# Patient Record
Sex: MALE | Race: WHITE | NOT HISPANIC OR LATINO | Employment: OTHER | URBAN - METROPOLITAN AREA
[De-identification: names, ages, dates, MRNs, and addresses within clinical notes are randomized per-mention and may not be internally consistent; named-entity substitution may affect disease eponyms.]

---

## 2022-01-01 ENCOUNTER — APPOINTMENT (INPATIENT)
Dept: RADIOLOGY | Facility: HOSPITAL | Age: 82
End: 2022-01-01

## 2022-01-01 ENCOUNTER — APPOINTMENT (INPATIENT)
Dept: INTERVENTIONAL RADIOLOGY/VASCULAR | Facility: HOSPITAL | Age: 82
End: 2022-01-01
Attending: INTERNAL MEDICINE

## 2022-01-01 ENCOUNTER — HOSPITAL ENCOUNTER (OUTPATIENT)
Dept: INFUSION CENTER | Facility: HOSPITAL | Age: 82
Discharge: HOME | End: 2022-12-13

## 2022-01-01 ENCOUNTER — APPOINTMENT (EMERGENCY)
Dept: RADIOLOGY | Facility: HOSPITAL | Age: 82
End: 2022-01-01

## 2022-01-01 ENCOUNTER — TELEPHONE (OUTPATIENT)
Dept: SURGICAL ONCOLOGY | Facility: CLINIC | Age: 82
End: 2022-01-01

## 2022-01-01 ENCOUNTER — APPOINTMENT (INPATIENT)
Dept: INTERVENTIONAL RADIOLOGY/VASCULAR | Facility: HOSPITAL | Age: 82
End: 2022-01-01

## 2022-01-01 ENCOUNTER — HOSPITAL ENCOUNTER (INPATIENT)
Facility: HOSPITAL | Age: 82
LOS: 11 days | End: 2022-12-16
Attending: EMERGENCY MEDICINE | Admitting: INTERNAL MEDICINE

## 2022-01-01 ENCOUNTER — PATIENT OUTREACH (OUTPATIENT)
Dept: HEMATOLOGY ONCOLOGY | Facility: CLINIC | Age: 82
End: 2022-01-01

## 2022-01-01 ENCOUNTER — APPOINTMENT (INPATIENT)
Dept: CT IMAGING | Facility: HOSPITAL | Age: 82
End: 2022-01-01

## 2022-01-01 VITALS
SYSTOLIC BLOOD PRESSURE: 92 MMHG | DIASTOLIC BLOOD PRESSURE: 59 MMHG | TEMPERATURE: 97.6 F | RESPIRATION RATE: 21 BRPM | OXYGEN SATURATION: 97 % | HEART RATE: 72 BPM | BODY MASS INDEX: 17.99 KG/M2 | HEIGHT: 67 IN | WEIGHT: 114.64 LBS

## 2022-01-01 DIAGNOSIS — C25.9 PANCREATIC ADENOCARCINOMA (HCC): ICD-10-CM

## 2022-01-01 DIAGNOSIS — E44.1 MILD PROTEIN-CALORIE MALNUTRITION (HCC): ICD-10-CM

## 2022-01-01 DIAGNOSIS — K22.4 ESOPHAGEAL DYSMOTILITY: ICD-10-CM

## 2022-01-01 DIAGNOSIS — C25.9 PANCREATIC CANCER (HCC): ICD-10-CM

## 2022-01-01 DIAGNOSIS — E87.6 HYPOKALEMIA: Primary | ICD-10-CM

## 2022-01-01 DIAGNOSIS — F41.8 ANXIETY ABOUT HEALTH: ICD-10-CM

## 2022-01-01 DIAGNOSIS — N28.9 ACUTE RENAL INSUFFICIENCY: ICD-10-CM

## 2022-01-01 DIAGNOSIS — E86.0 DEHYDRATION: Primary | ICD-10-CM

## 2022-01-01 DIAGNOSIS — R53.83 LETHARGY: ICD-10-CM

## 2022-01-01 LAB
2HR DELTA HS TROPONIN: 0 NG/L
4HR DELTA HS TROPONIN: -1 NG/L
ALBUMIN FLD-MCNC: 1.9 G/DL
ALBUMIN SERPL BCP-MCNC: 2.3 G/DL (ref 3.5–5)
ALBUMIN SERPL BCP-MCNC: 2.6 G/DL (ref 3.5–5)
ALBUMIN SERPL BCP-MCNC: 3 G/DL (ref 3.5–5)
ALBUMIN SERPL BCP-MCNC: 3 G/DL (ref 3.5–5)
ALP SERPL-CCNC: 158 U/L (ref 46–116)
ALP SERPL-CCNC: 171 U/L (ref 46–116)
ALP SERPL-CCNC: 206 U/L (ref 46–116)
ALP SERPL-CCNC: 206 U/L (ref 46–116)
ALT SERPL W P-5'-P-CCNC: 20 U/L (ref 12–78)
ALT SERPL W P-5'-P-CCNC: 22 U/L (ref 12–78)
ALT SERPL W P-5'-P-CCNC: 23 U/L (ref 12–78)
ALT SERPL W P-5'-P-CCNC: 30 U/L (ref 12–78)
AMYLASE FLD QL: 4 U/L
ANION GAP SERPL CALCULATED.3IONS-SCNC: 11 MMOL/L (ref 4–13)
ANION GAP SERPL CALCULATED.3IONS-SCNC: 12 MMOL/L (ref 4–13)
ANION GAP SERPL CALCULATED.3IONS-SCNC: 14 MMOL/L (ref 4–13)
ANION GAP SERPL CALCULATED.3IONS-SCNC: 7 MMOL/L (ref 4–13)
ANION GAP SERPL CALCULATED.3IONS-SCNC: 7 MMOL/L (ref 4–13)
ANION GAP SERPL CALCULATED.3IONS-SCNC: 9 MMOL/L (ref 4–13)
ANION GAP SERPL CALCULATED.3IONS-SCNC: 9 MMOL/L (ref 4–13)
ANISOCYTOSIS BLD QL SMEAR: PRESENT
APPEARANCE FLD: NORMAL
APTT PPP: 29 SECONDS (ref 23–37)
AST SERPL W P-5'-P-CCNC: 18 U/L (ref 5–45)
AST SERPL W P-5'-P-CCNC: 20 U/L (ref 5–45)
AST SERPL W P-5'-P-CCNC: 22 U/L (ref 5–45)
AST SERPL W P-5'-P-CCNC: 24 U/L (ref 5–45)
ATRIAL RATE: 86 BPM
ATRIAL RATE: 86 BPM
ATRIAL RATE: 87 BPM
BACTERIA BLD CULT: NORMAL
BACTERIA BLD CULT: NORMAL
BACTERIA SPEC ANAEROBE CULT: NO GROWTH
BACTERIA SPEC BFLD CULT: NO GROWTH
BACTERIA UR QL AUTO: ABNORMAL /HPF
BASE EXCESS BLDA CALC-SCNC: -3 MMOL/L (ref -2–3)
BASOPHILS # BLD AUTO: 0 THOUSANDS/ÂΜL (ref 0–0.1)
BASOPHILS # BLD AUTO: 0.01 THOUSANDS/ÂΜL (ref 0–0.1)
BASOPHILS # BLD MANUAL: 0 THOUSAND/UL (ref 0–0.1)
BASOPHILS NFR BLD AUTO: 0 % (ref 0–1)
BASOPHILS NFR FLD MANUAL: 1 %
BASOPHILS NFR MAR MANUAL: 0 % (ref 0–1)
BILIRUB SERPL-MCNC: 0.5 MG/DL (ref 0.2–1)
BILIRUB SERPL-MCNC: 0.5 MG/DL (ref 0.2–1)
BILIRUB SERPL-MCNC: 0.6 MG/DL (ref 0.2–1)
BILIRUB SERPL-MCNC: 0.8 MG/DL (ref 0.2–1)
BILIRUB UR QL STRIP: NEGATIVE
BUN SERPL-MCNC: 53 MG/DL (ref 5–25)
BUN SERPL-MCNC: 57 MG/DL (ref 5–25)
BUN SERPL-MCNC: 62 MG/DL (ref 5–25)
BUN SERPL-MCNC: 67 MG/DL (ref 5–25)
BUN SERPL-MCNC: 71 MG/DL (ref 5–25)
BUN SERPL-MCNC: 74 MG/DL (ref 5–25)
BUN SERPL-MCNC: 74 MG/DL (ref 5–25)
BUN SERPL-MCNC: 76 MG/DL (ref 5–25)
BUN SERPL-MCNC: 80 MG/DL (ref 5–25)
BUN SERPL-MCNC: 83 MG/DL (ref 5–25)
CA-I BLD-SCNC: 1.19 MMOL/L (ref 1.12–1.32)
CA-I BLD-SCNC: 1.2 MMOL/L (ref 1.12–1.32)
CALCIUM ALBUM COR SERPL-MCNC: 10.3 MG/DL (ref 8.3–10.1)
CALCIUM ALBUM COR SERPL-MCNC: 9.3 MG/DL (ref 8.3–10.1)
CALCIUM ALBUM COR SERPL-MCNC: 9.8 MG/DL (ref 8.3–10.1)
CALCIUM ALBUM COR SERPL-MCNC: 9.9 MG/DL (ref 8.3–10.1)
CALCIUM SERPL-MCNC: 5.7 MG/DL (ref 8.3–10.1)
CALCIUM SERPL-MCNC: 8.3 MG/DL (ref 8.3–10.1)
CALCIUM SERPL-MCNC: 8.4 MG/DL (ref 8.3–10.1)
CALCIUM SERPL-MCNC: 8.4 MG/DL (ref 8.3–10.1)
CALCIUM SERPL-MCNC: 8.5 MG/DL (ref 8.3–10.1)
CALCIUM SERPL-MCNC: 8.5 MG/DL (ref 8.3–10.1)
CALCIUM SERPL-MCNC: 8.7 MG/DL (ref 8.3–10.1)
CALCIUM SERPL-MCNC: 9.5 MG/DL (ref 8.3–10.1)
CARDIAC TROPONIN I PNL SERPL HS: 10 NG/L
CARDIAC TROPONIN I PNL SERPL HS: 10 NG/L
CARDIAC TROPONIN I PNL SERPL HS: 9 NG/L
CHLORIDE SERPL-SCNC: 103 MMOL/L (ref 96–108)
CHLORIDE SERPL-SCNC: 105 MMOL/L (ref 96–108)
CHLORIDE SERPL-SCNC: 107 MMOL/L (ref 96–108)
CHLORIDE SERPL-SCNC: 107 MMOL/L (ref 96–108)
CHLORIDE SERPL-SCNC: 108 MMOL/L (ref 96–108)
CHLORIDE SERPL-SCNC: 109 MMOL/L (ref 96–108)
CHLORIDE SERPL-SCNC: 117 MMOL/L (ref 96–108)
CHLORIDE SERPL-SCNC: 99 MMOL/L (ref 96–108)
CLARITY UR: CLEAR
CO2 SERPL-SCNC: 14 MMOL/L (ref 21–32)
CO2 SERPL-SCNC: 18 MMOL/L (ref 21–32)
CO2 SERPL-SCNC: 19 MMOL/L (ref 21–32)
CO2 SERPL-SCNC: 20 MMOL/L (ref 21–32)
CO2 SERPL-SCNC: 22 MMOL/L (ref 21–32)
CO2 SERPL-SCNC: 23 MMOL/L (ref 21–32)
COLOR FLD: NORMAL
COLOR UR: YELLOW
CREAT FLD-MCNC: 1.53 MG/DL
CREAT SERPL-MCNC: 1.27 MG/DL (ref 0.6–1.3)
CREAT SERPL-MCNC: 1.32 MG/DL (ref 0.6–1.3)
CREAT SERPL-MCNC: 1.35 MG/DL (ref 0.6–1.3)
CREAT SERPL-MCNC: 1.4 MG/DL (ref 0.6–1.3)
CREAT SERPL-MCNC: 1.53 MG/DL (ref 0.6–1.3)
CREAT SERPL-MCNC: 1.62 MG/DL (ref 0.6–1.3)
CREAT SERPL-MCNC: 1.64 MG/DL (ref 0.6–1.3)
CREAT SERPL-MCNC: 1.94 MG/DL (ref 0.6–1.3)
CREAT SERPL-MCNC: 1.94 MG/DL (ref 0.6–1.3)
CREAT SERPL-MCNC: 2 MG/DL (ref 0.6–1.3)
EOSINOPHIL # BLD AUTO: 0 THOUSAND/ÂΜL (ref 0–0.61)
EOSINOPHIL # BLD AUTO: 0 THOUSAND/ÂΜL (ref 0–0.61)
EOSINOPHIL # BLD AUTO: 0.01 THOUSAND/ÂΜL (ref 0–0.61)
EOSINOPHIL # BLD AUTO: 0.02 THOUSAND/ÂΜL (ref 0–0.61)
EOSINOPHIL # BLD MANUAL: 0 THOUSAND/UL (ref 0–0.4)
EOSINOPHIL NFR BLD AUTO: 0 % (ref 0–6)
EOSINOPHIL NFR BLD MANUAL: 0 % (ref 0–6)
EOSINOPHIL NFR FLD MANUAL: 5 %
ERYTHROCYTE [DISTWIDTH] IN BLOOD BY AUTOMATED COUNT: 15.1 % (ref 11.6–15.1)
ERYTHROCYTE [DISTWIDTH] IN BLOOD BY AUTOMATED COUNT: 15.2 % (ref 11.6–15.1)
ERYTHROCYTE [DISTWIDTH] IN BLOOD BY AUTOMATED COUNT: 15.6 % (ref 11.6–15.1)
ERYTHROCYTE [DISTWIDTH] IN BLOOD BY AUTOMATED COUNT: 16.2 % (ref 11.6–15.1)
ERYTHROCYTE [DISTWIDTH] IN BLOOD BY AUTOMATED COUNT: 16.5 % (ref 11.6–15.1)
FLUAV RNA RESP QL NAA+PROBE: NEGATIVE
FLUAV RNA RESP QL NAA+PROBE: NEGATIVE
FLUBV RNA RESP QL NAA+PROBE: NEGATIVE
FLUBV RNA RESP QL NAA+PROBE: NEGATIVE
FUNGUS SPEC CULT: NORMAL
GFR SERPL CREATININE-BSD FRML MDRD: 30 ML/MIN/1.73SQ M
GFR SERPL CREATININE-BSD FRML MDRD: 31 ML/MIN/1.73SQ M
GFR SERPL CREATININE-BSD FRML MDRD: 31 ML/MIN/1.73SQ M
GFR SERPL CREATININE-BSD FRML MDRD: 38 ML/MIN/1.73SQ M
GFR SERPL CREATININE-BSD FRML MDRD: 39 ML/MIN/1.73SQ M
GFR SERPL CREATININE-BSD FRML MDRD: 42 ML/MIN/1.73SQ M
GFR SERPL CREATININE-BSD FRML MDRD: 47 ML/MIN/1.73SQ M
GFR SERPL CREATININE-BSD FRML MDRD: 49 ML/MIN/1.73SQ M
GFR SERPL CREATININE-BSD FRML MDRD: 50 ML/MIN/1.73SQ M
GFR SERPL CREATININE-BSD FRML MDRD: 53 ML/MIN/1.73SQ M
GLUCOSE FLD-MCNC: 148 MG/DL
GLUCOSE SERPL-MCNC: 106 MG/DL (ref 65–140)
GLUCOSE SERPL-MCNC: 108 MG/DL (ref 65–140)
GLUCOSE SERPL-MCNC: 109 MG/DL (ref 65–140)
GLUCOSE SERPL-MCNC: 112 MG/DL (ref 65–140)
GLUCOSE SERPL-MCNC: 118 MG/DL (ref 65–140)
GLUCOSE SERPL-MCNC: 120 MG/DL (ref 65–140)
GLUCOSE SERPL-MCNC: 123 MG/DL (ref 65–140)
GLUCOSE SERPL-MCNC: 123 MG/DL (ref 65–140)
GLUCOSE SERPL-MCNC: 124 MG/DL (ref 65–140)
GLUCOSE SERPL-MCNC: 126 MG/DL (ref 65–140)
GLUCOSE SERPL-MCNC: 127 MG/DL (ref 65–140)
GLUCOSE SERPL-MCNC: 130 MG/DL (ref 65–140)
GLUCOSE SERPL-MCNC: 131 MG/DL (ref 65–140)
GLUCOSE SERPL-MCNC: 133 MG/DL (ref 65–140)
GLUCOSE SERPL-MCNC: 136 MG/DL (ref 65–140)
GLUCOSE SERPL-MCNC: 137 MG/DL (ref 65–140)
GLUCOSE SERPL-MCNC: 137 MG/DL (ref 65–140)
GLUCOSE SERPL-MCNC: 139 MG/DL (ref 65–140)
GLUCOSE SERPL-MCNC: 140 MG/DL (ref 65–140)
GLUCOSE SERPL-MCNC: 141 MG/DL (ref 65–140)
GLUCOSE SERPL-MCNC: 144 MG/DL (ref 65–140)
GLUCOSE SERPL-MCNC: 145 MG/DL (ref 65–140)
GLUCOSE SERPL-MCNC: 146 MG/DL (ref 65–140)
GLUCOSE SERPL-MCNC: 149 MG/DL (ref 65–140)
GLUCOSE SERPL-MCNC: 159 MG/DL (ref 65–140)
GLUCOSE SERPL-MCNC: 163 MG/DL (ref 65–140)
GLUCOSE SERPL-MCNC: 165 MG/DL (ref 65–140)
GLUCOSE SERPL-MCNC: 166 MG/DL (ref 65–140)
GLUCOSE SERPL-MCNC: 177 MG/DL (ref 65–140)
GLUCOSE SERPL-MCNC: 185 MG/DL (ref 65–140)
GLUCOSE SERPL-MCNC: 185 MG/DL (ref 65–140)
GLUCOSE SERPL-MCNC: 200 MG/DL (ref 65–140)
GLUCOSE SERPL-MCNC: 206 MG/DL (ref 65–140)
GLUCOSE SERPL-MCNC: 206 MG/DL (ref 65–140)
GLUCOSE SERPL-MCNC: 209 MG/DL (ref 65–140)
GLUCOSE SERPL-MCNC: 209 MG/DL (ref 65–140)
GLUCOSE SERPL-MCNC: 52 MG/DL (ref 65–140)
GLUCOSE SERPL-MCNC: 56 MG/DL (ref 65–140)
GLUCOSE SERPL-MCNC: 62 MG/DL (ref 65–140)
GLUCOSE SERPL-MCNC: 69 MG/DL (ref 65–140)
GLUCOSE SERPL-MCNC: 72 MG/DL (ref 65–140)
GLUCOSE SERPL-MCNC: 84 MG/DL (ref 65–140)
GLUCOSE SERPL-MCNC: 90 MG/DL (ref 65–140)
GLUCOSE SERPL-MCNC: 92 MG/DL (ref 65–140)
GLUCOSE SERPL-MCNC: 94 MG/DL (ref 65–140)
GLUCOSE UR STRIP-MCNC: NEGATIVE MG/DL
GRAM STN SPEC: NORMAL
GRAM STN SPEC: NORMAL
HCO3 BLDA-SCNC: 21.2 MMOL/L (ref 24–30)
HCT VFR BLD AUTO: 29.7 % (ref 36.5–49.3)
HCT VFR BLD AUTO: 31.4 % (ref 36.5–49.3)
HCT VFR BLD AUTO: 31.8 % (ref 36.5–49.3)
HCT VFR BLD AUTO: 32 % (ref 36.5–49.3)
HCT VFR BLD AUTO: 35 % (ref 36.5–49.3)
HCT VFR BLD CALC: 34 % (ref 36.5–49.3)
HELMET CELLS BLD QL SMEAR: PRESENT
HGB BLD-MCNC: 10 G/DL (ref 12–17)
HGB BLD-MCNC: 10.3 G/DL (ref 12–17)
HGB BLD-MCNC: 10.3 G/DL (ref 12–17)
HGB BLD-MCNC: 11.1 G/DL (ref 12–17)
HGB BLD-MCNC: 9.8 G/DL (ref 12–17)
HGB BLDA-MCNC: 11.6 G/DL (ref 12–17)
HGB UR QL STRIP.AUTO: NEGATIVE
HISTIOCYTES NFR FLD: 36 %
HYALINE CASTS #/AREA URNS LPF: ABNORMAL /LPF
IMM GRANULOCYTES # BLD AUTO: 0.01 THOUSAND/UL (ref 0–0.2)
IMM GRANULOCYTES # BLD AUTO: 0.03 THOUSAND/UL (ref 0–0.2)
IMM GRANULOCYTES # BLD AUTO: 0.04 THOUSAND/UL (ref 0–0.2)
IMM GRANULOCYTES # BLD AUTO: 0.05 THOUSAND/UL (ref 0–0.2)
IMM GRANULOCYTES NFR BLD AUTO: 0 % (ref 0–2)
IMM GRANULOCYTES NFR BLD AUTO: 1 % (ref 0–2)
INR PPP: 1.01 (ref 0.84–1.19)
KETONES UR STRIP-MCNC: ABNORMAL MG/DL
LACTATE SERPL-SCNC: 1.8 MMOL/L (ref 0.5–2)
LDH FLD L TO P-CCNC: 172 U/L
LEUKOCYTE ESTERASE UR QL STRIP: ABNORMAL
LYMPHOCYTES # BLD AUTO: 0.07 THOUSAND/UL (ref 0.6–4.47)
LYMPHOCYTES # BLD AUTO: 0.15 THOUSANDS/ÂΜL (ref 0.6–4.47)
LYMPHOCYTES # BLD AUTO: 0.19 THOUSANDS/ÂΜL (ref 0.6–4.47)
LYMPHOCYTES # BLD AUTO: 0.19 THOUSANDS/ÂΜL (ref 0.6–4.47)
LYMPHOCYTES # BLD AUTO: 0.2 THOUSANDS/ÂΜL (ref 0.6–4.47)
LYMPHOCYTES # BLD AUTO: 2 % (ref 14–44)
LYMPHOCYTES NFR BLD AUTO: 3 % (ref 14–44)
LYMPHOCYTES NFR BLD AUTO: 3 % (ref 14–44)
LYMPHOCYTES NFR BLD AUTO: 4 % (ref 14–44)
LYMPHOCYTES NFR BLD AUTO: 4 % (ref 14–44)
LYMPHOCYTES NFR BLD AUTO: 9 %
MAGNESIUM SERPL-MCNC: 2.1 MG/DL (ref 1.6–2.6)
MCH RBC QN AUTO: 29.1 PG (ref 26.8–34.3)
MCH RBC QN AUTO: 29.3 PG (ref 26.8–34.3)
MCH RBC QN AUTO: 29.4 PG (ref 26.8–34.3)
MCH RBC QN AUTO: 29.5 PG (ref 26.8–34.3)
MCH RBC QN AUTO: 29.7 PG (ref 26.8–34.3)
MCHC RBC AUTO-ENTMCNC: 31.7 G/DL (ref 31.4–37.4)
MCHC RBC AUTO-ENTMCNC: 31.8 G/DL (ref 31.4–37.4)
MCHC RBC AUTO-ENTMCNC: 32.2 G/DL (ref 31.4–37.4)
MCHC RBC AUTO-ENTMCNC: 32.4 G/DL (ref 31.4–37.4)
MCHC RBC AUTO-ENTMCNC: 33 G/DL (ref 31.4–37.4)
MCV RBC AUTO: 89 FL (ref 82–98)
MCV RBC AUTO: 92 FL (ref 82–98)
MONOCYTES # BLD AUTO: 0.07 THOUSAND/UL (ref 0–1.22)
MONOCYTES # BLD AUTO: 0.26 THOUSAND/ÂΜL (ref 0.17–1.22)
MONOCYTES # BLD AUTO: 0.47 THOUSAND/ÂΜL (ref 0.17–1.22)
MONOCYTES # BLD AUTO: 0.51 THOUSAND/ÂΜL (ref 0.17–1.22)
MONOCYTES # BLD AUTO: 0.53 THOUSAND/ÂΜL (ref 0.17–1.22)
MONOCYTES NFR BLD AUTO: 11 % (ref 4–12)
MONOCYTES NFR BLD AUTO: 18 %
MONOCYTES NFR BLD AUTO: 8 % (ref 4–12)
MONOCYTES NFR BLD: 2 % (ref 4–12)
NEUTROPHILS # BLD AUTO: 2.96 THOUSANDS/ÂΜL (ref 1.85–7.62)
NEUTROPHILS # BLD AUTO: 3.73 THOUSANDS/ÂΜL (ref 1.85–7.62)
NEUTROPHILS # BLD AUTO: 5.46 THOUSANDS/ÂΜL (ref 1.85–7.62)
NEUTROPHILS # BLD AUTO: 5.48 THOUSANDS/ÂΜL (ref 1.85–7.62)
NEUTROPHILS # BLD MANUAL: 3.32 THOUSAND/UL (ref 1.85–7.62)
NEUTS BAND NFR BLD MANUAL: 5 % (ref 0–8)
NEUTS SEG NFR BLD AUTO: 31 %
NEUTS SEG NFR BLD AUTO: 84 % (ref 43–75)
NEUTS SEG NFR BLD AUTO: 88 % (ref 43–75)
NEUTS SEG NFR BLD AUTO: 91 % (ref 43–75)
NITRITE UR QL STRIP: NEGATIVE
NON-SQ EPI CELLS URNS QL MICRO: ABNORMAL /HPF
NRBC BLD AUTO-RTO: 0 /100 WBCS
NT-PROBNP SERPL-MCNC: 492 PG/ML
OVALOCYTES BLD QL SMEAR: PRESENT
P AXIS: 37 DEGREES
P AXIS: 83 DEGREES
PCO2 BLD: 22 MMOL/L (ref 21–32)
PCO2 BLD: 34.4 MM HG (ref 42–50)
PH BLD: 7.4 [PH] (ref 7.3–7.4)
PH UR STRIP.AUTO: 5 [PH]
PHOSPHATE SERPL-MCNC: 4.8 MG/DL (ref 2.3–4.1)
PLATELET # BLD AUTO: 162 THOUSANDS/UL (ref 149–390)
PLATELET # BLD AUTO: 175 THOUSANDS/UL (ref 149–390)
PLATELET # BLD AUTO: 187 THOUSANDS/UL (ref 149–390)
PLATELET # BLD AUTO: 227 THOUSANDS/UL (ref 149–390)
PLATELET # BLD AUTO: 273 THOUSANDS/UL (ref 149–390)
PLATELET BLD QL SMEAR: ADEQUATE
PMV BLD AUTO: 10.1 FL (ref 8.9–12.7)
PMV BLD AUTO: 10.3 FL (ref 8.9–12.7)
PMV BLD AUTO: 10.7 FL (ref 8.9–12.7)
PMV BLD AUTO: 9.3 FL (ref 8.9–12.7)
PMV BLD AUTO: 9.7 FL (ref 8.9–12.7)
PO2 BLD: 27 MM HG (ref 35–45)
POLYCHROMASIA BLD QL SMEAR: PRESENT
POTASSIUM BLD-SCNC: 4.7 MMOL/L (ref 3.5–5.3)
POTASSIUM SERPL-SCNC: 3.6 MMOL/L (ref 3.5–5.3)
POTASSIUM SERPL-SCNC: 4.8 MMOL/L (ref 3.5–5.3)
POTASSIUM SERPL-SCNC: 4.8 MMOL/L (ref 3.5–5.3)
POTASSIUM SERPL-SCNC: 4.9 MMOL/L (ref 3.5–5.3)
POTASSIUM SERPL-SCNC: 4.9 MMOL/L (ref 3.5–5.3)
POTASSIUM SERPL-SCNC: 5.6 MMOL/L (ref 3.5–5.3)
POTASSIUM SERPL-SCNC: 6.1 MMOL/L (ref 3.5–5.3)
POTASSIUM SERPL-SCNC: 6.2 MMOL/L (ref 3.5–5.3)
POTASSIUM SERPL-SCNC: 7 MMOL/L (ref 3.5–5.3)
POTASSIUM SERPL-SCNC: 7 MMOL/L (ref 3.5–5.3)
PR INTERVAL: 200 MS
PR INTERVAL: 208 MS
PR INTERVAL: 232 MS
PROCALCITONIN SERPL-MCNC: 0.43 NG/ML
PROCALCITONIN SERPL-MCNC: 0.48 NG/ML
PROT FLD-MCNC: 3.4 G/DL
PROT SERPL-MCNC: 5.9 G/DL (ref 6.4–8.4)
PROT SERPL-MCNC: 6.2 G/DL (ref 6.4–8.4)
PROT SERPL-MCNC: 6.6 G/DL (ref 6.4–8.4)
PROT SERPL-MCNC: 7.4 G/DL (ref 6.4–8.4)
PROT UR STRIP-MCNC: ABNORMAL MG/DL
PROTHROMBIN TIME: 14 SECONDS (ref 11.6–14.5)
QRS AXIS: -71 DEGREES
QRS AXIS: -80 DEGREES
QRS AXIS: -82 DEGREES
QRS AXIS: -82 DEGREES
QRSD INTERVAL: 134 MS
QRSD INTERVAL: 136 MS
QRSD INTERVAL: 142 MS
QRSD INTERVAL: 146 MS
QT INTERVAL: 382 MS
QT INTERVAL: 384 MS
QT INTERVAL: 390 MS
QT INTERVAL: 404 MS
QTC INTERVAL: 462 MS
QTC INTERVAL: 462 MS
QTC INTERVAL: 466 MS
QTC INTERVAL: 483 MS
RBC # BLD AUTO: 3.35 MILLION/UL (ref 3.88–5.62)
RBC # BLD AUTO: 3.4 MILLION/UL (ref 3.88–5.62)
RBC # BLD AUTO: 3.47 MILLION/UL (ref 3.88–5.62)
RBC # BLD AUTO: 3.49 MILLION/UL (ref 3.88–5.62)
RBC # BLD AUTO: 3.82 MILLION/UL (ref 3.88–5.62)
RBC # FLD MANUAL: 1000 /UL
RBC #/AREA URNS AUTO: ABNORMAL /HPF
RBC MORPH BLD: PRESENT
RSV RNA RESP QL NAA+PROBE: NEGATIVE
RSV RNA RESP QL NAA+PROBE: NEGATIVE
SAO2 % BLD FROM PO2: 51 % (ref 60–85)
SARS-COV-2 RNA RESP QL NAA+PROBE: NEGATIVE
SARS-COV-2 RNA RESP QL NAA+PROBE: POSITIVE
SCHISTOCYTES BLD QL SMEAR: PRESENT
SITE: NORMAL
SODIUM BLD-SCNC: 132 MMOL/L (ref 136–145)
SODIUM SERPL-SCNC: 133 MMOL/L (ref 135–147)
SODIUM SERPL-SCNC: 134 MMOL/L (ref 135–147)
SODIUM SERPL-SCNC: 135 MMOL/L (ref 135–147)
SODIUM SERPL-SCNC: 135 MMOL/L (ref 135–147)
SODIUM SERPL-SCNC: 136 MMOL/L (ref 135–147)
SODIUM SERPL-SCNC: 137 MMOL/L (ref 135–147)
SODIUM SERPL-SCNC: 137 MMOL/L (ref 135–147)
SODIUM SERPL-SCNC: 138 MMOL/L (ref 135–147)
SODIUM SERPL-SCNC: 138 MMOL/L (ref 135–147)
SODIUM SERPL-SCNC: 142 MMOL/L (ref 135–147)
SP GR UR STRIP.AUTO: 1.02 (ref 1–1.03)
SPECIMEN SOURCE: ABNORMAL
T WAVE AXIS: 89 DEGREES
T WAVE AXIS: 89 DEGREES
T WAVE AXIS: 90 DEGREES
T WAVE AXIS: 93 DEGREES
TOTAL CELLS COUNTED SPEC: 100
TRIGL FLD-MCNC: 26 MG/DL
UROBILINOGEN UR STRIP-ACNC: <2 MG/DL
VENTRICULAR RATE: 86 BPM
VENTRICULAR RATE: 86 BPM
VENTRICULAR RATE: 87 BPM
VENTRICULAR RATE: 88 BPM
WBC # BLD AUTO: 3.39 THOUSAND/UL (ref 4.31–10.16)
WBC # BLD AUTO: 3.46 THOUSAND/UL (ref 4.31–10.16)
WBC # BLD AUTO: 4.44 THOUSAND/UL (ref 4.31–10.16)
WBC # BLD AUTO: 6.21 THOUSAND/UL (ref 4.31–10.16)
WBC # BLD AUTO: 6.28 THOUSAND/UL (ref 4.31–10.16)
WBC # FLD MANUAL: 440 /UL
WBC #/AREA URNS AUTO: ABNORMAL /HPF

## 2022-01-01 PROCEDURE — 0W9G3ZZ DRAINAGE OF PERITONEAL CAVITY, PERCUTANEOUS APPROACH: ICD-10-PCS | Performed by: RADIOLOGY

## 2022-01-01 RX ORDER — ONDANSETRON 2 MG/ML
4 INJECTION INTRAMUSCULAR; INTRAVENOUS EVERY 4 HOURS PRN
Status: DISCONTINUED | OUTPATIENT
Start: 2022-01-01 | End: 2022-01-01

## 2022-01-01 RX ORDER — HYDROMORPHONE HCL/PF 1 MG/ML
0.5 SYRINGE (ML) INJECTION ONCE
Status: COMPLETED | OUTPATIENT
Start: 2022-01-01 | End: 2022-01-01

## 2022-01-01 RX ORDER — POTASSIUM CHLORIDE 20 MEQ/1
40 TABLET, EXTENDED RELEASE ORAL DAILY
Qty: 14 TABLET | Refills: 0 | Status: SHIPPED | OUTPATIENT
Start: 2022-01-01 | End: 2022-01-01 | Stop reason: SDUPTHER

## 2022-01-01 RX ORDER — ACETAMINOPHEN 325 MG/1
650 TABLET ORAL EVERY 6 HOURS PRN
Status: DISCONTINUED | OUTPATIENT
Start: 2022-01-01 | End: 2022-01-01 | Stop reason: HOSPADM

## 2022-01-01 RX ORDER — SODIUM CHLORIDE FOR INHALATION 0.9 %
12 VIAL, NEBULIZER (ML) INHALATION ONCE
Status: COMPLETED | OUTPATIENT
Start: 2022-01-01 | End: 2022-01-01

## 2022-01-01 RX ORDER — KETOROLAC TROMETHAMINE 30 MG/ML
15 INJECTION, SOLUTION INTRAMUSCULAR; INTRAVENOUS ONCE
Status: COMPLETED | OUTPATIENT
Start: 2022-01-01 | End: 2022-01-01

## 2022-01-01 RX ORDER — LIDOCAINE HYDROCHLORIDE 10 MG/ML
INJECTION, SOLUTION EPIDURAL; INFILTRATION; INTRACAUDAL; PERINEURAL AS NEEDED
Status: COMPLETED | OUTPATIENT
Start: 2022-01-01 | End: 2022-01-01

## 2022-01-01 RX ORDER — ALBUMIN (HUMAN) 12.5 G/50ML
25 SOLUTION INTRAVENOUS ONCE
Status: COMPLETED | OUTPATIENT
Start: 2022-01-01 | End: 2022-01-01

## 2022-01-01 RX ORDER — POTASSIUM CHLORIDE 20 MEQ/1
40 TABLET, EXTENDED RELEASE ORAL DAILY
Status: DISCONTINUED | OUTPATIENT
Start: 2022-01-01 | End: 2022-01-01

## 2022-01-01 RX ORDER — HYDROMORPHONE HCL/PF 1 MG/ML
0.5 SYRINGE (ML) INJECTION EVERY 6 HOURS PRN
Status: DISCONTINUED | OUTPATIENT
Start: 2022-01-01 | End: 2022-01-01

## 2022-01-01 RX ORDER — BISACODYL 10 MG
10 SUPPOSITORY, RECTAL RECTAL DAILY PRN
Status: DISCONTINUED | OUTPATIENT
Start: 2022-01-01 | End: 2022-01-01 | Stop reason: HOSPADM

## 2022-01-01 RX ORDER — OXYCODONE HYDROCHLORIDE 5 MG/1
5 TABLET ORAL EVERY 6 HOURS PRN
Status: DISCONTINUED | OUTPATIENT
Start: 2022-01-01 | End: 2022-01-01

## 2022-01-01 RX ORDER — OXYCODONE HYDROCHLORIDE 5 MG/1
5 TABLET ORAL EVERY 4 HOURS PRN
Status: DISCONTINUED | OUTPATIENT
Start: 2022-01-01 | End: 2022-01-01 | Stop reason: HOSPADM

## 2022-01-01 RX ORDER — SODIUM POLYSTYRENE SULFONATE 4.1 MEQ/G
15 POWDER, FOR SUSPENSION ORAL; RECTAL ONCE
Status: COMPLETED | OUTPATIENT
Start: 2022-01-01 | End: 2022-01-01

## 2022-01-01 RX ORDER — ONDANSETRON 2 MG/ML
4 INJECTION INTRAMUSCULAR; INTRAVENOUS ONCE
Status: COMPLETED | OUTPATIENT
Start: 2022-01-01 | End: 2022-01-01

## 2022-01-01 RX ORDER — PANTOPRAZOLE SODIUM 40 MG/1
40 TABLET, DELAYED RELEASE ORAL
Status: DISCONTINUED | OUTPATIENT
Start: 2022-01-01 | End: 2022-01-01 | Stop reason: HOSPADM

## 2022-01-01 RX ORDER — DEXTROSE MONOHYDRATE 25 G/50ML
25 INJECTION, SOLUTION INTRAVENOUS ONCE
Status: COMPLETED | OUTPATIENT
Start: 2022-01-01 | End: 2022-01-01

## 2022-01-01 RX ORDER — POLYETHYLENE GLYCOL 3350 17 G/17G
17 POWDER, FOR SOLUTION ORAL DAILY PRN
Status: DISCONTINUED | OUTPATIENT
Start: 2022-01-01 | End: 2022-01-01

## 2022-01-01 RX ORDER — LORAZEPAM 2 MG/ML
0.5 INJECTION INTRAMUSCULAR EVERY 6 HOURS PRN
Status: DISCONTINUED | OUTPATIENT
Start: 2022-01-01 | End: 2022-01-01

## 2022-01-01 RX ORDER — SENNOSIDES 8.6 MG
1 TABLET ORAL 2 TIMES DAILY
Status: DISCONTINUED | OUTPATIENT
Start: 2022-01-01 | End: 2022-01-01 | Stop reason: HOSPADM

## 2022-01-01 RX ORDER — SODIUM CHLORIDE 9 MG/ML
75 INJECTION, SOLUTION INTRAVENOUS CONTINUOUS
Status: DISCONTINUED | OUTPATIENT
Start: 2022-01-01 | End: 2022-01-01

## 2022-01-01 RX ORDER — SODIUM CHLORIDE 9 MG/ML
125 INJECTION, SOLUTION INTRAVENOUS CONTINUOUS
Status: DISCONTINUED | OUTPATIENT
Start: 2022-01-01 | End: 2022-01-01

## 2022-01-01 RX ORDER — CALCIUM CARBONATE 200(500)MG
500 TABLET,CHEWABLE ORAL DAILY PRN
Status: DISCONTINUED | OUTPATIENT
Start: 2022-01-01 | End: 2022-01-01

## 2022-01-01 RX ORDER — HALOPERIDOL 2 MG/ML
2 SOLUTION ORAL EVERY 4 HOURS PRN
Status: DISCONTINUED | OUTPATIENT
Start: 2022-01-01 | End: 2022-01-01 | Stop reason: HOSPADM

## 2022-01-01 RX ORDER — INSULIN LISPRO 100 [IU]/ML
1-5 INJECTION, SOLUTION INTRAVENOUS; SUBCUTANEOUS
Status: DISCONTINUED | OUTPATIENT
Start: 2022-01-01 | End: 2022-01-01

## 2022-01-01 RX ORDER — HYDROMORPHONE HCL/PF 1 MG/ML
0.5 SYRINGE (ML) INJECTION
Status: DISCONTINUED | OUTPATIENT
Start: 2022-01-01 | End: 2022-01-01

## 2022-01-01 RX ORDER — ONDANSETRON 2 MG/ML
4 INJECTION INTRAMUSCULAR; INTRAVENOUS EVERY 4 HOURS PRN
Status: DISCONTINUED | OUTPATIENT
Start: 2022-01-01 | End: 2022-01-01 | Stop reason: HOSPADM

## 2022-01-01 RX ORDER — ONDANSETRON 4 MG/1
4 TABLET, ORALLY DISINTEGRATING ORAL EVERY 8 HOURS SCHEDULED
Status: DISCONTINUED | OUTPATIENT
Start: 2022-01-01 | End: 2022-01-01

## 2022-01-01 RX ORDER — ALPRAZOLAM 0.25 MG/1
0.25 TABLET ORAL EVERY 6 HOURS PRN
Status: DISCONTINUED | OUTPATIENT
Start: 2022-01-01 | End: 2022-01-01

## 2022-01-01 RX ORDER — HALOPERIDOL 5 MG/ML
0.5 INJECTION INTRAMUSCULAR
Status: DISCONTINUED | OUTPATIENT
Start: 2022-01-01 | End: 2022-01-01 | Stop reason: HOSPADM

## 2022-01-01 RX ORDER — PANTOPRAZOLE SODIUM 40 MG/1
40 TABLET, DELAYED RELEASE ORAL
Status: DISCONTINUED | OUTPATIENT
Start: 2022-01-01 | End: 2022-01-01

## 2022-01-01 RX ORDER — LORAZEPAM 2 MG/ML
2 INJECTION INTRAMUSCULAR
Status: DISCONTINUED | OUTPATIENT
Start: 2022-01-01 | End: 2022-01-01 | Stop reason: HOSPADM

## 2022-01-01 RX ORDER — ALBUMIN (HUMAN) 12.5 G/50ML
12.5 SOLUTION INTRAVENOUS ONCE
Status: COMPLETED | OUTPATIENT
Start: 2022-01-01 | End: 2022-01-01

## 2022-01-01 RX ORDER — SODIUM CHLORIDE, SODIUM GLUCONATE, SODIUM ACETATE, POTASSIUM CHLORIDE, MAGNESIUM CHLORIDE, SODIUM PHOSPHATE, DIBASIC, AND POTASSIUM PHOSPHATE .53; .5; .37; .037; .03; .012; .00082 G/100ML; G/100ML; G/100ML; G/100ML; G/100ML; G/100ML; G/100ML
500 INJECTION, SOLUTION INTRAVENOUS ONCE
Status: COMPLETED | OUTPATIENT
Start: 2022-01-01 | End: 2022-01-01

## 2022-01-01 RX ORDER — METOCLOPRAMIDE HYDROCHLORIDE 5 MG/ML
10 INJECTION INTRAMUSCULAR; INTRAVENOUS EVERY 6 HOURS PRN
Status: DISCONTINUED | OUTPATIENT
Start: 2022-01-01 | End: 2022-01-01

## 2022-01-01 RX ORDER — CALCIUM GLUCONATE 20 MG/ML
1 INJECTION, SOLUTION INTRAVENOUS ONCE
Status: COMPLETED | OUTPATIENT
Start: 2022-01-01 | End: 2022-01-01

## 2022-01-01 RX ORDER — LORAZEPAM 2 MG/ML
0.5 INJECTION INTRAMUSCULAR EVERY 6 HOURS PRN
Status: DISCONTINUED | OUTPATIENT
Start: 2022-01-01 | End: 2022-01-01 | Stop reason: HOSPADM

## 2022-01-01 RX ORDER — CALCIUM GLUCONATE 20 MG/ML
2 INJECTION, SOLUTION INTRAVENOUS ONCE
Status: COMPLETED | OUTPATIENT
Start: 2022-01-01 | End: 2022-01-01

## 2022-01-01 RX ORDER — ALPRAZOLAM 0.25 MG/1
0.25 TABLET ORAL EVERY 6 HOURS PRN
Status: DISCONTINUED | OUTPATIENT
Start: 2022-01-01 | End: 2022-01-01 | Stop reason: HOSPADM

## 2022-01-01 RX ORDER — ALPRAZOLAM 0.25 MG/1
0.25 TABLET ORAL 3 TIMES DAILY PRN
Status: DISCONTINUED | OUTPATIENT
Start: 2022-01-01 | End: 2022-01-01

## 2022-01-01 RX ORDER — PRAVASTATIN SODIUM 20 MG
20 TABLET ORAL DAILY
Status: DISCONTINUED | OUTPATIENT
Start: 2022-01-01 | End: 2022-01-01

## 2022-01-01 RX ADMIN — HYDROMORPHONE HYDROCHLORIDE 0.5 MG: 1 INJECTION, SOLUTION INTRAMUSCULAR; INTRAVENOUS; SUBCUTANEOUS at 17:39

## 2022-01-01 RX ADMIN — ONDANSETRON 4 MG: 2 INJECTION INTRAMUSCULAR; INTRAVENOUS at 19:45

## 2022-01-01 RX ADMIN — PANTOPRAZOLE SODIUM 40 MG: 40 TABLET, DELAYED RELEASE ORAL at 05:57

## 2022-01-01 RX ADMIN — SENNOSIDES 8.6 MG: 8.6 TABLET, FILM COATED ORAL at 08:31

## 2022-01-01 RX ADMIN — SENNOSIDES 8.6 MG: 8.6 TABLET, FILM COATED ORAL at 09:02

## 2022-01-01 RX ADMIN — PRAVASTATIN SODIUM 20 MG: 20 TABLET ORAL at 08:11

## 2022-01-01 RX ADMIN — HYDROMORPHONE HYDROCHLORIDE 0.5 MG: 1 INJECTION, SOLUTION INTRAMUSCULAR; INTRAVENOUS; SUBCUTANEOUS at 17:27

## 2022-01-01 RX ADMIN — ONDANSETRON 4 MG: 4 TABLET, ORALLY DISINTEGRATING ORAL at 14:52

## 2022-01-01 RX ADMIN — APIXABAN 5 MG: 5 TABLET, FILM COATED ORAL at 08:06

## 2022-01-01 RX ADMIN — BISACODYL 10 MG: 5 TABLET, COATED ORAL at 18:08

## 2022-01-01 RX ADMIN — PANTOPRAZOLE SODIUM 40 MG: 40 TABLET, DELAYED RELEASE ORAL at 17:13

## 2022-01-01 RX ADMIN — CALCIUM GLUCONATE 1 G: 20 INJECTION, SOLUTION INTRAVENOUS at 17:12

## 2022-01-01 RX ADMIN — APIXABAN 5 MG: 5 TABLET, FILM COATED ORAL at 09:31

## 2022-01-01 RX ADMIN — PANTOPRAZOLE SODIUM 40 MG: 40 TABLET, DELAYED RELEASE ORAL at 05:26

## 2022-01-01 RX ADMIN — ONDANSETRON 4 MG: 2 INJECTION INTRAMUSCULAR; INTRAVENOUS at 17:34

## 2022-01-01 RX ADMIN — SENNOSIDES 8.6 MG: 8.6 TABLET, FILM COATED ORAL at 18:33

## 2022-01-01 RX ADMIN — OXYCODONE HYDROCHLORIDE 5 MG: 5 TABLET ORAL at 11:41

## 2022-01-01 RX ADMIN — OXYCODONE HYDROCHLORIDE 5 MG: 5 TABLET ORAL at 02:33

## 2022-01-01 RX ADMIN — ONDANSETRON 4 MG: 4 TABLET, ORALLY DISINTEGRATING ORAL at 21:17

## 2022-01-01 RX ADMIN — SODIUM POLYSTYRENE SULFONATE 15 G: 4.1 POWDER, FOR SUSPENSION ORAL; RECTAL at 17:12

## 2022-01-01 RX ADMIN — METOCLOPRAMIDE HYDROCHLORIDE 10 MG: 5 INJECTION INTRAMUSCULAR; INTRAVENOUS at 21:38

## 2022-01-01 RX ADMIN — ALBUMIN (HUMAN) 12.5 G: 0.25 INJECTION, SOLUTION INTRAVENOUS at 08:35

## 2022-01-01 RX ADMIN — APIXABAN 5 MG: 5 TABLET, FILM COATED ORAL at 07:46

## 2022-01-01 RX ADMIN — ONDANSETRON 4 MG: 4 TABLET, ORALLY DISINTEGRATING ORAL at 14:54

## 2022-01-01 RX ADMIN — ONDANSETRON 4 MG: 4 TABLET, ORALLY DISINTEGRATING ORAL at 13:37

## 2022-01-01 RX ADMIN — ONDANSETRON 4 MG: 4 TABLET, ORALLY DISINTEGRATING ORAL at 13:40

## 2022-01-01 RX ADMIN — ONDANSETRON 4 MG: 4 TABLET, ORALLY DISINTEGRATING ORAL at 05:55

## 2022-01-01 RX ADMIN — SODIUM CHLORIDE 75 ML/HR: 0.9 INJECTION, SOLUTION INTRAVENOUS at 19:40

## 2022-01-01 RX ADMIN — SODIUM CHLORIDE 125 ML/HR: 0.9 INJECTION, SOLUTION INTRAVENOUS at 07:05

## 2022-01-01 RX ADMIN — PANTOPRAZOLE SODIUM 40 MG: 40 TABLET, DELAYED RELEASE ORAL at 06:08

## 2022-01-01 RX ADMIN — APIXABAN 5 MG: 5 TABLET, FILM COATED ORAL at 09:02

## 2022-01-01 RX ADMIN — PRAVASTATIN SODIUM 20 MG: 20 TABLET ORAL at 09:31

## 2022-01-01 RX ADMIN — OXYCODONE HYDROCHLORIDE 5 MG: 5 TABLET ORAL at 13:38

## 2022-01-01 RX ADMIN — PANTOPRAZOLE SODIUM 40 MG: 40 TABLET, DELAYED RELEASE ORAL at 05:03

## 2022-01-01 RX ADMIN — ONDANSETRON 4 MG: 4 TABLET, ORALLY DISINTEGRATING ORAL at 22:01

## 2022-01-01 RX ADMIN — INSULIN HUMAN 10 UNITS: 100 INJECTION, SOLUTION PARENTERAL at 17:12

## 2022-01-01 RX ADMIN — SODIUM CHLORIDE 500 ML: 0.9 INJECTION, SOLUTION INTRAVENOUS at 10:32

## 2022-01-01 RX ADMIN — SENNOSIDES 8.6 MG: 8.6 TABLET, FILM COATED ORAL at 08:11

## 2022-01-01 RX ADMIN — ONDANSETRON 4 MG: 4 TABLET, ORALLY DISINTEGRATING ORAL at 13:24

## 2022-01-01 RX ADMIN — APIXABAN 5 MG: 5 TABLET, FILM COATED ORAL at 08:11

## 2022-01-01 RX ADMIN — ACETAMINOPHEN 650 MG: 325 TABLET ORAL at 06:49

## 2022-01-01 RX ADMIN — ACETAMINOPHEN 650 MG: 325 TABLET ORAL at 16:27

## 2022-01-01 RX ADMIN — APIXABAN 5 MG: 5 TABLET, FILM COATED ORAL at 18:33

## 2022-01-01 RX ADMIN — POTASSIUM CHLORIDE 40 MEQ: 1500 TABLET, EXTENDED RELEASE ORAL at 09:31

## 2022-01-01 RX ADMIN — PANTOPRAZOLE SODIUM 40 MG: 40 TABLET, DELAYED RELEASE ORAL at 16:54

## 2022-01-01 RX ADMIN — ACETAMINOPHEN 650 MG: 325 TABLET ORAL at 07:55

## 2022-01-01 RX ADMIN — SODIUM CHLORIDE 75 ML/HR: 0.9 INJECTION, SOLUTION INTRAVENOUS at 05:56

## 2022-01-01 RX ADMIN — HYDROMORPHONE HYDROCHLORIDE 0.5 MG: 1 INJECTION, SOLUTION INTRAMUSCULAR; INTRAVENOUS; SUBCUTANEOUS at 17:14

## 2022-01-01 RX ADMIN — ONDANSETRON 4 MG: 4 TABLET, ORALLY DISINTEGRATING ORAL at 05:26

## 2022-01-01 RX ADMIN — ONDANSETRON 4 MG: 2 INJECTION INTRAMUSCULAR; INTRAVENOUS at 13:21

## 2022-01-01 RX ADMIN — PANTOPRAZOLE SODIUM 40 MG: 40 TABLET, DELAYED RELEASE ORAL at 05:22

## 2022-01-01 RX ADMIN — ONDANSETRON 4 MG: 4 TABLET, ORALLY DISINTEGRATING ORAL at 21:55

## 2022-01-01 RX ADMIN — OXYCODONE HYDROCHLORIDE 5 MG: 5 TABLET ORAL at 17:13

## 2022-01-01 RX ADMIN — PANTOPRAZOLE SODIUM 40 MG: 40 TABLET, DELAYED RELEASE ORAL at 14:54

## 2022-01-01 RX ADMIN — PANTOPRAZOLE SODIUM 40 MG: 40 TABLET, DELAYED RELEASE ORAL at 16:11

## 2022-01-01 RX ADMIN — HYDROMORPHONE HYDROCHLORIDE 0.5 MG: 1 INJECTION, SOLUTION INTRAMUSCULAR; INTRAVENOUS; SUBCUTANEOUS at 23:11

## 2022-01-01 RX ADMIN — DEXTROSE MONOHYDRATE 25 ML: 25 INJECTION, SOLUTION INTRAVENOUS at 17:12

## 2022-01-01 RX ADMIN — ONDANSETRON 4 MG: 2 INJECTION INTRAMUSCULAR; INTRAVENOUS at 01:50

## 2022-01-01 RX ADMIN — ALBUMIN (HUMAN) 25 G: 0.25 INJECTION, SOLUTION INTRAVENOUS at 18:08

## 2022-01-01 RX ADMIN — ONDANSETRON 4 MG: 4 TABLET, ORALLY DISINTEGRATING ORAL at 05:17

## 2022-01-01 RX ADMIN — SODIUM CHLORIDE 50 ML/HR: 0.9 INJECTION, SOLUTION INTRAVENOUS at 17:16

## 2022-01-01 RX ADMIN — INSULIN LISPRO 1 UNITS: 100 INJECTION, SOLUTION INTRAVENOUS; SUBCUTANEOUS at 21:17

## 2022-01-01 RX ADMIN — ONDANSETRON 4 MG: 2 INJECTION INTRAMUSCULAR; INTRAVENOUS at 16:29

## 2022-01-01 RX ADMIN — OXYCODONE HYDROCHLORIDE 5 MG: 5 TABLET ORAL at 17:34

## 2022-01-01 RX ADMIN — SENNOSIDES 8.6 MG: 8.6 TABLET, FILM COATED ORAL at 17:31

## 2022-01-01 RX ADMIN — PRAVASTATIN SODIUM 20 MG: 20 TABLET ORAL at 08:06

## 2022-01-01 RX ADMIN — INSULIN LISPRO 1 UNITS: 100 INJECTION, SOLUTION INTRAVENOUS; SUBCUTANEOUS at 21:55

## 2022-01-01 RX ADMIN — APIXABAN 5 MG: 5 TABLET, FILM COATED ORAL at 17:12

## 2022-01-01 RX ADMIN — ALPRAZOLAM 0.25 MG: 0.25 TABLET ORAL at 08:11

## 2022-01-01 RX ADMIN — PANTOPRAZOLE SODIUM 40 MG: 40 TABLET, DELAYED RELEASE ORAL at 06:04

## 2022-01-01 RX ADMIN — KETOROLAC TROMETHAMINE 15 MG: 30 INJECTION, SOLUTION INTRAMUSCULAR; INTRAVENOUS at 18:04

## 2022-01-01 RX ADMIN — LORAZEPAM 0.5 MG: 2 INJECTION INTRAMUSCULAR; INTRAVENOUS at 21:06

## 2022-01-01 RX ADMIN — APIXABAN 5 MG: 5 TABLET, FILM COATED ORAL at 16:43

## 2022-01-01 RX ADMIN — INSULIN LISPRO 1 UNITS: 100 INJECTION, SOLUTION INTRAVENOUS; SUBCUTANEOUS at 11:48

## 2022-01-01 RX ADMIN — LIDOCAINE HYDROCHLORIDE 9 ML: 10 INJECTION, SOLUTION EPIDURAL; INFILTRATION; INTRACAUDAL; PERINEURAL at 14:55

## 2022-01-01 RX ADMIN — POTASSIUM CHLORIDE 40 MEQ: 1500 TABLET, EXTENDED RELEASE ORAL at 08:06

## 2022-01-01 RX ADMIN — INSULIN LISPRO 1 UNITS: 100 INJECTION, SOLUTION INTRAVENOUS; SUBCUTANEOUS at 11:52

## 2022-01-01 RX ADMIN — ONDANSETRON 4 MG: 2 INJECTION INTRAMUSCULAR; INTRAVENOUS at 17:15

## 2022-01-01 RX ADMIN — CALCIUM GLUCONATE 1 G: 20 INJECTION, SOLUTION INTRAVENOUS at 10:38

## 2022-01-01 RX ADMIN — APIXABAN 5 MG: 5 TABLET, FILM COATED ORAL at 17:20

## 2022-01-01 RX ADMIN — PRAVASTATIN SODIUM 20 MG: 20 TABLET ORAL at 08:31

## 2022-01-01 RX ADMIN — HYDROMORPHONE HYDROCHLORIDE 0.5 MG: 1 INJECTION, SOLUTION INTRAMUSCULAR; INTRAVENOUS; SUBCUTANEOUS at 20:03

## 2022-01-01 RX ADMIN — PANTOPRAZOLE SODIUM 40 MG: 40 TABLET, DELAYED RELEASE ORAL at 08:14

## 2022-01-01 RX ADMIN — ONDANSETRON 4 MG: 4 TABLET, ORALLY DISINTEGRATING ORAL at 05:03

## 2022-01-01 RX ADMIN — ACETAMINOPHEN 650 MG: 325 TABLET ORAL at 23:41

## 2022-01-01 RX ADMIN — DEXTROSE MONOHYDRATE 25 ML: 25 INJECTION, SOLUTION INTRAVENOUS at 10:39

## 2022-01-01 RX ADMIN — POTASSIUM CHLORIDE 40 MEQ: 1500 TABLET, EXTENDED RELEASE ORAL at 08:11

## 2022-01-01 RX ADMIN — OXYCODONE HYDROCHLORIDE 5 MG: 5 TABLET ORAL at 22:38

## 2022-01-01 RX ADMIN — INSULIN HUMAN 10 UNITS: 100 INJECTION, SOLUTION PARENTERAL at 10:39

## 2022-01-01 RX ADMIN — PANTOPRAZOLE SODIUM 40 MG: 40 TABLET, DELAYED RELEASE ORAL at 16:30

## 2022-01-01 RX ADMIN — HYDROMORPHONE HYDROCHLORIDE 0.5 MG: 1 INJECTION, SOLUTION INTRAMUSCULAR; INTRAVENOUS; SUBCUTANEOUS at 16:34

## 2022-01-01 RX ADMIN — ACETAMINOPHEN 650 MG: 325 TABLET ORAL at 05:22

## 2022-01-01 RX ADMIN — APIXABAN 5 MG: 5 TABLET, FILM COATED ORAL at 09:13

## 2022-01-01 RX ADMIN — APIXABAN 5 MG: 5 TABLET, FILM COATED ORAL at 17:32

## 2022-01-01 RX ADMIN — ALPRAZOLAM 0.25 MG: 0.25 TABLET ORAL at 11:41

## 2022-01-01 RX ADMIN — HYDROMORPHONE HYDROCHLORIDE 0.5 MG: 1 INJECTION, SOLUTION INTRAMUSCULAR; INTRAVENOUS; SUBCUTANEOUS at 22:17

## 2022-01-01 RX ADMIN — PANTOPRAZOLE SODIUM 40 MG: 40 TABLET, DELAYED RELEASE ORAL at 16:46

## 2022-01-01 RX ADMIN — HYDROMORPHONE HYDROCHLORIDE 0.5 MG: 1 INJECTION, SOLUTION INTRAMUSCULAR; INTRAVENOUS; SUBCUTANEOUS at 07:41

## 2022-01-01 RX ADMIN — OXYCODONE HYDROCHLORIDE 5 MG: 5 TABLET ORAL at 19:14

## 2022-01-01 RX ADMIN — PANTOPRAZOLE SODIUM 40 MG: 40 TABLET, DELAYED RELEASE ORAL at 16:23

## 2022-01-01 RX ADMIN — ONDANSETRON 4 MG: 4 TABLET, ORALLY DISINTEGRATING ORAL at 13:38

## 2022-01-01 RX ADMIN — ALBUMIN (HUMAN) 25 G: 0.25 INJECTION, SOLUTION INTRAVENOUS at 21:16

## 2022-01-01 RX ADMIN — ONDANSETRON 4 MG: 4 TABLET, ORALLY DISINTEGRATING ORAL at 21:18

## 2022-01-01 RX ADMIN — POTASSIUM CHLORIDE 40 MEQ: 1500 TABLET, EXTENDED RELEASE ORAL at 08:35

## 2022-01-01 RX ADMIN — POTASSIUM CHLORIDE 40 MEQ: 1500 TABLET, EXTENDED RELEASE ORAL at 08:45

## 2022-01-01 RX ADMIN — POTASSIUM CHLORIDE 40 MEQ: 1500 TABLET, EXTENDED RELEASE ORAL at 09:13

## 2022-01-01 RX ADMIN — HYDROMORPHONE HYDROCHLORIDE 0.5 MG: 1 INJECTION, SOLUTION INTRAMUSCULAR; INTRAVENOUS; SUBCUTANEOUS at 07:26

## 2022-01-01 RX ADMIN — APIXABAN 5 MG: 5 TABLET, FILM COATED ORAL at 19:34

## 2022-01-01 RX ADMIN — CALCIUM CARBONATE (ANTACID) CHEW TAB 500 MG 500 MG: 500 CHEW TAB at 10:24

## 2022-01-01 RX ADMIN — ONDANSETRON 4 MG: 4 TABLET, ORALLY DISINTEGRATING ORAL at 21:54

## 2022-01-01 RX ADMIN — OXYCODONE HYDROCHLORIDE 5 MG: 5 TABLET ORAL at 01:50

## 2022-01-01 RX ADMIN — PANTOPRAZOLE SODIUM 40 MG: 40 TABLET, DELAYED RELEASE ORAL at 05:48

## 2022-01-01 RX ADMIN — APIXABAN 5 MG: 5 TABLET, FILM COATED ORAL at 08:35

## 2022-01-01 RX ADMIN — SENNOSIDES 8.6 MG: 8.6 TABLET, FILM COATED ORAL at 09:12

## 2022-01-01 RX ADMIN — ONDANSETRON 4 MG: 4 TABLET, ORALLY DISINTEGRATING ORAL at 21:16

## 2022-01-01 RX ADMIN — INSULIN LISPRO 1 UNITS: 100 INJECTION, SOLUTION INTRAVENOUS; SUBCUTANEOUS at 11:51

## 2022-01-01 RX ADMIN — ALPRAZOLAM 0.25 MG: 0.25 TABLET ORAL at 13:39

## 2022-01-01 RX ADMIN — SODIUM CHLORIDE 125 ML/HR: 0.9 INJECTION, SOLUTION INTRAVENOUS at 23:11

## 2022-01-01 RX ADMIN — PRAVASTATIN SODIUM 20 MG: 20 TABLET ORAL at 07:47

## 2022-01-01 RX ADMIN — SODIUM POLYSTYRENE SULFONATE 15 G: 4.1 POWDER, FOR SUSPENSION ORAL; RECTAL at 10:33

## 2022-01-01 RX ADMIN — POTASSIUM CHLORIDE 40 MEQ: 1500 TABLET, EXTENDED RELEASE ORAL at 07:46

## 2022-01-01 RX ADMIN — PANTOPRAZOLE SODIUM 40 MG: 40 TABLET, DELAYED RELEASE ORAL at 15:10

## 2022-01-01 RX ADMIN — POLYETHYLENE GLYCOL 3350 17 G: 17 POWDER, FOR SOLUTION ORAL at 09:13

## 2022-01-01 RX ADMIN — PRAVASTATIN SODIUM 20 MG: 20 TABLET ORAL at 08:35

## 2022-01-01 RX ADMIN — OXYCODONE HYDROCHLORIDE 5 MG: 5 TABLET ORAL at 00:52

## 2022-01-01 RX ADMIN — APIXABAN 5 MG: 5 TABLET, FILM COATED ORAL at 08:45

## 2022-01-01 RX ADMIN — ONDANSETRON 4 MG: 2 INJECTION INTRAMUSCULAR; INTRAVENOUS at 17:45

## 2022-01-01 RX ADMIN — APIXABAN 5 MG: 5 TABLET, FILM COATED ORAL at 17:00

## 2022-01-01 RX ADMIN — ONDANSETRON 4 MG: 4 TABLET, ORALLY DISINTEGRATING ORAL at 05:47

## 2022-01-01 RX ADMIN — INSULIN LISPRO 1 UNITS: 100 INJECTION, SOLUTION INTRAVENOUS; SUBCUTANEOUS at 12:17

## 2022-01-01 RX ADMIN — INSULIN LISPRO 1 UNITS: 100 INJECTION, SOLUTION INTRAVENOUS; SUBCUTANEOUS at 11:42

## 2022-01-01 RX ADMIN — SODIUM CHLORIDE 125 ML/HR: 0.9 INJECTION, SOLUTION INTRAVENOUS at 15:24

## 2022-01-01 RX ADMIN — PANTOPRAZOLE SODIUM 40 MG: 40 TABLET, DELAYED RELEASE ORAL at 06:32

## 2022-01-01 RX ADMIN — CALCIUM GLUCONATE 2 G: 20 INJECTION, SOLUTION INTRAVENOUS at 18:33

## 2022-01-01 RX ADMIN — ONDANSETRON 4 MG: 4 TABLET, ORALLY DISINTEGRATING ORAL at 05:22

## 2022-01-01 RX ADMIN — APIXABAN 5 MG: 5 TABLET, FILM COATED ORAL at 17:13

## 2022-01-01 RX ADMIN — OXYCODONE HYDROCHLORIDE 5 MG: 5 TABLET ORAL at 22:31

## 2022-01-01 RX ADMIN — SENNOSIDES 8.6 MG: 8.6 TABLET, FILM COATED ORAL at 17:12

## 2022-01-01 RX ADMIN — PRAVASTATIN SODIUM 20 MG: 20 TABLET ORAL at 09:13

## 2022-01-01 RX ADMIN — SODIUM CHLORIDE 75 ML/HR: 0.9 INJECTION, SOLUTION INTRAVENOUS at 10:33

## 2022-01-01 RX ADMIN — PRAVASTATIN SODIUM 20 MG: 20 TABLET ORAL at 08:45

## 2022-01-01 RX ADMIN — INSULIN LISPRO 1 UNITS: 100 INJECTION, SOLUTION INTRAVENOUS; SUBCUTANEOUS at 16:44

## 2022-01-01 RX ADMIN — ALBUTEROL SULFATE 10 MG: 2.5 SOLUTION RESPIRATORY (INHALATION) at 17:23

## 2022-01-01 RX ADMIN — ALPRAZOLAM 0.25 MG: 0.25 TABLET ORAL at 01:47

## 2022-01-01 RX ADMIN — OXYCODONE HYDROCHLORIDE 5 MG: 5 TABLET ORAL at 21:54

## 2022-01-01 RX ADMIN — PRAVASTATIN SODIUM 20 MG: 20 TABLET ORAL at 09:02

## 2022-01-01 RX ADMIN — SODIUM CHLORIDE 1000 ML: 0.9 INJECTION, SOLUTION INTRAVENOUS at 15:32

## 2022-01-01 RX ADMIN — ONDANSETRON 4 MG: 2 INJECTION INTRAMUSCULAR; INTRAVENOUS at 14:34

## 2022-01-01 RX ADMIN — APIXABAN 5 MG: 5 TABLET, FILM COATED ORAL at 08:31

## 2022-01-01 RX ADMIN — SODIUM CHLORIDE 125 ML/HR: 0.9 INJECTION, SOLUTION INTRAVENOUS at 06:58

## 2022-01-01 RX ADMIN — HYDROMORPHONE HYDROCHLORIDE 0.5 MG: 1 INJECTION, SOLUTION INTRAMUSCULAR; INTRAVENOUS; SUBCUTANEOUS at 13:21

## 2022-01-01 RX ADMIN — INSULIN LISPRO 1 UNITS: 100 INJECTION, SOLUTION INTRAVENOUS; SUBCUTANEOUS at 11:24

## 2022-01-01 RX ADMIN — SODIUM CHLORIDE, SODIUM GLUCONATE, SODIUM ACETATE, POTASSIUM CHLORIDE AND MAGNESIUM CHLORIDE 500 ML: 526; 502; 368; 37; 30 INJECTION, SOLUTION INTRAVENOUS at 17:31

## 2022-01-01 RX ADMIN — OXYCODONE HYDROCHLORIDE 5 MG: 5 TABLET ORAL at 06:32

## 2022-01-01 RX ADMIN — HYDROMORPHONE HYDROCHLORIDE 0.5 MG: 1 INJECTION, SOLUTION INTRAMUSCULAR; INTRAVENOUS; SUBCUTANEOUS at 20:51

## 2022-01-01 RX ADMIN — PANTOPRAZOLE SODIUM 40 MG: 40 TABLET, DELAYED RELEASE ORAL at 17:31

## 2022-01-01 RX ADMIN — ISODIUM CHLORIDE 12 ML: 0.03 SOLUTION RESPIRATORY (INHALATION) at 17:23

## 2022-01-01 RX ADMIN — HYDROMORPHONE HYDROCHLORIDE 0.5 MG: 1 INJECTION, SOLUTION INTRAMUSCULAR; INTRAVENOUS; SUBCUTANEOUS at 08:13

## 2022-02-02 ENCOUNTER — TELEPHONE (OUTPATIENT)
Dept: GASTROENTEROLOGY | Facility: CLINIC | Age: 82
End: 2022-02-02

## 2022-02-02 DIAGNOSIS — R79.89 ELEVATED LFTS: ICD-10-CM

## 2022-02-02 DIAGNOSIS — K86.1 OTHER CHRONIC PANCREATITIS (HCC): Primary | ICD-10-CM

## 2022-02-02 NOTE — TELEPHONE ENCOUNTER
----- Message from Kim Restrepo RN sent at 2/2/2022 10:55 AM EST -----  He was discharged from [facility] November 2021  If he wants earlier, put him on with NP openings on 2/16 if he wants physician he will need to wait until something opens up    He saw [facility] for consult and LS, WO hosp visits    ----- Message -----  From: Bonnie Patel  Sent: 2/1/2022   2:32 PM EST  To: Kim Restrepo RN    Patient called to schedule a [facility] hospital follow up with Dr Perrin Civil   He does not want to wait until March with [facility]   He feels it should be sooner rather than later     Thanks   [facility]

## 2022-02-14 NOTE — TELEPHONE ENCOUNTER
Per GS patient will be discharged from [facility] 2/15/22  He needs EUS as soon as possible  His Eliquis will need to be held two days prior  She requests LFT, lipase in one week  Order placed to [address] [place] [address]

## 2022-02-16 ENCOUNTER — PREP FOR PROCEDURE (OUTPATIENT)
Dept: GASTROENTEROLOGY | Facility: CLINIC | Age: 82
End: 2022-02-16

## 2022-02-16 DIAGNOSIS — K86.89 PANCREATIC MASS: Primary | ICD-10-CM

## 2022-02-16 DIAGNOSIS — K86.1 OTHER CHRONIC PANCREATITIS (HCC): ICD-10-CM

## 2022-02-16 NOTE — PROGRESS NOTES
Patient discharged [facility] 2/15/22  Records scanned into Epic  GS requested order for EUS to be placed to complete as soon as possible  Patient has transportation issues which are being addressed

## 2022-02-21 ENCOUNTER — TELEPHONE (OUTPATIENT)
Dept: GASTROENTEROLOGY | Facility: CLINIC | Age: 82
End: 2022-02-21

## 2022-02-21 LAB
ALBUMIN SERPL-MCNC: 4.3 G/DL (ref 3.6–5.1)
ALBUMIN/GLOB SERPL: 1.7 (CALC) (ref 1–2.5)
ALP SERPL-CCNC: 103 U/L (ref 35–144)
ALT SERPL-CCNC: 98 U/L (ref 9–46)
AST SERPL-CCNC: 57 U/L (ref 10–35)
BILIRUB DIRECT SERPL-MCNC: 0.8 MG/DL
BILIRUB INDIRECT SERPL-MCNC: 1.1 MG/DL (CALC) (ref 0.2–1.2)
BILIRUB SERPL-MCNC: 1.9 MG/DL (ref 0.2–1.2)
GLOBULIN SER CALC-MCNC: 2.6 G/DL (CALC) (ref 1.9–3.7)
LIPASE SERPL-CCNC: 220 U/L (ref 7–60)
PROT SERPL-MCNC: 6.9 G/DL (ref 6.1–8.1)

## 2022-02-21 NOTE — TELEPHONE ENCOUNTER
Pt was seen this morn by NP at [facility]; was told he has to call here to get pain med Oxycodone; was seen by [facility] in [facility]; d/c'd early Feb  # [phone]  Pt seems confused

## 2022-02-21 NOTE — TELEPHONE ENCOUNTER
Pt stopped in today to  lab orders  Printed and given orders from 2/15/2022 to go to [address] [place] [address]   Also asked what status was for going to [place] for biopsy?

## 2022-02-21 NOTE — TELEPHONE ENCOUNTER
Pt left  mssg asking nurse if it would be OK for his daughter to arrange a handicap [employer] for him to get to ? CB# [phone]

## 2022-02-21 NOTE — TELEPHONE ENCOUNTER
Patient finished the Percocet ordered at discharge  His pcp would not renew and told him to contact our office  Patient continues with mild pain at this time  If no Percocet refill what would we recommend  Please advise  I also discussed EUS with patient  advised that [facility] is working on transportation  I asked him if he spoke with his sister (she lives in [address] [place] [address] Road) to ask if she could possibly take time to help w/transportation and he has not  I did ask him to speak with her and if she says yes we can hopefully schedule soon  Patient also states his insurance company has a transport program for [employer]  I reviewed that this is not a routine office visit and patient will be under anesthesia so I do not think that option would be appropriate

## 2022-02-22 NOTE — RESULT ENCOUNTER NOTE
Awaiting EUS  Please advise pt to stay on clears, stay hydrated, if pain is getting worse, OK to take tylenol extra strength
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
## 2022-02-23 NOTE — TELEPHONE ENCOUNTER
I returned call, no answer, left message that patient should take Tylenol extra strength for pain  If moderate to severe he can take the Percocet if needed

## 2022-02-24 DIAGNOSIS — R79.89 ELEVATED LFTS: Primary | ICD-10-CM

## 2022-02-24 DIAGNOSIS — K86.1 OTHER CHRONIC PANCREATITIS (HCC): ICD-10-CM

## 2022-02-24 LAB
ALBUMIN SERPL-MCNC: 4.3 G/DL (ref 3.6–5.1)
ALBUMIN/GLOB SERPL: 1.9 (CALC) (ref 1–2.5)
ALP SERPL-CCNC: 129 U/L (ref 35–144)
ALT SERPL-CCNC: 267 U/L (ref 9–46)
AST SERPL-CCNC: 132 U/L (ref 10–35)
BILIRUB DIRECT SERPL-MCNC: 2.6 MG/DL
BILIRUB INDIRECT SERPL-MCNC: 2.2 MG/DL (CALC) (ref 0.2–1.2)
BILIRUB SERPL-MCNC: 4.8 MG/DL (ref 0.2–1.2)
GLOBULIN SER CALC-MCNC: 2.3 G/DL (CALC) (ref 1.9–3.7)
LIPASE SERPL-CCNC: 140 U/L (ref 7–60)
PROT SERPL-MCNC: 6.6 G/DL (ref 6.1–8.1)

## 2022-02-25 NOTE — RESULT ENCOUNTER NOTE
Spoke with patient  Increasing LFTs  Pt denies any fevers/abd pain  I advised if he has pain or fevers, he needs to go to the ER  Pt is agreeable  Pt cannot get to [place] for the EUS and would like a call back on Monday  Please fax LFTs and CBC to [facility] on Monday for pt to get

## 2022-03-01 DIAGNOSIS — K86.1 OTHER CHRONIC PANCREATITIS (HCC): Primary | ICD-10-CM

## 2022-03-01 RX ORDER — OXYCODONE HYDROCHLORIDE AND ACETAMINOPHEN 5; 325 MG/1; MG/1
1 TABLET ORAL EVERY 4 HOURS PRN
Qty: 10 TABLET | Refills: 0 | Status: SHIPPED | OUTPATIENT
Start: 2022-03-01 | End: 2022-03-05 | Stop reason: HOSPADM

## 2022-03-01 NOTE — TELEPHONE ENCOUNTER
Procedure scheduled with Dory Keith  Called patient LVM waiting for pt to confirm  Scheduled date of EUS(as of today): 3/18/22  Physician performing EUS: Dr Walsh  Location of EUS: [facility]  Instructions reviewed with patient by: Jesus Smith to [email or web address]  Clearances: Med Clearance faxed to Isaias Her

## 2022-03-01 NOTE — TELEPHONE ENCOUNTER
Patient still awaiting EUS to be scheduled  He was seen [facility] ER for abdominal pain 2/26/22, hx pancreatic mass  Records sent to scan  He is awaiting call from B to schedule EUS  He is requesting a refill on his Percocet 1 tab q 8 hr prn  He was discharged from [facility] with 15 pills and last refill was 2/21/22 for 10 pills  He only uses it when pain level reaches 8-10 otherwise he is maintained on extra strength Tylenol  He only has one pill left  Please review and send Rx to [facility]  He asked that I call him back to update him  GS rounding and KK not available (they both were providers while he was inpatient)

## 2022-03-01 NOTE — TELEPHONE ENCOUNTER
I spoke with patient's sister David Hathaway and patient (conference call) Her phone number [phone]  She is willing to transport patient to and from EUS  She requests he be scheduled after March 9, 2022  She requires notice to get down from [facility] and get a hotel room in area  Please call her along with Adria Velázquez with an appointment as soon as possible  Original referral placed for EUS 2/16/22  It is okay to leave a message on [employer] phone if she does not   Please update me/Dr Kathryn Patrick when scheduled  Thank You

## 2022-03-02 ENCOUNTER — HOSPITAL ENCOUNTER (EMERGENCY)
Facility: HOSPITAL | Age: 82
End: 2022-03-02
Attending: EMERGENCY MEDICINE | Admitting: EMERGENCY MEDICINE
Payer: COMMERCIAL

## 2022-03-02 ENCOUNTER — HOSPITAL ENCOUNTER (INPATIENT)
Facility: HOSPITAL | Age: 82
LOS: 3 days | Discharge: HOME | DRG: 438 | End: 2022-03-05
Attending: HOSPITALIST | Admitting: INTERNAL MEDICINE
Payer: COMMERCIAL

## 2022-03-02 VITALS
SYSTOLIC BLOOD PRESSURE: 127 MMHG | TEMPERATURE: 98.4 F | BODY MASS INDEX: 32.91 KG/M2 | DIASTOLIC BLOOD PRESSURE: 73 MMHG | RESPIRATION RATE: 20 BRPM | OXYGEN SATURATION: 99 % | WEIGHT: 210.1 LBS | HEART RATE: 80 BPM

## 2022-03-02 DIAGNOSIS — E80.6 HYPERBILIRUBINEMIA: Primary | ICD-10-CM

## 2022-03-02 DIAGNOSIS — K86.89 PANCREATIC MASS: ICD-10-CM

## 2022-03-02 DIAGNOSIS — R10.13 ACUTE EPIGASTRIC PAIN: ICD-10-CM

## 2022-03-02 DIAGNOSIS — R74.01 TRANSAMINITIS: Primary | ICD-10-CM

## 2022-03-02 DIAGNOSIS — Z79.4 TYPE 2 DIABETES MELLITUS WITHOUT COMPLICATION, WITH LONG-TERM CURRENT USE OF INSULIN (HCC): ICD-10-CM

## 2022-03-02 DIAGNOSIS — R17 ELEVATED BILIRUBIN: ICD-10-CM

## 2022-03-02 DIAGNOSIS — E11.9 TYPE 2 DIABETES MELLITUS WITHOUT COMPLICATION, WITH LONG-TERM CURRENT USE OF INSULIN (HCC): ICD-10-CM

## 2022-03-02 LAB
ALBUMIN SERPL BCP-MCNC: 3.4 G/DL (ref 3.5–5)
ALBUMIN SERPL-MCNC: 4.3 G/DL (ref 3.6–5.1)
ALBUMIN/GLOB SERPL: 1.8 (CALC) (ref 1–2.5)
ALP SERPL-CCNC: 269 U/L (ref 35–144)
ALP SERPL-CCNC: 303 U/L (ref 46–116)
ALT SERPL W P-5'-P-CCNC: 702 U/L (ref 12–78)
ALT SERPL-CCNC: 504 U/L (ref 9–46)
ANION GAP SERPL CALCULATED.3IONS-SCNC: 4 MMOL/L (ref 4–13)
APAP SERPL-MCNC: <2 UG/ML (ref 10–20)
APTT PPP: 30 SECONDS (ref 23–37)
AST SERPL W P-5'-P-CCNC: 322 U/L (ref 5–45)
AST SERPL-CCNC: 271 U/L (ref 10–35)
BACTERIA UR QL AUTO: ABNORMAL /HPF
BASOPHILS # BLD AUTO: 0.07 THOUSANDS/ΜL (ref 0–0.1)
BASOPHILS # BLD AUTO: 61 CELLS/UL (ref 0–200)
BASOPHILS NFR BLD AUTO: 1 % (ref 0–1)
BASOPHILS NFR BLD AUTO: 1.2 %
BILIRUB DIRECT SERPL-MCNC: 6.1 MG/DL
BILIRUB INDIRECT SERPL-MCNC: 4.6 MG/DL (CALC) (ref 0.2–1.2)
BILIRUB SERPL-MCNC: 10.7 MG/DL (ref 0.2–1)
BILIRUB SERPL-MCNC: 10.7 MG/DL (ref 0.2–1.2)
BILIRUB UR QL STRIP: ABNORMAL
BUN SERPL-MCNC: 23 MG/DL (ref 5–25)
CALCIUM ALBUM COR SERPL-MCNC: 9.1 MG/DL (ref 8.3–10.1)
CALCIUM SERPL-MCNC: 8.6 MG/DL (ref 8.3–10.1)
CHLORIDE SERPL-SCNC: 98 MMOL/L (ref 100–108)
CLARITY UR: CLEAR
CO2 SERPL-SCNC: 30 MMOL/L (ref 21–32)
COLOR UR: ABNORMAL
CREAT SERPL-MCNC: 0.78 MG/DL (ref 0.6–1.3)
EOSINOPHIL # BLD AUTO: 0.19 THOUSAND/ΜL (ref 0–0.61)
EOSINOPHIL # BLD AUTO: 158 CELLS/UL (ref 15–500)
EOSINOPHIL NFR BLD AUTO: 3.1 %
EOSINOPHIL NFR BLD AUTO: 4 % (ref 0–6)
ERYTHROCYTE [DISTWIDTH] IN BLOOD BY AUTOMATED COUNT: 12.7 % (ref 11–15)
ERYTHROCYTE [DISTWIDTH] IN BLOOD BY AUTOMATED COUNT: 14.4 % (ref 11.6–15.1)
GFR SERPL CREATININE-BSD FRML MDRD: 85 ML/MIN/1.73SQ M
GLOBULIN SER CALC-MCNC: 2.4 G/DL (CALC) (ref 1.9–3.7)
GLUCOSE SERPL-MCNC: 273 MG/DL (ref 65–140)
GLUCOSE UR STRIP-MCNC: ABNORMAL MG/DL
HCT VFR BLD AUTO: 37.5 % (ref 36.5–49.3)
HCT VFR BLD AUTO: 41.1 % (ref 38.5–50)
HGB BLD-MCNC: 12.4 G/DL (ref 12–17)
HGB BLD-MCNC: 13.7 G/DL (ref 13.2–17.1)
HGB UR QL STRIP.AUTO: NEGATIVE
IMM GRANULOCYTES # BLD AUTO: 0.02 THOUSAND/UL (ref 0–0.2)
IMM GRANULOCYTES NFR BLD AUTO: 0 % (ref 0–2)
INR PPP: 1.21 (ref 0.84–1.19)
KETONES UR STRIP-MCNC: ABNORMAL MG/DL
LEUKOCYTE ESTERASE UR QL STRIP: ABNORMAL
LIPASE SERPL-CCNC: 650 U/L (ref 73–393)
LYMPHOCYTES # BLD AUTO: 1.03 THOUSANDS/ΜL (ref 0.6–4.47)
LYMPHOCYTES # BLD AUTO: 1010 CELLS/UL (ref 850–3900)
LYMPHOCYTES NFR BLD AUTO: 19.8 %
LYMPHOCYTES NFR BLD AUTO: 21 % (ref 14–44)
MCH RBC QN AUTO: 28.8 PG (ref 26.8–34.3)
MCH RBC QN AUTO: 29.5 PG (ref 27–33)
MCHC RBC AUTO-ENTMCNC: 33.1 G/DL (ref 31.4–37.4)
MCHC RBC AUTO-ENTMCNC: 33.3 G/DL (ref 32–36)
MCV RBC AUTO: 87 FL (ref 82–98)
MCV RBC AUTO: 88.4 FL (ref 80–100)
MONOCYTES # BLD AUTO: 0.54 THOUSAND/ΜL (ref 0.17–1.22)
MONOCYTES # BLD AUTO: 587 CELLS/UL (ref 200–950)
MONOCYTES NFR BLD AUTO: 11 % (ref 4–12)
MONOCYTES NFR BLD AUTO: 11.5 %
MUCOUS THREADS UR QL AUTO: ABNORMAL
NEUTROPHILS # BLD AUTO: 3.03 THOUSANDS/ΜL (ref 1.85–7.62)
NEUTROPHILS # BLD AUTO: 3284 CELLS/UL (ref 1500–7800)
NEUTROPHILS NFR BLD AUTO: 64.4 %
NEUTS SEG NFR BLD AUTO: 63 % (ref 43–75)
NITRITE UR QL STRIP: NEGATIVE
NON-SQ EPI CELLS URNS QL MICRO: ABNORMAL /HPF
NRBC BLD AUTO-RTO: 0 /100 WBCS
PH UR STRIP.AUTO: 6 [PH]
PLATELET # BLD AUTO: 186 THOUSANDS/UL (ref 149–390)
PLATELET # BLD AUTO: 209 THOUSAND/UL (ref 140–400)
PMV BLD AUTO: 11.6 FL (ref 8.9–12.7)
PMV BLD REES-ECKER: 11.8 FL (ref 7.5–12.5)
POTASSIUM SERPL-SCNC: 3.5 MMOL/L (ref 3.5–5.3)
PROT SERPL-MCNC: 6.7 G/DL (ref 6.1–8.1)
PROT SERPL-MCNC: 6.8 G/DL (ref 6.4–8.2)
PROT UR STRIP-MCNC: ABNORMAL MG/DL
PROTHROMBIN TIME: 15.1 SECONDS (ref 11.6–14.5)
RBC # BLD AUTO: 4.31 MILLION/UL (ref 3.88–5.62)
RBC # BLD AUTO: 4.65 MILLION/UL (ref 4.2–5.8)
RBC #/AREA URNS AUTO: ABNORMAL /HPF
SODIUM SERPL-SCNC: 132 MMOL/L (ref 136–145)
SP GR UR STRIP.AUTO: >=1.03 (ref 1–1.03)
UROBILINOGEN UR QL STRIP.AUTO: 0.2 E.U./DL
WBC # BLD AUTO: 4.88 THOUSAND/UL (ref 4.31–10.16)
WBC # BLD AUTO: 5.1 THOUSAND/UL (ref 3.8–10.8)
WBC #/AREA URNS AUTO: ABNORMAL /HPF

## 2022-03-02 PROCEDURE — 99285 EMERGENCY DEPT VISIT HI MDM: CPT | Performed by: PHYSICIAN ASSISTANT

## 2022-03-02 PROCEDURE — 85025 COMPLETE CBC W/AUTO DIFF WBC: CPT | Performed by: PHYSICIAN ASSISTANT

## 2022-03-02 PROCEDURE — 85730 THROMBOPLASTIN TIME PARTIAL: CPT | Performed by: PHYSICIAN ASSISTANT

## 2022-03-02 PROCEDURE — 80143 DRUG ASSAY ACETAMINOPHEN: CPT | Performed by: PHYSICIAN ASSISTANT

## 2022-03-02 PROCEDURE — 96361 HYDRATE IV INFUSION ADD-ON: CPT

## 2022-03-02 PROCEDURE — 99285 EMERGENCY DEPT VISIT HI MDM: CPT

## 2022-03-02 PROCEDURE — 93005 ELECTROCARDIOGRAM TRACING: CPT

## 2022-03-02 PROCEDURE — 85610 PROTHROMBIN TIME: CPT | Performed by: PHYSICIAN ASSISTANT

## 2022-03-02 PROCEDURE — 96360 HYDRATION IV INFUSION INIT: CPT

## 2022-03-02 PROCEDURE — 80053 COMPREHEN METABOLIC PANEL: CPT | Performed by: PHYSICIAN ASSISTANT

## 2022-03-02 PROCEDURE — 81001 URINALYSIS AUTO W/SCOPE: CPT | Performed by: PHYSICIAN ASSISTANT

## 2022-03-02 PROCEDURE — 83690 ASSAY OF LIPASE: CPT | Performed by: PHYSICIAN ASSISTANT

## 2022-03-02 PROCEDURE — 36415 COLL VENOUS BLD VENIPUNCTURE: CPT | Performed by: PHYSICIAN ASSISTANT

## 2022-03-02 RX ORDER — HYDROMORPHONE HCL IN WATER/PF 6 MG/30 ML
0.2 PATIENT CONTROLLED ANALGESIA SYRINGE INTRAVENOUS EVERY 4 HOURS PRN
Status: DISCONTINUED | OUTPATIENT
Start: 2022-03-02 | End: 2022-03-05 | Stop reason: HOSPADM

## 2022-03-02 RX ORDER — MAGNESIUM HYDROXIDE/ALUMINUM HYDROXICE/SIMETHICONE 120; 1200; 1200 MG/30ML; MG/30ML; MG/30ML
30 SUSPENSION ORAL EVERY 6 HOURS PRN
Status: DISCONTINUED | OUTPATIENT
Start: 2022-03-02 | End: 2022-03-05 | Stop reason: HOSPADM

## 2022-03-02 RX ORDER — NICOTINE 21 MG/24HR
1 PATCH, TRANSDERMAL 24 HOURS TRANSDERMAL DAILY
Status: DISCONTINUED | OUTPATIENT
Start: 2022-03-03 | End: 2022-03-05 | Stop reason: HOSPADM

## 2022-03-02 RX ORDER — OXYCODONE HYDROCHLORIDE 5 MG/1
2.5 TABLET ORAL EVERY 4 HOURS PRN
Status: DISCONTINUED | OUTPATIENT
Start: 2022-03-02 | End: 2022-03-05 | Stop reason: HOSPADM

## 2022-03-02 RX ORDER — INSULIN GLARGINE 100 [IU]/ML
40 INJECTION, SOLUTION SUBCUTANEOUS DAILY
Status: DISCONTINUED | OUTPATIENT
Start: 2022-03-03 | End: 2022-03-03

## 2022-03-02 RX ORDER — TEMAZEPAM 7.5 MG/1
7.5 CAPSULE ORAL
Status: DISCONTINUED | OUTPATIENT
Start: 2022-03-02 | End: 2022-03-03

## 2022-03-02 RX ORDER — PANTOPRAZOLE SODIUM 40 MG/1
40 TABLET, DELAYED RELEASE ORAL
Status: DISCONTINUED | OUTPATIENT
Start: 2022-03-03 | End: 2022-03-05 | Stop reason: HOSPADM

## 2022-03-02 RX ORDER — SODIUM CHLORIDE 9 MG/ML
75 INJECTION, SOLUTION INTRAVENOUS CONTINUOUS
Status: DISCONTINUED | OUTPATIENT
Start: 2022-03-02 | End: 2022-03-02 | Stop reason: HOSPADM

## 2022-03-02 RX ORDER — DOCUSATE SODIUM 100 MG/1
100 CAPSULE, LIQUID FILLED ORAL 2 TIMES DAILY PRN
Status: DISCONTINUED | OUTPATIENT
Start: 2022-03-02 | End: 2022-03-05 | Stop reason: HOSPADM

## 2022-03-02 RX ORDER — ONDANSETRON 2 MG/ML
4 INJECTION INTRAMUSCULAR; INTRAVENOUS EVERY 6 HOURS PRN
Status: DISCONTINUED | OUTPATIENT
Start: 2022-03-02 | End: 2022-03-05 | Stop reason: HOSPADM

## 2022-03-02 RX ORDER — PRAVASTATIN SODIUM 40 MG
40 TABLET ORAL DAILY
Status: DISCONTINUED | OUTPATIENT
Start: 2022-03-03 | End: 2022-03-03

## 2022-03-02 RX ORDER — SODIUM CHLORIDE, SODIUM GLUCONATE, SODIUM ACETATE, POTASSIUM CHLORIDE, MAGNESIUM CHLORIDE, SODIUM PHOSPHATE, DIBASIC, AND POTASSIUM PHOSPHATE .53; .5; .37; .037; .03; .012; .00082 G/100ML; G/100ML; G/100ML; G/100ML; G/100ML; G/100ML; G/100ML
100 INJECTION, SOLUTION INTRAVENOUS CONTINUOUS
Status: DISCONTINUED | OUTPATIENT
Start: 2022-03-02 | End: 2022-03-04

## 2022-03-02 RX ORDER — OXYCODONE HYDROCHLORIDE 5 MG/1
5 TABLET ORAL EVERY 4 HOURS PRN
Status: DISCONTINUED | OUTPATIENT
Start: 2022-03-02 | End: 2022-03-05 | Stop reason: HOSPADM

## 2022-03-02 RX ORDER — LORAZEPAM 2 MG/ML
1 INJECTION INTRAMUSCULAR EVERY 4 HOURS PRN
Status: DISCONTINUED | OUTPATIENT
Start: 2022-03-02 | End: 2022-03-03

## 2022-03-02 RX ORDER — POLYETHYLENE GLYCOL 3350 17 G/17G
17 POWDER, FOR SOLUTION ORAL DAILY
COMMUNITY

## 2022-03-02 RX ADMIN — LORAZEPAM 1 MG: 2 INJECTION INTRAMUSCULAR; INTRAVENOUS at 23:37

## 2022-03-02 RX ADMIN — SODIUM CHLORIDE, SODIUM GLUCONATE, SODIUM ACETATE, POTASSIUM CHLORIDE, MAGNESIUM CHLORIDE, SODIUM PHOSPHATE, DIBASIC, AND POTASSIUM PHOSPHATE 100 ML/HR: .53; .5; .37; .037; .03; .012; .00082 INJECTION, SOLUTION INTRAVENOUS at 23:37

## 2022-03-02 RX ADMIN — SODIUM CHLORIDE 75 ML/HR: 0.9 INJECTION, SOLUTION INTRAVENOUS at 20:22

## 2022-03-02 NOTE — RESULT ENCOUNTER NOTE
Increasing labs  Getting more obstructed  Call pt, no answer  Will route to nursing to try to reach him  He needs to go to [facility] ER  I think we will have to transfer him from there to [place] for EUS/ERCP, which isn't scheduled til 3/18
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
## 2022-03-03 PROBLEM — Z72.0 TOBACCO ABUSE: Status: ACTIVE | Noted: 2022-03-03

## 2022-03-03 LAB
ALBUMIN SERPL BCP-MCNC: 3.3 G/DL (ref 3.5–5)
ALP SERPL-CCNC: 302 U/L (ref 46–116)
ALT SERPL W P-5'-P-CCNC: 695 U/L (ref 12–78)
ANION GAP SERPL CALCULATED.3IONS-SCNC: 7 MMOL/L (ref 4–13)
APTT PPP: 29 SECONDS (ref 23–37)
APTT PPP: 82 SECONDS (ref 23–37)
AST SERPL W P-5'-P-CCNC: 333 U/L (ref 5–45)
ATRIAL RATE: 82 BPM
BACTERIA UR QL AUTO: ABNORMAL /HPF
BASOPHILS # BLD AUTO: 0.07 THOUSANDS/ΜL (ref 0–0.1)
BASOPHILS NFR BLD AUTO: 1 % (ref 0–1)
BILIRUB SERPL-MCNC: 11.2 MG/DL (ref 0.2–1)
BILIRUB UR QL STRIP: ABNORMAL
BUN SERPL-MCNC: 24 MG/DL (ref 5–25)
CALCIUM ALBUM COR SERPL-MCNC: 10.2 MG/DL (ref 8.3–10.1)
CALCIUM SERPL-MCNC: 9.6 MG/DL (ref 8.3–10.1)
CHLORIDE SERPL-SCNC: 101 MMOL/L (ref 100–108)
CLARITY UR: CLEAR
CO2 SERPL-SCNC: 26 MMOL/L (ref 21–32)
COLOR UR: ABNORMAL
CREAT SERPL-MCNC: 0.67 MG/DL (ref 0.6–1.3)
EOSINOPHIL # BLD AUTO: 0.2 THOUSAND/ΜL (ref 0–0.61)
EOSINOPHIL NFR BLD AUTO: 3 % (ref 0–6)
ERYTHROCYTE [DISTWIDTH] IN BLOOD BY AUTOMATED COUNT: 14.7 % (ref 11.6–15.1)
GFR SERPL CREATININE-BSD FRML MDRD: 91 ML/MIN/1.73SQ M
GLUCOSE SERPL-MCNC: 140 MG/DL (ref 65–140)
GLUCOSE SERPL-MCNC: 187 MG/DL (ref 65–140)
GLUCOSE SERPL-MCNC: 220 MG/DL (ref 65–140)
GLUCOSE SERPL-MCNC: 86 MG/DL (ref 65–140)
GLUCOSE UR STRIP-MCNC: ABNORMAL MG/DL
GRAN CASTS #/AREA URNS LPF: ABNORMAL /[LPF]
HCT VFR BLD AUTO: 36.9 % (ref 36.5–49.3)
HGB BLD-MCNC: 12.5 G/DL (ref 12–17)
HGB UR QL STRIP.AUTO: ABNORMAL
IMM GRANULOCYTES # BLD AUTO: 0.02 THOUSAND/UL (ref 0–0.2)
IMM GRANULOCYTES NFR BLD AUTO: 0 % (ref 0–2)
INR PPP: 1.2 (ref 0.84–1.19)
KETONES UR STRIP-MCNC: ABNORMAL MG/DL
LEUKOCYTE ESTERASE UR QL STRIP: ABNORMAL
LYMPHOCYTES # BLD AUTO: 1.11 THOUSANDS/ΜL (ref 0.6–4.47)
LYMPHOCYTES NFR BLD AUTO: 19 % (ref 14–44)
MAGNESIUM SERPL-MCNC: 2 MG/DL (ref 1.6–2.6)
MCH RBC QN AUTO: 29.9 PG (ref 26.8–34.3)
MCHC RBC AUTO-ENTMCNC: 33.9 G/DL (ref 31.4–37.4)
MCV RBC AUTO: 88 FL (ref 82–98)
MONOCYTES # BLD AUTO: 0.66 THOUSAND/ΜL (ref 0.17–1.22)
MONOCYTES NFR BLD AUTO: 11 % (ref 4–12)
MUCOUS THREADS UR QL AUTO: ABNORMAL
NEUTROPHILS # BLD AUTO: 3.74 THOUSANDS/ΜL (ref 1.85–7.62)
NEUTS SEG NFR BLD AUTO: 66 % (ref 43–75)
NITRITE UR QL STRIP: NEGATIVE
NON-SQ EPI CELLS URNS QL MICRO: ABNORMAL /HPF
NRBC BLD AUTO-RTO: 0 /100 WBCS
P AXIS: 70 DEGREES
PH UR STRIP.AUTO: 6 [PH]
PHOSPHATE SERPL-MCNC: 2.8 MG/DL (ref 2.3–4.1)
PLATELET # BLD AUTO: 195 THOUSANDS/UL (ref 149–390)
PMV BLD AUTO: 11.6 FL (ref 8.9–12.7)
POTASSIUM SERPL-SCNC: 3.4 MMOL/L (ref 3.5–5.3)
PR INTERVAL: 212 MS
PROT SERPL-MCNC: 6.7 G/DL (ref 6.4–8.2)
PROT UR STRIP-MCNC: ABNORMAL MG/DL
PROTHROMBIN TIME: 14.8 SECONDS (ref 11.6–14.5)
QRS AXIS: -72 DEGREES
QRSD INTERVAL: 140 MS
QT INTERVAL: 400 MS
QTC INTERVAL: 467 MS
RBC # BLD AUTO: 4.18 MILLION/UL (ref 3.88–5.62)
RBC #/AREA URNS AUTO: ABNORMAL /HPF
SODIUM SERPL-SCNC: 134 MMOL/L (ref 136–145)
SP GR UR STRIP.AUTO: 1.03 (ref 1–1.03)
T WAVE AXIS: 84 DEGREES
UROBILINOGEN UR STRIP-ACNC: <2 MG/DL
VENTRICULAR RATE: 82 BPM
WBC # BLD AUTO: 5.8 THOUSAND/UL (ref 4.31–10.16)
WBC #/AREA URNS AUTO: ABNORMAL /HPF

## 2022-03-03 PROCEDURE — 85730 THROMBOPLASTIN TIME PARTIAL: CPT | Performed by: HOSPITALIST

## 2022-03-03 PROCEDURE — 80053 COMPREHEN METABOLIC PANEL: CPT | Performed by: HOSPITALIST

## 2022-03-03 PROCEDURE — 85025 COMPLETE CBC W/AUTO DIFF WBC: CPT | Performed by: HOSPITALIST

## 2022-03-03 PROCEDURE — 85610 PROTHROMBIN TIME: CPT | Performed by: HOSPITALIST

## 2022-03-03 PROCEDURE — 99223 1ST HOSP IP/OBS HIGH 75: CPT | Performed by: INTERNAL MEDICINE

## 2022-03-03 PROCEDURE — RECHECK: Performed by: NURSE PRACTITIONER

## 2022-03-03 PROCEDURE — 82948 REAGENT STRIP/BLOOD GLUCOSE: CPT

## 2022-03-03 PROCEDURE — 84100 ASSAY OF PHOSPHORUS: CPT | Performed by: HOSPITALIST

## 2022-03-03 PROCEDURE — 83735 ASSAY OF MAGNESIUM: CPT | Performed by: HOSPITALIST

## 2022-03-03 PROCEDURE — 81001 URINALYSIS AUTO W/SCOPE: CPT | Performed by: INTERNAL MEDICINE

## 2022-03-03 PROCEDURE — 93010 ELECTROCARDIOGRAM REPORT: CPT | Performed by: INTERNAL MEDICINE

## 2022-03-03 RX ORDER — ALPRAZOLAM 0.25 MG/1
0.25 TABLET ORAL 3 TIMES DAILY PRN
Status: DISCONTINUED | OUTPATIENT
Start: 2022-03-03 | End: 2022-03-05 | Stop reason: HOSPADM

## 2022-03-03 RX ORDER — INSULIN GLARGINE 100 [IU]/ML
20 INJECTION, SOLUTION SUBCUTANEOUS DAILY
Status: DISCONTINUED | OUTPATIENT
Start: 2022-03-04 | End: 2022-03-05 | Stop reason: HOSPADM

## 2022-03-03 RX ORDER — HEPARIN SODIUM 1000 [USP'U]/ML
4800 INJECTION, SOLUTION INTRAVENOUS; SUBCUTANEOUS ONCE
Status: COMPLETED | OUTPATIENT
Start: 2022-03-03 | End: 2022-03-03

## 2022-03-03 RX ORDER — POLYETHYLENE GLYCOL 3350 17 G/17G
17 POWDER, FOR SOLUTION ORAL DAILY
Status: DISCONTINUED | OUTPATIENT
Start: 2022-03-03 | End: 2022-03-05 | Stop reason: HOSPADM

## 2022-03-03 RX ORDER — HEPARIN SODIUM 1000 [USP'U]/ML
2400 INJECTION, SOLUTION INTRAVENOUS; SUBCUTANEOUS
Status: DISCONTINUED | OUTPATIENT
Start: 2022-03-03 | End: 2022-03-04

## 2022-03-03 RX ORDER — HEPARIN SODIUM 1000 [USP'U]/ML
4800 INJECTION, SOLUTION INTRAVENOUS; SUBCUTANEOUS
Status: DISCONTINUED | OUTPATIENT
Start: 2022-03-03 | End: 2022-03-04

## 2022-03-03 RX ADMIN — ENOXAPARIN SODIUM 40 MG: 40 INJECTION SUBCUTANEOUS at 08:51

## 2022-03-03 RX ADMIN — NICOTINE 1 PATCH: 21 PATCH, EXTENDED RELEASE TRANSDERMAL at 08:51

## 2022-03-03 RX ADMIN — PANTOPRAZOLE SODIUM 40 MG: 40 TABLET, DELAYED RELEASE ORAL at 05:59

## 2022-03-03 RX ADMIN — INSULIN GLARGINE 40 UNITS: 100 INJECTION, SOLUTION SUBCUTANEOUS at 08:51

## 2022-03-03 RX ADMIN — OXYCODONE HYDROCHLORIDE 5 MG: 5 TABLET ORAL at 02:35

## 2022-03-03 RX ADMIN — PRAVASTATIN SODIUM 40 MG: 40 TABLET ORAL at 08:50

## 2022-03-03 RX ADMIN — HEPARIN SODIUM 4800 UNITS: 1000 INJECTION INTRAVENOUS; SUBCUTANEOUS at 16:46

## 2022-03-03 RX ADMIN — HEPARIN SODIUM 18 UNITS/KG/HR: 10000 INJECTION, SOLUTION INTRAVENOUS; SUBCUTANEOUS at 16:41

## 2022-03-03 RX ADMIN — SODIUM CHLORIDE, SODIUM GLUCONATE, SODIUM ACETATE, POTASSIUM CHLORIDE, MAGNESIUM CHLORIDE, SODIUM PHOSPHATE, DIBASIC, AND POTASSIUM PHOSPHATE 100 ML/HR: .53; .5; .37; .037; .03; .012; .00082 INJECTION, SOLUTION INTRAVENOUS at 20:05

## 2022-03-03 RX ADMIN — SODIUM CHLORIDE, SODIUM GLUCONATE, SODIUM ACETATE, POTASSIUM CHLORIDE, MAGNESIUM CHLORIDE, SODIUM PHOSPHATE, DIBASIC, AND POTASSIUM PHOSPHATE 100 ML/HR: .53; .5; .37; .037; .03; .012; .00082 INJECTION, SOLUTION INTRAVENOUS at 10:30

## 2022-03-03 RX ADMIN — OXYCODONE HYDROCHLORIDE 5 MG: 5 TABLET ORAL at 14:46

## 2022-03-03 NOTE — CASE MANAGEMENT
Case Management Assessment & Discharge Planning Note    Patient name Robb Pa  Location [facility] [address]/[facility] 676[address]54 MRN [id number]  : 1940 Date 3/3/2022       Current Admission Date: 3/2/2022  Current Admission Diagnosis:Pancreatic mass   Patient Active Problem List    Diagnosis Date Noted    Type 2 diabetes mellitus without complication, with long-term current use of insulin ([address] )     GERD (gastroesophageal reflux disease)     Pancreatic mass     Chronic pulmonary embolism without acute cor pulmonale (HCC)     Hyperbilirubinemia     Anxiety about health       LOS (days): 1  Geometric Mean LOS (GMLOS) (days): 3 10  Days to GMLOS:2 6     OBJECTIVE:    Risk of Unplanned Readmission Score: 12         Current admission status: Inpatient       Preferred Pharmacy:   [id number] [address]  Phone: [phone] Fax: [phone]    Primary Care Provider: Diego Wray MD    Primary Insurance: [facility]  Secondary Insurance:     ASSESSMENT:  [address] Agents    There are no active Health Care Agents on file  Advance Directives  Does patient have a [address]?: No  Does patient have Advance Directives?: No              Patient Information  Admitted from[de-identified] Facility  Mental Status: Alert  During Assessment patient was accompanied by: Not accompanied during assessment  Assessment information provided by[de-identified] Patient  Primary Caregiver: Self  Support Systems: [facility] entry access options   Select all that apply : Elevator  Type of Current Residence: Apartment  Floor Level: 1  Upon entering residence, is there a bedroom on the main floor (no further steps)?: Yes  Upon entering residence, is there a bathroom on the main floor (no further steps)?: Yes  In the last 12 months, was there a time when you were not able to pay the mortgage or rent on time?: No  In the last 12 months, was there a time when you did not have a steady place to sleep or slept in a shelter (including now)?: No  Homeless/housing insecurity resource given?: N/A  Living Arrangements: Lives Alone    Activities of Daily Living Prior to Admission  Functional Status: Independent  Completes ADLs independently?: Yes  Ambulates independently?: Yes  Does patient use assisted devices?: Yes  Assisted Devices (DME) used: Straight Cane  Does patient currently own DME?: Yes  What DME does the patient currently own?: Straight Cane  Does patient have a history of Outpatient Therapy (PT/OT)?: No  Does the patient have a history of Short-Term Rehab?: No  Does patient have a history of HHC?: Yes ([place])         Patient Information Continued  Income Source: Pension/USP  Within the past 12 months, you worried that your food would run out before you got the money to buy more : Never true  Within the past 12 months, the food you bought just didnt last and you didnt have money to get more : Never true  Food insecurity resource given?: N/A  Does patient receive dialysis treatments?: No  Does patient have a history of substance abuse?: No  Does patient have a history of Mental Health Diagnosis?: Yes ([address] [place] [address], no treatment presently)  Has patient received inpatient treatment related to mental health in the last 2 years?: No         Means of Transportation  Means of Transport to Appts[de-identified] Drives Self  In the past 12 months, has lack of transportation kept you from medical appointments or from getting medications?: No  In the past 12 months, has lack of transportation kept you from meetings, work, or from getting things needed for daily living?: No  Was application for public transport provided?: N/A        DISCHARGE DETAILS:    Discharge planning discussed with[de-identified] patient  Freedom of Choice: Yes                   Contacts  Patient Contacts: sister Willie Perry  Relationship to Patient[de-identified] Family  Contact Method: Phone  Phone Number: [phone]  Reason/Outcome: Continuity of Care,Emergency Contact,Discharge Planning                   Would you like to participate in our [address] service program?  : No - Declined     CM reviewed d/c planning process including the following: identifying help at home, patient preference for d/c planning needs, Discharge Lounge, [facility] Meds to Bed program, availability of treatment team to discuss questions or concerns patient and/or family may have regarding understanding medications and recognizing signs and symptoms once discharged  CM also encouraged patient to follow up with all recommended appointments after discharge  Patient advised of importance for patient and family to participate in managing patients medical well being  Patient/caregiver received discharge checklist  Content reviewed  Patient/caregiver encouraged to participate in discharge plan of care prior to discharge home

## 2022-03-03 NOTE — TELEPHONE ENCOUNTER
Dr Eamon Hudson requested patient be contacted to have him report to hospital due to elevated labs  I spoke with patient and reviewed need to report to [facility] for possible transport to [place]  Patient agreeable and he asked that I contact his sister Bianca Herrera and update her  He was concerned about leaving this car in hospital parking lot and I assured him that would be okay

## 2022-03-03 NOTE — UTILIZATION REVIEW
Initial Clinical Review    Admission: Date/Time/Statement:   Admission Orders (From admission, onward)     Ordered        03/02/22 2245  Inpatient Admission  Once                      Orders Placed This Encounter   Procedures    Inpatient Admission     Standing Status:   Standing     Number of Occurrences:   1     Order Specific Question:   Level of Care     Answer:   Med Surg [16]     Order Specific Question:   Estimated length of stay     Answer:   More than 2 Midnights     Order Specific Question:   Certification     Answer:   I certify that inpatient services are medically necessary for this patient for a duration of greater than two midnights  See H&P and MD Progress Notes for additional information about the patient's course of treatment  Initial Presentation: 79 y/o male with PMhx of PE on Eliquis, GERD, T2 DM on insulin, HLD and constipation presents to \[facility]\"" as a transfer from [facility] where he initially presented on 3/2 with c/o abdominal discomfort and weight loss  Imaging in ED revealed a pancreatic mass, elevated LFTs and was tx'd to \[facility]\"" for GI eval and further tx  In \[facility]\"" on exam pt denies abdominal pain currently  ADMIT INPATIENT to M/S UNIT with PANCREATIC MASS -- npo for now  Hold Eliquis  BMP/CBC in AM  GI consulted  Continue pantoprazole, fingerstick glucose checks w/ ssi  GI consult 3/3 -- Pt with likely non-calcified obstruction of the biliary tract, will undergo ERCP during this stay  Last dose of Eliquis AM of 3/2, continue to hold  Will need two days off Eliquis for ERCP  Can advance diet today, NPO midnight for ERCP tomorrow  Pt very anxious of pancreas diagnosis and this morning does not want any bad news to be communicated to him regarding this  States he would prefer to follow up with his pancreas workup as an outpatient  Date: 3/3   Day 2: Pt reports right abdominal pain 2/10 on attending exam  Denies n/v/d/c  Pt reports he is feeling very anxious   Hold metformin  Currently on Lantus- reduce dose to 20 units daily since he will be NPO after MN  Insulin sliding scale with Accu-Checks  Cons carb diet today, npo after MN  Add prn Xanax  start VTE high heparin gtt now  Stop at 6 am prior to ERCP  ERCP planned for 3/4      Vital Signs:   Date/Time Temp Pulse Resp BP MAP (mmHg) SpO2   03/03/22 15:37:59 98 7 °F (37 1 °C) -- 18 133/81 98 --   03/03/22 07:38:38 97 9 °F (36 6 °C) -- 20 132/83 99 --   03/02/22 22:49:24 97 8 °F (36 6 °C) 80 18 130/82 98 99 %       Pertinent Labs/Diagnostic Test Results:   CT A/P 2/26 -- CT of the abdomen reveals stable right infiltrating pancreatic head mass which encases the superior mesenteric artery and vein and narrows the distal portal vein  The gallbladder appears to be mildly hydropic and increased in size from prior  No definite wall thickening noted  A small amount of pericholecystic fluid is noted  Consider right upper quadrant ultrasound if acute cholecystitis is in the differential consideration clinically  Results from last 7 days   Lab Units 03/03/22 0256 03/02/22 1815 03/01/22  1101   WBC Thousand/uL 5 80 4 88  --    WHITE BLOOD CELL COUNT  Thousand/uL  --   --  5 1   HEMOGLOBIN g/dL 12 5 12 4  --    HEMOGLOBIN  g/dL  --   --  13 7   HEMATOCRIT % 36 9 37 5  --    HEMATOCRIT  %  --   --  41 1   PLATELETS Thousands/uL 195 186  --    PLATELETS  Thousand/uL  --   --  209   NEUTROS ABS Thousands/µL 3 74 3 03  --    NEUTROS ABS   cells/uL  --   --  3,284     Results from last 7 days   Lab Units 03/03/22 0256 03/02/22 1815   SODIUM mmol/L 134* 132*   POTASSIUM mmol/L 3 4* 3 5   CHLORIDE mmol/L 101 98*   CO2 mmol/L 26 30   ANION GAP mmol/L 7 4   BUN mg/dL 24 23   CREATININE mg/dL 0 67 0 78   EGFR ml/min/1 73sq m 91 85   CALCIUM mg/dL 9 6 8 6   MAGNESIUM mg/dL 2 0  --    PHOSPHORUS mg/dL 2 8  --      Results from last 7 days   Lab Units 03/03/22  0256 03/02/22 1815 03/01/22  1101 02/24/22  0907   AST U/L 333* 322* 271* 132* ALT U/L 695* 702* 504* 267*   ALK PHOS U/L 302* 303* 269* 129   TOTAL PROTEIN g/dL 6 7 6 8 6 7 6 6   ALBUMIN g/dL 3 3* 3 4* 4 3 4 3   TOTAL BILIRUBIN mg/dL 11 20* 10 70* 10 7* 4 8*     Results from last 7 days   Lab Units 03/03/22  0755   POC GLUCOSE mg/dl 187*     Results from last 7 days   Lab Units 03/03/22  0256 03/02/22  1815   GLUCOSE RANDOM mg/dL 220* 273*     Results from last 7 days   Lab Units 03/03/22  0256 03/02/22  1815   PROTIME seconds 14 8* 15 1*   INR  1 20* 1 21*   PTT seconds 29 30     Results from last 7 days   Lab Units 03/02/22  1815 02/24/22  0907   LIPASE u/L 650* 140*     Results from last 7 days   Lab Units 03/03/22  0352 03/02/22  1818   CLARITY UA  Clear Clear   COLOR UA  Dark Yellow Orange   SPEC GRAV UA  1 035* >=1 030   PH UA  6 0 6 0   GLUCOSE UA mg/dl >=1000 (1%)* >=1000 (1%)*   KETONES UA mg/dl 60 (2+)* Trace*   BLOOD UA  Moderate* Negative   PROTEIN UA mg/dl 30 (1+)* Trace*   NITRITE UA  Negative Negative   BILIRUBIN UA  Moderate* Interference- unable to analyze*   UROBILINOGEN UA E U /dl  --  0 2   UROBILINOGEN UA (BE) mg/dl <2 0  --    LEUKOCYTES UA  Elevated glucose may cause decreased leukocyte values  See urine microscopic for [facility] result/* Elevated glucose may cause decreased leukocyte values   See urine microscopic for [facility] result/*   WBC UA /hpf 4-10* 0-1   RBC UA /hpf 1-2 0-1   BACTERIA UA /hpf Occasional Occasional   EPITHELIAL CELLS WET PREP /hpf None Seen Occasional   MUCUS THREADS  Occasional* Occasional*     Results from last 7 days   Lab Units 03/02/22  1815   ACETAMINOPHEN LVL ug/mL <2 0*       Past Medical History:   Diagnosis Date    Diabetes mellitus ([facility] [address] )     GERD (gastroesophageal reflux disease)      Present on Admission:  **None**      Admitting Diagnosis: Elevated liver enzymes [R74 8]  Age/Sex: 80 y o  male  Admission Orders:  Scheduled Medications:  enoxaparin, 40 mg, Subcutaneous, Daily  insulin glargine, 40 Units, Subcutaneous, Daily  nicotine, 1 patch, Transdermal, Daily  pantoprazole, 40 mg, Oral, Early Morning  polyethylene glycol, 17 g, Oral, Daily  pravastatin, 40 mg, Oral, Daily    Continuous IV Infusions:  multi-electrolyte, 100 mL/hr, Intravenous, Continuous    PRN Meds:  aluminum-magnesium hydroxide-simethicone, 30 mL, Oral, Q6H PRN  docusate sodium, 100 mg, Oral, BID PRN  HYDROmorphone, 0 2 mg, Intravenous, Q4H PRN  LORazepam, 1 mg, Intravenous, Q4H PRN 3/2 x1  ondansetron, 4 mg, Intravenous, Q6H PRN  oxyCODONE, 2 5 mg, Oral, Q4H PRN  oxyCODONE, 5 mg, Oral, Q4H PRN 3/3 x2  temazepam, 7 5 mg, Oral, HS PRN        IP CONSULT TO GASTROENTEROLOGY  IP CONSULT TO CASE MANAGEMENT    Network Utilization Review Department  ATTENTION: Please call with any questions or concerns to [phone] and carefully listen to the prompts so that you are directed to the right person  All voicemails are confidential   Adwoa Rodriguez all requests for admission clinical reviews, approved or denied determinations and any other requests to dedicated fax number below belonging to the campus where the patient is receiving treatment   List of dedicated fax numbers for the Facilities:  [address] DENIALS (Administrative/Medical Necessity) [phone]   [address] (Maternity/NICU/Pediatrics) [phone]   [address]  [address] [facility] [place] [address] [address] [place] [address] [address] [facility] [phone]

## 2022-03-03 NOTE — ASSESSMENT & PLAN NOTE
· Patient's workup has all been done by [facility] gastroenterology    · Currently with elevated liver enzymes from previous; sent to [facility] for further gastroenterology intervention with ERCP possibly stenting- plan for ERCP 3/4  · Gastroenterology consult appreciated  · hold Eliquis- start IV heparin gtt give recent PE

## 2022-03-03 NOTE — ASSESSMENT & PLAN NOTE
Lab Results   Component Value Date    HGBA1C 6 1 08/28/2021       Recent Labs     03/03/22  0755 03/03/22  1145   POCGLU 187* 140       Blood Sugar Average: Last 72 hrs:  (P) 163 5   · currently on Lantus- reduce dose to 20 units daily since he will be NPO after midnight     · Insulin sliding scale with Accu-Cheks every   · Hold Metformin   · ADA diet   · Hypoglycemia protocol

## 2022-03-03 NOTE — PLAN OF CARE
Problem: PAIN - ADULT  Goal: Verbalizes/displays adequate comfort level or baseline comfort level  Description: Interventions:  - Encourage patient to monitor pain and request assistance  - Assess pain using appropriate pain scale  - Administer analgesics based on type and severity of pain and evaluate response  - Implement non-pharmacological measures as appropriate and evaluate response  - Consider cultural and social influences on pain and pain management  - Notify physician/advanced practitioner if interventions unsuccessful or patient reports new pain  Outcome: Progressing     Problem: INFECTION - ADULT  Goal: Absence or prevention of progression during hospitalization  Description: INTERVENTIONS:  - Assess and monitor for signs and symptoms of infection  - Monitor lab/diagnostic results  - Monitor all insertion sites, i e  indwelling lines, tubes, and drains  - Monitor endotracheal if appropriate and nasal secretions for changes in amount and color  - [place] appropriate cooling/warming therapies per order  - Administer medications as ordered  - Instruct and encourage patient and family to use good hand hygiene technique  - Identify and instruct in appropriate isolation precautions for identified infection/condition  Outcome: Progressing  Goal: Absence of fever/infection during neutropenic period  Description: INTERVENTIONS:  - Monitor WBC    Outcome: Progressing     Problem: DISCHARGE PLANNING  Goal: Discharge to home or other facility with appropriate resources  Description: INTERVENTIONS:  - Identify barriers to discharge w/patient and caregiver  - Arrange for needed discharge resources and transportation as appropriate  - Identify discharge learning needs (meds, wound care, etc )  - Arrange for interpretive services to assist at discharge as needed  - Refer to Case Management Department for coordinating discharge planning if the patient needs post-hospital services based on physician/advanced practitioner order or complex needs related to functional status, cognitive ability, or social support system  Outcome: Progressing     Problem: Knowledge Deficit  Goal: Patient/family/caregiver demonstrates understanding of disease process, treatment plan, medications, and discharge instructions  Description: Complete learning assessment and assess knowledge base    Interventions:  - Provide teaching at level of understanding  - Provide teaching via preferred learning methods  Outcome: Progressing

## 2022-03-03 NOTE — ASSESSMENT & PLAN NOTE
· On chronic Eliquis  · hold Eliquis for anticipated procedure tomorrow  · Pt reports PE was diagnosed in December of this year- will start VTE high heparin gtt now   Stop at 6 am prior to ERCP

## 2022-03-03 NOTE — CONSULTS
Consultation - [facility]  Anabel Gonsales 80 y o  male MRN: [id number]  Unit/Bed#: [facility] 834-01 Encounter: [id number]        Inpatient consult to gastroenterology  Consult performed by: Av Wilson MD  Consult ordered by: Denny Greenfield MD          Reason for Consult / Principal Problem: For ERCP, hyperbilirubinemia      ASSESSMENT AND PLAN:      71yo male pmh of pancreatic mass of the uncinate process with no biliary obstruction, here with uptrending cholestatic labs and CT showing mildly hydropic gallbladder with increased size and the presence of pericholecystic fluid  · Patient with likely non-calcified obstruction of the biliary tract, will undergo ERCP during this stay  · Last dose of Eliquis AM of 3/2, continue to hold  Will need two days off Eliquis for ERCP  · Can advance diet today, NPO midnight for ERCP tomorrow  · Patient with pancreatic mass, scheduled for EUS mid March  · Patient very anxious of pancreas diagnosis and this morning does not want any bad news to be communicated to him regarding this  States he would prefer to follow up with his pancreas workup as an outpatient      ______________________________________________________________________    HPI:      71yo male with pmh of COPD, pulmonary embolism 11/2021 on chronic Eliquis, GERD, Insulin dependent DM2 last Hba1c 6 1, appendectomy    For the past 1 month the patient reports experiencing worsening lower abdominal discomfort with weight loss of roughly 10 lbs  He presented to [facility] on [facility]/2022 for this discomfort that at that time was 10/10 with normal LFTs and lipase of 264      CT abdomen pelvis with con at that time showed:  · Heterogeneous enlargement with decreased attenuation involving the pancreatic head with low attenuation focus measuring up to 3 2 by 2 4cm, concerning for possible pancreatic mass lesion vs inflammation or fluid collection of pancreatitis or pseudocyst   · Normal gallbladder and bile ducts with no calcified stones or ductal dilation  MRI/MRCP w/ and w/o con on 2/13/2022 showed:  · 4 x 3 2 cm pancreatic mass at the uncinate process with no biliary obstruction  There is increased T2 signal within the lesion  The peripheral portion of the lesion is of diminished signal intensity  There is peripheral enhancement with nonhenhancing central portion  · There is anterior displacement and partial encasement of the superior mesenteric artery and vein  · Pancreatic duct is of average caliber and is not obstructed by this mass  · Unremarkable gallbladder and biliary system  · 7mm subcapsular lesion within the right lobe of the liver, possibly a small hemangioma but not as T2 hyperintense as is typical for hemangiomas  Patient was to follow up with EUS to biopsy the pancreatic mass but in March but prior to that appointment presented to [facility] 2/26 with abdominal pain  Labs at that time notible for: , , Alk Phos 170, Tbili 5 0    CT at that time found:  · Stable pancreatic head mass which encases the superior mesenteric artery and vein and narrows the distal portal vein  · Mildly hydropic gallbladder with increased size and the presence of pericholecystic fluid  Patient was discharged with recommendations to f/u with GI  Patient presented to the ED at [facility] 03/02 stating that he was told by his GI clinic that he needs to be transferred to [facility] because he has abnormal LFTs and will need an ERCP  Patient seen bedside this morning  He is anxious about his possible diagnosis and continually states that he does not want to know too much "depressing" information at one time  He is aware of his pancreatic mass but would like to follow up with this as an outpatient and states he really only wants to do the ERCP here  Denies slight 1/10 suprapubic abdominal pain intermittently but not currently  No nausea or vomiting  No diarrhea  Slight constipation for which he takes miralax, last bowel movement yesterday with no blood  Distant hx of tobacco use in his 25s  Drinks socially but last drink multiple months ago  No drug use  Brother with a hx of thyroid cancer, no other family hx of cancer or GI issues that he knows of  REVIEW OF SYSTEMS:    CONSTITUTIONAL: Denies any fever, chills, rigors, +weight loss  HEENT: No earache or tinnitus  Denies hearing loss or visual disturbances  CARDIOVASCULAR: No chest pain or palpitations  RESPIRATORY: Denies any cough, hemoptysis, shortness of breath or dyspnea on exertion  GASTROINTESTINAL: As noted in the History of Present Illness  GENITOURINARY: No problems with urination  Denies any hematuria or dysuria  NEUROLOGIC: No dizziness or vertigo, denies headaches  MUSCULOSKELETAL: Denies any muscle or joint pain  SKIN: Denies skin rashes or itching  ENDOCRINE: Denies excessive thirst  Denies intolerance to heat or cold  PSYCHOSOCIAL: Denies depression or anxiety  Denies any recent memory loss  Historical Information   Past Medical History:   Diagnosis Date    [facility] mellitus ([facility] [address] )     GERD (gastroesophageal reflux disease)      Past Surgical History:   Procedure Laterality Date    APPENDECTOMY      CATARACT EXTRACTION Right      Social History   Social History     Substance and Sexual Activity   Alcohol Use Yes    Comment: seldom     Social History     Substance and Sexual Activity   Drug Use No     Social History     Tobacco Use   Smoking Status Current Every Day Smoker   Smokeless Tobacco Never Used   Tobacco Comment    quit in 1960s     No family history on file      Meds/Allergies     Medications Prior to Admission   Medication    apixaban (Eliquis) 5 mg    omeprazole (PriLOSEC) 40 MG capsule    oxyCODONE-acetaminophen (Percocet) 5-325 mg per tablet    polyethylene glycol (MIRALAX) 17 g packet    insulin glargine (LANTUS) 100 units/mL subcutaneous injection    metFORMIN (GLUCOPHAGE) 500 mg tablet    ondansetron (ZOFRAN) 4 mg tablet    pioglitazone (ACTOS) 45 mg tablet    pravastatin (PRAVACHOL) 40 mg tablet     Current Facility-Administered Medications   Medication Dose Route Frequency    aluminum-magnesium hydroxide-simethicone (MYLANTA) oral suspension 30 mL  30 mL Oral Q6H PRN    docusate sodium (COLACE) capsule 100 mg  100 mg Oral BID PRN    enoxaparin (LOVENOX) subcutaneous injection 40 mg  40 mg Subcutaneous Daily    HYDROmorphone HCl (DILAUDID) injection 0 2 mg  0 2 mg Intravenous Q4H PRN    insulin glargine (LANTUS) subcutaneous injection 40 Units 0 4 mL  40 Units Subcutaneous Daily    LORazepam (ATIVAN) injection 1 mg  1 mg Intravenous Q4H PRN    multi-electrolyte (PLASMALYTE-A/ISOLYTE-S PH 7 4) IV solution  100 mL/hr Intravenous Continuous    nicotine (NICODERM CQ) 21 mg/24 hr TD 24 hr patch 1 patch  1 patch Transdermal Daily    ondansetron (ZOFRAN) injection 4 mg  4 mg Intravenous Q6H PRN    oxyCODONE (ROXICODONE) IR tablet 2 5 mg  2 5 mg Oral Q4H PRN    oxyCODONE (ROXICODONE) IR tablet 5 mg  5 mg Oral Q4H PRN    pantoprazole (PROTONIX) EC tablet 40 mg  40 mg Oral Early Morning    polyethylene glycol (MIRALAX) packet 17 g  17 g Oral Daily    pravastatin (PRAVACHOL) tablet 40 mg  40 mg Oral Daily    temazepam (RESTORIL) capsule 7 5 mg  7 5 mg Oral HS PRN       Allergies   Allergen Reactions    Aleve [Naproxen] Swelling           Objective     Blood pressure 132/83, pulse 80, temperature 97 9 °F (36 6 °C), resp  rate 20, SpO2 99 %  There is no height or weight on file to calculate BMI      No intake or output data in the 24 hours ending 03/03/22 0744      PHYSICAL EXAM:      General Appearance:   Alert, cooperative, no distress   HEENT:   Normocephalic, atraumatic, +scleral icterus persent      Neck:  Supple, symmetrical, trachea midline   Lungs:   Clear to auscultation bilaterally; no rales, rhonchi or wheezing; respirations unlabored  Heart[de-identified]   Regular rate and rhythm; no murmur, rub, or gallop  Abdomen:   Soft, non-tender, non-distended; normal bowel sounds; no masses, no organomegaly, no alford's sign    Genitalia:   Deferred    Rectal:   Deferred    Extremities:  No cyanosis, clubbing or edema    Pulses:  2+ and symmetric all extremities    Skin:  + jaundice, rashes, or lesions    Lymph nodes:  No palpable cervical lymphadenopathy        Lab Results:   Admission on 03/02/2022   Component Date Value    Color, UA 03/03/2022 Dark Yellow     Clarity, UA 03/03/2022 Clear     Specific Gravity, UA 03/03/2022 1 035*    pH, UA 03/03/2022 6 0     Leukocytes, UA 03/03/2022 Elevated glucose may cause decreased leukocyte values   See urine microscopic for [facility] result/*    Nitrite, UA 03/03/2022 Negative     Protein, UA 03/03/2022 30 (1+)*    Glucose, UA 03/03/2022 >=1000 (1%)*    Ketones, UA 03/03/2022 60 (2+)*    Urobilinogen, UA 03/03/2022 <2 0     Bilirubin, UA 03/03/2022 Moderate*    Blood, UA 03/03/2022 Moderate*    PTT 03/03/2022 29     WBC 03/03/2022 5 80     RBC 03/03/2022 4 18     Hemoglobin 03/03/2022 12 5     Hematocrit 03/03/2022 36 9     MCV 03/03/2022 88     MCH 03/03/2022 29 9     MCHC 03/03/2022 33 9     RDW 03/03/2022 14 7     MPV 03/03/2022 11 6     Platelets 24/00/0691 195     nRBC 03/03/2022 0     Neutrophils Relative 03/03/2022 66     Immat GRANS % 03/03/2022 0     Lymphocytes Relative 03/03/2022 19     Monocytes Relative 03/03/2022 11     Eosinophils Relative 03/03/2022 3     Basophils Relative 03/03/2022 1     Neutrophils Absolute 03/03/2022 3 74     Immature Grans Absolute 03/03/2022 0 02     Lymphocytes Absolute 03/03/2022 1 11     Monocytes Absolute 03/03/2022 0 66     Eosinophils Absolute 03/03/2022 0 20     Basophils Absolute 03/03/2022 0 07     Sodium 03/03/2022 134*    Potassium 03/03/2022 3 4*    Chloride 03/03/2022 101     CO2 03/03/2022 26     ANION GAP 03/03/2022 7     BUN 03/03/2022 24     Creatinine 03/03/2022 0 67     Glucose 03/03/2022 220*    Calcium 03/03/2022 9 6     Corrected Calcium 03/03/2022 10 2*    AST 03/03/2022 333*    ALT 03/03/2022 695*    Alkaline Phosphatase 03/03/2022 302*    Total Protein 03/03/2022 6 7     Albumin 03/03/2022 3 3*    Total Bilirubin 03/03/2022 11 20*    eGFR 03/03/2022 91     Magnesium 03/03/2022 2 0     Phosphorus 03/03/2022 2 8     Protime 03/03/2022 14 8*    INR 03/03/2022 1 20*    RBC, UA 03/03/2022 1-2     WBC, UA 03/03/2022 4-10*    Epithelial Cells 03/03/2022 None Seen     Bacteria, UA 03/03/2022 Occasional     MUCUS THREADS 03/03/2022 Occasional*    Granular Casts, UA 03/03/2022 0-3*       Imaging Studies: I have personally reviewed pertinent imaging studies

## 2022-03-03 NOTE — H&P
[address] [facility] 1940, 80 y o  male MRN: [id number]  [facility] Encounter: [id number]  Primary Care Provider: Anisha Villafuerte MD   Date and time admitted to hospital: 3/2/2022 10:34 PM    * Pancreatic mass  Assessment & Plan  Patient's workup has all been done by [facility] gastroenterology  Currently with elevated liver enzymes from previous; sent to [facility] for further gastroenterology intervention with ERCP possibly stenting  At this point will consult Gastroenterology  Will leave to gastroenterology if patient needs further inpatient consult with Oncology and Surgical Oncology  NPO now  Will put on hold Eliquis  Metabolic profile with CBC and coagulation profile tomorrow  Anxiety about health  Assessment & Plan  Patient does not want to know further about what he knows right now regarding his current condition  Patient would rather wait for further information and possibly discuss with his primary care provider before discussing further with him  Hyperbilirubinemia  Assessment & Plan  Will continue to monitor liver enzymes  For ERCP tomorrow  Chronic pulmonary embolism without acute cor pulmonale (HCC)  Assessment & Plan  On chronic Eliquis  Will put on hold Eliquis for anticipated procedure tomorrow  GERD (gastroesophageal reflux disease)  Assessment & Plan  Continue pantoprazole 40 mg p o  Type 2 diabetes mellitus without complication, with long-term current use of insulin [facility])  Assessment & Plan  Lab Results   Component Value Date    HGBA1C 6 1 08/28/2021       No results for input(s): POCGLU in the last 72 hours  Blood Sugar Average: Last 72 hrs:   currently on Lantus  Insulin sliding scale with Accu-Cheks every 6 hours  Put on hold metformin and insulin glargine            VTE Prophylaxis: Apixaban (Eliquis)  / sequential compression device   Code Status: Level 1 - Full Code   POLST: There is no POLST form on file for this patient (pre-hospital)    Anticipated Length of Stay:  Patient will be admitted on an Inpatient basis with an anticipated length of stay of  greater than 2 midnights  Justification for Hospital Stay: Please see detailed plans noted above  Chief Complaint:     Worsening jaundice and liver enzymes  History of Present Illness:  Cole Silveira is a 80 y o  male who has past medical history significant for pulmonary embolism on chronic Eliquis, gastroesophageal reflux disease, diabetes mellitus type 2 on chronic insulin, hyperlipidemia and constipation  According to the patient he was in his usual state of health until approximately a month ago when he began to have lower abdominal discomfort and he was noted to have lost a bit of weight  He attributed this to poor appetite  However he was seen in [facility] and initial imaging studies revealed presence of pancreatic mass  According to the report of February 26, 2022 the CT of the abdomen reveals stable right infiltrating pancreatic head mass which encases the superior mesenteric artery and vein and narrows the distal portal vein  The gallbladder appears to be mildly hydropic and increased in size from prior  No definite wall thickening noted  A small amount of pericholecystic fluid is noted  Consider right upper quadrant ultrasound if acute cholecystitis is in the differential consideration clinically "    Patient reports that he had blood work done and someone from [facility] called telling him to go to [facility] in order to have a procedure done specifically an ERCP  Currently, patient is not having any abdominal pain  He denies having pruritus  According to patient his weight loss is much better after he is having his food delivered by a certain group that specializes in nutrition      Review of Systems:    Constitutional:  Denies fever or chills   Eyes:  Denies change in visual acuity HENT:  Denies nasal congestion or sore throat   Respiratory:  Denies cough or shortness of breath   Cardiovascular:  Denies chest pain or edema   GI:  Denies abdominal pain, nausea, vomiting, bloody stools or diarrhea   :  Denies dysuria   Musculoskeletal:  Denies back pain or joint pain   Integument:  Denies rash   Neurologic:  Denies headache, focal weakness or sensory changes   Endocrine:  Denies polyuria or polydipsia   Lymphatic:  Denies swollen glands   Psychiatric:  Denies depression or anxiety     Past Medical and Surgical History:   Past Medical History:   Diagnosis Date    Diabetes mellitus ([facility] [address] )     GERD (gastroesophageal reflux disease)      Past Surgical History:   Procedure Laterality Date    APPENDECTOMY      CATARACT EXTRACTION Right        Meds/Allergies:  Medications Prior to Admission   Medication    apixaban (Eliquis) 5 mg    omeprazole (PriLOSEC) 40 MG capsule    oxyCODONE-acetaminophen (Percocet) 5-325 mg per tablet    polyethylene glycol (MIRALAX) 17 g packet    insulin glargine (LANTUS) 100 units/mL subcutaneous injection    metFORMIN (GLUCOPHAGE) 500 mg tablet    ondansetron (ZOFRAN) 4 mg tablet    pioglitazone (ACTOS) 45 mg tablet    pravastatin (PRAVACHOL) 40 mg tablet       Allergies: Allergies   Allergen Reactions    Aleve [Naproxen] Swelling     History:  Marital Status:    Occupation:  He is retired  Patient Pre-hospital Living Situation:  Lives at home in an assisted living  Patient Pre-hospital Level of Mobility:  Ambulatory  Patient Pre-hospital Diet Restrictions:  Regular  Substance Use History:   Social History     Substance and Sexual Activity   Alcohol Use Yes    Comment: seldom     Social History     Tobacco Use   Smoking Status Current Every Day Smoker   Smokeless Tobacco Never Used   Tobacco Comment    quit in 1960s     Social History     Substance and Sexual Activity   Drug Use No       Family History:  No family history on file      Physical Exam: Vitals:   Blood Pressure: 130/82 (03/02/22 2249)  Pulse: 80 (03/02/22 2249)  Temperature: 97 8 °F (36 6 °C) (03/02/22 2249)  Temp Source: Oral (03/02/22 2249)  Respirations: 18 (03/02/22 2249)  SpO2: 99 % (03/02/22 2249)    Constitutional:  Well developed, well nourished, no acute distress, non-toxic appearance   Eyes:  PERRL, conjunctiva normal however icteric and mildly jaundiced  HENT:  Atraumatic, external ears normal, nose normal, oropharynx moist, no pharyngeal exudates  Neck- normal range of motion, no tenderness, supple   Respiratory:  No respiratory distress, normal breath sounds, no rales, no wheezing   Cardiovascular:  Normal rate, normal rhythm, no murmurs, no gallops, no rubs   GI:  Soft, nondistended, normal bowel sounds, nontender, no organomegaly, no mass, no rebound, no guarding   :  No costovertebral angle tenderness   Musculoskeletal:  No edema, no tenderness, no deformities  Back- no tenderness  Integument:  Well hydrated, no rash   Lymphatic:  No lymphadenopathy noted   Neurologic:  Alert &awake, communicative, CN 2-12 normal, normal motor function, normal sensory function, no focal deficits noted   Psychiatric:  Speech and behavior appropriate       Lab Results: I have personally reviewed pertinent reports  Results from last 7 days   Lab Units 03/02/22  1815   WBC Thousand/uL 4 88   HEMOGLOBIN g/dL 12 4   HEMATOCRIT % 37 5   PLATELETS Thousands/uL 186   NEUTROS PCT % 63   LYMPHS PCT % 21   MONOS PCT % 11   EOS PCT % 4     Results from last 7 days   Lab Units 03/02/22  1815   POTASSIUM mmol/L 3 5   CHLORIDE mmol/L 98*   CO2 mmol/L 30   BUN mg/dL 23   CREATININE mg/dL 0 78   CALCIUM mg/dL 8 6   ALK PHOS U/L 303*   ALT U/L 702*   AST U/L 322*     Results from last 7 days   Lab Units 03/02/22  1815   INR  1 21*           Imaging: I have personally reviewed pertinent reports  No results found        ** Please Note: Dragon 360 Dictation voice to text software was used in the creation of this document   **

## 2022-03-03 NOTE — ASSESSMENT & PLAN NOTE
· Patient does not want to know further about what he knows right now regarding his current condition  · Patient would rather wait for further information and possibly discuss with his primary care provider before discussing further with him    · Add prn xanax

## 2022-03-03 NOTE — PROGRESS NOTES
[address] [facility]  Progress Note - Patrecia Lips 1940, 80 y o  male MRN: [id number]  Unit/Bed#: [facility] 834-01 Encounter: [id number]  Primary Care Provider: Taar Horta MD   Date and time admitted to hospital: 3/2/2022 10:34 PM    * Pancreatic mass  Assessment & Plan  · Patient's workup has all been done by [facility] gastroenterology  · Currently with elevated liver enzymes from previous; sent to [facility] for further gastroenterology intervention with ERCP possibly stenting- plan for ERCP 3/4  · Gastroenterology consult appreciated  · hold Eliquis- start IV heparin gtt give recent PE    Hyperbilirubinemia  Assessment & Plan  · Will continue to monitor liver enzymes  · For ERCP tomorrow  History of pulmonary embolism  Assessment & Plan  · On chronic Eliquis  · hold Eliquis for anticipated procedure tomorrow  · Pt reports PE was diagnosed in December of this year- will start VTE high heparin gtt now  Stop at 6 am prior to ERCP     Tobacco abuse  Assessment & Plan  Encourage cessation   C/w patch     Anxiety about health  Assessment & Plan  · Patient does not want to know further about what he knows right now regarding his current condition  · Patient would rather wait for further information and possibly discuss with his primary care provider before discussing further with him  · Add prn xanax     GERD (gastroesophageal reflux disease)  Assessment & Plan  Continue pantoprazole 40 mg p o  Type 2 diabetes mellitus without complication, with long-term current use of insulin [facility])  Assessment & Plan  Lab Results   Component Value Date    HGBA1C 6 1 08/28/2021       Recent Labs     03/03/22  0755 03/03/22  1145   POCGLU 187* 140       Blood Sugar Average: Last 72 hrs:  (P) 163 5   · currently on Lantus- reduce dose to 20 units daily since he will be NPO after midnight     · Insulin sliding scale with Accu-Cheks every   · Hold Metformin   · ADA diet · Hypoglycemia protocol        VTE Pharmacologic Prophylaxis: VTE Score: 6 High Risk (Score >/= 5) - Pharmacological DVT Prophylaxis Ordered: heparin drip  Sequential Compression Devices Ordered  Patient Centered Rounds: I performed bedside rounds with nursing staff today  Discussions with Specialists or Other Care Team Provider: d/w RN d/w GI     Education and Discussions with Family / Patient: Patient declined call to   Time Spent for Care: 30 minutes  More than 50% of total time spent on counseling and coordination of care as described above  Current Length of Stay: 1 day(s)  Current Patient Status: Inpatient   Certification Statement: The patient will continue to require additional inpatient hospital stay due to pending ERCP in am   Discharge Plan: Anticipate discharge in 24-48 hrs to home  Code Status: Level 1 - Full Code    Subjective:   Pt reports right abdominal pain 2/10 currently  Denies n/v/d/c  Pt reports he is feeling very anxious  Denies any other complaints  Objective:     Vitals:   Temp (24hrs), Av °F (36 7 °C), Min:97 8 °F (36 6 °C), Max:98 4 °F (36 9 °C)    Temp:  [97 8 °F (36 6 °C)-98 4 °F (36 9 °C)] 97 9 °F (36 6 °C)  HR:  [74-88] 80  Resp:  [13-31] 20  BP: (123-158)/(64-96) 132/83  SpO2:  [96 %-99 %] 99 %  Body mass index is 21 75 kg/m²  Input and Output Summary (last 24 hours): Intake/Output Summary (Last 24 hours) at 3/3/2022 1241  Last data filed at 3/3/2022 1030  Gross per 24 hour   Intake 1088 33 ml   Output --   Net 1088 33 ml       Physical Exam:   Physical Exam  Constitutional:       General: He is not in acute distress  Eyes:      General: Scleral icterus present  Cardiovascular:      Rate and Rhythm: Normal rate and regular rhythm  Pulses: Normal pulses  Heart sounds: Normal heart sounds  No murmur heard  Pulmonary:      Effort: No respiratory distress  Breath sounds: Normal breath sounds  No wheezing or rales  Abdominal:      General: Bowel sounds are normal  There is no distension  Palpations: Abdomen is soft  Tenderness: There is no abdominal tenderness  Musculoskeletal:         General: No swelling or tenderness  Skin:     General: Skin is warm and dry  Coloration: Skin is jaundiced  Findings: No erythema or rash  Neurological:      General: No focal deficit present  Mental Status: He is alert  Mental status is at baseline  Psychiatric:         Attention and Perception: He is inattentive  Mood and Affect: Mood is anxious  Additional Data:     Labs:  Results from last 7 days   Lab Units 03/03/22  0256   WBC Thousand/uL 5 80   HEMOGLOBIN g/dL 12 5   HEMATOCRIT % 36 9   PLATELETS Thousands/uL 195   NEUTROS PCT % 66   LYMPHS PCT % 19   MONOS PCT % 11   EOS PCT % 3     Results from last 7 days   Lab Units 03/03/22  0256   SODIUM mmol/L 134*   POTASSIUM mmol/L 3 4*   CHLORIDE mmol/L 101   CO2 mmol/L 26   BUN mg/dL 24   CREATININE mg/dL 0 67   ANION GAP mmol/L 7   CALCIUM mg/dL 9 6   ALBUMIN g/dL 3 3*   TOTAL BILIRUBIN mg/dL 11 20*   ALK PHOS U/L 302*   ALT U/L 695*   AST U/L 333*   GLUCOSE RANDOM mg/dL 220*     Results from last 7 days   Lab Units 03/03/22  0256   INR  1 20*     Results from last 7 days   Lab Units 03/03/22  1145 03/03/22  0755   POC GLUCOSE mg/dl 140 187*               Lines/Drains:  Invasive Devices  Report    Peripheral Intravenous Line            Peripheral IV 03/03/22 Left;Ventral (anterior) Forearm <1 day                      Imaging: No pertinent imaging reviewed      Recent Cultures (last 7 days):         Last 24 Hours Medication List:   Current Facility-Administered Medications   Medication Dose Route Frequency Provider Last Rate    ALPRAZolam  0 25 mg Oral TID PRN TAMIKA Malin      aluminum-magnesium hydroxide-simethicone  30 mL Oral Q6H PRN Minus MD Donnie      docusate sodium  100 mg Oral BID PRN Minus MD Donnie      heparin (porcine)  3-30 Units/kg/hr (Order-Specific) Intravenous Titrated TAMIKA Figueroa      heparin (porcine)  2,400 Units Intravenous Q1H PRN TAMIKA Figueroa      heparin (porcine)  4,800 Units Intravenous Once TAMIKA Figueroa      heparin (porcine)  4,800 Units Intravenous Q1H PRN TAMIKA Figueroa      HYDROmorphone  0 2 mg Intravenous Q4H PRN Kin Raymundo MD      insulin glargine  40 Units Subcutaneous Daily Kin Raymundo MD      insulin lispro  1-5 Units Subcutaneous TID AC TAMIKA Figueroa      multi-electrolyte  100 mL/hr Intravenous Continuous Kin Raymundo  mL/hr (03/03/22 1030)    nicotine  1 patch Transdermal Daily Kin Raymundo MD      ondansetron  4 mg Intravenous Q6H PRN Kin Raymundo MD      oxyCODONE  2 5 mg Oral Q4H PRN Kin Raymundo MD      oxyCODONE  5 mg Oral Q4H PRN Kin Raymundo MD      pantoprazole  40 mg Oral Early Morning iKn Raymundo MD      polyethylene glycol  17 g Oral Daily Kin Raymundo MD          Today, Patient Was Seen By: TAMIKA Figueroa    **Please Note: This note may have been constructed using a voice recognition system  **

## 2022-03-03 NOTE — ASSESSMENT & PLAN NOTE
Patient does not want to know further about what he knows right now regarding his current condition  Patient would rather wait for further information and possibly discuss with his primary care provider before discussing further with him

## 2022-03-04 ENCOUNTER — APPOINTMENT (OUTPATIENT)
Dept: GASTROENTEROLOGY | Facility: HOSPITAL | Age: 82
DRG: 438 | End: 2022-03-04
Payer: COMMERCIAL

## 2022-03-04 ENCOUNTER — ANESTHESIA (INPATIENT)
Dept: GASTROENTEROLOGY | Facility: HOSPITAL | Age: 82
DRG: 438 | End: 2022-03-04
Payer: COMMERCIAL

## 2022-03-04 ENCOUNTER — ANESTHESIA EVENT (INPATIENT)
Dept: GASTROENTEROLOGY | Facility: HOSPITAL | Age: 82
DRG: 438 | End: 2022-03-04
Payer: COMMERCIAL

## 2022-03-04 ENCOUNTER — APPOINTMENT (INPATIENT)
Dept: RADIOLOGY | Facility: HOSPITAL | Age: 82
DRG: 438 | End: 2022-03-04
Payer: COMMERCIAL

## 2022-03-04 PROBLEM — Z72.0 TOBACCO ABUSE: Status: RESOLVED | Noted: 2022-03-03 | Resolved: 2022-03-04

## 2022-03-04 PROBLEM — E87.6 HYPOKALEMIA: Status: ACTIVE | Noted: 2022-03-04

## 2022-03-04 LAB
ALBUMIN SERPL BCP-MCNC: 3 G/DL (ref 3.5–5)
ALP SERPL-CCNC: 293 U/L (ref 46–116)
ALT SERPL W P-5'-P-CCNC: 701 U/L (ref 12–78)
ANION GAP SERPL CALCULATED.3IONS-SCNC: 9 MMOL/L (ref 4–13)
APTT PPP: 27 SECONDS (ref 23–37)
APTT PPP: 81 SECONDS (ref 23–37)
AST SERPL W P-5'-P-CCNC: 380 U/L (ref 5–45)
BILIRUB SERPL-MCNC: 11.91 MG/DL (ref 0.2–1)
BUN SERPL-MCNC: 13 MG/DL (ref 5–25)
CALCIUM ALBUM COR SERPL-MCNC: 9.7 MG/DL (ref 8.3–10.1)
CALCIUM SERPL-MCNC: 8.9 MG/DL (ref 8.3–10.1)
CHLORIDE SERPL-SCNC: 104 MMOL/L (ref 100–108)
CO2 SERPL-SCNC: 24 MMOL/L (ref 21–32)
CREAT SERPL-MCNC: 0.55 MG/DL (ref 0.6–1.3)
ERYTHROCYTE [DISTWIDTH] IN BLOOD BY AUTOMATED COUNT: 14.8 % (ref 11.6–15.1)
ERYTHROCYTE [DISTWIDTH] IN BLOOD BY AUTOMATED COUNT: 15.2 % (ref 11.6–15.1)
GFR SERPL CREATININE-BSD FRML MDRD: 99 ML/MIN/1.73SQ M
GLUCOSE SERPL-MCNC: 121 MG/DL (ref 65–140)
GLUCOSE SERPL-MCNC: 130 MG/DL (ref 65–140)
GLUCOSE SERPL-MCNC: 205 MG/DL (ref 65–140)
GLUCOSE SERPL-MCNC: 63 MG/DL (ref 65–140)
GLUCOSE SERPL-MCNC: 68 MG/DL (ref 65–140)
GLUCOSE SERPL-MCNC: 71 MG/DL (ref 65–140)
HCT VFR BLD AUTO: 34.1 % (ref 36.5–49.3)
HCT VFR BLD AUTO: 34.5 % (ref 36.5–49.3)
HGB BLD-MCNC: 11.9 G/DL (ref 12–17)
HGB BLD-MCNC: 12.2 G/DL (ref 12–17)
INR PPP: 1.18 (ref 0.84–1.19)
MCH RBC QN AUTO: 29.5 PG (ref 26.8–34.3)
MCH RBC QN AUTO: 29.9 PG (ref 26.8–34.3)
MCHC RBC AUTO-ENTMCNC: 34.9 G/DL (ref 31.4–37.4)
MCHC RBC AUTO-ENTMCNC: 35.4 G/DL (ref 31.4–37.4)
MCV RBC AUTO: 84 FL (ref 82–98)
MCV RBC AUTO: 85 FL (ref 82–98)
PLATELET # BLD AUTO: 164 THOUSANDS/UL (ref 149–390)
PLATELET # BLD AUTO: 178 THOUSANDS/UL (ref 149–390)
PMV BLD AUTO: 11.2 FL (ref 8.9–12.7)
PMV BLD AUTO: 11.4 FL (ref 8.9–12.7)
POTASSIUM SERPL-SCNC: 3.1 MMOL/L (ref 3.5–5.3)
PROT SERPL-MCNC: 6.4 G/DL (ref 6.4–8.2)
PROTHROMBIN TIME: 14.5 SECONDS (ref 11.6–14.5)
RBC # BLD AUTO: 4.04 MILLION/UL (ref 3.88–5.62)
RBC # BLD AUTO: 4.08 MILLION/UL (ref 3.88–5.62)
SODIUM SERPL-SCNC: 137 MMOL/L (ref 136–145)
WBC # BLD AUTO: 5.64 THOUSAND/UL (ref 4.31–10.16)
WBC # BLD AUTO: 7.77 THOUSAND/UL (ref 4.31–10.16)

## 2022-03-04 PROCEDURE — 88172 CYTP DX EVAL FNA 1ST EA SITE: CPT | Performed by: PATHOLOGY

## 2022-03-04 PROCEDURE — 88305 TISSUE EXAM BY PATHOLOGIST: CPT | Performed by: PATHOLOGY

## 2022-03-04 PROCEDURE — 85610 PROTHROMBIN TIME: CPT | Performed by: INTERNAL MEDICINE

## 2022-03-04 PROCEDURE — 88363 XM ARCHIVE TISSUE MOLEC ANAL: CPT | Performed by: PATHOLOGY

## 2022-03-04 PROCEDURE — 0FBG8ZX EXCISION OF PANCREAS, VIA NATURAL OR ARTIFICIAL OPENING ENDOSCOPIC, DIAGNOSTIC: ICD-10-PCS | Performed by: INTERNAL MEDICINE

## 2022-03-04 PROCEDURE — 0F798DZ DILATION OF COMMON BILE DUCT WITH INTRALUMINAL DEVICE, VIA NATURAL OR ARTIFICIAL OPENING ENDOSCOPIC: ICD-10-PCS | Performed by: INTERNAL MEDICINE

## 2022-03-04 PROCEDURE — BF101ZZ FLUOROSCOPY OF BILE DUCTS USING LOW OSMOLAR CONTRAST: ICD-10-PCS | Performed by: INTERNAL MEDICINE

## 2022-03-04 PROCEDURE — 85730 THROMBOPLASTIN TIME PARTIAL: CPT | Performed by: INTERNAL MEDICINE

## 2022-03-04 PROCEDURE — C1874 STENT, COATED/COV W/DEL SYS: HCPCS

## 2022-03-04 PROCEDURE — 99232 SBSQ HOSP IP/OBS MODERATE 35: CPT | Performed by: NURSE PRACTITIONER

## 2022-03-04 PROCEDURE — 43242 EGD US FINE NEEDLE BX/ASPIR: CPT | Performed by: INTERNAL MEDICINE

## 2022-03-04 PROCEDURE — 85027 COMPLETE CBC AUTOMATED: CPT | Performed by: NURSE PRACTITIONER

## 2022-03-04 PROCEDURE — 74330 X-RAY BILE/PANC ENDOSCOPY: CPT

## 2022-03-04 PROCEDURE — 85027 COMPLETE CBC AUTOMATED: CPT | Performed by: INTERNAL MEDICINE

## 2022-03-04 PROCEDURE — 82948 REAGENT STRIP/BLOOD GLUCOSE: CPT

## 2022-03-04 PROCEDURE — 80053 COMPREHEN METABOLIC PANEL: CPT | Performed by: NURSE PRACTITIONER

## 2022-03-04 PROCEDURE — 88173 CYTOPATH EVAL FNA REPORT: CPT | Performed by: PATHOLOGY

## 2022-03-04 PROCEDURE — 43274 ERCP DUCT STENT PLACEMENT: CPT | Performed by: INTERNAL MEDICINE

## 2022-03-04 PROCEDURE — C1769 GUIDE WIRE: HCPCS

## 2022-03-04 PROCEDURE — 85730 THROMBOPLASTIN TIME PARTIAL: CPT | Performed by: HOSPITALIST

## 2022-03-04 RX ORDER — SODIUM CHLORIDE 9 MG/ML
75 INJECTION, SOLUTION INTRAVENOUS CONTINUOUS
Status: DISCONTINUED | OUTPATIENT
Start: 2022-03-04 | End: 2022-03-05

## 2022-03-04 RX ORDER — POTASSIUM CHLORIDE 14.9 MG/ML
20 INJECTION INTRAVENOUS ONCE
Status: COMPLETED | OUTPATIENT
Start: 2022-03-04 | End: 2022-03-04

## 2022-03-04 RX ORDER — LIDOCAINE HYDROCHLORIDE 10 MG/ML
INJECTION, SOLUTION EPIDURAL; INFILTRATION; INTRACAUDAL; PERINEURAL AS NEEDED
Status: DISCONTINUED | OUTPATIENT
Start: 2022-03-04 | End: 2022-03-04

## 2022-03-04 RX ORDER — ALBUMIN, HUMAN INJ 5% 5 %
SOLUTION INTRAVENOUS CONTINUOUS PRN
Status: DISCONTINUED | OUTPATIENT
Start: 2022-03-04 | End: 2022-03-04

## 2022-03-04 RX ORDER — PROPOFOL 10 MG/ML
INJECTION, EMULSION INTRAVENOUS AS NEEDED
Status: DISCONTINUED | OUTPATIENT
Start: 2022-03-04 | End: 2022-03-04

## 2022-03-04 RX ORDER — FENTANYL CITRATE 50 UG/ML
INJECTION, SOLUTION INTRAMUSCULAR; INTRAVENOUS AS NEEDED
Status: DISCONTINUED | OUTPATIENT
Start: 2022-03-04 | End: 2022-03-04

## 2022-03-04 RX ORDER — SODIUM CHLORIDE, SODIUM LACTATE, POTASSIUM CHLORIDE, CALCIUM CHLORIDE 600; 310; 30; 20 MG/100ML; MG/100ML; MG/100ML; MG/100ML
INJECTION, SOLUTION INTRAVENOUS CONTINUOUS PRN
Status: DISCONTINUED | OUTPATIENT
Start: 2022-03-04 | End: 2022-03-04

## 2022-03-04 RX ORDER — ROCURONIUM BROMIDE 10 MG/ML
INJECTION, SOLUTION INTRAVENOUS AS NEEDED
Status: DISCONTINUED | OUTPATIENT
Start: 2022-03-04 | End: 2022-03-04

## 2022-03-04 RX ORDER — DEXTROSE AND SODIUM CHLORIDE 5; .9 G/100ML; G/100ML
100 INJECTION, SOLUTION INTRAVENOUS CONTINUOUS
Status: DISCONTINUED | OUTPATIENT
Start: 2022-03-04 | End: 2022-03-04

## 2022-03-04 RX ADMIN — PHENYLEPHRINE HYDROCHLORIDE 50 MCG/MIN: 10 INJECTION INTRAVENOUS at 13:40

## 2022-03-04 RX ADMIN — FENTANYL CITRATE 25 MCG: 50 INJECTION INTRAMUSCULAR; INTRAVENOUS at 13:34

## 2022-03-04 RX ADMIN — SUGAMMADEX 120 MG: 100 INJECTION, SOLUTION INTRAVENOUS at 14:47

## 2022-03-04 RX ADMIN — OXYCODONE HYDROCHLORIDE 2.5 MG: 5 TABLET ORAL at 10:29

## 2022-03-04 RX ADMIN — DOCUSATE SODIUM 100 MG: 100 CAPSULE ORAL at 16:10

## 2022-03-04 RX ADMIN — ALBUMIN (HUMAN): 12.5 INJECTION, SOLUTION INTRAVENOUS at 13:50

## 2022-03-04 RX ADMIN — HEPARIN SODIUM 18 UNITS/KG/HR: 10000 INJECTION, SOLUTION INTRAVENOUS; SUBCUTANEOUS at 23:35

## 2022-03-04 RX ADMIN — DEXTROSE AND SODIUM CHLORIDE 50 ML/HR: 5; .9 INJECTION, SOLUTION INTRAVENOUS at 10:14

## 2022-03-04 RX ADMIN — IOHEXOL 12 ML: 240 INJECTION, SOLUTION INTRATHECAL; INTRAVASCULAR; INTRAVENOUS; ORAL at 14:15

## 2022-03-04 RX ADMIN — FENTANYL CITRATE 25 MCG: 50 INJECTION INTRAMUSCULAR; INTRAVENOUS at 13:30

## 2022-03-04 RX ADMIN — SODIUM CHLORIDE 75 ML/HR: 9 INJECTION, SOLUTION INTRAVENOUS at 17:29

## 2022-03-04 RX ADMIN — PROPOFOL 160 MG: 10 INJECTION, EMULSION INTRAVENOUS at 13:34

## 2022-03-04 RX ADMIN — SODIUM CHLORIDE, SODIUM LACTATE, POTASSIUM CHLORIDE, AND CALCIUM CHLORIDE: .6; .31; .03; .02 INJECTION, SOLUTION INTRAVENOUS at 13:27

## 2022-03-04 RX ADMIN — POTASSIUM CHLORIDE 20 MEQ: 14.9 INJECTION, SOLUTION INTRAVENOUS at 10:14

## 2022-03-04 RX ADMIN — LIDOCAINE HYDROCHLORIDE 100 MG: 10 INJECTION, SOLUTION EPIDURAL; INFILTRATION; INTRACAUDAL; PERINEURAL at 13:34

## 2022-03-04 RX ADMIN — OXYCODONE HYDROCHLORIDE 5 MG: 5 TABLET ORAL at 16:01

## 2022-03-04 RX ADMIN — ROCURONIUM BROMIDE 50 MG: 50 INJECTION, SOLUTION INTRAVENOUS at 13:35

## 2022-03-04 RX ADMIN — SODIUM CHLORIDE, SODIUM GLUCONATE, SODIUM ACETATE, POTASSIUM CHLORIDE, MAGNESIUM CHLORIDE, SODIUM PHOSPHATE, DIBASIC, AND POTASSIUM PHOSPHATE 100 ML/HR: .53; .5; .37; .037; .03; .012; .00082 INJECTION, SOLUTION INTRAVENOUS at 06:11

## 2022-03-04 NOTE — ASSESSMENT & PLAN NOTE
Lab Results   Component Value Date    HGBA1C 6 1 08/28/2021       Recent Labs     03/03/22  0755 03/03/22  1145 03/03/22  1629 03/04/22  0827   POCGLU 187* 140 86 63*       Blood Sugar Average: Last 72 hrs:  (P) 119   · currently on Lantus- reduced dose to 20 units daily 3/3- will hold dose of lantus today given hypoglycemia and start D5NS at 50ml/hr since he is currently NPO    · Insulin sliding scale with Accu-Cheks every 6hrs   · Hold Metformin   · ADA diet   · Hypoglycemia protocol

## 2022-03-04 NOTE — ANESTHESIA POSTPROCEDURE EVALUATION
Post-Op Assessment Note    CV Status:  Stable  Pain Score: 0    Pain management: adequate     Mental Status:  Agitated   Hydration Status:  Stable   PONV Controlled:  Controlled   Airway Patency:  Patent      Post Op Vitals Reviewed: Yes      Staff: CRNA         No complications documented      BP (!) 190/95 (03/04/22 1506)    Temp (!) 97 3 °F (36 3 °C) (03/04/22 1506)    Pulse 94 (03/04/22 1506)   Resp 18 (03/04/22 1506)    SpO2 100 % (03/04/22 1506)

## 2022-03-04 NOTE — ASSESSMENT & PLAN NOTE
· On chronic Eliquis  · hold Eliquis for anticipated procedure tomorrow    · Pt reports PE was diagnosed in December of this year  · Started on VTE high heparin gtt yesterday- currently on hold for ERCP

## 2022-03-04 NOTE — PROGRESS NOTES
[address] [facility]  Progress Note - Maite Schmitz 1940, 80 y o  male MRN: [id number]  Unit/Bed#: [facility] 834-01 Encounter: [id number]  Primary Care Provider: Leann Rehman MD   Date and time admitted to hospital: 3/2/2022 10:34 PM    * Pancreatic mass  Assessment & Plan  · Patient's workup has all been done by [facility] gastroenterology  · Currently with elevated liver enzymes from previous; sent to [facility] for further gastroenterology intervention with ERCP possibly stenting- plan for ERCP today   · Gastroenterology consult appreciated  · holding Eliquis- started IV heparin gtt give recent PE on 3/4- currently on hold for procedure     Hyperbilirubinemia  Assessment & Plan  · Will continue to monitor liver enzymes  · For ERCP today    History of pulmonary embolism  Assessment & Plan  · On chronic Eliquis  · hold Eliquis for anticipated procedure tomorrow  · Pt reports PE was diagnosed in December of this year  · Started on VTE high heparin gtt yesterday- currently on hold for ERCP     Hypokalemia  Assessment & Plan  · Replete with potassium chloride 20 meq IV x1     Tobacco abuse  Assessment & Plan  · Encourage cessation   · C/w patch     Anxiety about health  Assessment & Plan  · Patient does not want to know further about what he knows right now regarding his current condition  · Patient would rather wait for further information and possibly discuss with his primary care provider before discussing further with him  · Provide supportive care   · prn xanax     GERD (gastroesophageal reflux disease)  Assessment & Plan  Continue pantoprazole 40 mg p o      Type 2 diabetes mellitus without complication, with long-term current use of insulin [facility])  Assessment & Plan  Lab Results   Component Value Date    HGBA1C 6 1 08/28/2021       Recent Labs     03/03/22  0755 03/03/22  1145 03/03/22  1629 03/04/22  0827   POCGLU 187* 140 86 63*       Blood Sugar Average: Last 72 hrs:  (P) 119   · currently on Lantus- reduced dose to 20 units daily 3/3- will hold dose of lantus today given hypoglycemia and start D5NS at 50ml/hr since he is currently NPO    · Insulin sliding scale with Accu-Cheks every 6hrs   · Hold Metformin   · ADA diet   · Hypoglycemia protocol        VTE Pharmacologic Prophylaxis: VTE Score: 6 High Risk (Score >/= 5) - Pharmacological DVT Prophylaxis Ordered: heparin drip  Sequential Compression Devices Ordered  currently on hold for ERCP     Patient Centered Rounds: I performed bedside rounds with nursing staff today  Discussions with Specialists or Other Care Team Provider: d/w RN     Education and Discussions with Family / Patient: Updated  (sister) via phone  Time Spent for Care: 30 minutes  More than 50% of total time spent on counseling and coordination of care as described above  Current Length of Stay: 2 day(s)  Current Patient Status: Inpatient   Certification Statement: The patient will continue to require additional inpatient hospital stay due to ERCP   Discharge Plan: Anticipate discharge in 24-48 hrs to home  Code Status: Level 1 - Full Code    Subjective:   Pt reports he is anxious because his blood sugar is 63 and he is afraid he will go into a diabetic coma  He reports some minor 2/10 abdominal pain today denies n/v/d/c  Pt denies cp or sob  Admits to feeling week and tired with the low glucose     Objective:     Vitals:   Temp (24hrs), Av 7 °F (37 1 °C), Min:98 6 °F (37 °C), Max:98 8 °F (37 1 °C)    Temp:  [98 6 °F (37 °C)-98 8 °F (37 1 °C)] 98 6 °F (37 °C)  Resp:  [9-18] 18  BP: (132-136)/(81-84) 136/82  Body mass index is 21 75 kg/m²  Input and Output Summary (last 24 hours):      Intake/Output Summary (Last 24 hours) at 3/4/2022 0912  Last data filed at 3/4/2022 0854  Gross per 24 hour   Intake 3397 28 ml   Output --   Net 3397 28 ml       Physical Exam:   Physical Exam  Constitutional:       General: He is not in acute distress  Eyes:      General: Scleral icterus present  Cardiovascular:      Rate and Rhythm: Normal rate and regular rhythm  Pulses: Normal pulses  Heart sounds: Normal heart sounds  No murmur heard  Pulmonary:      Effort: No respiratory distress  Breath sounds: Normal breath sounds  No wheezing or rales  Abdominal:      General: Bowel sounds are normal  There is no distension  Palpations: Abdomen is soft  Tenderness: There is no abdominal tenderness  Musculoskeletal:         General: No swelling or tenderness  Skin:     General: Skin is warm and dry  Coloration: Skin is jaundiced  Findings: No erythema or rash  Neurological:      General: No focal deficit present  Mental Status: He is alert  Mental status is at baseline  Psychiatric:         Attention and Perception: Attention normal          Mood and Affect: Mood is anxious  Additional Data:     Labs:  Results from last 7 days   Lab Units 03/04/22  0531 03/03/22  0256 03/03/22  0256   WBC Thousand/uL 5 64   < > 5 80   HEMOGLOBIN g/dL 11 9*   < > 12 5   HEMATOCRIT % 34 1*   < > 36 9   PLATELETS Thousands/uL 178   < > 195   NEUTROS PCT %  --   --  66   LYMPHS PCT %  --   --  19   MONOS PCT %  --   --  11   EOS PCT %  --   --  3    < > = values in this interval not displayed       Results from last 7 days   Lab Units 03/04/22  0531   SODIUM mmol/L 137   POTASSIUM mmol/L 3 1*   CHLORIDE mmol/L 104   CO2 mmol/L 24   BUN mg/dL 13   CREATININE mg/dL 0 55*   ANION GAP mmol/L 9   CALCIUM mg/dL 8 9   ALBUMIN g/dL 3 0*   TOTAL BILIRUBIN mg/dL 11 91*   ALK PHOS U/L 293*   ALT U/L 701*   AST U/L 380*   GLUCOSE RANDOM mg/dL 71     Results from last 7 days   Lab Units 03/03/22  0256   INR  1 20*     Results from last 7 days   Lab Units 03/04/22  0827 03/03/22  1629 03/03/22  1145 03/03/22  0755   POC GLUCOSE mg/dl 63* 86 140 187*               Lines/Drains:  Invasive Devices  Report    Peripheral Intravenous Line Peripheral IV 03/03/22 Left;Ventral (anterior) Forearm 1 day    Peripheral IV 03/03/22 Distal;Dorsal (posterior); Left Forearm <1 day                      Imaging: No pertinent imaging reviewed  Recent Cultures (last 7 days):         Last 24 Hours Medication List:   Current Facility-Administered Medications   Medication Dose Route Frequency Provider Last Rate    ALPRAZolam  0 25 mg Oral TID PRN TAMIKA Nguyen      aluminum-magnesium hydroxide-simethicone  30 mL Oral Q6H PRN Kelsey Roland MD      dextrose 5 % and sodium chloride 0 9 %  50 mL/hr Intravenous Continuous TAMIKA Cruz      docusate sodium  100 mg Oral BID PRN Kelsey Roland MD      HYDROmorphone  0 2 mg Intravenous Q4H PRN Kelsey Roland MD      insulin glargine  20 Units Subcutaneous Daily TAMIKA Nguyen      insulin lispro  1-5 Units Subcutaneous TID AC TAMIKA Nguyen      nicotine  1 patch Transdermal Daily Kelsey Roland MD      ondansetron  4 mg Intravenous Q6H PRN Kelsey Roland MD      oxyCODONE  2 5 mg Oral Q4H PRN Kelsey Roland MD      oxyCODONE  5 mg Oral Q4H PRN Kelsey Roland MD      pantoprazole  40 mg Oral Early Morning Kelsey Roland MD      polyethylene glycol  17 g Oral Daily Kelsey Roland MD      potassium chloride  20 mEq Intravenous Once TAMIKA Nguyen          Today, Patient Was Seen By: TAMIKA Nguyen    **Please Note: This note may have been constructed using a voice recognition system  **

## 2022-03-04 NOTE — ANESTHESIA PREPROCEDURE EVALUATION
Procedure:  ERCP  ENDOSCOPIC ULTRASOUND (UPPER)    Relevant Problems   ANESTHESIA (within normal limits)      CARDIO (within normal limits)      ENDO   (+) Type 2 diabetes mellitus without complication, with long-term current use of insulin (HCC)      GI/HEPATIC   (+) GERD (gastroesophageal reflux disease)      NEURO/PSYCH   (+) Anxiety about health   (+) History of pulmonary embolism      PULMONARY   (-) Smoking      Other   (+) Pancreatic mass        Physical Exam    Airway    Mallampati score: II  TM Distance: >3 FB  Neck ROM: full     Dental   No notable dental hx     Cardiovascular      Pulmonary      Other Findings        Anesthesia Plan  ASA Score- 2     Anesthesia Type- general with ASA Monitors  Additional Monitors:   Airway Plan:           Plan Factors-Exercise tolerance (METS): >4 METS  Chart reviewed  EKG reviewed  Existing labs reviewed  Patient summary reviewed  Patient is not a current smoker  Induction- intravenous  Postoperative Plan-     Informed Consent- Anesthetic plan and risks discussed with patient  I personally reviewed this patient with the CRNA  Discussed and agreed on the Anesthesia Plan with the DOMO Rondon

## 2022-03-04 NOTE — ASSESSMENT & PLAN NOTE
· Patient's workup has all been done by [facility] gastroenterology    · Currently with elevated liver enzymes from previous; sent to [facility] for further gastroenterology intervention with ERCP possibly stenting- plan for ERCP today   · Gastroenterology consult appreciated  · holding Eliquis- started IV heparin gtt give recent PE on 3/4- currently on hold for procedure

## 2022-03-04 NOTE — ASSESSMENT & PLAN NOTE
· Patient does not want to know further about what he knows right now regarding his current condition  · Patient would rather wait for further information and possibly discuss with his primary care provider before discussing further with him    · Provide supportive care   · prn xanax

## 2022-03-04 NOTE — CASE MANAGEMENT
Case Management Discharge Planning Note    Patient name Penny Venegas  [facility] [id number]  : 1940 Date 3/4/2022       Current Admission Date: 3/2/2022  Current Admission Diagnosis:Pancreatic mass   Patient Active Problem List    Diagnosis Date Noted    Hypokalemia 2022    Tobacco abuse 2022    Type 2 diabetes mellitus without complication, with long-term current use of insulin (HCC)     GERD (gastroesophageal reflux disease)     Pancreatic mass     History of pulmonary embolism     Hyperbilirubinemia     Anxiety about health       LOS (days): 2  Geometric Mean LOS (GMLOS) (days): 3 10  Days to GMLOS:1 6     OBJECTIVE:  Risk of Unplanned Readmission Score: 12         Current admission status: Inpatient   Preferred Pharmacy:   [facility]/pharmacy #[id number]- [address]  Phone: [phone] Fax: [phone]    Primary Care Provider: Stuart Quiroz MD    Primary Insurance: [facility]  Secondary Insurance:     DISCHARGE DETAILS:    DC AM    Will need ride  States car is at [facility] but he is not sure he should drive    If he cannot, would need ride home                                                                                        IMM Given (Date):: 22  IMM Given to[de-identified] Patient

## 2022-03-05 VITALS
TEMPERATURE: 98 F | OXYGEN SATURATION: 99 % | DIASTOLIC BLOOD PRESSURE: 72 MMHG | BODY MASS INDEX: 21.97 KG/M2 | HEIGHT: 67 IN | HEART RATE: 80 BPM | SYSTOLIC BLOOD PRESSURE: 121 MMHG | RESPIRATION RATE: 16 BRPM | WEIGHT: 140 LBS

## 2022-03-05 PROBLEM — E87.6 HYPOKALEMIA: Status: RESOLVED | Noted: 2022-03-04 | Resolved: 2022-03-05

## 2022-03-05 LAB
ALBUMIN SERPL BCP-MCNC: 3.3 G/DL (ref 3.5–5)
ALP SERPL-CCNC: 351 U/L (ref 46–116)
ALT SERPL W P-5'-P-CCNC: 752 U/L (ref 12–78)
ANION GAP SERPL CALCULATED.3IONS-SCNC: 9 MMOL/L (ref 4–13)
APTT PPP: 49 SECONDS (ref 23–37)
AST SERPL W P-5'-P-CCNC: 403 U/L (ref 5–45)
BILIRUB SERPL-MCNC: 8.08 MG/DL (ref 0.2–1)
BUN SERPL-MCNC: 13 MG/DL (ref 5–25)
CALCIUM ALBUM COR SERPL-MCNC: 10.3 MG/DL (ref 8.3–10.1)
CALCIUM SERPL-MCNC: 9.7 MG/DL (ref 8.3–10.1)
CHLORIDE SERPL-SCNC: 102 MMOL/L (ref 100–108)
CO2 SERPL-SCNC: 23 MMOL/L (ref 21–32)
CREAT SERPL-MCNC: 0.57 MG/DL (ref 0.6–1.3)
ERYTHROCYTE [DISTWIDTH] IN BLOOD BY AUTOMATED COUNT: 15.2 % (ref 11.6–15.1)
GFR SERPL CREATININE-BSD FRML MDRD: 97 ML/MIN/1.73SQ M
GLUCOSE SERPL-MCNC: 139 MG/DL (ref 65–140)
GLUCOSE SERPL-MCNC: 147 MG/DL (ref 65–140)
HCT VFR BLD AUTO: 34.9 % (ref 36.5–49.3)
HGB BLD-MCNC: 12 G/DL (ref 12–17)
MCH RBC QN AUTO: 29.4 PG (ref 26.8–34.3)
MCHC RBC AUTO-ENTMCNC: 34.4 G/DL (ref 31.4–37.4)
MCV RBC AUTO: 86 FL (ref 82–98)
PLATELET # BLD AUTO: 183 THOUSANDS/UL (ref 149–390)
PMV BLD AUTO: 11.7 FL (ref 8.9–12.7)
POTASSIUM SERPL-SCNC: 3.7 MMOL/L (ref 3.5–5.3)
PROT SERPL-MCNC: 6.6 G/DL (ref 6.4–8.2)
RBC # BLD AUTO: 4.08 MILLION/UL (ref 3.88–5.62)
SODIUM SERPL-SCNC: 134 MMOL/L (ref 136–145)
WBC # BLD AUTO: 5.53 THOUSAND/UL (ref 4.31–10.16)

## 2022-03-05 PROCEDURE — 85730 THROMBOPLASTIN TIME PARTIAL: CPT | Performed by: HOSPITALIST

## 2022-03-05 PROCEDURE — 99232 SBSQ HOSP IP/OBS MODERATE 35: CPT | Performed by: INTERNAL MEDICINE

## 2022-03-05 PROCEDURE — 99239 HOSP IP/OBS DSCHRG MGMT >30: CPT | Performed by: NURSE PRACTITIONER

## 2022-03-05 PROCEDURE — 82948 REAGENT STRIP/BLOOD GLUCOSE: CPT

## 2022-03-05 PROCEDURE — 80053 COMPREHEN METABOLIC PANEL: CPT | Performed by: NURSE PRACTITIONER

## 2022-03-05 PROCEDURE — 85027 COMPLETE CBC AUTOMATED: CPT | Performed by: NURSE PRACTITIONER

## 2022-03-05 RX ORDER — BLOOD SUGAR DIAGNOSTIC
STRIP MISCELLANEOUS
Qty: 100 EACH | Refills: 0 | Status: SHIPPED | OUTPATIENT
Start: 2022-03-05

## 2022-03-05 RX ORDER — BLOOD-GLUCOSE METER
KIT MISCELLANEOUS
Qty: 1 KIT | Refills: 0 | Status: SHIPPED | OUTPATIENT
Start: 2022-03-05

## 2022-03-05 RX ORDER — GLUCOSAMINE HCL/CHONDROITIN SU 500-400 MG
CAPSULE ORAL
Qty: 100 EACH | Refills: 0 | Status: SHIPPED | OUTPATIENT
Start: 2022-03-05

## 2022-03-05 RX ORDER — LANCETS 33 GAUGE
EACH MISCELLANEOUS
Qty: 100 EACH | Refills: 0 | Status: SHIPPED | OUTPATIENT
Start: 2022-03-05

## 2022-03-05 RX ADMIN — INSULIN GLARGINE 20 UNITS: 100 INJECTION, SOLUTION SUBCUTANEOUS at 08:02

## 2022-03-05 RX ADMIN — OXYCODONE HYDROCHLORIDE 5 MG: 5 TABLET ORAL at 11:49

## 2022-03-05 RX ADMIN — OXYCODONE HYDROCHLORIDE 2.5 MG: 5 TABLET ORAL at 00:24

## 2022-03-05 RX ADMIN — PANTOPRAZOLE SODIUM 40 MG: 40 TABLET, DELAYED RELEASE ORAL at 06:46

## 2022-03-05 RX ADMIN — POLYETHYLENE GLYCOL 3350 17 G: 17 POWDER, FOR SOLUTION ORAL at 08:01

## 2022-03-05 RX ADMIN — OXYCODONE HYDROCHLORIDE 2.5 MG: 5 TABLET ORAL at 06:46

## 2022-03-05 RX ADMIN — SODIUM CHLORIDE 75 ML/HR: 9 INJECTION, SOLUTION INTRAVENOUS at 07:58

## 2022-03-05 RX ADMIN — APIXABAN 5 MG: 5 TABLET, FILM COATED ORAL at 10:07

## 2022-03-05 NOTE — ASSESSMENT & PLAN NOTE
· Patient's workup has all been done by [facility] gastroenterology  · presented with elevated liver enzymes from previous; sent to [facility] for further gastroenterology intervention  · S/p ERCP/EUS on 3/4- s/p stent placement (metal) traversing the distal biliary stricture  Large pancreatic head/ uncinate mass with upstream bile duct dilation  Portal confluence was not seen  No liver lesions seen and no lymphadenopathy present  Biopsy done      · F/u with biopsy after discharge   · GI following and cleared for discharge today  · AST and ALT slightly elevated today but Tbili decreasing from 11--> 8- will order repeat CMP in one week from discharge to continue to monitor   ·  --> 403  ·  --> 752

## 2022-03-05 NOTE — DISCHARGE INSTRUCTIONS
Endoscopic Biliary Stent Placement   AMBULATORY CARE:   What you need to know about an endoscopic biliary stent placement: An endoscopic biliary stent placement is a procedure to open a blocked bile duct  Your bile duct carries bile from your gallbladder to your small intestine  Bile helps your body digest foods  A stent is a small tube made of plastic or metal  The stent helps widen your bile duct and keeps it open          How to prepare for an endoscopic biliary stent placement:   · Your healthcare provider will talk to you about how to prepare for your procedure  He or she may tell you not to eat or drink anything after midnight on the day of your procedure  He or she will tell you what medicines to take or not take on the day of your procedure  You may need to stop taking blood thinners, aspirin, or NSAIDs several days before your procedure       · You may be given an antibiotic to help prevent a bacterial infection  You will be given contrast liquid during your procedure  Tell your healthcare provider if you have ever had an allergic reaction to contrast liquid or antibiotics  Arrange for someone to drive you home and stay with you      What will happen during an endoscopic biliary stent placement:   · You will be given general anesthesia to keep you asleep and free from pain during surgery  Your healthcare provider will insert an endoscope through your mouth and into your stomach  An endoscope is a long tube with a camera and a light  The provider will move the endoscope until it reaches your small intestines  He or she will use the camera to find the opening to your bile duct       · Your healthcare provider will inject contrast liquid into your bile duct  This will help your bile duct show up better in pictures  Your provider may use a balloon to widen your bile duct  He or she will insert 1 or more stents through the endoscope and into your bile duct   The provider will take more pictures to make sure the stent or stents are in the correct position      What will happen after an endoscopic biliary stent placement: Healthcare providers will monitor you until you are awake  You may need blood tests to check your liver function  You may have abdominal pain or feel bloated  You may also feel nauseous  These symptoms should go away in a few hours  You may be able to go home or you may need to spend a night in the hospital    Risks of an endoscopic biliary stent placement: You may bleed more than expected or get an infection  The endoscope or tools may damage your esophagus, stomach, intestines, or bile duct  The biliary stent may move out of place or block your bile duct  The procedure may not work and you may need to have another stent placed  Seek care immediately if:   · You have severe abdominal pain or your abdomen is larger than usual       · You faint       · You vomit blood or something that looks like coffee grounds       · You have blood in your bowel movements, or your bowel movements look like tar       · Your skin or whites of your eyes are yellow      Contact your healthcare provider if:   · You have a fever or chills      · You have nausea or are vomiting      · Your skin is itchy, swollen, or you have a rash      · You have questions or concerns about your condition or care      Medicines:   · Medicines may be given to decrease pain       · Take your medicine as directed  Contact your healthcare provider if you think your medicine is not helping or if you have side effects  Tell him or her if you are allergic to any medicine  Keep a list of the medicines, vitamins, and herbs you take  Include the amounts, and when and why you take them  Bring the list or the pill bottles to follow-up visits  Carry your medicine list with you in case of an emergency      Self-care:   · Rest as directed  Return to your regular activities in 24 hours or as directed       · Drink liquids as directed   Liquids will help flush the contrast liquid from your body  Ask how much liquid to drink each day and which liquids are best for you       · Eat small meals more often  This may help prevent nausea  Do not eat spicy or greasy foods      Follow up with your healthcare provider as directed: You may need to return for more tests  Write down your questions so you remember to ask them during your visits  © Copyright [employer] 2022 Information is for End User's use only and may not be sold, redistributed or otherwise used for commercial purposes  All illustrations and images included in CareNotes® are the copyrighted property of A D A M , Inc  or [address] [place] [address] [facility]  The above information is an  only  It is not intended as medical advice for individual conditions or treatments  Talk to your doctor, nurse or pharmacist before following any medical regimen to see if it is safe and effective for you      Pancreatic Stent Placement   WHAT YOU NEED TO KNOW:   A stent is a small tube used to widen a pancreatic duct and keep it open  Pancreatic fluid normally flows through the ducts  Smaller ducts empty fluid into the main duct  A stent can help treat a narrow, blocked, or leaking duct, or drain extra fluid  One or more stents may be placed before certain procedures to prevent pancreatitis (inflammation of the pancreas)  Pancreatic stent placement may be done during endoscopic retrograde cholangiopancreatography (ERCP)          DISCHARGE INSTRUCTIONS:   Call your local emergency number (911 in the [address] [place] [address],[address]) if:   · You have chest pain      · You have sudden or severe trouble breathing      Call your doctor if:   · You have severe pain in your throat 2 days after the procedure      · You have trouble swallowing      · You have severe abdominal pain or your abdomen is larger than usual      · You faint      · You vomit blood or something that looks like coffee grounds      · You have blood in your bowel movements, or your bowel movements look like tar      · You have a fever or chills      · You have nausea or are vomiting      · You have questions or concerns about your condition or care      Medicines:   · Prescription pain medicine may be given  Ask your healthcare provider how to take this medicine safely  Some prescription pain medicines contain acetaminophen  Do not take other medicines that contain acetaminophen without talking to your healthcare provider  Too much acetaminophen may cause liver damage  Prescription pain medicine may cause constipation  Ask your healthcare provider how to prevent or treat constipation       · Take your medicine as directed  Contact your healthcare provider if you think your medicine is not helping or if you have side effects  Tell him or her if you are allergic to any medicine  Keep a list of the medicines, vitamins, and herbs you take  Include the amounts, and when and why you take them  Bring the list or the pill bottles to follow-up visits  Carry your medicine list with you in case of an emergency      Self-care:   · Rest when you feel it is needed  You may be drowsy for up to 24 hours after your procedure  Return to your daily activities as directed       · Drink liquids as directed  Liquids will help flush the contrast liquid from your body  Ask how much liquid to drink each day and which liquids are best for you      · Ask when you can eat regular foods  Healthy foods include fruits, vegetables, whole-grain breads, low-fat dairy products, beans, lean meats, and fish  Eat small meals more often  This may help prevent nausea      · Relieve a sore throat with ice chips, liquids, or lozenges as directed       · Do not take aspirin after your procedure unless your healthcare provider says it is okay  Aspirin may increase your risk for bleeding      Follow up with your healthcare provider as directed: You will need to return in 3 to 4 weeks to make sure the stent is still in the right place   Write down your questions so you remember to ask them during your visits  © Copyright [employer] 2022 Information is for End User's use only and may not be sold, redistributed or otherwise used for commercial purposes  All illustrations and images included in CareNotes® are the copyrighted property of A D A M , Inc  or Rachael Morgan  The above information is an  only  It is not intended as medical advice for individual conditions or treatments  Talk to your doctor, nurse or pharmacist before following any medical regimen to see if it is safe and effective for you      Type 2 Diabetes in the Older Adult   WHAT YOU NEED TO KNOW:   The risk for type 2 diabetes increases as a person gets older  Type 2 diabetes means your pancreas does not make enough insulin, or your body does not use insulin well  Insulin helps move sugar out of the blood so it can be used for energy  Diabetes cannot be cured, but it can be managed         DISCHARGE INSTRUCTIONS:   Have someone call your local emergency number (911 in the [address] [facility],[address]) for any of the following:   · You have any of the following signs of a stroke:      ? Numbness or drooping on one side of your face      ? Weakness in an arm or leg     ?  Confusion or difficulty speaking     ? Dizziness, a severe headache, or vision loss     · You have any of the following signs of a heart attack:      ? Squeezing, pressure, or pain in your chest     ? You may also have any of the following:      § Discomfort or pain in your back, neck, jaw, stomach, or arm     § Shortness of breath     § Nausea or vomiting     § Lightheadedness or a sudden cold sweat     Return to the emergency department if:   · Your blood sugar level is higher than your goal and does not come down with treatment      · You have blurred or double vision      · You have signs of a high ketone level, such as fruity, sweet smelling breath, or shallow breathing      · You have symptoms of a low blood sugar level, such as trouble thinking, sweating, or a pounding heartbeat      · Your blood sugar level is lower than goal and does not improve with treatment      Call your doctor or diabetes care team if:   · You are vomiting or have diarrhea      · You have an upset stomach and cannot eat the foods on your meal plan      · You feel weak or more tired than usual      · You feel dizzy, have headaches, or are easily irritated      · Your skin is red, warm, dry, or swollen      · You have a wound that does not heal      · You have numbness in your arms or legs      · You have trouble coping with diabetes, or you feel anxious or depressed      · You have memory problems      · You have changes in your vision      · You have questions or concerns about your condition or care      Diabetes education: You may have providers come to your house and teach you more about diabetes  You may instead attend classes with others who have diabetes  You will be taught the following:  · When and how to check your blood sugar level: You will learn when to check your blood sugar level and what the level should be  You will learn what to do if your level is too high or too low  You may need to check a drop of your blood in a glucose test machine  Write down the times of your checks and your levels  Take them to all follow-up appointments  Your diabetes care team may recommend a continuous glucose monitor (CGM)  A CGM is a device that is worn at all times  The CGM checks your blood sugar level every 5 minutes  It sends results to an electronic device such as a smart phone              · About diabetes medicine: Oral diabetes medicine may be given to help control your blood sugar levels  Your healthcare provider will teach you how and when to take your diabetes medicine  You will also be taught when not to take the medicine  You will also be taught about side effects oral diabetes medicine can cause   Insulin may be added if oral diabetes medicine becomes less effective over time  Insulin may be injected, or given through a pump or pen  You and your care team will discuss which method is best for you      ? An insulin pump is an implanted device that gives your insulin 24 hours a day  An insulin pump prevents the need for multiple insulin injections in a day           ? An insulin pen is a device prefilled with the right amount of insulin           ? You and your family members will be taught how to draw up and give insulin if this is the best method for you  Your education team will also teach you how to dispose of needles and syringes      ? You will learn how much insulin you need and when to give it  You will be taught when not to give insulin  You will also be taught what to do if your blood sugar level drops too low  This may happen if you take insulin and do not eat the right amount of carbohydrates      · About nutrition: A dietitian will help you make a meal plan to keep your blood sugar level steady  You will learn why protein is important  You will learn how food affects your blood sugar levels  You will also learn to keep track of sugar and starchy foods (carbohydrates)  Do not skip meals  Your blood sugar level may drop too low if you have taken insulin and do not eat      · How to prevent complications: Diabetes that is not well controlled can lead to health problems  Examples include foot sores, retinopathy (vision loss), and peripheral neuropathy (loss of feeling in your hands and feet)  Also, risk for dementia can increase with blood sugar levels that are too high or too low for long periods of time  Your team will help you know when to get regular checkups, such as vision checks  They will teach you how to watch for problems and when to get a problem checked      What you can do to manage your blood sugar levels: Sometimes type 2 diabetes can be managed with changes in nutrition and physical activity    · Work with your diabetes care team to create plans to meet your needs  Your diabetes care team may include a physician, nurse practitioner, and physician assistant  It may also include a diabetes nurse educator, dietitian, and an exercise specialist  Family members, or others who are close to you, may also be part of the team  You and your team will make goals and plans to manage diabetes and other health problems  For example, the plan will include how to manage medicines you may take for diabetes and for other health conditions  The plans and goals will be specific to your needs and abilities  Your plan will change as your needs and abilities change      · Try to be physically active for 30 to 60 minutes most days of the week  Physical activity, such as exercise, helps keep your blood sugar level steady and lowers your risk for heart disease  Physical activity can help improve your balance and strength and lower your risk for falls  Start slowly  Activity can be done in 10-minute intervals      ? Set a goal for 30 minutes of aerobic activity at least 5 times a week  Aerobic activity helps your heart stay strong  Aerobic activity includes walking, bicycling, dancing, swimming, and raking leaves           ? Set a goal for strength training 2 times a week  Strength training helps you keep the muscles you have and build new muscles  Strength training includes lifting weights, climbing stairs, and doing yoga or laverne chi           ? Stay steady on your feet with balancing activities  These include walking backwards, standing on one foot, and walking heel to toe in a straight line         · Maintain a healthy weight  Ask your provider what a healthy weight is for you  A healthy weight can help you control diabetes and prevent heart disease  Ask your provider to help you create a weight loss plan if you are overweight  Weight loss of 10 to 15 pounds can help make a difference in managing diabetes   Together you and your care team can set manageable weight loss goals      · Know the risks if you choose to drink alcohol  Alcohol can cause your blood sugar level to be low if you use insulin  Alcohol can cause high blood sugar levels and weight gain if you drink too much  Women 21 years or older and men 72 years or older should limit alcohol to 1 drink a day  Men 21 to 64 years should limit alcohol to 2 drinks a day  A drink of alcohol is 12 ounces of beer, 5 ounces of wine, or 1½ ounces of liquor  Your care team can tell you how many drinks are okay within 24 hours and within 1 week      What else you can do to manage diabetes:   · Check your feet every day for sores  Look at your whole foot, including the bottom, and between and under your toes  Check for wounds, corns, and calluses  Use a mirror to see the bottom of your feet  The skin on your feet may be shiny, tight, dry, or darker than normal  Your feet may also be cold and pale  Feel your feet by running your hands along the tops, bottoms, sides, and between your toes  Redness, swelling, and warmth are signs of blood flow problems that can lead to a foot ulcer  Do not try to remove corns or calluses yourself           · Wear medical alert identification  Wear medical alert jewelry or carry a card that says you have type 2 diabetes  Ask your care team where to get these items           · Do not smoke  Nicotine and other chemicals in cigarettes can cause lung disease and other health problems  It can also cause blood vessel damage that makes diabetes more difficult to manage  Ask your healthcare provider for information if you currently smoke and need help to quit  Do not use e-cigarettes or smokeless tobacco in place of cigarettes or to help you quit  They still contain nicotine      · Manage other health issues as directed  Health issues may include high blood pressure, high cholesterol levels, and heart problems  Health issues may also include depression   Together you and your care team can create a plan to manage any other health issues      · Ask about vaccines you may need  You have a higher risk for serious illness if you get the flu, pneumonia, COVID-19, or hepatitis  Ask if you should get vaccines to prevent these or other diseases, and when to get the vaccines      · Get help from family and friends  You may need help checking your blood sugar level, giving insulin injections, or preparing your meals  You may also need help to check your feet for sores  Talk to your care team if you need someone at home to help you      Follow up with your doctor or diabetes care team as directed: You will need to return to meet with different care team members  You may need tests to monitor for problems  Write down your questions so you remember to ask them during your visits  Talk to your care team or doctor if you cannot afford your medicines  © Copyright [employer] 2022 Information is for End User's use only and may not be sold, redistributed or otherwise used for commercial purposes  All illustrations and images included in CareNotes® are the copyrighted property of A D A M , Inc  or [facility] [employer]   The above information is an  only  It is not intended as medical advice for individual conditions or treatments  Talk to your doctor, nurse or pharmacist before following any medical regimen to see if it is safe and effective for you        How to Check your Blood Sugar   WHAT YOU NEED TO KNOW:   Your healthcare provider will tell you when and how often to check your blood sugar levels during the day  Your provider may use the results to make changes to your medicine, food, or exercise schedules  DISCHARGE INSTRUCTIONS:   Have someone call 911 for any of the following:   · You cannot be woken  · You have chest pain or shortness of breath  Return to the emergency department if:   · You have a low blood sugar level and it does not improve with treatment       · Your blood sugar level is above 240 mg/dL and does not come down after you take a dose of insulin  · You have ketones in your blood or urine  · You have blurred or double vision  · Your breath has a fruity, sweet smell, or your breathing is shallow  · You have symptoms of a low blood sugar level, such as trouble thinking, sweating, or a pounding heartbeat  Contact your healthcare provider if:   · Your blood sugar levels are higher than your target goals  · You often have low blood sugar levels  · You have trouble coping with your illness or you feel anxious or depressed  · You have questions or concerns about your condition or care  How to check your blood sugar: You will be taught how to check a small drop of blood with a glucose meter  Get all of your supplies together, such as your meter, test strips, and lancet device  Be sure to read the information that came with your meter  Use the following steps as a guide:  · Wash your hands  Use warm water to help blood flow  Dry your hands completely  · Put the test strip in the meter  This will turn on your meter  · Use the lancing device to stick your finger or area  Do not use the same finger or area every time  Stick the side of your fingertip, not the middle  The side may cause less pain  · Squeeze your fingertip or area gently  · Touch the drop of blood to the test strip  A number will appear on the meter after a few seconds  This is your blood sugar level  ·        When to check your blood sugar level:   · If you check your blood sugar level before a meal , it will show your lowest blood sugar  If you check your blood sugar level 2 hours after a meal , it will show your highest blood sugar  Ask your healthcare provider what good goals are for your blood sugar levels at different times  · You may need to check for ketones in your urine or blood if your blood sugar level is higher than directed      Follow up with your healthcare provider as directed:  Write down your results and show them to your healthcare provider at every visit  Also, write down your questions so you remember to ask them during your visits  © Copyright [employer] 2022 Information is for End User's use only and may not be sold, redistributed or otherwise used for commercial purposes  All illustrations and images included in CareNotes® are the copyrighted property of [employer]  or [facility] [employer]   The above information is an  only  It is not intended as medical advice for individual conditions or treatments  Talk to your doctor, nurse or pharmacist before following any medical regimen to see if it is safe and effective for you

## 2022-03-05 NOTE — ASSESSMENT & PLAN NOTE
Lab Results   Component Value Date    HGBA1C 6 1 08/28/2021       Recent Labs     03/04/22  1218 03/04/22  1624 03/04/22  2101 03/05/22  0744   POCGLU 121 130 205* 139       Blood Sugar Average: Last 72 hrs:  (P) 661 [id number]   · Discussed medication regimen with patient- pt was noted to be hypoglycemic yesterday-- pt reports he no longer takes actos or lantus at home because he was able to improve his diet to control his blood sugars at home- he reports his PCP recently stopped his metformin as well- will d/c with no diabetic medication at discharge, c/w CCD   Call to [facility] pharmacy and glucometer ordered for patient to check blood sugars after discharge (no longer has a machine at home)

## 2022-03-05 NOTE — PLAN OF CARE
Problem: PAIN - ADULT  Goal: Verbalizes/displays adequate comfort level or baseline comfort level  Description: Interventions:  - Encourage patient to monitor pain and request assistance  - Assess pain using appropriate pain scale  - Administer analgesics based on type and severity of pain and evaluate response  - Implement non-pharmacological measures as appropriate and evaluate response  - Consider cultural and social influences on pain and pain management  - Notify physician/advanced practitioner if interventions unsuccessful or patient reports new pain  Outcome: Progressing     Problem: INFECTION - ADULT  Goal: Absence or prevention of progression during hospitalization  Description: INTERVENTIONS:  - Assess and monitor for signs and symptoms of infection  - Monitor lab/diagnostic results  - Monitor all insertion sites, i e  indwelling lines, tubes, and drains  - Monitor endotracheal if appropriate and nasal secretions for changes in amount and color  - [place] appropriate cooling/warming therapies per order  - Administer medications as ordered  - Instruct and encourage patient and family to use good hand hygiene technique  - Identify and instruct in appropriate isolation precautions for identified infection/condition  Outcome: Progressing  Goal: Absence of fever/infection during neutropenic period  Description: INTERVENTIONS:  - Monitor WBC    Outcome: Progressing     Problem: SAFETY ADULT  Goal: Patient will remain free of falls  Description: INTERVENTIONS:  - Educate patient/family on patient safety including physical limitations  - Instruct patient to call for assistance with activity   - Consult OT/PT to assist with strengthening/mobility   - Keep Call bell within reach  - Keep bed low and locked with side rails adjusted as appropriate  - Keep care items and personal belongings within reach  - Initiate and maintain comfort rounds  - Make Fall Risk Sign visible to staff  - Apply yellow socks and bracelet for high fall risk patients  - Consider moving patient to room near nurses station  Outcome: Progressing  Goal: Maintain or return to baseline ADL function  Description: INTERVENTIONS:  -  Assess patient's ability to carry out ADLs; assess patient's baseline for ADL function and identify physical deficits which impact ability to perform ADLs (bathing, care of mouth/teeth, toileting, grooming, dressing, etc )  - Assess/evaluate cause of self-care deficits   - Assess range of motion  - Assess patient's mobility; develop plan if impaired  - Assess patient's need for assistive devices and provide as appropriate  - Encourage maximum independence but intervene and supervise when necessary  - Involve family in performance of ADLs  - Assess for home care needs following discharge   - Consider OT consult to assist with ADL evaluation and planning for discharge  - Provide patient education as appropriate  Outcome: Progressing  Goal: Maintains/Returns to pre admission functional level  Description: INTERVENTIONS:  - Perform BMAT or MOVE assessment daily    - Set and communicate daily mobility goal to care team and patient/family/caregiver     - Collaborate with rehabilitation services on mobility goals if consulted  - Out of bed for toileting  - Record patient progress and toleration of activity level   Outcome: Progressing     Problem: DISCHARGE PLANNING  Goal: Discharge to home or other facility with appropriate resources  Description: INTERVENTIONS:  - Identify barriers to discharge w/patient and caregiver  - Arrange for needed discharge resources and transportation as appropriate  - Identify discharge learning needs (meds, wound care, etc )  - Arrange for interpretive services to assist at discharge as needed  - Refer to Case Management Department for coordinating discharge planning if the patient needs post-hospital services based on physician/advanced practitioner order or complex needs related to functional status, cognitive ability, or social support system  Outcome: Progressing     Problem: Knowledge Deficit  Goal: Patient/family/caregiver demonstrates understanding of disease process, treatment plan, medications, and discharge instructions  Description: Complete learning assessment and assess knowledge base  Interventions:  - Provide teaching at level of understanding  - Provide teaching via preferred learning methods  Outcome: Progressing     Problem: Potential for Falls  Goal: Patient will remain free of falls  Description: INTERVENTIONS:  - Educate patient/family on patient safety including physical limitations  - Instruct patient to call for assistance with activity   - Consult OT/PT to assist with strengthening/mobility   - Keep Call bell within reach  - Keep bed low and locked with side rails adjusted as appropriate  - Keep care items and personal belongings within reach  - Initiate and maintain comfort rounds  - Make Fall Risk Sign visible to staff  - Apply yellow socks and bracelet for high fall risk patients  - Consider moving patient to room near nurses station  Outcome: Progressing     Problem: Nutrition/Hydration-ADULT  Goal: Nutrient/Hydration intake appropriate for improving, restoring or maintaining nutritional needs  Description: Monitor and assess patient's nutrition/hydration status for malnutrition  Collaborate with interdisciplinary team and initiate plan and interventions as ordered  Monitor patient's weight and dietary intake as ordered or per policy  Utilize nutrition screening tool and intervene as necessary  Determine patient's food preferences and provide high-protein, high-caloric foods as appropriate       INTERVENTIONS:  - Monitor oral intake, urinary output, labs, and treatment plans  - Assess nutrition and hydration status and recommend course of action  - Evaluate amount of meals eaten  - Assist patient with eating if necessary   - Allow adequate time for meals  - Recommend/ encourage appropriate diets, oral nutritional supplements, and vitamin/mineral supplements  - Order, calculate, and assess calorie counts as needed  - Recommend, monitor, and adjust tube feedings and TPN/PPN based on assessed needs  - Assess need for intravenous fluids  - Provide specific nutrition/hydration education as appropriate  - Include patient/family/caregiver in decisions related to nutrition  Outcome: Progressing

## 2022-03-05 NOTE — DISCHARGE SUMMARY
[facility]  Discharge- Alirioa Lips 1940, 80 y o  male MRN: [id number]  Unit/Bed#: [facility] 834-01 Encounter: [id number]  Primary Care Provider: Tara Horta MD   Date and time admitted to hospital: 3/2/2022 10:34 PM    * Pancreatic mass  Assessment & Plan  · Patient's workup has all been done by [facility] gastroenterology  · presented with elevated liver enzymes from previous; sent to [facility] for further gastroenterology intervention  · S/p ERCP/EUS on 3/4- s/p stent placement (metal) traversing the distal biliary stricture  Large pancreatic head/ uncinate mass with upstream bile duct dilation  Portal confluence was not seen  No liver lesions seen and no lymphadenopathy present  Biopsy done  · F/u with biopsy after discharge   · GI following and cleared for discharge today  · AST and ALT slightly elevated today but Tbili decreasing from 11--> 8- will order repeat CMP in one week from discharge to continue to monitor   ·  --> 403  ·  --> 752    Hyperbilirubinemia  Assessment & Plan  · Improved today from 11 --> 8 today   · S/p ERCP on 3/4    History of pulmonary embolism  Assessment & Plan  · On chronic Eliquis- ok to resume per GI   · Pt reports PE was diagnosed in December of this year     Anxiety about health  Assessment & Plan  · Patient does not want to know further about what he knows right now regarding his current condition  · Patient would rather wait for further information and possibly discuss with his primary care provider before discussing further with him  · Provide supportive care   · Recommend outpatient counseling after discharge     GERD (gastroesophageal reflux disease)  Assessment & Plan  Continue pantoprazole 40 mg p o      Type 2 diabetes mellitus without complication, with long-term current use of insulin [facility])  Assessment & Plan  Lab Results   Component Value Date    HGBA1C 6 1 08/28/2021       Recent Labs 03/04/22  1218 03/04/22  1624 03/04/22  2101 03/05/22  0744   POCGLU 121 130 205* 139       Blood Sugar Average: Last 72 hrs:  (P) 430 [id number]   · Discussed medication regimen with patient- pt was noted to be hypoglycemic yesterday-- pt reports he no longer takes actos or lantus at home because he was able to improve his diet to control his blood sugars at home- he reports his PCP recently stopped his metformin as well- will d/c with no diabetic medication at discharge, c/w CCD  Call to [facility] pharmacy and glucometer ordered for patient to check blood sugars after discharge (no longer has a machine at home)    Hypokalemia-resolved as of 3/5/2022  Assessment & Plan  · Repleted       Medical Problems             Resolved Problems  Date Reviewed: 3/5/2022          Resolved    Tobacco abuse 3/4/2022     Resolved by  Mu Pierce MD    Hypokalemia 3/5/2022     Resolved by  Sheri Hi [facility]              Discharging Physician / Practitioner: TAMIKA Hi  PCP: Leann Rehman MD  Admission Date:   Admission Orders (From admission, onward)     Ordered        03/02/22 2245  Inpatient Admission  Once                      Discharge Date: 03/05/22    Consultations During Hospital Stay:  · GI     Procedures Performed:   · 3/4 ERCP/ EUS- Successful ERCP with stent placement (metal) traversing the distal biliary stricture  Large pancreatic head/ uncinate mass with upstream bile duct dilation  Portal confluence was not seen  No liver lesions seen and no lymphadenopathy present  Biopsy done  Significant Findings / Test Results:   · Pancreatic mass, transaminitis, hyperbilirubinemia     Incidental Findings:   · See above     Test Results Pending at Discharge (will require follow up):    · Biopsy      Outpatient Tests Requested:  · cmp in one week     Complications:  None     Reason for Admission: worsening jaundice and liver enzymes     Hospital Course:   Maite Schmitz is a 80 y o  male patient who has past medical history significant for pulmonary embolism on Eliquis, gastroesophageal reflux disease, diabetes mellitus type 2, anxiety, pancreatic mass who originally presented to the hospital on 3/2/2022 due to elevated liver enzymes and worsening jaundice  The patient presented to [facility] and was transferred to [facility] for ERCP/EUS with GI  The patient primarily follows with [facility] GI outpatient and they are following him for his pancreatic mass  ERCP and EUS was done on 3/4 with  stent placement (metal) traversing the distal biliary stricture  Large pancreatic head/ uncinate mass with upstream bile duct dilation  Portal confluence was not seen  No liver lesions seen and no lymphadenopathy present  Biopsy was done and pending at discharge  The patient was advised to f/u outpatient with a repeat CMP in one week  His AST and ALT were slightly more elevated on the day of discharge compared to the day prior but his bilirubin level had improved from 11 --> 8 on the day of discharge  GI cleared patient for discharge with outpatient followup  During this admission the patient reported anxiety about his pancreatic mass and expressed that he didn't want to discuss the mass in detail as he is following with his primary providers outpatient for further workup and diagnosis  The patient reported he was here for his ERCP/EUS and wanted that done and discussed during this admission  Pt was advised to follow with his primary team for further workup, diagnosis and treatment outpatient  The patient was admitted to the hospital and started on lantus which it was reported he was on at home  The patient was noted to be hypoglycemic and on further discussion the patient disclosed he is no longer taking insulin or oral diabetic medications  Will d/c off of medications and RX called in for new glucometer to [facility] pharmacy  Pt was advised to check glucose BID after discharge         Please see above list of diagnoses and related plan for additional information  Condition at Discharge: good    Discharge Day Visit / Exam:   Subjective:  Pt denies any complaints  Reports he has some intermittent abdominal pain at times but currently no pain  Denies any n/v and tolerating regular diet  Pt is requesting to be discharged home today    Vitals: Blood Pressure: 121/72 (03/05/22 0753)  Pulse: 80 (03/05/22 0753)  Temperature: 98 °F (36 7 °C) (03/05/22 0753)  Temp Source: Oral (03/05/22 0753)  Respirations: 16 (03/05/22 0753)  Height: 5' 7" (170 2 cm) (03/04/22 1314)  Weight - Scale: 63 5 kg (140 lb) (03/04/22 1314)  SpO2: 99 % (03/05/22 0753)  Exam:   Physical Exam  Vitals and nursing note reviewed  Constitutional:       Appearance: He is well-developed  Eyes:      General: Scleral icterus present  Cardiovascular:      Rate and Rhythm: Normal rate and regular rhythm  Heart sounds: Normal heart sounds  No murmur heard  Pulmonary:      Effort: Pulmonary effort is normal  No respiratory distress  Breath sounds: Normal breath sounds  No wheezing or rales  Abdominal:      General: Bowel sounds are normal  There is no distension  Palpations: Abdomen is soft  Tenderness: There is no abdominal tenderness  Musculoskeletal:         General: No swelling or tenderness  Skin:     General: Skin is warm and dry  Coloration: Skin is jaundiced (improved from yesterday)  Neurological:      Mental Status: He is alert  Mental status is at baseline  Psychiatric:         Mood and Affect: Mood is anxious  Discussion with Family: Patient declined call to   Discharge instructions/Information to patient and family:   See after visit summary for information provided to patient and family  Provisions for Follow-Up Care:  See after visit summary for information related to follow-up care and any pertinent home health orders         Disposition:   Home    Planned Readmission: no     Discharge Statement:  I spent 45 minutes discharging the patient  This time was spent on the day of discharge  I had direct contact with the patient on the day of discharge  Greater than 50% of the total time was spent examining patient, answering all patient questions, arranging and discussing plan of care with patient as well as directly providing post-discharge instructions  Additional time then spent on discharge activities  Discharge Medications:  See after visit summary for reconciled discharge medications provided to patient and/or family        **Please Note: This note may have been constructed using a voice recognition system**

## 2022-03-05 NOTE — PROGRESS NOTES
[facility] - Progress Note  Trina Martin 80 y o  male MRN: [id number]  Unit/Bed#: [facility] 834-01 Encounter: [id number]      ASSESSMENT & PLAN:    82-year-old male with pancreatic mass of the uncinate with biliary obstruction and elevated LFTs  GI consultation requested for EUS and ERCP  1  Pancreatic mass, biliary obstruction, elevated LFTs - status post EUS and ERCP  Large pancreatic head/uncinate mass with upstream bile duct dilatation  Performed FNB and path is pending  ERCP with small sphincterotomy and placement of covered metal biliary stent  Mild elevation in transaminases and ALP which could be secondary to procedure  Total bilirubin improved to 8 from 11   · Okay for discharge from GI standpoint  · Follow-up pathology results  · Will message oncology navigators to arrange outpatient follow-up with surgical and medical onology  · Repeat CMP next week; orders in  · Remainder of care per primary team    2  History of pulmonary embolism - on anticoagulation  Currently on IV heparin  No bleeding  Hb stable  · Okay to restart Eliquis      GI will sign off  Please contact the GI fellow on-call with any questions or concerns  ______________________________________________________________________    SUBJECTIVE:     Patient seen and evaluated at bedside  Denies any new abdominal pain  Tolerating diet  Offers no acute GI complaints      Medication Administration - last 24 hours from 03/04/2022 0739 to 03/05/2022 0739       Date/Time Order Dose Route Action Action by     03/05/2022 0646 pantoprazole (PROTONIX) EC tablet 40 mg 40 mg Oral Given Briana Santoyo RN     03/04/2022 1734 multi-electrolyte (PLASMALYTE-A/ISOLYTE-S PH 7 4) IV solution 0 mL/hr Intravenous Stopped Tramaine Mitchell RN     03/04/2022 1610 docusate sodium (COLACE) capsule 100 mg 100 mg Oral Given Darrell Roberts     03/04/2022 1032 nicotine (NICODERM CQ) 21 mg/24 hr TD 24 hr patch 1 patch 1 patch Transdermal Not Given Ewell Severance     03/04/2022 1031 nicotine (NICODERM CQ) 21 mg/24 hr TD 24 hr patch 1 patch 1 patch Transdermal Patch Removed Ewell Severance     03/05/2022 0646 oxyCODONE (ROXICODONE) IR tablet 2 5 mg 2 5 mg Oral Given Yanira Tirado RN     03/05/2022 0024 oxyCODONE (ROXICODONE) IR tablet 2 5 mg 2 5 mg Oral Given Yanira Tirado RN     03/04/2022 1029 oxyCODONE (ROXICODONE) IR tablet 2 5 mg 2 5 mg Oral Given Ewell Severance     03/04/2022 1601 oxyCODONE (ROXICODONE) IR tablet 5 mg 5 mg Oral Given Brian Snow RN     03/04/2022 1032 polyethylene glycol (MIRALAX) packet 17 g 17 g Oral Refused [facility]     03/04/2022 1032 insulin lispro (HumaLOG) 100 units/mL subcutaneous injection 1-5 Units 1 Units Subcutaneous Not Given Ewell Severance     03/04/2022 1030 insulin glargine (LANTUS) subcutaneous injection 20 Units 0 2 mL 20 Units Subcutaneous Not Given Ewell Severance     03/04/2022 1734 dextrose 5 % and sodium chloride 0 9 % infusion 0 mL/hr Intravenous Stopped Aurora Carreno RN     03/04/2022 1327 dextrose 5 % and sodium chloride 0 9 % infusion 100 mL/hr Intravenous Continue from Pre-op Xiomara Beltre CRNA     03/04/2022 1225 dextrose 5 % and sodium chloride 0 9 % infusion 100 mL/hr Intravenous Rate/Dose Change [facility]     03/04/2022 1014 dextrose 5 % and sodium chloride 0 9 % infusion 50 mL/hr Intravenous New Bag [facility]     03/04/2022 1014 potassium chloride 20 mEq IVPB (premix) 20 mEq Intravenous New Bag [facility]     03/04/2022 1721 insulin lispro (HumaLOG) 100 units/mL subcutaneous injection 1-5 Units 1 Units Subcutaneous Not Given [facility]     03/04/2022 1230 insulin lispro (HumaLOG) 100 units/mL subcutaneous injection 1-5 Units 1 Units Subcutaneous Not Given [facility]     03/04/2022 1415 iohexol (OMNIPAQUE) 240 MG/ML solution 50 mL 12 mL Injection Given by Maximiliano MUJICA [facility]     03/04/2022 2290 sodium chloride 0 9 % infusion 75 mL/hr Intravenous New Bag Dinesh Guidry RN 03/04/2022 1730 insulin lispro (HumaLOG) 100 units/mL subcutaneous injection 1-5 Units 1 Units Subcutaneous Not Given Breana Kim RN     03/04/2022 2335 heparin (porcine) 25,000 Units in sodium chloride 0 45 % 250 mL infusion 18 Units/kg/hr Intravenous New Bag Asuncion Morton RN          OBJECTIVE:     Objective   Blood pressure 118/72, pulse 85, temperature 98 6 °F (37 °C), temperature source Oral, resp  rate 18, height 5' 7" (1 702 m), weight 63 5 kg (140 lb), SpO2 99 %  Body mass index is 21 93 kg/m²  Intake/Output Summary (Last 24 hours) at 3/5/2022 0739  Last data filed at 3/4/2022 1734  Gross per 24 hour   Intake 450 ml   Output 975 ml   Net -525 ml       PHYSICAL EXAM:   General Appearance: Awake and alert, in no acute distress; chronically ill-appearing, jaundiced  Abdomen: Soft, non-tender, non-distended; bowel sounds normal; no masses or no organomegaly    Invasive Devices  Report    Peripheral Intravenous Line            Peripheral IV 03/03/22 Left;Ventral (anterior) Forearm 2 days    Peripheral IV 03/03/22 Distal;Dorsal (posterior); Left Forearm 1 day                LAB RESULTS:  Admission on 03/02/2022   Component Date Value    Color, UA 03/03/2022 Dark Yellow     Clarity, UA 03/03/2022 Clear     Specific Gravity, UA 03/03/2022 1 035*    pH, UA 03/03/2022 6 0     Leukocytes, UA 03/03/2022 Elevated glucose may cause decreased leukocyte values   See urine microscopic for [facility] result/*    Nitrite, UA 03/03/2022 Negative     Protein, UA 03/03/2022 30 (1+)*    Glucose, UA 03/03/2022 >=1000 (1%)*    Ketones, UA 03/03/2022 60 (2+)*    Urobilinogen, UA 03/03/2022 <2 0     Bilirubin, UA 03/03/2022 Moderate*    Blood, UA 03/03/2022 Moderate*    PTT 03/03/2022 29     WBC 03/03/2022 5 80     RBC 03/03/2022 4 18     Hemoglobin 03/03/2022 12 5     Hematocrit 03/03/2022 36 9     MCV 03/03/2022 88     MCH 03/03/2022 29 9     MCHC 03/03/2022 33 9     RDW 03/03/2022 14 7     MPV 03/03/2022 11 6     Platelets 65/23/3445 195     nRBC 03/03/2022 0     Neutrophils Relative 03/03/2022 66     Immat GRANS % 03/03/2022 0     Lymphocytes Relative 03/03/2022 19     Monocytes Relative 03/03/2022 11     Eosinophils Relative 03/03/2022 3     Basophils Relative 03/03/2022 1     Neutrophils Absolute 03/03/2022 3 74     Immature Grans Absolute 03/03/2022 0 02     Lymphocytes Absolute 03/03/2022 1 11     Monocytes Absolute 03/03/2022 0 66     Eosinophils Absolute 03/03/2022 0 20     Basophils Absolute 03/03/2022 0 07     Sodium 03/03/2022 134*    Potassium 03/03/2022 3 4*    Chloride 03/03/2022 101     CO2 03/03/2022 26     ANION GAP 03/03/2022 7     BUN 03/03/2022 24     Creatinine 03/03/2022 0 67     Glucose 03/03/2022 220*    Calcium 03/03/2022 9 6     Corrected Calcium 03/03/2022 10 2*    AST 03/03/2022 333*    ALT 03/03/2022 695*    Alkaline Phosphatase 03/03/2022 302*    Total Protein 03/03/2022 6 7     Albumin 03/03/2022 3 3*    Total Bilirubin 03/03/2022 11 20*    eGFR 03/03/2022 91     Magnesium 03/03/2022 2 0     Phosphorus 03/03/2022 2 8     Protime 03/03/2022 14 8*    INR 03/03/2022 1 20*    RBC, UA 03/03/2022 1-2     WBC, UA 03/03/2022 4-10*    Epithelial Cells 03/03/2022 None Seen     Bacteria, UA 03/03/2022 Occasional     MUCUS THREADS 03/03/2022 Occasional*    Granular Casts, UA 03/03/2022 0-3*    POC Glucose 03/03/2022 187*    POC Glucose 03/03/2022 140     POC Glucose 03/03/2022 86     PTT 03/03/2022 82*    PTT 03/04/2022 81*    Sodium 03/04/2022 137     Potassium 03/04/2022 3 1*    Chloride 03/04/2022 104     CO2 03/04/2022 24     ANION GAP 03/04/2022 9     BUN 03/04/2022 13     Creatinine 03/04/2022 0 55*    Glucose 03/04/2022 71     Calcium 03/04/2022 8 9     Corrected Calcium 03/04/2022 9 7     AST 03/04/2022 380*    ALT 03/04/2022 701*    Alkaline Phosphatase 03/04/2022 293*    Total Protein 03/04/2022 6 4     Albumin 03/04/2022 3 0*  Total Bilirubin 03/04/2022 11 91*    eGFR 03/04/2022 99     WBC 03/04/2022 5 64     RBC 03/04/2022 4 04     Hemoglobin 03/04/2022 11 9*    Hematocrit 03/04/2022 34 1*    MCV 03/04/2022 84     MCH 03/04/2022 29 5     MCHC 03/04/2022 34 9     RDW 03/04/2022 14 8     Platelets 18/99/5071 178     MPV 03/04/2022 11 4     POC Glucose 03/04/2022 63*    POC Glucose 03/04/2022 68     POC Glucose 03/04/2022 121     POC Glucose 03/04/2022 130     WBC 03/04/2022 7 77     RBC 03/04/2022 4 08     Hemoglobin 03/04/2022 12 2     Hematocrit 03/04/2022 34 5*    MCV 03/04/2022 85     MCH 03/04/2022 29 9     MCHC 03/04/2022 35 4     RDW 03/04/2022 15 2*    Platelets 24/99/6568 164     MPV 03/04/2022 11 2     PTT 03/04/2022 27     Protime 03/04/2022 14 5     INR 03/04/2022 1 18     POC Glucose 03/04/2022 205*    Sodium 03/05/2022 134*    Potassium 03/05/2022 3 7     Chloride 03/05/2022 102     CO2 03/05/2022 23     ANION GAP 03/05/2022 9     BUN 03/05/2022 13     Creatinine 03/05/2022 0 57*    Glucose 03/05/2022 147*    Calcium 03/05/2022 9 7     Corrected Calcium 03/05/2022 10 3*    AST 03/05/2022 403*    ALT 03/05/2022 752*    Alkaline Phosphatase 03/05/2022 351*    Total Protein 03/05/2022 6 6     Albumin 03/05/2022 3 3*    Total Bilirubin 03/05/2022 8 08*    eGFR 03/05/2022 97     WBC 03/05/2022 5 53     RBC 03/05/2022 4 08     Hemoglobin 03/05/2022 12 0     Hematocrit 03/05/2022 34 9*    MCV 03/05/2022 86     MCH 03/05/2022 29 4     MCHC 03/05/2022 34 4     RDW 03/05/2022 15 2*    Platelets 25/35/6367 183     MPV 03/05/2022 11 7     PTT 03/05/2022 49*       RADIOLOGY RESULTS: I have personally reviewed pertinent imaging studies  ALMA Payton  Gastroenterology Fellow  [facility]  Available on [email or web address]

## 2022-03-05 NOTE — CASE MANAGEMENT
Case Management Discharge Planning Note    Patient name Stephani Rivera  Location [facility] 834/[facility] 242-54 MRN [id number]  : 1940 Date 3/5/2022       Current Admission Date: 3/2/2022  Current Admission Diagnosis:Pancreatic mass   Patient Active Problem List    Diagnosis Date Noted    Type 2 diabetes mellitus without complication, with long-term current use of insulin (HCC)     GERD (gastroesophageal reflux disease)     Pancreatic mass     History of pulmonary embolism     Hyperbilirubinemia     Anxiety about health       LOS (days): 3  Geometric Mean LOS (GMLOS) (days): 3 10  Days to GMLOS:0 6     OBJECTIVE:  Risk of Unplanned Readmission Score: 12         Current admission status: Inpatient   Preferred Pharmacy:   [id number], [address] [place] [address]  Phone: [phone] Fax: [phone]    Primary Care Provider: Kit Thompson MD    Primary Insurance: [facility]  Secondary Insurance:     DISCHARGE DETAILS:    Discharge planning discussed with[de-identified] Pt chart review  Per medical team, pt is medically stable for discharged  Pt to discharged home with no need  Cm rec'd TT from unit RN reported pt need to ride to get to his car at [place] [address] in [facility]  Cm spoke to pt via unit phone, confirmed discharged and transport  pt reported same that need ride to [address] [facility]  Unit RN made aware can assist with ride with lyft when pt ready for discharged  Pt require to signed waiver form  Lyft requested and unit RN made aware

## 2022-03-05 NOTE — ASSESSMENT & PLAN NOTE
· Patient does not want to know further about what he knows right now regarding his current condition  · Patient would rather wait for further information and possibly discuss with his primary care provider before discussing further with him    · Provide supportive care   · Recommend outpatient counseling after discharge

## 2022-03-07 ENCOUNTER — TELEPHONE (OUTPATIENT)
Dept: GASTROENTEROLOGY | Facility: CLINIC | Age: 82
End: 2022-03-07

## 2022-03-07 NOTE — TELEPHONE ENCOUNTER
Pt states nurse Latrice Mcclellan told him to go to  for liver enzymes proc; after was told to have BW in 1 wk but he lost the order  Conf'd that lab order was sent to [facility] to be done 3/12  Also, he is to f/u w/ GI  in [place]; notes transportation difficulty/asks if he can f/u w/ [facility]? # [phone]

## 2022-03-07 NOTE — UTILIZATION REVIEW
Notification of Discharge   This is a Notification of Discharge from our facility [address]  Please be advised that this patient has been discharge from our facility  Below you will find the admission and discharge date and time including the patients disposition  UTILIZATION REVIEW CONTACT:  Jeff Bennett  Utilization   Network Utilization Review Department  Phone: [phone] x carefully listen to the prompts  All voicemails are confidential   Email: [email or web address]     PHYSICIAN ADVISORY SERVICES:  FOR [id number] REVIEW - MEDICAL NECESSITY DENIAL  Phone: [phone]  Fax: [phone]  Email: [email or web address]     PRESENTATION DATE: 3/2/2022 10:34 PM  OBERVATION ADMISSION DATE:   INPATIENT ADMISSION DATE: 3/2/22 10:34 PM   DISCHARGE DATE: 3/5/2022 12:41 PM  DISPOSITION: Home/Self Care Home/Self Care      IMPORTANT INFORMATION:  Send all requests for admission clinical reviews, approved or denied determinations and any other requests to dedicated fax number below belonging to the campus where the patient is receiving treatment   List of dedicated fax numbers:  [address] DENIALS (Administrative/Medical Necessity) [phone]   [address] (Maternity/NICU/Pediatrics) [phone]   Douglas Johnson [phone]   [address] [facility] [phone]   [address] [place] [address] [phone]   2000 [facility] [address] [place] [address],[address] [place] [address] [phone]   [facility]  [phone]   [address] [place] [address], [address]  [address] [facility] 1000 [address] [facility] [phone]

## 2022-03-08 ENCOUNTER — TELEPHONE (OUTPATIENT)
Dept: GASTROENTEROLOGY | Facility: AMBULARY SURGERY CENTER | Age: 82
End: 2022-03-08

## 2022-03-08 NOTE — TELEPHONE ENCOUNTER
Patients GI provider:  Dr Chris Calvillo     Number to return call: [phone]     Reason for call: Pt calling to reschedule his Endoscopic Ultrasound     Scheduled procedure/appointment date if applicable: Apt/procedure n/a

## 2022-03-08 NOTE — TELEPHONE ENCOUNTER
Pt called again stating that he does not need to schedule this   This was done recently in the hospital

## 2022-03-08 NOTE — TELEPHONE ENCOUNTER
Dr Jodi Newsome replied and is okay with patient to follow up with our office  Patient scheduled for 3/15/22

## 2022-03-08 NOTE — TELEPHONE ENCOUNTER
Patients GI provider:  Dr Javier Villegas    Number to return call: ( [phone]    Reason for call: Pt calling to reschedule his EUS     Scheduled procedure/appointment date if applicable:  N/A

## 2022-03-08 NOTE — TELEPHONE ENCOUNTER
I spoke with Arias Borges and reviewed that his EUS was completed while he was inpatient in [facility]  I did schedule him for follow up appt 3/15/22 with Dr Audra Ariza

## 2022-03-14 PROBLEM — C25.9 PANCREATIC ADENOCARCINOMA (HCC): Status: ACTIVE | Noted: 2022-03-14

## 2022-03-15 ENCOUNTER — OFFICE VISIT (OUTPATIENT)
Dept: GASTROENTEROLOGY | Facility: CLINIC | Age: 82
End: 2022-03-15
Payer: COMMERCIAL

## 2022-03-15 VITALS
HEART RATE: 86 BPM | BODY MASS INDEX: 21.35 KG/M2 | DIASTOLIC BLOOD PRESSURE: 76 MMHG | SYSTOLIC BLOOD PRESSURE: 118 MMHG | WEIGHT: 136 LBS | HEIGHT: 67 IN

## 2022-03-15 DIAGNOSIS — C25.9 PANCREATIC ADENOCARCINOMA (HCC): Primary | ICD-10-CM

## 2022-03-15 PROCEDURE — 99214 OFFICE O/P EST MOD 30 MIN: CPT | Performed by: INTERNAL MEDICINE

## 2022-03-15 NOTE — Clinical Note
Rene Serna pt said he had labs at [facility] on Saturday, can you scan into epic and assign to me? Thanks

## 2022-03-15 NOTE — PROGRESS NOTES
[facility] - Outpatient Follow-up Note  Clotilde Go 80 y o  male MRN: [id number]  Encounter: [id number]    ASSESSMENT AND PLAN:      1  Pancreatic adenocarcinoma [facility])  Presented to [facility] February 13th with several months of progressive upper abdominal pain  CT showed an abnormality in the pancreatic head, mass was confirmed on MRI  MRI shows partial encasement of the superior mesenteric artery and vein  Bilirubin was initially normal began to increase  EUS March 4th at [facility]  Fine-needle aspiration confirmed adenoCA and a covered metal stent was placed  Jaundice, icterus and dark urine are improving  Await labs from [address]   The patient is working with an oncology liaison on scheduling Medical and Surgical Oncology appointments, along with transportation      Followup Appointment:  Pending medical and surgical oncology consult  ______________________________________________________________________    Chief Complaint   Patient presents with   [facility] follow up     HPI:  The patient presents for follow-up on a pancreatic mass  He was initially seen during admission to [facility] in mid February with abdominal pain  CT suggested a pancreatic head mass which was confirmed on MRI along with encasement of the mesenteric vessels  He was discharged with plans for an endoscopic ultrasound  This was expedited when bilirubin began increasing  He had EUS March 4th that confirmed adenocarcinoma and a metal stent was placed  Symptoms of obstruction are improving  He had labs at [address] but they are not yet available  He is due to follow-up with Medical and Surgical Oncology but is having issues with transportation  He denies any abdominal pain, nausea or vomiting  Appetite is good and his weight is stable      Historical Information   Past Medical History:   Diagnosis Date    Diabetes mellitus ([address] )     GERD (gastroesophageal reflux disease)      Past Surgical History:   Procedure Laterality Date    APPENDECTOMY      CATARACT EXTRACTION Right      Social History     Substance and Sexual Activity   Alcohol Use Yes    Comment: seldom     Social History     Substance and Sexual Activity   Drug Use No     Social History     Tobacco Use   Smoking Status Former Smoker   Smokeless Tobacco Never Used   Tobacco Comment    quit in 1960s     History reviewed  No pertinent family history  Current Outpatient Medications:     Alcohol Swabs 70 % PADS    apixaban (Eliquis) 5 mg    Blood Glucose Monitoring Suppl (OneTouch Verio Reflect) w/Device KIT    glucose blood (OneTouch Verio) test strip    metFORMIN (GLUCOPHAGE) 1000 MG tablet    omeprazole (PriLOSEC) 40 MG capsule    OneTouch Delica Lancets 80U MISC    polyethylene glycol (MIRALAX) 17 g packet  Allergies   Allergen Reactions    Aleve [Naproxen] Swelling     Reviewed medications and allergies and updated as indicated    PHYSICAL EXAM:    Blood pressure 118/76, pulse 86, height 5' 7" (1 702 m), weight 61 7 kg (136 lb)  Body mass index is 21 3 kg/m²  General Appearance: NAD, cooperative, alert  Eyes: Anicteric, PERRLA, EOMI  ENT:  Normocephalic, atraumatic, normal mucosa  Neck:  Supple, symmetrical, trachea midline  Resp:  Clear to auscultation bilaterally; no rales, rhonchi or wheezing; respirations unlabored   CV:  S1 S2, Regular rate and rhythm; no murmur, rub, or gallop  GI:  Soft, non-tender, non-distended; normal bowel sounds; no masses, no organomegaly   Rectal: Deferred  Musculoskeletal: No cyanosis, clubbing or edema  Normal ROM    Skin:  No jaundice, rashes, or lesions   Heme/Lymph: No palpable cervical lymphadenopathy  Psych: Normal affect, good eye contact  Neuro: No gross deficits, AAOx3    Lab Results:   Lab Results   Component Value Date    WBC 5 53 03/05/2022    HGB 12 0 03/05/2022    HCT 34 9 (L) 03/05/2022    MCV 86 03/05/2022     03/05/2022     Lab Results Component Value Date    K 3 7 03/05/2022     03/05/2022    CO2 23 03/05/2022    BUN 13 03/05/2022    CREATININE 0 57 (L) 03/05/2022    CALCIUM 9 7 03/05/2022    CORRECTEDCA 10 3 (H) 03/05/2022     (H) 03/05/2022     (H) 03/05/2022    ALKPHOS 351 (H) 03/05/2022    EGFR 97 03/05/2022     No results found for: IRON, TIBC, FERRITIN  Lab Results   Component Value Date    LIPASE 650 (H) 03/02/2022       Radiology Results:   ERCP    Result Date: 3/4/2022  Narrative: [address] [place] [address] [address] DATE OF SERVICE: 3/04/22 PHYSICIAN(S): Gilbert Ruggiero MD Proceduralist INDICATION: Hyperbilirubinemia, Pancreatic mass POST-OP DIAGNOSIS: See the impression below  PREPROCEDURE: Informed consent was obtained for the procedure, including sedation  Risks of perforation, hemorrhage, adverse drug reaction and aspiration were discussed  The patient was placed in the left lateral decubitus position  Patient was explained about the risks and benefits of the procedure  Risks including but not limited to bleeding, infection, and perforation were explained in detail  Also explained about less than 100% sensitivity with the exam and other alternatives  DETAILS OF PROCEDURE: Patient was taken to the procedure room where a time out was performed to confirm correct patient and correct procedure  The patient underwent general anesthesia, which was administered by an anesthesia professional  The patient's blood pressure, heart rate, level of consciousness, respirations and oxygen were monitored throughout the procedure  Clinical intention was achieved  The patient experienced no blood loss  The procedure was not difficult  The patient tolerated the procedure well   ANESTHESIA INFORMATION: ASA: II Anesthesia Type: General MEDICATIONS: iohexol (OMNIPAQUE) 240 MG/ML solution 50 mL 12 mL (Totals for administrations occurring from 1327 to 1504 on 03/04/22) FINDINGS: The esophagus appeared normal  The stomach appeared normal  Deeply cannulated the common bile duct after 1 attempt using a traction sphincterotome  Used 260 cm x 0 035 in guidewire  Cannulation was not difficult  Injected contrast  Distal stricture with upstream dilation  Performed small biliary sphincterotomy using a sphincterotome  No bleeding at the procedure site  Placed metal, covered stent with length of 6 cm and diameter of 10 mm in the common bile duct  SPECIMENS: ID Type Source Tests Collected by Time Destination 1 : pancreatic head mass Other FNA NON-GYNECOLOGIC CYTOLOGY, FINE NEEDLE ASPIRATION Shayy Garcia MD 3/4/2022  1:50 PM       Impression: Successful ERCP with stent placement (metal) traversing the distal biliary stricture  RECOMMENDATION: Follow LFT's  Diet Discharge if stable tomorrow  Shayy Garcia MD     [place] ERCP biliary and pancreatic    Result Date: 3/4/2022  Narrative: ERCP INDICATION:  Hyperbilirubinemia COMPARISON:  Endoscopic ultrasound from 3/4/2022  IMAGES:  6 FLUOROSCOPY TIME:   1 min 25 sec CONTRAST: 12 mL of iohexol (OMNIPAQUE) FINDINGS: Fluoroscopic guidance was provided for performance of ERCP  BILIARY:  Images provided demonstrates performance of ERCP  Common bile duct was opacified with contrast, and was dilated proximally with narrowing distally  A metallic common bile duct stent was eventually placed  PANCREATIC: Not opacified with contrast      Impression: Fluoroscopic guidance was provided for performance of ERCP  Please see ERCP procedure note for full description  Workstation performed: [id number]     Endoscopic ultrasonography, GI (Upper)    Result Date: 3/4/2022  Narrative: [address] [place] [address] [address] DATE OF SERVICE: 3/04/22 PHYSICIAN(S): Shayy Garcia MD Proceduralist INDICATION: Pancreatic mass POST-OP DIAGNOSIS: See the impression below   PREPROCEDURE: Informed consent was obtained for the procedure, including sedation  Risks of perforation, hemorrhage, adverse drug reaction and aspiration were discussed  The patient was placed in the left lateral decubitus position  Patient was explained about the risks and benefits of the procedure  Risks including but not limited to bleeding, infection, and perforation were explained in detail  Also explained about less than 100% sensitivity with the exam and other alternatives  DETAILS OF PROCEDURE: Patient was taken to the procedure room where a time out was performed to confirm correct patient and correct procedure  The patient underwent monitored anesthesia care, which was administered by an anesthesia professional  The patient's blood pressure, heart rate, oxygen, respirations and level of consciousness were monitored throughout the procedure  The linear scope was advanced to the duodenum  The patient experienced no blood loss  The procedure was not difficult  The patient tolerated the procedure well  There were no apparent complications  ANESTHESIA INFORMATION: ASA: II Anesthesia Type: General MEDICATIONS: No administrations occurring from 1327 to 1413 on 03/04/22 FINDINGS: Single round, hypoechoic and heterogeneous mass measuring 30 mm x 30 mm with well-defined margins in the head of the pancreas and uncinate process; 4 fine needle biopsy passes were taken with a 25 gauge needle using a transduodenal approach guided by Doppler and sent sample for cytology  Onsite cytologist was present  The mass measured at least 3 cm but this was only one view  It extended towards the uncinate process and the neck of the pancreas  The portal confluence could not be well seen  The SMA and the celiac artery appeared to be free of tumor  The bile duct appeared to be occluded and was dilated up to 12 mm and the common hepatic duct  The pancreatic duct was 3 mm in the body of the pancreas  Pancreatic atrophy was seen  Dilation of the bile duct up to 12 mm   Visualized areas of the liver appeared to be normal   No mass lesions were seen  No celiac nodes were seen  SPECIMENS: ID Type Source Tests Collected by Time Destination 1 : pancreatic head mass Other FNA NON-GYNECOLOGIC CYTOLOGY, FINE NEEDLE ASPIRATION Quinton Dean MD 3/4/2022  1:50 PM       Impression: Large pancreatic head/ uncinate mass with upstream bile duct dilation  Portal confluence was not seen  No liver lesions seen and no lymphadenopathy present  Biopsy done  RECOMMENDATION: Await pathology results ERCP today     Quinton Dean MD

## 2022-03-15 NOTE — PROGRESS NOTES
Labs printed and sent to [facility] for the chart  I put the paper copies on your desk for review

## 2022-03-16 ENCOUNTER — TELEPHONE (OUTPATIENT)
Dept: HEMATOLOGY ONCOLOGY | Facility: CLINIC | Age: 82
End: 2022-03-16

## 2022-03-16 ENCOUNTER — PATIENT OUTREACH (OUTPATIENT)
Dept: HEMATOLOGY ONCOLOGY | Facility: CLINIC | Age: 82
End: 2022-03-16

## 2022-03-16 NOTE — TELEPHONE ENCOUNTER
Soft Intake Form   Patient Details   Liss Aguilar     1940     Reason For Appointment   Who is Calling? Patient    If not patient, Name? Who is the Referring Doctor? Cameron Patrick   What is the diagnosis? Pancreatic adenocarcinoma    Has this diagnosis been confirmed by a biopsy/surgery? If yes, what is the date it was done? Yes, 3-4-2022   Biopsy done at [facility]? If not, where was it done? Yes   Was imaging done, and was it done at [facility]? If not, where was it done? Yes   Have you been seen by another Oncologist?  If so, who and where (name of facility, city and state) No   For 2nd Opinions Only: Are you currently undergoing treatment, or are you scheduled to start treatment? If yes, name of facility, city and state     For "History Of" only: Have you completed treatment? Have you had Genetic Testing done in the past?  If so, advise to bring test results to their visit No   Record Gathering Information   Did you advise to have records faxed to [phone]? No   Did you advise to have disks sent to the proper address with imaging? ("History of" Patients)  5 years of imaging for breast patients-Mammos, US etc No   Scheduling Information   What is the best call back number?    (If the [facility] is calling, please use their number)    Miscellaneous Information

## 2022-03-17 ENCOUNTER — TELEPHONE (OUTPATIENT)
Dept: NUTRITION | Facility: CLINIC | Age: 82
End: 2022-03-17

## 2022-03-17 ENCOUNTER — TELEPHONE (OUTPATIENT)
Dept: GASTROENTEROLOGY | Facility: CLINIC | Age: 82
End: 2022-03-17

## 2022-03-17 ENCOUNTER — HOSPITAL ENCOUNTER (OUTPATIENT)
Dept: CT IMAGING | Facility: HOSPITAL | Age: 82
Discharge: HOME | End: 2022-03-17
Attending: INTERNAL MEDICINE
Payer: COMMERCIAL

## 2022-03-17 ENCOUNTER — PATIENT OUTREACH (OUTPATIENT)
Dept: HEMATOLOGY ONCOLOGY | Facility: CLINIC | Age: 82
End: 2022-03-17

## 2022-03-17 DIAGNOSIS — C25.9 PANCREATIC ADENOCARCINOMA (HCC): ICD-10-CM

## 2022-03-17 DIAGNOSIS — C25.9 PANCREATIC ADENOCARCINOMA (HCC): Primary | ICD-10-CM

## 2022-03-17 PROCEDURE — G1004 CDSM NDSC: HCPCS

## 2022-03-17 PROCEDURE — 71260 CT THORAX DX C+: CPT

## 2022-03-17 RX ADMIN — IOHEXOL 100 ML: 350 INJECTION, SOLUTION INTRAVENOUS at 08:36

## 2022-03-17 NOTE — PROGRESS NOTES
Called the pt to go over appt details  Pt was rescheduled for CT scan of the chest for 3/17 at [facility], as well as appt w/ Dr Doroteo Tilley on the same day at [facility]  The pt stated that he could drive himself but would have no one to be there w him  I did tell the pt that my partner Alka Dugan, would be able to meet up w him at his appt w Dr Doroteo Tilley so that he was not alone for his consult  We went over the appt details a few times  Pt was agreeable

## 2022-03-17 NOTE — PROGRESS NOTES
Called back the pt after he left me a VM to let me know that the surg onc appt he had originally sched today was cancelled and resched due to emergency case w Dr Fiordaliza Dial  The pt has been resched for 3/30 this month and Dr Fiordaliza Dial aware of the new appt  The pt is seeing surg onc following med onc appt  Med onc appt is on 3/24 w Dr Janina Freeman in [facility]  I reviewed these appts several times w the pt  I asked the pt if he had an email address that he used that I could send him the appts since he is very forgetful  I will send all information to him by email today, email he provided was [email or web address]  We reviewed the paperwork needed to set the pt up w [facility]  He was agreeable to all the terms  I told him that I would be including in the email a copy of this document that we reviewed together  I explained to him about a port and that he has a hold date for 4/1 w Dr Fiordaliza Dial and that they would provide him w a time closer to that date  I explained to him what a port is and what it is used for, I told him that this would be surg implanted by Dr Fiordaliza Dial  He would be recv'ing chemo at the infusion center and after seeing medical oncology they would be the ones to set him up with dates for his therapy and a calendar printout  We went over the general assessment together  I am putting in a referral for genetics, SW, and palliative care  He takes Tramadol to manage his pain which he does say helps, pain goes from 8 to 1-2  Pt does not have a support system, needs things to be repeated to him several times, lives alone  Sending him info via email for the Cancer support community service, he was interested  Pt did report about a 15-20 min wgt loss and decreased appetite  Putting in referral for nutrition  Pt has my contact information and I told him to call me if he has any questions, any issues, changes to his scheduling, anything at all to call me   I told him that when I send my email that he will also have all my contact information in there  He was appreciative of all the information and assistance I provided him over the phone

## 2022-03-17 NOTE — TELEPHONE ENCOUNTER
Patients GI provider:  Dr Checo Fish to return call: [phone]    Reason for call: Lucero Neal from radiology calling with significant findings in CT chest w contrast   Ready to read in Epic  Tiger Text sent      Scheduled procedure/appointment date if applicable: Apt/procedure

## 2022-03-17 NOTE — TELEPHONE ENCOUNTER
Received notification form January Francisco RN on 3/17/22 that Derek Soliz has triggered for oncology nutrition care  Reason for referral: MST score 3 indicating unintentional wt loss of 14-23 lbs(6 4-10 5 kg), and the pt has been eating poorly because of decreased appetite  Called hang Croew self, explained reason for call and nutritional services available for pt  Derek Soliz expressed interest in scheduling an appt with RD  Appt scheduled on 3/23/22 at 11 am  Derek Soliz was appreciative of the call  He has RD contact information and was encouraged to call if he has any questions/concerns

## 2022-03-17 NOTE — PROGRESS NOTES
Pt called and left VM that his appt was cancelled w Dr Red Parks today  Pt resched for 3/30  Notified Kylee Arechiga to make sure that Dr Red Parks was aware that she would be seeing him following med onc appt  She is aware

## 2022-03-18 ENCOUNTER — PATIENT OUTREACH (OUTPATIENT)
Dept: HEMATOLOGY ONCOLOGY | Facility: CLINIC | Age: 82
End: 2022-03-18

## 2022-03-18 NOTE — PROGRESS NOTES
Biopsychosocial and Barriers Assessment    Cancer Diagnosis: Pancreatic Mass  Home/Cell Phone: [phone]  Emergency Contact: Jessie Gaines  Marital Status:  6 years prior  Interpretation concerns, speaks another language, preferred language: English  Cultural concerns: None reported  Ability to read or write: Yes, fully Literate    Caregiver/Support: sister, daughter  Children: Daughter in [place]  Child/Elder care: No    Housin [address]:  All on one floor  Lives With: Alone  Daily Living Activities:   Durable Medical Equipment:  Cane  Ambulation: Ambulates with a cane    [facility] [place]  High co-pays with insurance: None reported  High co-pays with medication coverage: None reported  No medication coverage: Pt has prescription coverage through his Aet    Primary Care Provider: Dr Charlotte Frausto  Hx of  [facility]: Yes, [facility]  Hx of Short term rehab: No  Mental Health Hx: Yes, Pt self reports that he was in a state mental hospital from [facility] through   Substance Abuse Hx: Non reported  Employment:  for the [employer] Status/Location: No  Ability to pay bills: Yes  POA/LW/AD: No  Transportation Plan/Concerns: Pt drives but expressed concern with long distance      What do you know about your Cancer Diagnosis    What has your doctor told you about your cancer diagnosis:  " There is a tumor on my pancreas "    What has your doctor told you about your cancer treatment: "I think I should be getting chemo "    What specific concerns do you have about your diagnosis and treatment:  "It seems concerning that the treatment is taking so long  Will it be too late to treat me?"    Have you been made aware of any hair loss associated with treatment: N/A    Additional Comments:  MSW received a referral from the Nurse Navigator and outreach was made this day  Pt was open and receptive to conversation    He sounded highly anxious on the phone as he asked repeatedly when his treatment would be starting  The pt also felt that his chemo was starting next week and was asking the specific date  MSW reviewed the pt's schedule with him and he has an oncology appointment next week and an appointment with the surgeon the following week  He appeared surprised to hear that his chemo was not starting  MSW explained to the pt that he would most likely be discussing his treatment options with the Doctor at his appointment and then his treatment plan would be developed  The pt repeatedly asked this MSW if the time frame was "too long to wait" and "do you think my cancer is spreading and getting worse "  MSW assured the pt that his Doctor's are looking at his records and he was recently in the hospital so it appears as though things are moving relatively quickly  When this was mentioned several times, the pt appeared to be less anxious  MSW inquired with the pt about his supports and he shared that his sister lives in [place] and his daughter is in [place]  He shared that he prefers to "keep to himself "  The pt spoke of his long standing mental health history, attributing this to growing up in a home with an alcoholic mother  The pt shared that he would go to his room when his mother was drinking and he found that he was ablet o "escape reality"  In doing so, he became depressed and severely withdrawn and described how he spent 5 years in a state mental hospital   During that time, he was in high school and completing high school and the hospital had assisted the pt with moving into a half way house when he left  With the assistance of social service programs, the pt was able to get into college while living in the half way house and complete his college degree  To some extent, he still describes himself as someone who "will easily withdraw "  For this reason, the pt states that he has minimal friends and supports        MSW thanked the pt for sharing his story and explained how the treatment team will be able to support him through his journey  The pt does drive but sounded unsure of where to go so MSW spoke with him about [facility] and the pt was in agreement  MSW will set this up for his next 2 appointments  MSW will call the pt when this is scheduled  Significant support offered and MSW will continue to follow  Contact information was provided and the pt was encouraged to call as needed

## 2022-03-18 NOTE — PROGRESS NOTES
Hematology/Oncology Outpatient Office Note    Date of Service: 3/24/2022    [facility]  [phone]    Reason for Consultation:   Chief Complaint   Patient presents with    Consult       Cancer Stage at diagnosis: in process (pulm nodules being assessed closely)    Referral Physician: Kellie Batista MD    Primary Care Physician:  Merry Chester     Nickname: Dayanna Mann        He lives alone    Original ECO    Today's ECO    He goes to the gym once a week and does exercise bikes, walking    Goals and Barriers:  Current Goal: Minimize effects of disease burden, extend life  Barriers to accomplishing this: abdominal pain (Tramadol reduces this)    Patient's Capacity to Self Care:  Patient is able to self care  His sister pays for a service to come several times a month to help clean, do laundry, organize    Code Status: not addressed today    Advanced directives: not addressed today    ASSESSMENT & PLAN      Diagnosis ICD-10-CM Associated Orders   1  Pancreatic adenocarcinoma ([facility] [address] )  C25 9 Ambulatory Referral to Hematology / Oncology         This is a 80 y o  c PMHx notable for PE on Eliquis, Anxiety from diagnosis (hx of being in a psychiatric hospital in the past after his wife passed away), GERD, DMII, being seen in consultation for pancreatic adenocarcinoma    Thoughts on prediction for Grade III-V toxicity in elderly patients to systemic chemotherapy:  Age >72 years   GI/ cancers  Falls in last 6 months   Hearing impairment  Limited ability to walk 1 block  Requires assistance with medications  Decreased social activities   Micaela Cannon JCO 2011)  BMI<18 5 or moderate or severe weight loss or decrease in food intake in past 3 months  Mild vs moderate dementia/depression/  anxiety     After assessing this patient's ECOG PS to be 1, I have deemed him to be a candidate for systemic chemotherapy from a fitness perspective  However, he has significant liver dysfunction per March 5, 2022 labs with bilirubin above 8  He did have a metal stent placed in the biliary tract on March for and I would expect some improvement since that time  We would need significant improvement in his liver parameters to consider chemotherapy (Bili <2 needed at minimum for most of the regimens involved with FOLFIRINOX)    Discussion of decision making   Oncology history updated, accordingly, during this visit   Goals of care/patient communication  o I discussed with the patient the clinical course leading up to their cancer diagnosis  I reviewed relevant office notes, imaging reports and pathology result as well   o I told the patient that this is a case of either curable or incurable disease depending on what the final status of the pulmonary nodules are and what this means  We discussed that the goal of anti-cancer therapy is to provide best quality of life, extend overall survival, and progression free survival as shown in clinical trials  We also discussed that there might be a point when the cancer will no longer respond to this anti-neoplastic therapy  As a result, we also discussed the role of the palliative care team being introduced early in the treatment course   He is seeing them already  o I explained the risks/benefits of the proposed cancer therapy: mFOLFORINOX and after discussion including understanding risks of possible life-threatening complications and therapy-related malignancy development, informed consent for blood products and treatment has been delayed until his LFT can be reassessed to ensure T  Bili <2   TNM/Staging At Diagnosis  Cancer Staging  Pancreatic adenocarcinoma [facility])  Staging form: Pancreas, AJCC 8th Edition  - Clinical stage from 3/4/2022: Stage IB (cT2, cN0, cMx) - Unsigned  Stage prefix: Initial diagnosis  Total positive nodes: 0   Disease Features/Tumor Markers/Genetics  o Tumor Marker: n/a  Notable Path Features: 3/4/22 Pancreatic head EUS biopsy: (ThinPrep, smear and cell block preparations)  Malignant (PSC Category VI) Adenocarcinoma  Satisfactory for evaluation   Treatment: TBD**   Other Supportive care:    Treatment Team Members  o Surgeon: Dr Elizabeth Díaz  o Palliative: Nataly Born 3/11/22: creat 0 39, lipase 147 (13-60), WBC 5 6k, Hgb 13 1, MCV 89, plt 199k, T  Bilirubin 2 9   Diagnostics:  2/13/22 MRI MRCP abdomen w/wo c: 4x3 2cm pancreatic mass at uncinate process, 7mm R lobe liver hemangioma  2/26/22 CT Abd/pelv:  Stable infiltrating pancreatic head mass encasing the superior mesenteric artery and vein and narrows distal portal vein  3/17/22 CT Chest w/c: Small bilateral pulmonary nodules, nonspecific  Although these could be part and parcel of granulomatous disease, metastases cannot be excluded  Interval follow-up CT chest in about 10 weeks recommended to reevaluate these findings  Discussion of decision making    I personally reviewed the following lab results, the image studies, pathology, other specialty/physicians consult notes and recommendations, and outside medical records from [facility]  I had a lengthy discussion with the patient and shared the work-up findings  We discussed the diagnosis and management plan as below  I spent 47 minutes reviewing the records (labs, clinician notes, outside records, medical history, ordering medicine/tests/procedures, interpreting the imaging/labs previously done) and coordination of care as well as direct time with the patient today, of which greater than 50% of the time was spent in counseling and coordination of care with the patient/family        · Plan/Labs  · Zofran 8mg q8 prn nausea/vomiting to be sent home to the pharmacy today  · F/u RD (they communicated with him yesterday)  · F/u Surg Onc  · Continue indefinite Eliquis for cancer induced PE  · Repeat restaging CT CAP in 2-3 months to follow up treatment response as well as trending b/l pulm nodules seen on 3/17/22 CT Chest  · Repeat CMP, CBC to assess T  Bili level and eligibility for FOLFIRINOX  · Tumor board discussion to determine resectability candidacy, nature of pulm nodules      Follow Up: 2 weeks    All questions were answered to the patient's satisfaction during this encounter  The patient knows the contact information for our office and knows to reach out for any relevant concerns related to this encounter  They are to call for any temperature 100 4 or higher, new symptoms including but not restricted to shaking chills, decreased appetite, nausea, vomiting, diarrhea, increased fatigue, shortness of breath or chest pain, confusion, and not feeling the strength to come to the clinic  For all other listed problems and medical diagnosis in their chart - they are managed by PCP and/or other specialists, which the patient acknowledges  Thank you very much for your consultation and making us a part of this patient's care  We are continuing to follow closely with you  Please do not hesitate to reach out to me with any additional questions or concerns  Marlene Partida MD  Hematology & Medical Oncology Staff Physician             Disclaimer: This document was prepared using [employer] Fluency Direct technology  If a word or phrase is confusing, or does not make sense, this is likely due to recognition error which was not discovered during this clinician's review  If you believe an error has occurred, please contact me through [address] [place] line for tan? cation  ONCOLOGY HISTORY OF PRESENT ILLNESS        Oncology History   Pancreatic adenocarcinoma ([facility] [address] )   3/4/2022 Biopsy    A-B-C  Pancreas, pancreatic head mass   Malignant (PSC Category VI) -  Adenocarcinoma       3/14/2022 Initial Diagnosis    Pancreatic adenocarcinoma ([facility] [address] )       2/13/22 went to [facility] for 3-6 months of progressing upper abd pain, dark urine, scleral icteri, 14-23 lb weight loss, decrease appetite  3/2/22 presentation for jaundice and liver enzymes elevation  3/4/22 ERCP/EUS with metal stent placed in the distal biliary tract  3x3 cm mass head of pancreas, extending to uncinate process and neck of pancreas, free of SMA and celiac artery    SUBJECTIVE  (INTERVAL HISTORY)      Clotting History PE  12/2021   Bleeding History None   Cancer History Pancreatic   Family Cancer History Brother (esophageal)   H/O Blood/Plt Transfusion None   Tobacco/etoh/drug abuse Former smoker (quit at age 89 [facility]), etoh use (socially)   Hx COVID19 Infection and Vaccine Status  NO infection  Vaccinated x3   Cancer Screening history n/a   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)   New medications in the last month: none but has not been on Tramadol for longer than a few months   Pain: aching lower abdominal pain that does not radiate (8/10 at its worse, significantly improved with Tramadol)      I have reviewed the relevant past medical, surgical, social and family history  I have also reviewed allergies and medications for this patient  He is dealing with significant anxiety from his cancer diagnosis  Review of Systems  Baseline weight: 145lbs    He has lost 11 lbs in the past few months  He denies F/C, N/V, SOB, CP, LH, HA, rash, itching, falls, hematuria, melena, hematochezia, generalized weakness, unilateral weakness, diplopia, loss of appetite, or diarrhea  He deals with chronic constipation and is now on Miralax  He is careful with his diet due to his DM  He uses a cullen for balance (since 2019) which he uses for significant walking but not all the time  A 10-point review of system was performed, pertinent positive and negative were detailed as above  Otherwise, the 10-point review of system was negative        Past Medical History:   Diagnosis Date    Diabetes mellitus ([facility] [address] )     GERD (gastroesophageal reflux disease)    Hx SBO, incarcerated R inguinal hernia, HTN    Past Surgical History:   Procedure Laterality Date    APPENDECTOMY      CATARACT EXTRACTION Right        No family history on file  Social History     Socioeconomic History    Marital status:      Spouse name: Not on file    Number of children: Not on file    Years of education: Not on file    Highest education level: Not on file   Occupational History    Not on file   Tobacco Use    Smoking status: Former Smoker    Smokeless tobacco: Never Used    Tobacco comment: quit in 1960s   Vaping Use    Vaping Use: Never used   Substance and Sexual Activity    Alcohol use: Yes     Comment: seldom    Drug use: No    Sexual activity: Not on file   Other Topics Concern    Not on file   Social History Narrative    Not on file     Social Determinants of Health     Financial Resource Strain: Not on file   Food Insecurity: No Food Insecurity    Worried About [address] in the Last Year: Never true    [address] in the Last Year: Never true   Transportation Needs: No Transportation Needs    Lack of Transportation (Medical): No    Lack of Transportation (Non-Medical): No   Physical Activity: Not on file   Stress: Not on file   Social Connections: Not on file   Intimate Partner Violence: Not on file   Housing Stability: Unknown    Unable to Pay for Housing in the Last Year: No    Number of Places Lived in the Last Year: Not on file    Unstable Housing in the Last Year: No       Allergies   Allergen Reactions    Aleve [Naproxen] Swelling       Current Outpatient Medications   Medication Sig Dispense Refill    Alcohol Swabs 70 % PADS May substitute brand based on insurance coverage  Check glucose BID  100 each 0    apixaban (Eliquis) 5 mg Take 5 mg by mouth 2 (two) times a day      Blood Glucose Monitoring Suppl (OneTouch Verio Reflect) w/Device KIT May substitute brand based on insurance coverage   Check glucose BID  1 kit 0    glucose blood (OneTouch Verio) test strip May substitute brand based on insurance coverage  Check glucose BID  100 each 0    metFORMIN (GLUCOPHAGE) 1000 MG tablet Take 1,000 mg by mouth daily with breakfast      omeprazole (PriLOSEC) 40 MG capsule Take 40 mg by mouth daily   OneTouch Delica Lancets 05B MISC May substitute brand based on insurance coverage  Check glucose BID  100 each 0    polyethylene glycol (MIRALAX) 17 g packet Take 17 g by mouth daily       No current facility-administered medications for this visit  (Not in a hospital admission)      Objective:     24 Hour Vitals Assessment:     Vitals:    03/24/22 0946   BP: 120/74   Pulse: 98   Resp: 17   Temp: 97 9 °F (36 6 °C)   SpO2: 98%       PHYSICIAN EXAM:    General: Appearance: alert, cooperative, moderate distress from anxiety  HEENT: Normocephalic, atraumatic  No scleral icterus  conjunctivae clear  EOMI  Chest: No tenderness to palpation  No open wound noted  Lungs: Clear to auscultation bilaterally, Respirations unlabored  Cardiac: Regular rate and rhythm, +S1and S2  Abdomen: Soft, non-tender, non-distended  Bowel sounds are normal    Extremities:  No edema, cyanosis, clubbing  Skin: Skin color, turgor are normal  No rashes  Lymphatics: no palpable supra-cervical, axillary, or inguinal adenopathy  Neurologic: Awake, Alert, and oriented, no gross focal deficits noted b/l  DATA REVIEW:    Pathology Result:    Final Diagnosis   Date Value Ref Range Status   03/04/2022   Final    A-B-C  Pancreas, pancreatic head mass (ThinPrep, smear and cell block preparations)  Malignant (PSC Category VI) - See note  Adenocarcinoma  Satisfactory for evaluation  Note: The above diagnostic category is part of the six-tiered system proposed by The Papanicolaou Society of Cytopathology for the reporting of pancreaticobiliary specimens   This proposed scheme provides terminology that correlates the cytologic diagnostic nomenclature with the 2010 WHO classification of pancreatic tumors and standardizes the categorization of the various diseases of the pancreas, some of which are difficult to diagnose specifically by cytology  *The Papanicolaou Society of Cytopathology System for Reporting Pancreaticobiliary Cytology: Definitions, Criteria and Explanatory Notes  Kati Mcclain; Casey, 1216  Image Results:   Image result are reviewed and documented in Hematology/Oncology history    US outside images  1 2 840 [id number]  1 706 [id number]  CT abdomen pelvis w wo contrast  1 2 840 [id number]  8 435 8 [id number] 215  MRI outside images  1 2 840 [id number]  0 492 [id number]  MRI outside images  1 2 840 [id number]  2 194 [id number]  CT abdomen pelvis w wo contrast  1 2 840 853451  7 417 8 [id number]  [address] are reviewed and documented in HemOnc history  Lab Results   Component Value Date    HGB 12 0 03/05/2022    HCT 34 9 (L) 03/05/2022    MCV 86 03/05/2022     03/05/2022    WBC 5 53 03/05/2022    NRBC 0 03/03/2022     Lab Results   Component Value Date    K 3 7 03/05/2022     03/05/2022    CO2 23 03/05/2022    BUN 13 03/05/2022    CREATININE 0 57 (L) 03/05/2022    CALCIUM 9 7 03/05/2022    CORRECTEDCA 10 3 (H) 03/05/2022     (H) 03/05/2022     (H) 03/05/2022    ALKPHOS 351 (H) 03/05/2022    EGFR 97 03/05/2022       No results found for: IRON, TIBC, FERRITIN    No results found for: [id number]    No results for input(s): WBC, CREAT in the last 72 hours      Invalid input(s):  PLT    By:  Janice Huerta MD, 3/24/2022, 10:16 AM

## 2022-03-18 NOTE — PROGRESS NOTES
Email sent w appt details and copy of the [facility] documents to set pt up w transport for appts

## 2022-03-22 ENCOUNTER — TELEPHONE (OUTPATIENT)
Dept: GENETICS | Facility: CLINIC | Age: 82
End: 2022-03-22

## 2022-03-22 NOTE — TELEPHONE ENCOUNTER
I called Adria Velázquez to schedule a new patient appointment with the [facility]       Outcome:   I left a voice message encouraging the patient to call the genetics team at [phone] to schedule this appointment  Follow-up: We will make one more attempt to reach the patient

## 2022-03-23 ENCOUNTER — NUTRITION (OUTPATIENT)
Dept: NUTRITION | Facility: HOSPITAL | Age: 82
End: 2022-03-23

## 2022-03-23 ENCOUNTER — TELEPHONE (OUTPATIENT)
Dept: GENETICS | Facility: CLINIC | Age: 82
End: 2022-03-23

## 2022-03-23 ENCOUNTER — PATIENT OUTREACH (OUTPATIENT)
Dept: HEMATOLOGY ONCOLOGY | Facility: CLINIC | Age: 82
End: 2022-03-23

## 2022-03-23 DIAGNOSIS — C25.9 PANCREATIC ADENOCARCINOMA (HCC): ICD-10-CM

## 2022-03-23 NOTE — PROGRESS NOTES
MSW made outreach to pt to notify him that his transport was confirmed for his 2 upcoming appointments  Pt will have [facility] for his oncology appointment and he will have [facility] for his Surg Onc appointment  MSW spent time explaining this to the pt and answering his questions  The pt presents with much anxiety over his appointments and the transport  MSW spent time reassuring him and offering significant support  MSW will follow up next week

## 2022-03-23 NOTE — PROGRESS NOTES
Outpatient Consultation - Palliative and Supportive Care   Leandro Reyes 80 y o  male [id number]    Assessment & Plan  Problems actively addressed:  1  Pancreatic adenocarcinoma ([address] )    2  Gastroesophageal reflux disease without esophagitis    3  Pancreatic mass    4  Anxiety about health    5  Cancer-related pain        · Nicolette Chávez endorses extreme anxiety about health  Willing to initiate med for this, prefers to take something PRN  · Start Xanax 0 25 mg TID PRN  · Plan to discuss SSRI/SNRI at next visit  · Refilled tramadol 50mg BID which gives Sebastien adequate relief  · Opioid agreement signed  · Has Zofran 8 mg from Oncology  · Continue nutritionist follow-up  · Complete blood work to re-evaluate LFTs and bilirubin  · Continue oncology follow-up  Nicolette Chávez hopes to initiate chemo soon as possible  · Emotional support given  Discussed anxiety management strategies  · ACP - not addressed  · RTC 1 month       Medications adjusted this encounter:  Requested Prescriptions     Signed Prescriptions Disp Refills    traMADol (ULTRAM) 50 mg tablet 60 tablet 0     Sig: Take 1 tablet (50 mg total) by mouth 2 (two) times a day as needed for moderate pain    ALPRAZolam (XANAX) 0 5 mg tablet 90 tablet 0     Sig: Take 0 5 tablets (0 25 mg total) by mouth 3 (three) times a day as needed for anxiety     No orders of the defined types were placed in this encounter  Medications Discontinued During This Encounter   Medication Reason    traMADol (ULTRAM) 50 mg tablet Reorder            Leandro Reyes was seen today for symptoms and planning cares related to above illnesses  Above orders were sent electronically, or provided in hardcopy in clinic  I have reviewed the patient's controlled substance dispensing history in the Prescription Drug Monitoring Program in compliance with the [facility] regulations before prescribing any controlled substances    1  [id number] 03/16/2022 03/16/2022 traMADol HCL (Tablet)  30 0 15 50 MG 10 0 [facility]  Private Pay 00 / 1 [place]    1  [id number] 03/07/2022 03/07/2022 traMADol HCL (Tablet)  30 0 10 50 MG 15 0 [facility]  Medicare 00 / 0 [place]    1  [id number] 03/01/2022 03/01/2022 oxyCODONE HCL-ACETAMINOPHEN (Tablet)  10 0 2 325 MG-5 MG 37 50 [facility]  Medicare 00 / 0 [place]    1  [id number] 02/21/2022 02/21/2022 oxyCODONE HCL-ACETAMINOPHEN (Tablet)  10 0 3 325 MG-5 MG 25 0 [facility]  Medicare 00 / 0 [place]    1  [id number] 02/16/2022  02/15/2022 oxyCODONE HCL-ACETAMINOPHEN (Tablet)  10 0 4 325 MG-5 MG 18 75 SUSSY ACOSTA              We appreciate the referral, and wish for him to continue to follow with us  If there are questions or concerns, please contact us through our clinic/answering service 24 hours a day, seven days a week  [address] PA-C  [facility] Palliative and Supportive [facility]  [phone]        Visit Information    Accompanied By: No one    Source of History: Self    History Limitations:  Anxious thought pattern    History of Present Illness      Page Esquivel is a 80 y o  male who presents as a referral from Dr Jason Arrieta of oncology for primary palliative diagnosis of pancreatic adenocarcinoma  Consultation is requested for: symptom management, goal of care assessment and decisional support, disease process education and discussion of prognosis, advance care planning, emotional support in the setting of serious illness  Derek Soliz has a recent diagnosis of pancreatic adenocarcinoma, diagnosed February 2022 when he presented to [facility] after several months of progressive upper abdominal pain  He had a hospitalization with [facility] due to hyperbilirubinemia in which by needle aspiration confirmed the diagnosis and a stent was placed  He was seen in the office by Dr Jason Arrieta 03/24/2022    Treatment options were discussed, specifically mFOLFORINOX  Prior to initiating this, Dr Edi Freitas requests repeat lab work to assessed Hut[place] LFTs as they were significantly elevated prior to stent placement  Hilaria Tang also has appointment with surgical Oncology on 03/30/2022  Hilaria Tang has been referred to palliative care for symptom management and support  Today, Hilaria Tang presents unaccompanied to this palliative and supportive care consultation  I introduced the role of palliative and supportive care  Hilaria Tang asks multiple questions about possible treatments for his cancer  He requests repeated reassurance that there are options for him, even if the blood work does not show that his bilirubin has diminished  Hilaria Zacharyberkley endorses "extreme anxiety", and that he is trying not to get himself "worked up"  Extensive emotional support provided  We discussed anxiety management techniques  Hilaria Tang is agreeable to initiating a medication for anxiety however he prefers to take something PRN rather than scheduled  We discussed options and will start with a low-dose Xanax  Hilaria Tang reports his pain is minimal   He states throughout the day, he feels well  At night, he does experience pain at which time he takes a tramadol 50 mg with good relief  At times, he will take a 2nd tramadol  We reviewed his pill count  I provided a refill after he signed opioid agreement form  Hilaria Tang denies significant constipation or diarrhea  He denies significant nausea or vomiting  He did receive a script for Zofran from his oncologist   We reviewed that this may be be beneficial to him when he begins treatment  Jaundice hopeful to continue meeting with Nutrition Services to optimize his oral intake  He has questions regarding how much he should be eating as a diabetic  We reviewed Sebastien's social situation  He states his wife passed away several years ago  He states he lives in [facility] and has adequate social contact with the other residents    At this time, Sebastien's goals are evident that he would like to pursue any and all possible treatments  Charm Lefort plans to obtain blood work ordered by Dr Cash Salomon as soon as possible  He is agreeable to further palliative follow-up  I will plan to follow with him in approximately 1 month  At the next visit, will consider long-acting anxietolytic and discussion of advance care planning  Pertinent Palliative Care Domains    Physical Symptoms:ambulates    Psychological Symptoms:severe anxiety      Advance Directive and Living Will:    not discussed on this visit   Power of :    POLST:      Historical Data  Past Medical History:   Diagnosis Date    Diabetes mellitus ([address] )     GERD (gastroesophageal reflux disease)      Past Surgical History:   Procedure Laterality Date    APPENDECTOMY      CATARACT EXTRACTION Right      Social History     Socioeconomic History    Marital status:      Spouse name: Not on file    Number of children: Not on file    Years of education: Not on file    Highest education level: Not on file   Occupational History    Not on file   Tobacco Use    Smoking status: Former Smoker    Smokeless tobacco: Never Used    Tobacco comment: quit in 1960s   Vaping Use    Vaping Use: Never used   Substance and Sexual Activity    Alcohol use: Yes     Comment: seldom    Drug use: No    Sexual activity: Not on file   Other Topics Concern    Not on file   Social History Narrative    Not on file     Social Determinants of Health     Financial Resource Strain: Not on file   Food Insecurity: No Food Insecurity    Worried About [address] in the Last Year: Never true    [address] in the Last Year: Never true   Transportation Needs: No Transportation Needs    Lack of Transportation (Medical): No    Lack of Transportation (Non-Medical):  No   Physical Activity: Not on file   Stress: Not on file   Social Connections: Not on file   Intimate Partner Violence: Not on file Housing Stability: Unknown    Unable to Pay for Housing in the Last Year: No    Number of Places Lived in the Last Year: Not on file    Unstable Housing in the Last Year: No     History reviewed  No pertinent family history  Allergies   Allergen Reactions    Aleve [Naproxen] Swelling     Current Outpatient Medications   Medication Sig Dispense Refill    apixaban (Eliquis) 5 mg Take 5 mg by mouth 2 (two) times a day      metFORMIN (GLUCOPHAGE) 1000 MG tablet Take 1,000 mg by mouth daily with breakfast      omeprazole (PriLOSEC) 40 MG capsule Take 40 mg by mouth daily   polyethylene glycol (MIRALAX) 17 g packet Take 17 g by mouth daily      Alcohol Swabs 70 % PADS May substitute brand based on insurance coverage  Check glucose BID  100 each 0    ALPRAZolam (XANAX) 0 5 mg tablet Take 0 5 tablets (0 25 mg total) by mouth 3 (three) times a day as needed for anxiety 90 tablet 0    Blood Glucose Monitoring Suppl (OneTouch Verio Reflect) w/Device KIT May substitute brand based on insurance coverage  Check glucose BID  1 kit 0    glucose blood (OneTouch Verio) test strip May substitute brand based on insurance coverage  Check glucose BID  100 each 0    ondansetron (Zofran ODT) 8 mg disintegrating tablet Take 1 tablet (8 mg total) by mouth every 8 (eight) hours as needed for nausea or vomiting 20 tablet 0    OneTouch Delica Lancets 81P MISC May substitute brand based on insurance coverage  Check glucose BID  100 each 0    traMADol (ULTRAM) 50 mg tablet Take 1 tablet (50 mg total) by mouth 2 (two) times a day as needed for moderate pain 60 tablet 0     No current facility-administered medications for this visit  Review of Systems   Constitutional: Positive for weight loss  Negative for chills, decreased appetite and night sweats  Cardiovascular: Negative for chest pain and dyspnea on exertion  Skin: Negative for dry skin and flushing     Musculoskeletal: Negative for joint swelling, muscle weakness and myalgias  Gastrointestinal: Positive for abdominal pain (mild)  Negative for bloating, anorexia, constipation, diarrhea, nausea and vomiting  Neurological: Negative for difficulty with concentration and excessive daytime sleepiness  Psychiatric/Behavioral: Positive for hypervigilance  Negative for altered mental status and substance abuse  The patient is nervous/anxious  The patient does not have insomnia  Vital Signs    /64 (BP Location: Right arm, Patient Position: Sitting, Cuff Size: Standard)   Pulse 88   Temp 97 9 °F (36 6 °C) (Temporal)   Resp 16   Wt 62 6 kg (138 lb)   SpO2 99%   BMI 21 61 kg/m²     Physical Exam and Objective Data  Physical Exam  Vitals and nursing note reviewed  Constitutional:       Appearance: He is underweight  He is ill-appearing  HENT:      Head: Normocephalic and atraumatic  Eyes:      Conjunctiva/sclera: Conjunctivae normal    Cardiovascular:      Rate and Rhythm: Normal rate and regular rhythm  Pulmonary:      Effort: Pulmonary effort is normal  No respiratory distress  Musculoskeletal:      Cervical back: Neck supple  Skin:     General: Skin is warm and dry  Coloration: Skin is pale  Neurological:      Mental Status: He is alert  Psychiatric:         Attention and Perception: Attention normal          Mood and Affect: Mood is anxious  Speech: Speech is rapid and pressured          Radiology and Laboratory:  I personally reviewed and interpreted the following results:  Office visits for Oncology, CT scan results, prior hospitalization results    45 minutes was spent face to face with Jacki Webb with greater than 50% of the time spent in counseling or coordination of care including discussions of etiology of diagnosis, pathogenesis of diagnosis, prognosis of diagnosis, risks and benefits of treatment, instructions for disease self management, follow up requirements, risk factors and risk reduction of disease, patient and family counseling/involvement in care and compliance with treatment regimen  All of the patient's questions were answered during this discussion

## 2022-03-23 NOTE — PROGRESS NOTES
Pt left me a VM when I was out of the office asking about [facility] to see if he was set up for it  I let him know that he is and that he should be ready about 90 minutes before the appt and that they would call him tomorrow when they are on their way to pick him up  Unfortunately there is no specific  time  The pt said that he is usually ready by 7 am everyday anyway  I told him that I would be meeting w him tomorrow morning at his consult  I would make mention to the office staff that he has [facility] set up and when he is ready to leave from his appt to have the office call for his ride back home  He was appreciative of my help  He made mention to me that Brayan Sullivan, did ask the pt about his hx of smoking and drinking, he couldn't remember the answer to the questions  He told me that he wanted to clarify that he is a social drinker and that he quit smoking when he was 27years old

## 2022-03-23 NOTE — PATIENT INSTRUCTIONS
Nutrition Rx & Recommendations:  · Diet: High Calorie, High Protein (for high calorie foods see pages 52-53, and for high protein foods see pages 49-51 in your Eating Hints book)  · Keep a daily food journal (pen/paper, patti such as [employer])  · Nutrition Supplements: high calorie/high protein  May use homemade shakes/smoothies as desired  If using a pre-made shake/bar, choose ones with >300 calories and >10 grams protein (ex  Ensure Complete, Ensure Enlive, Ensure Plus, Boost Plus, Boost Very High Calorie, etc )  · Include protein at all meals/snacks  · Include a variety of foods (as tolerated/allowed by diet)  · Stay hydrated by sipping fluids of choice/tolerance throughout the day  · Liquid nutrition may be better tolerated than solids at times  · Alter food choices and eating patterns to accommodate changing needs  · Light physical activity (such as walking) is encouraged, as able/tolerated  · Refer to Eating Hints book for other meal/snack ideas and symptom management  · Follow proper oral care; Try baking soda/salt water rinse recipe (mix 3/4 tsp salt + 1 tsp baking soda + 1 qt water; rinse with plain water after using) in Eating Hints book (pg 18)  Brush your teeth before/after meals & before bed  · For appetite loss: try powdered or liquid nutrition supplements; eating by the clock every 2-3 hours; set a timer to remind yourself to eat, keep snacks nearby; add extra protein & calories to your diet; drink liquids in between meals, choose liquids that add calories; eat a bedtime snack; eat soft, cool or frozen foods; eat a larger meal when you feel well & rested; only sip small amounts of liquids during meals (see pages 10-12 in your Eating Hints book)    · For weight loss: monitor your weight at home at least 2x/week, record your weight, start by adding 250-500 extra calories per day, eat 5-6 small scheduled meals every 2-3 hrs, choose foods that are high in protein and calories (see pages 49-53 in your Eating Hints book), drink liquids with calories for example: milkshakes/smoothies/juice/soup/whole milk/chocolate milk, cook with protein fortified milk (see recipe on page 36 in your Eating Hints book), consider ready-to-drink oral nutrition supplements such as Ensure Plus, Ensure Enlive, Boost Plus, or Boost Very High Calorie, avoid "diet" and "light" foods when possible, avoid drinking too much with meals, contact your dietitian with any continued weight loss over the course of 1 week  For more info see pages 35-37 in your Eating Hints book  · For constipation: drink plenty of fluids (>64 oz/day); drink hot liquids; eat high-fiber foods as tolerated (whole grains, beans, peas, nuts, seeds, fruits, vegetables, etc); increase physical activity as tolerated  Avoid increasing fiber intake too quickly, add fiber into your diet slowly; keep a record of your bowel movements (see pages 13-14 in you Eating Hints book)  · Weigh yourself regularly  If you notice weight loss, make an effort to increase your daily food/calorie intake  If you continue to notice loss after these efforts, reach out to your dietitian to establish a plan to stabilize weight  · -Decrease processed foods and added sugars consumption  · -Increase fresh/lightly cooked fruits/vegetables intake  · -Try to cook at home more often(ask your daughter to help as needed)  · -Try whole FairLife milk (it is a Lactose free high protein milk)  · -Try home made fresh fruit/berries smoothies with whole milk plain yogurt /greek yogurt or kefir and added nuts/seeds or nut or seeds butters, protein powder  · -Try green smoothies: Ex: Kiwi, green apple/banana or pear + avocado+ a handful of spinach/ann greens or kale with greek yogurt or soy milk  Can add some coconut water for thinner consistency   Can modify the ingredients to suit your tastes  · -Home made soups/stews, made in slow cooker(crock pot)-are easy to make and highly nutritious-could be a healthier option than canned soups(no preservatives, and less sodium too)  · -Sauteed vegetables like spinach, broccoli, cauliflower or mushrooms can be easily added to regular omelet to increase nutrition and incorporate more vegetables into the diet   · -Home made broth or bone broth might be included as a morning and evening drink and provide additional healthy hydration source  · -Cultured vegetables like sauerkraut or kimchi might be included gradually into the diet to provide natural probiotics and nutrition  · -Fresh greens/vegetable salads with simple home made dressing(olive oil, a little salt, some lemon juice) with avocado, olives, and some healthy protein(meats, poultry, fish, beans, legumes, edamame, tofu, tempeh, eggs, cheeses)  · -Soaked nuts(almonds/walnuts) can be soaked overnight and added to shakes/smoothies  · -Tuna salads, chicken or egg salads with simple olive oil/fresh lemon juice and natural seasoning like Dash   · -Guacamole or other avocado based dips/spreads might be added to different meals  · -Cottage cheese with greek yogurt and some blended berries/fruits can make a healthy and nourishing addition to different dishes  · -Try Egg custards/egg puddings  · -Try Eggnog as another source of healthy hydration  · -Try hot cereals like steel cut oats/cream of wheat, or 5-7-9 grain wholegrains hot cereal (ready to cook mixes) as a healthier alternative to cold cereals  Use whole Fairlife milk, or whole milk, or soy milk   Can add fruits of your choice, berries, nuts/seeds, nut/seed butters  · -Try to include into your daily schedule: walking inside/or even better outside in the sunshine, slow/gentle yoga or resistance bands exercises, weight bearing exercises like stair walking, etc as tolerated/allowed    Discussed the potential interactions of high dose antioxidants with cancer tx and recommended exercising caution when taking these supplements, Discussed reading supplement labels and making sure supplements have <100% of the daily value for all nutrients to avoid over supplementation, Recommended avoiding high dose antioxidant supplements during tx and Refered pt to [facility] "About Herbs" website for further information on safety/effectiveness/interactions of supplements         Follow Up Plan: in ~3 weeks, depending on the tx plan  Recommend Referral to Other Providers: none at this time

## 2022-03-23 NOTE — PROGRESS NOTES
Outpatient Oncology Nutrition Consultation   Type of Consult: Initial Consult  Care Location: Telephone Call    Reason for referral: Received notification form Lo Shoemaker RN on 3/17/22 that Charm Lefort has triggered for oncology nutrition care  Reason for referral: MST score 3 indicating unintentional wt loss of 14-23 lbs(6 4-10 5 kg), and the pt has been eating poorly because of decreased appetite  Nutrition Assessment:   Oncology Diagnosis & Treatments: Diagnosed with pancreatic adenocarcinoma in 3/2022  Pt had CT scan of chest thorax done yesterday  Pt has appt with Hem onc Dr Cash Salomon tomorrow to plan treatment  Oncology History   Pancreatic adenocarcinoma ([facility] )   3/4/2022 Biopsy    A-B-C  Pancreas, pancreatic head mass   Malignant (PSC Category VI) -  Adenocarcinoma       3/14/2022 Initial Diagnosis    Pancreatic adenocarcinoma [facility])       Past Medical & Surgical Hx:   Patient Active Problem List   Diagnosis    Type 2 diabetes mellitus without complication, with long-term current use of insulin ([facility] [address] )    GERD (gastroesophageal reflux disease)    Pancreatic mass    History of pulmonary embolism    Hyperbilirubinemia    Anxiety about health    Pancreatic adenocarcinoma ([facility] [address] )     Past Medical History:   Diagnosis Date    Diabetes mellitus ([facility] [address] )     GERD (gastroesophageal reflux disease)      Past Surgical History:   Procedure Laterality Date    APPENDECTOMY      CATARACT EXTRACTION Right        Review of Medications:   Vitamins, Supplements and Herbals: Med List Reviewed & pt is only taking: multivitamins, Discussed the potential interactions of high dose antioxidants with cancer tx and recommended exercising caution when taking these supplements, Discussed reading supplement labels and making sure supplements have <100% of the daily value for all nutrients to avoid over supplementation, Recommended avoiding high dose antioxidant supplements during tx and Refered pt to [facility] "About Herbs" website for further information on safety/effectiveness/interactions of supplements    Current Outpatient Medications:     Alcohol Swabs 70 % PADS, May substitute brand based on insurance coverage  Check glucose BID , Disp: 100 each, Rfl: 0    apixaban (Eliquis) 5 mg, Take 5 mg by mouth 2 (two) times a day, Disp: , Rfl:     Blood Glucose Monitoring Suppl (OneTouch Verio Reflect) w/Device KIT, May substitute brand based on insurance coverage  Check glucose BID , Disp: 1 kit, Rfl: 0    glucose blood (OneTouch Verio) test strip, May substitute brand based on insurance coverage  Check glucose BID , Disp: 100 each, Rfl: 0    metFORMIN (GLUCOPHAGE) 1000 MG tablet, Take 1,000 mg by mouth daily with breakfast, Disp: , Rfl:     omeprazole (PriLOSEC) 40 MG capsule, Take 40 mg by mouth daily  , Disp: , Rfl:     OneTouch Delica Lancets 06P MISC, May substitute brand based on insurance coverage   Check glucose BID , Disp: 100 each, Rfl: 0    polyethylene glycol (MIRALAX) 17 g packet, Take 17 g by mouth daily, Disp: , Rfl:     Most Recent Lab Results:   Lab Results   Component Value Date    WBC 5 53 03/05/2022    NEUTROABS 3 74 03/03/2022     (H) 03/05/2022     (H) 03/05/2022    ALB 3 3 (L) 03/05/2022    SODIUM 134 (L) 03/05/2022    SODIUM 137 03/04/2022    K 3 7 03/05/2022    K 3 1 (L) 03/04/2022     03/05/2022    BUN 13 03/05/2022    BUN 13 03/04/2022    CREATININE 0 57 (L) 03/05/2022    CREATININE 0 55 (L) 03/04/2022    EGFR 97 03/05/2022    PHOS 2 8 03/03/2022    POCGLU 139 03/05/2022    GLUC 147 (H) 03/05/2022    HGBA1C 6 1 08/28/2021    CALCIUM 9 7 03/05/2022    MG 2 0 03/03/2022       Anthropometric Measurements:   Height: 67  Ht Readings from Last 1 Encounters:   03/15/22 5' 7" (1 702 m)     Wt Readings from Last 20 Encounters:   03/15/22 61 7 kg (136 lb)   03/04/22 63 5 kg (140 lb)   03/02/22 95 3 kg (210 lb 1 6 oz)   09/06/16 95 3 kg (210 lb)       Weight History:    Usual Weight: 200-210#   Varian: n/a   Home Scale: n/a    Oncology Nutrition-Anthropometrics      Nutrition from 3/23/2022 in [address] [place] [address]   Patient age (years): 80 years   Patient (male) height (in): 79 in   Current weight (lbs): 136 lbs   Current weight to be used for anthropometric calculations (kg) 61 8 kg   BMI: 21 3   IBW male 148 lb   IBW (kg) male 67 3 kg   IBW % (male) 91 9 %   Adjusted BW (male): 145 lbs   Adjusted BW in kg (male): 65 9 kg   % weight change after 1 week: -2 9 %   Weight change after 1 week (lbs) -4 lbs          Nutrition-Focused Physical Findings: n/a due to telephone call    Food/Nutrition-Related History & Client/Social History:    Current Nutrition Impact Symptoms:  [] Nausea  [x] Reduced Appetite  [] Acid Reflux    [] Vomiting  [x] Unintended Wt Loss  [] Malabsorption    [] Diarrhea  [] Unintended Wt Gain  [] Dumping Syndrome    [x] Constipation  [] Thick Mucous/Secretions  [] Abdominal Pain    [] Dysgeusia (Altered Taste)  [] Xerostomia (Dry Mouth)  [] Gas    [] Dysosmia (Altered Smell)  [] Thrush  [] Difficulty Chewing    [] Oral Mucositis (Sore Mouth)  [] Fatigue  [] Hyperglycemia    [] Odynophagia  [] Esophagitis  [] Other:    [] Dysphagia  [] Early Satiety  [] No Problems Eating      Food Allergies & Intolerances: no    Current Diet: Regular Diet, No Restrictions  Current Nutrition Intake: Less than usual  Appetite: Good, Fair  Nutrition Route: PO  Oral Care: brushes BID  Activity level: ADL, walking outside, light exercise    24 Hr Diet Recall:   Breakfast: banana+ protein bar  Snack: 1 Ensure original  Lunch: sandwich with meat and vegetables  Snack: n/a  Dinner: cold cereal with low fat /skim milk  Snack: n/a    Beverages: water (16 9 oz x 1-2 a day), orange juice (6 oz x 1 a day)  Supplements:    Ensure Original (8 oz, 220 kcal, 9 g pro) 1-2 a day      Oncology Nutrition-Estimated Needs      Nutrition from 3/23/2022 in [address] [place] [address]   Weight type used Actual weight   Weight in kilograms (kg) used for estimated needs 61 8 kg   Energy needs formula:  30-35 kcal/kg   Energy needs based on 30 kcal/k kcal   Energy needs based on 35 kcal/k kcal   Protein needs formula: 1 2-1 5 g/kg   Protein needs based on 1 2 g/k g   Protein needs based on 1 5 g/kg 93 g   Fluid needs formula: 30-35 mL/kg   Fluid needs based on 30 mL/kg 1854 mL   Fluid needs in ounces 63 oz   Fluid needs based on 35 mL/kg 2163 mL   Fluid needs in ounces 73 oz           Discussion & Intervention:   Mariana Obrien was evaluated today for an initial RD consultation regarding wt loss, poor po intake and nutrition impact sx management  Mariana Obrien is planned to undergo tx for Pancreatic adenocarcinoma  Diagnosed with pancreatic adenocarcinoma in 3/2022  Pt had CT scan of chest thorax done yesterday  Pt has appt with Hem onc Dr Lynette Eli tomorrow to plan treatment  Mariana Obrien reports feeling okay today  He states his appetite is fluctuating and is good or fair at times  Mariana Obrien states he eats mostly store bought foods, bars or cold cereals  He follows some advice he heard long ago on the necessity to include only low fat or skim milk into his daily diet, which he is strictly following  He rarely eats any fruits/vegetables besides banana and whatever vegetables are in his canned soups  He often eats microwaved foods, or cold cereals for lunch and dinner because it just seems easy and doesn't require any efforts  Suggested that healthy meals can also be easy to make and will require just minimal efforts, however the health benefits that home made foods would bring for the body are truly worth it  He feels he can gradually add some fruits/vegetables back into his diet  He is also open to the idea of increasing his protein/calories/nutrients intake form healthier food options, and from some home made foods  Mariana Obrien reports he drinks some water and orange juice sometimes   He complains of constipation at times and takes stool softener as needed  He also feels he can try to include some light exercise into his daily schedule  Reviewed 24 hour recall, which revealed an inadequate po intake, and discussed ways to increase kcal, protein, and fluid intakes and optimize nutrient intake  Also reviewed the importance of wt management throughout the tx process and the role of a high kcal/ high protein diet in managing wt and overall health    Based on today's assessment, discussion included: MNT for: Pancreatic adenocarcinoma, constipation, decreased appetite, unintentional wt loss, how to modify food for anticipated nutrition impact symptoms pt may experience during CA tx, practicing proper oral care, a high kcal/protein diet & food choices to include at all meals & snacks (Examples of high kcal foods: cheese, full-fat dairy products, nut butter, plant-based fats, coconut oil/milk, avocado, butter, cream soup, etc  Example of high protein foods: eggs, chicken, fish, beans/legumes, nuts/nut butters, bone brother, etc  ), fortifying foods for added kcal and protein (examples include: adding cheese to foods such as eggs, mashed potatoes, casseroles, etc ; Making oatmeal with milk rather than water; Making fortified mashed potatoes with cream, butter, dry milk powder, plain [place] yogurt, and cheese ), adequate hydration & fluid choices, sipping on calorie containing beverages (examples include: adding whole milk or cream to coffee, oral nutrition supplements, juice, electrolyte replacement beverages, milk, etc ), eating smaller more frequent meals every 2-3 hours (5-6 small meals/day), eating when feeling most hungry, high calorie/high protein oral nutrition supplements, recipe suggestions/resources, trying new recipes, & cooking at home more often, modifying foods to increase their palatability & experimenting with new foods/flavors/cuisines and adding extra fat to foods while cooking such as butter, plant-based oil, coconut oil/milk, avocado, nut butters, etc    Moving forward, Jacki Barth was encouraged to increase kcal, protein, and fluid intakes  Materials Provided: all e-mailed to pt (@ [email or web address]'): Eating hints book, high calorie/high protein recipes, and creative ways to modify them to suit needs/tastes, constipation handout, commercial supplements list, natural sources of high protein foods  All questions and concerns addressed during todays visit  Jacki Barth has RD contact information  Nutrition Diagnosis:    Inadequate Energy Intake related to physiological causes, disease state and treatment related issues as evidenced by food recall, wt loss and discussion with pt and/or family  and Increased Nutrient Needs (kcal & pro) related to increased demand for nutrients and disease state as evidenced by cancer dx and pt undergoing tx for cancer  Monitoring & Evaluation:   Goals:  · weight maintenance/stabilization  · adequate nutrition impact symptom management  · pt to meet >/=75% estimated nutrition needs daily  · increase fluid intake    · Progress Towards Goals: Initiated    Nutrition Rx & Recommendations:  · Diet: High Calorie, High Protein (for high calorie foods see pages 52-53, and for high protein foods see pages 49-51 in your Eating Hints book)  · Keep a daily food journal (pen/paper, patti such as [employer])  · Nutrition Supplements: high calorie/high protein  May use homemade shakes/smoothies as desired  If using a pre-made shake/bar, choose ones with >300 calories and >10 grams protein (ex  Ensure Complete, Ensure Enlive, Ensure Plus, Boost Plus, Boost Very High Calorie, etc )  · Include protein at all meals/snacks  · Include a variety of foods (as tolerated/allowed by diet)  · Stay hydrated by sipping fluids of choice/tolerance throughout the day  · Liquid nutrition may be better tolerated than solids at times  · Alter food choices and eating patterns to accommodate changing needs    · Light physical activity (such as walking) is encouraged, as able/tolerated  · Refer to Eating Hints book for other meal/snack ideas and symptom management  · Follow proper oral care; Try baking soda/salt water rinse recipe (mix 3/4 tsp salt + 1 tsp baking soda + 1 qt water; rinse with plain water after using) in Eating Hints book (pg 18)  Brush your teeth before/after meals & before bed  · For appetite loss: try powdered or liquid nutrition supplements; eating by the clock every 2-3 hours; set a timer to remind yourself to eat, keep snacks nearby; add extra protein & calories to your diet; drink liquids in between meals, choose liquids that add calories; eat a bedtime snack; eat soft, cool or frozen foods; eat a larger meal when you feel well & rested; only sip small amounts of liquids during meals (see pages 10-12 in your Eating Hints book)  · For weight loss: monitor your weight at home at least 2x/week, record your weight, start by adding 250-500 extra calories per day, eat 5-6 small scheduled meals every 2-3 hrs, choose foods that are high in protein and calories (see pages 49-53 in your Eating Hints book), drink liquids with calories for example: milkshakes/smoothies/juice/soup/whole milk/chocolate milk, cook with protein fortified milk (see recipe on page 36 in your Eating Hints book), consider ready-to-drink oral nutrition supplements such as Ensure Plus, Ensure Enlive, Boost Plus, or Boost Very High Calorie, avoid "diet" and "light" foods when possible, avoid drinking too much with meals, contact your dietitian with any continued weight loss over the course of 1 week  For more info see pages 35-37 in your Eating Hints book  · For constipation: drink plenty of fluids (>64 oz/day); drink hot liquids; eat high-fiber foods as tolerated (whole grains, beans, peas, nuts, seeds, fruits, vegetables, etc); increase physical activity as tolerated    Avoid increasing fiber intake too quickly, add fiber into your diet slowly; keep a record of your bowel movements (see pages 13-14 in you Eating Hints book)  · Weigh yourself regularly  If you notice weight loss, make an effort to increase your daily food/calorie intake  If you continue to notice loss after these efforts, reach out to your dietitian to establish a plan to stabilize weight  · -Decrease processed foods and added sugars consumption  · -Increase fresh/lightly cooked fruits/vegetables intake  · -Try to cook at home more often(ask your daughter to help as needed)  · -Try whole FairLife milk (it is a Lactose free high protein milk)  · -Try home made fresh fruit/berries smoothies with whole milk plain yogurt /greek yogurt or kefir and added nuts/seeds or nut or seeds butters, protein powder  · -Try green smoothies: Ex: Kiwi, green apple/banana or pear + avocado+ a handful of spinach/ann greens or kale with greek yogurt or soy milk  Can add some coconut water for thinner consistency   Can modify the ingredients to suit your tastes  · -Home made soups/stews, made in slow cooker(crock pot)-are easy to make and highly nutritious-could be a healthier option than canned soups(no preservatives, and less sodium too)  · -Sauteed vegetables like spinach, broccoli, cauliflower or mushrooms can be easily added to regular omelet to increase nutrition and incorporate more vegetables into the diet   · -Home made broth or bone broth might be included as a morning and evening drink and provide additional healthy hydration source  · -Cultured vegetables like sauerkraut or kimchi might be included gradually into the diet to provide natural probiotics and nutrition  · -Fresh greens/vegetable salads with simple home made dressing(olive oil, a little salt, some lemon juice) with avocado, olives, and some healthy protein(meats, poultry, fish, beans, legumes, edamame, tofu, tempeh, eggs, cheeses)  · -Soaked nuts(almonds/walnuts) can be soaked overnight and added to shakes/smoothies  · -Tuna salads, chicken or egg salads with simple olive oil/fresh lemon juice and natural seasoning like Dash   · -Guacamole or other avocado based dips/spreads might be added to different meals  · -[address] with greek yogurt and some blended berries/fruits can make a healthy and nourishing addition to different dishes  · -Try Egg custards/egg puddings  · -Try Eggnog as another source of healthy hydration  · -Try hot cereals like steel cut oats/cream of wheat, or 5-7-9 grain wholegrains hot cereal (ready to cook mixes) as a healthier alternative to cold cereals  Use whole Fairlife milk, or whole milk, or soy milk   Can add fruits of your choice, berries, nuts/seeds, nut/seed butters  · -Try to include into your daily schedule: walking inside/or even better outside in the sunshine, slow/gentle yoga or resistance bands exercises, weight bearing exercises like stair walking, etc as tolerated/allowed    Discussed the potential interactions of high dose antioxidants with cancer tx and recommended exercising caution when taking these supplements, Discussed reading supplement labels and making sure supplements have <100% of the daily value for all nutrients to avoid over supplementation, Recommended avoiding high dose antioxidant supplements during tx and Refered pt to [facility] "About Herbs" website for further information on safety/effectiveness/interactions of supplements         Follow Up Plan: in ~3 weeks, depending on the tx plan  Recommend Referral to Other Providers: none at this time

## 2022-03-23 NOTE — TELEPHONE ENCOUNTER
Patient returned call, ok'd with tomorrow's appointment he stated that he will have to arrange out his transportation after his appointment but that he would like to keep the appointment all in one day as he is not available on April 7

## 2022-03-23 NOTE — TELEPHONE ENCOUNTER
Patient returned call regarding scheduling his genetics appointment, he stated that he has a daughter and he would like to get tested for her  I offered patient April 7 at 11:00 am or 1:00 pm, patient stated that he will have to confirm and call me back  He is also coming to the [facility] office tomorrow at 9:40 for an appointment with Hem Onc, I will see if he is available to conduct his appointment for genetics after his hem onc appointment, ok'd by cristobal

## 2022-03-23 NOTE — PROGRESS NOTES
Pre-Test Genetic Counseling Consult Note    Patient Name: Samm Mitchell DOB/Age: 1940/81 y o  Referring Provider: Alka Tenorio MD    Date of Service: 3/24/2022  Genetic Counselor: Emeka Reed MS, [facility]  Interpretation Services: None  [facility]      Yenny Thomas was referred to the [address] [place] [address] and Genetic Assessment Program due to his personal history of pancreatic cancer and family history of ovarian cancer  He presents today to discuss the possibility of a hereditary cancer syndrome, options for genetic testing, and implications for him and his family  Cancer History and Treatment:     Personal History: Personal history of pancreatic cancer    3/4/2022 FNA of pancreas  Final Diagnosis   A-B-C  Pancreas, pancreatic head mass (ThinPrep, smear and cell block preparations)  Malignant (PSC Category VI) - See note  Adenocarcinoma      Satisfactory for evaluation  Screening Hx: Not assessed      Medical and Surgical History  Pertinent surgical history:   Past Surgical History:   Procedure Laterality Date    APPENDECTOMY      CATARACT EXTRACTION Right       Pertinent medical history:  Past Medical History:   Diagnosis Date    Diabetes mellitus ([address] )     GERD (gastroesophageal reflux disease)        Other History:  Height:   Ht Readings from Last 1 Encounters:   03/15/22 5' 7" (1 702 m)     Weight:   Wt Readings from Last 1 Encounters:   03/15/22 61 7 kg (136 lb)     Relevant Family History   Patient reports no Ashkenazi Orthodox ancestry       Brother (d age 76) with throat cancer     Father (d age 76) with no history of cancer  There is no known history of cancer in paternal relatives    Mother (d age 80) with no history of cancer  Maternal Grandmother (d age 52's) with ovarian cancer    Please refer to the scanned pedigree in the Media Tab for a complete family history     *All history is reported as provided by the patient; records are not available for review, except where indicated  Assessment: We also discussed the many factors that influence our risk for cancer such as age, environmental exposures, lifestyle choices and family history  We reviewed the indications suggestive of a hereditary predisposition to cancer  Genetic testing is indicated for Janie Dominguez based on the following criteria: Meets NCCN P3 4193 Testing Criteria for Pancreatic Cancer Susceptibility Genes: Personal history of pancreatic cancer    The risks, benefits, and limitations of genetic testing were reviewed with the patient, as well as possible test results (positive, negative, variants of uncertain significance) and their clinical implications  If positive for a mutation, options for managing cancer risk including increased surveillance, chemoprevention, and in some cases prophylactic surgery were discussed  Janie Dominguez was informed that if a hereditary cancer syndrome was identified in him, first degree relatives (parents, siblings, and children) have a chance of also inheriting the condition  Genetic testing would allow for predictive genetic testing in other relatives, who may also be at risk depending on their degree of relation  Plan: Patient decided to proceed with testing and provided consent  Summary:     Sample Collection:  The patient's blood sample was drawn in the office on 2/24/2022 by medical assistant Vicki Marie    Genetic Testing Preformed: CancerNext + RNA (36 genes): APC, JACQUELINE, AXIN2 BARD1, BRCA1, BRCA2, BRIP1, BMPR1A, CDH1, CDK4, CDKN2A, CHEK2, DICER1, EPCAM, GREM1, HOXB13, MLH1, MSH2, MSH3, MSH6, MUTYH, NBN, NF1, NTHL1, PALB2, PMS2, POLD1, POLE, PTEN, RAD51C, RAD51D, RECQL SMAD4, SMARCA4, STK11, TP53    Results take approximately 2-3 weeks to complete once test is started  We will contact Janie Dominguez once results are available  Additional recommendations for surveillance/medical management will be made pending genetic test results

## 2022-03-24 ENCOUNTER — CLINICAL SUPPORT (OUTPATIENT)
Dept: GENETICS | Facility: CLINIC | Age: 82
End: 2022-03-24
Payer: COMMERCIAL

## 2022-03-24 ENCOUNTER — CONSULT (OUTPATIENT)
Dept: PALLIATIVE MEDICINE | Age: 82
End: 2022-03-24
Payer: COMMERCIAL

## 2022-03-24 ENCOUNTER — PATIENT OUTREACH (OUTPATIENT)
Dept: HEMATOLOGY ONCOLOGY | Facility: CLINIC | Age: 82
End: 2022-03-24

## 2022-03-24 ENCOUNTER — DOCUMENTATION (OUTPATIENT)
Dept: GENETICS | Facility: CLINIC | Age: 82
End: 2022-03-24

## 2022-03-24 ENCOUNTER — DOCUMENTATION (OUTPATIENT)
Dept: HEMATOLOGY ONCOLOGY | Facility: CLINIC | Age: 82
End: 2022-03-24

## 2022-03-24 ENCOUNTER — CONSULT (OUTPATIENT)
Dept: HEMATOLOGY ONCOLOGY | Facility: CLINIC | Age: 82
End: 2022-03-24
Payer: COMMERCIAL

## 2022-03-24 VITALS
OXYGEN SATURATION: 98 % | HEART RATE: 98 BPM | DIASTOLIC BLOOD PRESSURE: 74 MMHG | SYSTOLIC BLOOD PRESSURE: 120 MMHG | RESPIRATION RATE: 17 BRPM | WEIGHT: 134 LBS | TEMPERATURE: 97.9 F | HEIGHT: 67 IN | BODY MASS INDEX: 21.03 KG/M2

## 2022-03-24 VITALS
BODY MASS INDEX: 21.61 KG/M2 | OXYGEN SATURATION: 99 % | SYSTOLIC BLOOD PRESSURE: 100 MMHG | TEMPERATURE: 97.9 F | RESPIRATION RATE: 16 BRPM | WEIGHT: 138 LBS | DIASTOLIC BLOOD PRESSURE: 64 MMHG | HEART RATE: 88 BPM

## 2022-03-24 DIAGNOSIS — C25.9 PANCREATIC ADENOCARCINOMA (HCC): Primary | ICD-10-CM

## 2022-03-24 DIAGNOSIS — T45.1X5A CHEMOTHERAPY INDUCED NAUSEA AND VOMITING: ICD-10-CM

## 2022-03-24 DIAGNOSIS — R11.2 CHEMOTHERAPY INDUCED NAUSEA AND VOMITING: ICD-10-CM

## 2022-03-24 DIAGNOSIS — F41.8 ANXIETY ABOUT HEALTH: ICD-10-CM

## 2022-03-24 DIAGNOSIS — K21.9 GASTROESOPHAGEAL REFLUX DISEASE WITHOUT ESOPHAGITIS: ICD-10-CM

## 2022-03-24 DIAGNOSIS — Z80.41 FAMILY HISTORY OF OVARIAN CANCER: ICD-10-CM

## 2022-03-24 DIAGNOSIS — K86.89 PANCREATIC MASS: ICD-10-CM

## 2022-03-24 DIAGNOSIS — G89.3 CANCER-RELATED PAIN: ICD-10-CM

## 2022-03-24 PROCEDURE — 99204 OFFICE O/P NEW MOD 45 MIN: CPT | Performed by: PHYSICIAN ASSISTANT

## 2022-03-24 PROCEDURE — NC001 PR NO CHARGE: Performed by: GENETIC COUNSELOR, MS

## 2022-03-24 PROCEDURE — 99204 OFFICE O/P NEW MOD 45 MIN: CPT | Performed by: INTERNAL MEDICINE

## 2022-03-24 PROCEDURE — 36415 COLL VENOUS BLD VENIPUNCTURE: CPT

## 2022-03-24 RX ORDER — TRAMADOL HYDROCHLORIDE 50 MG/1
50 TABLET ORAL 2 TIMES DAILY PRN
Qty: 60 TABLET | Refills: 0 | Status: SHIPPED | OUTPATIENT
Start: 2022-03-24 | End: 2022-04-21

## 2022-03-24 RX ORDER — ONDANSETRON 8 MG/1
8 TABLET, ORALLY DISINTEGRATING ORAL EVERY 8 HOURS PRN
Qty: 20 TABLET | Refills: 0 | Status: SHIPPED | OUTPATIENT
Start: 2022-03-24 | End: 2022-04-15 | Stop reason: SDUPTHER

## 2022-03-24 RX ORDER — TRAMADOL HYDROCHLORIDE 50 MG/1
20 TABLET ORAL 2 TIMES DAILY PRN
COMMUNITY
Start: 2022-03-16 | End: 2022-03-24 | Stop reason: SDUPTHER

## 2022-03-24 RX ORDER — ALPRAZOLAM 0.5 MG/1
0.25 TABLET ORAL 3 TIMES DAILY PRN
Qty: 90 TABLET | Refills: 0 | Status: SHIPPED | OUTPATIENT
Start: 2022-03-24

## 2022-03-24 NOTE — PROGRESS NOTES
Sat in on the consult w the pt while meeting w Dr Jason Arrieta  Sent an email to the pt following consult outlining all the details explained today in addition to the upcoming appts he has w Dr Precious De Jesus and f/u w Dr Jason Arrieta  I addressed in the email that the pt should have his blood work drawn as well as me reaching out to the dietician whom he spoke to yest 3/23 making sure she emailed him materials and list of food items that he should purchase to aide him w his nutritional intake

## 2022-03-24 NOTE — LETTER
2022     Janice Huerta [facility] [address]  [address] [address]    Patient: Demi Worthington  YOB: 1940  Date of Visit: 3/24/2022      Dear Dr Dara Obando:    Thank you for referring Flor Simmons to me for evaluation  Below are my notes for this consultation  If you have questions, please do not hesitate to call me  I look forward to following your patient along with you  Sincerely,        Libia Cruz, GC        CC: No Recipients        Pre-Test Genetic Counseling Consult Note    Patient Name: Demi Worthington   /Age: 1940/81 y o  Referring Provider: Janice Huerta MD    Date of Service: 3/24/2022  Genetic Counselor: Erlin Nelson MS, [facility]  Interpretation Services: None  [facility]      Janae Pryor was referred to the [address] [place] [address] and Genetic Assessment Program due to his personal history of pancreatic cancer and family history of ovarian cancer  He presents today to discuss the possibility of a hereditary cancer syndrome, options for genetic testing, and implications for him and his family  Cancer History and Treatment:     Personal History: Personal history of pancreatic cancer    3/4/2022 FNA of pancreas  Final Diagnosis   A-B-C  Pancreas, pancreatic head mass (ThinPrep, smear and cell block preparations)  Malignant (PSC Category VI) - See note  Adenocarcinoma      Satisfactory for evaluation       Screening Hx: Not assessed      Medical and Surgical History  Pertinent surgical history:   Past Surgical History:   Procedure Laterality Date    APPENDECTOMY      CATARACT EXTRACTION Right       Pertinent medical history:  Past Medical History:   Diagnosis Date    Diabetes mellitus ([address] )     GERD (gastroesophageal reflux disease)        Other History:  Height:   Ht Readings from Last 1 Encounters:   03/15/22 5' 7" (1 702 m)     Weight:   Wt Readings from Last 1 Encounters: 03/15/22 61 7 kg (136 lb)     Relevant Family History   Patient reports no Ashkenazi Latter-day ancestry  Brother (d age 76) with throat cancer     Father (d age 76) with no history of cancer  There is no known history of cancer in paternal relatives    Mother (d age 80) with no history of cancer  Maternal Grandmother (d age 52's) with ovarian cancer    Please refer to the scanned pedigree in the Media Tab for a complete family history     *All history is reported as provided by the patient; records are not available for review, except where indicated  Assessment: We also discussed the many factors that influence our risk for cancer such as age, environmental exposures, lifestyle choices and family history  We reviewed the indications suggestive of a hereditary predisposition to cancer  Genetic testing is indicated for Blas Leon based on the following criteria: Meets NCCN K2 5334 Testing Criteria for Pancreatic Cancer Susceptibility Genes: Personal history of pancreatic cancer    The risks, benefits, and limitations of genetic testing were reviewed with the patient, as well as possible test results (positive, negative, variants of uncertain significance) and their clinical implications  If positive for a mutation, options for managing cancer risk including increased surveillance, chemoprevention, and in some cases prophylactic surgery were discussed  Blas Leon was informed that if a hereditary cancer syndrome was identified in him, first degree relatives (parents, siblings, and children) have a chance of also inheriting the condition  Genetic testing would allow for predictive genetic testing in other relatives, who may also be at risk depending on their degree of relation  Plan: Patient decided to proceed with testing and provided consent      Summary:     Sample Collection:  The patient's blood sample was drawn in the office on 2/24/2022 by medical assistant Elliott Holstein    Genetic Testing Preformed: CancerNext + RNA (36 genes): APC, JACQUELINE, AXIN2 BARD1, BRCA1, BRCA2, BRIP1, BMPR1A, CDH1, CDK4, CDKN2A, CHEK2, DICER1, EPCAM, GREM1, HOXB13, MLH1, MSH2, MSH3, MSH6, MUTYH, NBN, NF1, NTHL1, PALB2, PMS2, POLD1, POLE, PTEN, RAD51C, RAD51D, RECQL SMAD4, SMARCA4, STK11, TP53    Results take approximately 2-3 weeks to complete once test is started  We will contact Jackson Cannon once results are available  Additional recommendations for surveillance/medical management will be made pending genetic test results

## 2022-03-24 NOTE — LETTER
March 24, 2022     Janae Richard [address]  [address] [address]    Patient: Jacki Webb   YOB: 1940   Date of Visit: 3/24/2022       Dear Dr Dwayne Mijares:    Thank you for referring Oliva Escobar to me for evaluation  Below are my notes for this consultation  If you have questions, please do not hesitate to call me  I look forward to following your patient along with you  Sincerely,        Ashlyn Bañuelos PA-C        CC: No Recipients  Ashlyn Bañuelos PA-C  3/24/2022  2:30 PM  Sign when Signing Visit  Outpatient Consultation - Palliative and Supportive Care   Jacki Webb 80 y o  male [id number]    Assessment & Plan  Problems actively addressed:  1  Pancreatic adenocarcinoma ([facility] [address] )    2  Gastroesophageal reflux disease without esophagitis    3  Pancreatic mass    4  Anxiety about health    5  Cancer-related pain        · Jacki Barth endorses extreme anxiety about health  Willing to initiate med for this, prefers to take something PRN  · Start Xanax 0 25 mg TID PRN  · Plan to discuss SSRI/SNRI at next visit  · Refilled tramadol 50mg BID which gives Sebastien adequate relief  · Opioid agreement signed  · Has Zofran 8 mg from Oncology  · Continue nutritionist follow-up  · Complete blood work to re-evaluate LFTs and bilirubin  · Continue oncology follow-up  Jacki Barth hopes to initiate chemo soon as possible  · Emotional support given  Discussed anxiety management strategies  · ACP - not addressed    · RTC 1 month       Medications adjusted this encounter:  Requested Prescriptions     Signed Prescriptions Disp Refills    traMADol (ULTRAM) 50 mg tablet 60 tablet 0     Sig: Take 1 tablet (50 mg total) by mouth 2 (two) times a day as needed for moderate pain    ALPRAZolam (XANAX) 0 5 mg tablet 90 tablet 0     Sig: Take 0 5 tablets (0 25 mg total) by mouth 3 (three) times a day as needed for anxiety     No orders of the defined types were placed in this encounter  Medications Discontinued During This Encounter   Medication Reason    traMADol (ULTRAM) 50 mg tablet Reorder            Phillip Johnson was seen today for symptoms and planning cares related to above illnesses  Above orders were sent electronically, or provided in hardcopy in clinic  I have reviewed the patient's controlled substance dispensing history in the Prescription Drug Monitoring Program in compliance with the [facility] regulations before prescribing any controlled substances  1  [id number] 03/16/2022 03/16/2022 traMADol HCL (Tablet)  30 0 15 50 MG 10 0 [facility]  Private Pay 00 / 1 PA    1  [id number] 03/07/2022 03/07/2022 traMADol HCL (Tablet)  30 0 10 50 MG 15 0 [facility]  Medicare 00 / 0 PA    1  [id number] 03/01/2022 03/01/2022 oxyCODONE HCL-ACETAMINOPHEN (Tablet)  10 0 2 325 MG-5 MG 37 50 [facility]  Medicare 00 / 0 PA    1  [id number] 02/21/2022 02/21/2022 oxyCODONE HCL-ACETAMINOPHEN (Tablet)  10 0 3 325 MG-5 MG 25 0 [facility]  Medicare 00 / 0 PA    1  [id number] 02/16/2022  02/15/2022 oxyCODONE HCL-ACETAMINOPHEN (Tablet)  10 0 4 325 MG-5 MG 18 75 SUSSY ACOSTA              We appreciate the referral, and wish for him to continue to follow with us  If there are questions or concerns, please contact us through our clinic/answering service 24 hours a day, seven days a week  [address]chett PA-C  [facility]'s Palliative and Supportive Care          Visit Information    Accompanied By: No one    Source of History: Self    History Limitations:  Anxious thought pattern    History of Present Illness      Phillip Johnson is a 80 y o  male who presents as a referral from Dr Wilver Magana of oncology for primary palliative diagnosis of pancreatic adenocarcinoma    Consultation is requested for: symptom management, goal of care assessment and decisional support, disease process education and discussion of prognosis, advance care planning, emotional support in the setting of serious illness  January Zaragoza has a recent diagnosis of pancreatic adenocarcinoma, diagnosed February 2022 when he presented to [facility] after several months of progressive upper abdominal pain  He had a hospitalization with [facility] due to hyperbilirubinemia in which by needle aspiration confirmed the diagnosis and a stent was placed  He was seen in the office by Dr David Rios 03/24/2022  Treatment options were discussed, specifically mFOLFORINOX  Prior to initiating this, Dr David Rios requests repeat lab work to assessed Hutson LFTs as they were significantly elevated prior to stent placement  January Zaragoza also has appointment with surgical Oncology on 03/30/2022  January Zaragoza has been referred to palliative care for symptom management and support  Today, January Zaragoza presents unaccompanied to this palliative and supportive care consultation  I introduced the role of palliative and supportive care  January Zaragoza asks multiple questions about possible treatments for his cancer  He requests repeated reassurance that there are options for him, even if the blood work does not show that his bilirubin has diminished  January Zaragoza endorses "extreme anxiety", and that he is trying not to get himself "worked up"  Extensive emotional support provided  We discussed anxiety management techniques  January Zaragoza is agreeable to initiating a medication for anxiety however he prefers to take something PRN rather than scheduled  We discussed options and will start with a low-dose Xanax  January Zaragoza reports his pain is minimal   He states throughout the day, he feels well  At night, he does experience pain at which time he takes a tramadol 50 mg with good relief  At times, he will take a 2nd tramadol  We reviewed his pill count  I provided a refill after he signed opioid agreement form    January Zaragoza denies significant constipation or diarrhea  He denies significant nausea or vomiting  He did receive a script for Zofran from his oncologist   We reviewed that this may be be beneficial to him when he begins treatment  Jaundice hopeful to continue meeting with Nutrition Services to optimize his oral intake  He has questions regarding how much he should be eating as a diabetic  We reviewed Sebastien's social situation  He states his wife passed away several years ago  He states he lives in [facility] and has adequate social contact with the other residents  At this time, Sebastien's goals are evident that he would like to pursue any and all possible treatments  Savannah Huddleston plans to obtain blood work ordered by Dr Tamia Thomas as soon as possible  He is agreeable to further palliative follow-up  I will plan to follow with him in approximately 1 month  At the next visit, will consider long-acting anxietolytic and discussion of advance care planning  Pertinent Palliative Care Domains    Physical Symptoms:ambulates    Psychological Symptoms:severe anxiety      Advance Directive and Living Will:    not discussed on this visit   Power of :    POLST:      Historical Data  Past Medical History:   Diagnosis Date    Diabetes mellitus ([facility] [address] )     GERD (gastroesophageal reflux disease)      Past Surgical History:   Procedure Laterality Date    APPENDECTOMY      CATARACT EXTRACTION Right      Social History     Socioeconomic History    Marital status:       Spouse name: Not on file    Number of children: Not on file    Years of education: Not on file    Highest education level: Not on file   Occupational History    Not on file   Tobacco Use    Smoking status: Former Smoker    Smokeless tobacco: Never Used    Tobacco comment: quit in 1960s   Vaping Use    Vaping Use: Never used   Substance and Sexual Activity    Alcohol use: Yes     Comment: seldom    Drug use: No    Sexual activity: Not on file   Other Do You Have A Family History Of Psoriasis?: no Topics Concern    Not on file   Social History Narrative    Not on file     Social Determinants of Health     Financial Resource Strain: Not on file   Food Insecurity: No Food Insecurity    Worried About Running Out of Food in the Last Year: Never true    Mazin of Food in the Last Year: Never true   Transportation Needs: No Transportation Needs    Lack of Transportation (Medical): No    Lack of Transportation (Non-Medical): No   Physical Activity: Not on file   Stress: Not on file   Social Connections: Not on file   Intimate Partner Violence: Not on file   Housing Stability: Unknown    Unable to Pay for Housing in the Last Year: No    Number of Places Lived in the Last Year: Not on file    Unstable Housing in the Last Year: No     History reviewed  No pertinent family history  Allergies   Allergen Reactions    Aleve [Naproxen] Swelling     Current Outpatient Medications   Medication Sig Dispense Refill    apixaban (Eliquis) 5 mg Take 5 mg by mouth 2 (two) times a day      metFORMIN (GLUCOPHAGE) 1000 MG tablet Take 1,000 mg by mouth daily with breakfast      omeprazole (PriLOSEC) 40 MG capsule Take 40 mg by mouth daily   polyethylene glycol (MIRALAX) 17 g packet Take 17 g by mouth daily      Alcohol Swabs 70 % PADS May substitute brand based on insurance coverage  Check glucose BID  100 each 0    ALPRAZolam (XANAX) 0 5 mg tablet Take 0 5 tablets (0 25 mg total) by mouth 3 (three) times a day as needed for anxiety 90 tablet 0    Blood Glucose Monitoring Suppl (OneTouch Verio Reflect) w/Device KIT May substitute brand based on insurance coverage  Check glucose BID  1 kit 0    glucose blood (OneTouch Verio) test strip May substitute brand based on insurance coverage   Check glucose BID  100 each 0    ondansetron (Zofran ODT) 8 mg disintegrating tablet Take 1 tablet (8 mg total) by mouth every 8 (eight) hours as needed for nausea or vomiting 20 tablet 0    OneTouch Delica Lancets 95R MISC May How Severe Is Your Psoriasis?: moderate substitute brand based on insurance coverage  Check glucose BID  100 each 0    traMADol (ULTRAM) 50 mg tablet Take 1 tablet (50 mg total) by mouth 2 (two) times a day as needed for moderate pain 60 tablet 0     No current facility-administered medications for this visit  Review of Systems   Constitutional: Positive for weight loss  Negative for chills, decreased appetite and night sweats  Cardiovascular: Negative for chest pain and dyspnea on exertion  Skin: Negative for dry skin and flushing  Musculoskeletal: Negative for joint swelling, muscle weakness and myalgias  Gastrointestinal: Positive for abdominal pain (mild)  Negative for bloating, anorexia, constipation, diarrhea, nausea and vomiting  Neurological: Negative for difficulty with concentration and excessive daytime sleepiness  Psychiatric/Behavioral: Positive for hypervigilance  Negative for altered mental status and substance abuse  The patient is nervous/anxious  The patient does not have insomnia  Vital Signs    /64 (BP Location: Right arm, Patient Position: Sitting, Cuff Size: Standard)   Pulse 88   Temp 97 9 °F (36 6 °C) (Temporal)   Resp 16   Wt 62 6 kg (138 lb)   SpO2 99%   BMI 21 61 kg/m²     Physical Exam and Objective Data  Physical Exam  Vitals and nursing note reviewed  Constitutional:       Appearance: He is underweight  He is ill-appearing  HENT:      Head: Normocephalic and atraumatic  Eyes:      Conjunctiva/sclera: Conjunctivae normal    Cardiovascular:      Rate and Rhythm: Normal rate and regular rhythm  Pulmonary:      Effort: Pulmonary effort is normal  No respiratory distress  Musculoskeletal:      Cervical back: Neck supple  Skin:     General: Skin is warm and dry  Coloration: Skin is pale  Neurological:      Mental Status: He is alert  Psychiatric:         Attention and Perception: Attention normal          Mood and Affect: Mood is anxious           Speech: Speech is rapid Is This A New Presentation, Or A Follow-Up?: Psoriasis and pressured  Radiology and Laboratory:  I personally reviewed and interpreted the following results:  Office visits for Oncology, CT scan results, prior hospitalization results    45 minutes was spent face to face with Darryn Mahajan with greater than 50% of the time spent in counseling or coordination of care including discussions of etiology of diagnosis, pathogenesis of diagnosis, prognosis of diagnosis, risks and benefits of treatment, instructions for disease self management, follow up requirements, risk factors and risk reduction of disease, patient and family counseling/involvement in care and compliance with treatment regimen  All of the patient's questions were answered during this discussion  Additional History: The patient has a history of lower back pain, but not arthritis. He has no history of TB, IBS, Crohn’s disease, or Depression. He does have a family history (mother) of Cohns disease.

## 2022-03-24 NOTE — PROGRESS NOTES
Joined Frederic LITTLE for patient's consult with Dr Maggy Beckford today 3/24/2022  Patient had many questions and was very anxious during the visit  He did not want to hear about his disease but what treatment he would or could get  He has not had blood work done recently to check his liver function to see if the stent had rectified the blockage  Adria Velázquez had great difficulty focusing on waiting for the blood work results instead asking multiple times if his liver function did not improve he would be left with no options  He does plan on getting his labs drawn at [facility] 3/25/2022 in the AM   He will see Dr Gamaliel Davey 3/30 and follow up again with Dr Maggy Beckford 4/12  Patient required redirection multiple times as well as repeated appointment details and next step expectations  Time spent with him was over 1 hour

## 2022-03-24 NOTE — PROGRESS NOTES
Per RTE pt has an active [employer] med repl plan that runs on a marisel year   Effective 01/01/22  Deduct $233 met  Out of pocket $2500 met $338 77

## 2022-03-25 ENCOUNTER — PATIENT OUTREACH (OUTPATIENT)
Dept: HEMATOLOGY ONCOLOGY | Facility: CLINIC | Age: 82
End: 2022-03-25

## 2022-03-25 LAB
ALBUMIN SERPL-MCNC: 4.1 G/DL (ref 3.6–5.1)
ALBUMIN/GLOB SERPL: 1.6 (CALC) (ref 1–2.5)
ALP SERPL-CCNC: 142 U/L (ref 35–144)
ALT SERPL-CCNC: 42 U/L (ref 9–46)
AST SERPL-CCNC: 24 U/L (ref 10–35)
BASOPHILS # BLD AUTO: 69 CELLS/UL (ref 0–200)
BASOPHILS NFR BLD AUTO: 1.4 %
BILIRUB SERPL-MCNC: 1.7 MG/DL (ref 0.2–1.2)
BUN SERPL-MCNC: 19 MG/DL (ref 7–25)
BUN/CREAT SERPL: 31 (CALC) (ref 6–22)
CALCIUM SERPL-MCNC: 9.7 MG/DL (ref 8.6–10.3)
CHLORIDE SERPL-SCNC: 93 MMOL/L (ref 98–110)
CO2 SERPL-SCNC: 31 MMOL/L (ref 20–32)
CREAT SERPL-MCNC: 0.62 MG/DL (ref 0.7–1.18)
EOSINOPHIL # BLD AUTO: 181 CELLS/UL (ref 15–500)
EOSINOPHIL NFR BLD AUTO: 3.7 %
ERYTHROCYTE [DISTWIDTH] IN BLOOD BY AUTOMATED COUNT: 13 % (ref 11–15)
GLOBULIN SER CALC-MCNC: 2.6 G/DL (CALC) (ref 1.9–3.7)
GLUCOSE SERPL-MCNC: 159 MG/DL (ref 65–99)
HCT VFR BLD AUTO: 39.6 % (ref 38.5–50)
HGB BLD-MCNC: 13.2 G/DL (ref 13.2–17.1)
LYMPHOCYTES # BLD AUTO: 1352 CELLS/UL (ref 850–3900)
LYMPHOCYTES NFR BLD AUTO: 27.6 %
MCH RBC QN AUTO: 29.4 PG (ref 27–33)
MCHC RBC AUTO-ENTMCNC: 33.3 G/DL (ref 32–36)
MCV RBC AUTO: 88.2 FL (ref 80–100)
MONOCYTES # BLD AUTO: 794 CELLS/UL (ref 200–950)
MONOCYTES NFR BLD AUTO: 16.2 %
NEUTROPHILS # BLD AUTO: 2504 CELLS/UL (ref 1500–7800)
NEUTROPHILS NFR BLD AUTO: 51.1 %
PLATELET # BLD AUTO: 242 THOUSAND/UL (ref 140–400)
PMV BLD REES-ECKER: 11.5 FL (ref 7.5–12.5)
POTASSIUM SERPL-SCNC: 4.2 MMOL/L (ref 3.5–5.3)
PROT SERPL-MCNC: 6.7 G/DL (ref 6.1–8.1)
RBC # BLD AUTO: 4.49 MILLION/UL (ref 4.2–5.8)
SL AMB EGFR AFRICAN AMERICAN: 109 ML/MIN/1.73M2
SL AMB EGFR NON AFRICAN AMERICAN: 94 ML/MIN/1.73M2
SODIUM SERPL-SCNC: 134 MMOL/L (ref 135–146)
WBC # BLD AUTO: 4.9 THOUSAND/UL (ref 3.8–10.8)

## 2022-03-25 NOTE — PROGRESS NOTES
Phone outreach to the pt to see if the pt was able to get his bloodwork completed  He stated that he did this morning  He set up an appt this morning and made it there  Pt knows to call me if he needs anything

## 2022-03-28 ENCOUNTER — TELEPHONE (OUTPATIENT)
Dept: PALLIATIVE MEDICINE | Facility: CLINIC | Age: 82
End: 2022-03-28

## 2022-03-28 LAB — MISCELLANEOUS LAB TEST RESULT: NORMAL

## 2022-03-28 NOTE — TELEPHONE ENCOUNTER
Pt seen in office on 3/24/22 and is receiving rx's for controlled medications  Please ensure chart is updated with signed agreement  Thank you

## 2022-03-29 ENCOUNTER — PATIENT OUTREACH (OUTPATIENT)
Dept: HEMATOLOGY ONCOLOGY | Facility: CLINIC | Age: 82
End: 2022-03-29

## 2022-03-29 ENCOUNTER — TELEPHONE (OUTPATIENT)
Dept: NUTRITION | Facility: CLINIC | Age: 82
End: 2022-03-29

## 2022-03-29 NOTE — PROGRESS NOTES
MSW made outreach to the pt to offer support and to follow up on his appointment with oncology  The pt was sounding highly anxious as he repeatedly stated that his "enzymes were too high for chemo"  The pt states that he is trying not to give up hope and questions why this is happening to him as he was so hopeful to receive treatment  MSW acknowledged the pt's fears and validated his concerns  The pt appeared to be getting is appointments confused as he was not aware that he had been to palliative Care  He reports that he has pain medication for "mild pain but my pain is excruciating "  MSW did encourage the pt to call the doctor to share this with him  The pt will be riding by [facility] for his Surg Onc appointment tomorrow  He appears highly anxious about this despite MSW describing the process numerous times  The pt was encouraged to call this MSW by 1:30 if he has not heard from the [employer]   Pt verbalized understanding  Significant support offered

## 2022-03-29 NOTE — TELEPHONE ENCOUNTER
Kimberly Schultz called to request an Eating hints: before, during, and after cancer treatment book to be sent to him  Called back, no answer  LVM and explained that the copy of the book was sent to his email together with other educational materials after the nutritional consult appt on 3/23/22  I am sending a hard copy of the same book today to Sebastien's mailing address with some [employer] and [employer] coupons, and Oncology nutrition services brochure with additional cancer nutrition/recommendations resources  Kimberly cShultz has RD contact information, and was encouraged to call if he has any other questions/concerns

## 2022-03-30 ENCOUNTER — CONSULT (OUTPATIENT)
Dept: SURGICAL ONCOLOGY | Facility: CLINIC | Age: 82
End: 2022-03-30
Payer: COMMERCIAL

## 2022-03-30 VITALS
HEIGHT: 67 IN | DIASTOLIC BLOOD PRESSURE: 74 MMHG | SYSTOLIC BLOOD PRESSURE: 112 MMHG | HEART RATE: 98 BPM | BODY MASS INDEX: 21.66 KG/M2 | OXYGEN SATURATION: 98 % | TEMPERATURE: 97.3 F | WEIGHT: 138 LBS | RESPIRATION RATE: 17 BRPM

## 2022-03-30 DIAGNOSIS — C25.9 PANCREATIC ADENOCARCINOMA (HCC): Primary | ICD-10-CM

## 2022-03-30 PROCEDURE — 99205 OFFICE O/P NEW HI 60 MIN: CPT | Performed by: STUDENT IN AN ORGANIZED HEALTH CARE EDUCATION/TRAINING PROGRAM

## 2022-03-30 RX ORDER — CEFAZOLIN SODIUM 1 G/50ML
1000 SOLUTION INTRAVENOUS ONCE
Status: CANCELLED | OUTPATIENT
Start: 2022-03-30 | End: 2022-03-30

## 2022-03-30 NOTE — LETTER
March 30, 2022     Jennifer Cao MD  [address]  [address]    Patient: Jacki Webb   YOB: 1940   Date of Visit: 3/30/2022       Dear Dr Arely Barakat:    Thank you for referring Oliva Escobar to me for evaluation  Below are my notes for this consultation  If you have questions, please do not hesitate to call me  I look forward to following your patient along with you  Sincerely,        Libby Rueda MD        CC: MD Shanna Richard RN Monna Collar, PA-C Sharlon Pap, MD  3/30/2022  3:56 PM  Incomplete  Surgical Oncology Consultation    [address] Jeferson Gatica  [address] 12[address] [facility]  [phone]    Patient:  Jacki Webb  1940  [id number]    [facility]  [facility]  [address] [facility] [address]    Referring provider:  Jennifer Cao MD  [address] [place] [id number]    Diagnoses and all orders for this visit:    Pancreatic adenocarcinoma [facility])  -     Ambulatory referral to Surgical Oncology        Chief Complaint   Patient presents with    New Patient Visit       No follow-ups on file  Oncology History   Pancreatic adenocarcinoma ([facility] [address] )   3/4/2022 Biopsy    A-B-C  Pancreas, pancreatic head mass   Malignant (PSC Category VI) -  Adenocarcinoma  3/14/2022 Initial Diagnosis    Pancreatic adenocarcinoma (HCC)         History of Present Illness  :   79 yo male with new diagnosis of likely unresectable pancreas cancer  The patient is very overwhelmed and is a poor historian today  In mid February the patient presented to the emergency department at [facility] for essentially failure to thrive with poor appetite, upper abdominal pain, jaundice, unexpected weight loss  Studies demonstrated a pancreatic head mass causing biliary obstruction with elevated bilirubin    He underwent ERCP with stent placement as well as EUS  Stent placement was successful at resolving his hyperbilirubinemia  EUS demonstrated an ill-defined pancreatic head mass  The portal confluence could not be visualized, though the celiac and SMA appeared to be clear of tumor  Biopsy confirmed pancreatic adenocarcinoma  The patient was seen by palliative care as well as Medical Oncology  He has been started on treatment for his symptoms as well as his anxiety/depression associated with his diagnosis  Dr Artemio Miller has proposed initiating chemotherapy  He presents today for surgical opinion  Note, cross-sectional imaging of the chest abdomen pelvis did not reveal any evidence of metastatic disease  He has not get had a CA 19 9  The patient is relatively healthy however he does endorse some mental health issues  He states he has had periods of depression since his wife's passing  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  He lives alone and does require assistance with cleaning and meal preparation  His ECOG is 0-1  He ambulates freely with no issues  Review of Systems  Complete ROS Surg Onc:   Constitutional: The patient ENDORSES recent history of general fatigue, recent weight loss, change in appetite  Eyes: No complaints of visual problems, ENDORSES scleral icterus  ENT: No complaints of ear pain, no hoarseness, no difficulty swallowing,  no tinnitus and no new masses in head, oral cavity, or neck  Cardiovascular: No complaints of chest pain, no palpitations, no ankle edema  Respiratory: No complaints of shortness of breath, no cough  Gastrointestinal: No complaints of jaundice, no bloody stools, no pale stools  Genitourinary: No complaints of dysuria, no hematuria, no nocturia, no frequent urination, no urethral discharge  Musculoskeletal: No complaints of weakness, paralysis, joint stiffness or arthralgias  Integumentary: No complaints of rash, no new lesions     Neurological: No complaints of convulsions, no seizures, no dizziness  Hematologic/Lymphatic: No complaints of easy bruising  Endocrine:  No hot or cold intolerance  No polydipsia, polyphagia, or polyuria  Allergy/immunology:  No environmental allergies  No food allergies  Not immunocompromised  Patient Active Problem List   Diagnosis    Type 2 diabetes mellitus without complication, with long-term current use of insulin ([facility])    GERD (gastroesophageal reflux disease)    Pancreatic mass    History of pulmonary embolism    Hyperbilirubinemia    Anxiety about health    Pancreatic adenocarcinoma [facility])     Past Medical History:   Diagnosis Date    [facility]     GERD (gastroesophageal reflux disease)      Past Surgical History:   Procedure Laterality Date    APPENDECTOMY      CATARACT EXTRACTION Right      No family history on file  Social History     Socioeconomic History    Marital status:      Spouse name: Not on file    Number of children: Not on file    Years of education: Not on file    Highest education level: Not on file   Occupational History    Not on file   Tobacco Use    Smoking status: Former Smoker    Smokeless tobacco: Never Used    Tobacco comment: quit in 1960s   Vaping Use    Vaping Use: Never used   Substance and Sexual Activity    Alcohol use: Yes     Comment: seldom    Drug use: No    Sexual activity: Not on file   Other Topics Concern    Not on file   Social History Narrative    Not on file     Social Determinants of Health     Financial Resource Strain: Not on file   Food Insecurity: No Food Insecurity    Worried About [address] in the Last Year: Never true    [address]  in the Last Year: Never true   Transportation Needs: No Transportation Needs    Lack of Transportation (Medical): No    Lack of Transportation (Non-Medical):  No   Physical Activity: Not on file   Stress: Not on file   Social Connections: Not on file   Intimate Partner Violence: Not on file   Housing Stability: Unknown    Unable to Pay for Housing in the Last Year: No    Number of Places Lived in the Last Year: Not on file    Unstable Housing in the Last Year: No       Current Outpatient Medications:     Alcohol Swabs 70 % PADS, May substitute brand based on insurance coverage  Check glucose BID , Disp: 100 each, Rfl: 0    ALPRAZolam (XANAX) 0 5 mg tablet, Take 0 5 tablets (0 25 mg total) by mouth 3 (three) times a day as needed for anxiety, Disp: 90 tablet, Rfl: 0    apixaban (Eliquis) 5 mg, Take 5 mg by mouth 2 (two) times a day, Disp: , Rfl:     Blood Glucose Monitoring Suppl (OneTouch Verio Reflect) w/Device KIT, May substitute brand based on insurance coverage  Check glucose BID , Disp: 1 kit, Rfl: 0    glucose blood (OneTouch Verio) test strip, May substitute brand based on insurance coverage  Check glucose BID , Disp: 100 each, Rfl: 0    metFORMIN (GLUCOPHAGE) 1000 MG tablet, Take 1,000 mg by mouth daily with breakfast, Disp: , Rfl:     omeprazole (PriLOSEC) 40 MG capsule, Take 40 mg by mouth daily  , Disp: , Rfl:     OneTouch Delica Lancets 12F MISC, May substitute brand based on insurance coverage   Check glucose BID , Disp: 100 each, Rfl: 0    polyethylene glycol (MIRALAX) 17 g packet, Take 17 g by mouth daily, Disp: , Rfl:     traMADol (ULTRAM) 50 mg tablet, Take 1 tablet (50 mg total) by mouth 2 (two) times a day as needed for moderate pain, Disp: 60 tablet, Rfl: 0    ondansetron (Zofran ODT) 8 mg disintegrating tablet, Take 1 tablet (8 mg total) by mouth every 8 (eight) hours as needed for nausea or vomiting (Patient not taking: Reported on 3/30/2022 ), Disp: 20 tablet, Rfl: 0  Allergies   Allergen Reactions    Aleve [Naproxen] Swelling       Vitals:    03/30/22 1458   BP: 112/74   Pulse: 98   Resp: 17   Temp: (!) 97 3 °F (36 3 °C)   SpO2: 98%       Physical Exam   General: Appears well, appears stated age  Skin: Warm, mildly icteric  HEENT: Normocephalic, atraumatic; sclera aniceteric, mucous membranes moist; cervical nodes without adenopathy  Cardiopulmonary: RRR, Easy WOB, no BLE edema  Abd: Flat and soft, nontender, no masses appreciated, no hepatosplenomegaly  MSK: Symmetric, no cyanosis, no overt weakness  Lymphatic: No cervical, axillary or inguinal lymphadenopathy  Neuro: Affect appropriate, no gross motor abnormalities      Pathology:  Final Diagnosis   A-B-C  Pancreas, pancreatic head mass (ThinPrep, smear and cell block preparations)  Malignant (PSC Category VI) - See note  Adenocarcinoma      Satisfactory for evaluation      Note: The above diagnostic category is part of the six-tiered system proposed by The Papanicolaou Society of Cytopathology for the reporting of pancreaticobiliary specimens  This proposed scheme provides terminology that correlates the cytologic diagnostic nomenclature with the 2010 WHO classification of pancreatic tumors and standardizes the categorization of the various diseases of the pancreas, some of which are difficult to diagnose specifically by cytology  *The Papanicolaou Society of Cytopathology System for Reporting Pancreaticobiliary Cytology: Definitions, Criteria and Explanatory Notes  Amber Coates; Casey, [id number]  Labs: Reviewed in EPIC    Imaging  ERCP    Result Date: 3/4/2022  Narrative: [facility] [address] [place] [address] [phone] [address]: 3/04/22 PHYSICIAN(S): Fox Berg MD Proceduralist INDICATION: Hyperbilirubinemia, Pancreatic mass POST-OP DIAGNOSIS: See the impression below  PREPROCEDURE: Informed consent was obtained for the procedure, including sedation  Risks of perforation, hemorrhage, adverse drug reaction and aspiration were discussed  The patient was placed in the left lateral decubitus position  Patient was explained about the risks and benefits of the procedure   Risks including but not limited to bleeding, infection, and perforation were explained in detail  Also explained about less than 100% sensitivity with the exam and other alternatives  DETAILS OF PROCEDURE: Patient was taken to the procedure room where a time out was performed to confirm correct patient and correct procedure  The patient underwent general anesthesia, which was administered by an anesthesia professional  The patient's blood pressure, heart rate, level of consciousness, respirations and oxygen were monitored throughout the procedure  Clinical intention was achieved  The patient experienced no blood loss  The procedure was not difficult  The patient tolerated the procedure well  ANESTHESIA INFORMATION: ASA: II Anesthesia Type: General MEDICATIONS: iohexol (OMNIPAQUE) 240 MG/ML solution 50 mL 12 mL (Totals for administrations occurring from 1327 to 1504 on 03/04/22) FINDINGS: The esophagus appeared normal  The stomach appeared normal  Deeply cannulated the common bile duct after 1 attempt using a traction sphincterotome  Used 260 cm x 0 035 in guidewire  Cannulation was not difficult  Injected contrast  Distal stricture with upstream dilation  Performed small biliary sphincterotomy using a sphincterotome  No bleeding at the procedure site  Placed metal, covered stent with length of 6 cm and diameter of 10 mm in the common bile duct  SPECIMENS: ID Type Source Tests Collected by Time Destination 1 : pancreatic head mass Other FNA NON-GYNECOLOGIC CYTOLOGY, FINE NEEDLE ASPIRATION Wesley Villagomez MD 3/4/2022  1:50 PM       Impression: Successful ERCP with stent placement (metal) traversing the distal biliary stricture  RECOMMENDATION: Follow LFT's  Diet Discharge if stable tomorrow  Wesley Villagomez MD     CT chest w contrast    Result Date: 3/17/2022  Narrative: CT CHEST WITH IV CONTRAST INDICATION:  C25 9: Malignant neoplasm of pancreas, unspecified  COMPARISON:  None TECHNIQUE: CT examination of the chest was performed   Axial, sagittal, and coronal 2D reformatted images were created from the source data and submitted for interpretation  Radiation dose length product (DLP) for this visit:  225 mGy-cm  This examination, like all CT scans performed in the [facility], was performed utilizing techniques to minimize radiation dose exposure, including the use of iterative reconstruction and automated exposure control  IV Contrast:  100 mL of iohexol (OMNIPAQUE) FINDINGS: LUNGS:  -3 mm left lower lobe nodule series 3/51  -4 mm left lower lobe nodule series 3/67  -3-4 mm left lower lobe nodule series 3/79  -4 mm subpleural left lower lobe nodule series 3/91  -2 mm subpleural left lower lobe nodule series 3/98   -1 mm subpleural left lower lobe nodule series 3/104  -Tiny right middle lobe nodules on the order of 1 to 2 mm  -3 mm juxtapleural right lower lobe nodule series 3/68  -5 mm right lower lobe nodule adjacent to the diaphragm series 3/99  -3 mm right lower lobe nodule series 3/74  Calcified granulomas in the right lower lobe and lingula  No endobronchial lesions  There is displacement of the trachea to the right by enlarged left thyroid  PLEURA:  Unremarkable  HEART/GREAT VESSELS: Heart is unremarkable for patient's age  Atherosclerotic changes thoracic aorta and coronary arteries  No thoracic aortic aneurysm  MEDIASTINUM AND HEMANTH:  Unremarkable  CHEST WALL AND LOWER NECK: 4 9 x 2 9 cm left mid to lower pole nodule with coarse calcifications superiorly  Subcentimeter right midpole hypodensity  Incidental discovery of one or more thyroid nodule(s) measuring more than 1 5 cm and without suspicious features is noted in this patient who is above 28years old; according to guidelines published in the February 2015 white paper on incidental thyroid nodules in the Journal of the [employer] of Radiology [facility]), further characterization with thyroid ultrasound is recommended   VISUALIZED STRUCTURES IN THE UPPER ABDOMEN:  Incompletely visualized hypodensity about the SMA, presumably corresponding to patient's history of pancreatic adenocarcinoma  The pancreatic body and tail are atrophic  Biliary stent in place with pneumobilia  Mesenteric collateral vessels are suggested  SMV cannot be evaluated  The stomach is largely collapsed, evaluation limited  Colonic diverticulosis  OSSEOUS STRUCTURES:  No acute fracture or destructive osseous lesion  Degenerative changes of the spine and bilateral glenohumeral joints  Impression: 1  Small bilateral pulmonary nodules, nonspecific  Although these could be part and parcel of granulomatous disease, metastases cannot be excluded  Interval follow-up CT chest in about 10 weeks recommended to reevaluate these findings  2  Near 5 cm dominant left thyroid nodule  Further characterization with thyroid ultrasound is recommended  3  Abdominal findings as above  Correlate with presumed outside imaging  If formal abdominal imaging workup has not been obtained, further characterization with contrast-enhanced CT imaging utilizing pancreatic protocol would provide further information  The study was marked in [facility] for significant notification and follow-up  Workstation performed: [id number]     FL ERCP biliary and pancreatic    Result Date: 3/4/2022  Narrative: ERCP INDICATION:  Hyperbilirubinemia COMPARISON:  Endoscopic ultrasound from 3/4/2022  IMAGES:  6 FLUOROSCOPY TIME:   1 min 25 sec CONTRAST: 12 mL of iohexol (OMNIPAQUE) FINDINGS: Fluoroscopic guidance was provided for performance of ERCP  BILIARY:  Images provided demonstrates performance of ERCP  Common bile duct was opacified with contrast, and was dilated proximally with narrowing distally  A metallic common bile duct stent was eventually placed  PANCREATIC: Not opacified with contrast      Impression: Fluoroscopic guidance was provided for performance of ERCP  Please see ERCP procedure note for full description   Workstation performed: [id number] Endoscopic ultrasonography, GI (Upper)    Result Date: 3/4/2022  Narrative: [address] Endoscopy 12[address] [facility] [address] DATE OF SERVICE: 3/04/22 PHYSICIAN(S): Catrina Powell MD Proceduralist INDICATION: Pancreatic mass POST-OP DIAGNOSIS: See the impression below  PREPROCEDURE: Informed consent was obtained for the procedure, including sedation  Risks of perforation, hemorrhage, adverse drug reaction and aspiration were discussed  The patient was placed in the left lateral decubitus position  Patient was explained about the risks and benefits of the procedure  Risks including but not limited to bleeding, infection, and perforation were explained in detail  Also explained about less than 100% sensitivity with the exam and other alternatives  DETAILS OF PROCEDURE: Patient was taken to the procedure room where a time out was performed to confirm correct patient and correct procedure  The patient underwent monitored anesthesia care, which was administered by an anesthesia professional  The patient's blood pressure, heart rate, oxygen, respirations and level of consciousness were monitored throughout the procedure  The linear scope was advanced to the duodenum  The patient experienced no blood loss  The procedure was not difficult  The patient tolerated the procedure well  There were no apparent complications  ANESTHESIA INFORMATION: ASA: II Anesthesia Type: General MEDICATIONS: No administrations occurring from 1327 to 1413 on 03/04/22 FINDINGS: Single round, hypoechoic and heterogeneous mass measuring 30 mm x 30 mm with well-defined margins in the head of the pancreas and uncinate process; 4 fine needle biopsy passes were taken with a 25 gauge needle using a transduodenal approach guided by Doppler and sent sample for cytology  Onsite cytologist was present  The mass measured at least 3 cm but this was only one view    It extended towards the uncinate process and the neck of the pancreas  The portal confluence could not be well seen  The SMA and the celiac artery appeared to be free of tumor  The bile duct appeared to be occluded and was dilated up to 12 mm and the common hepatic duct  The pancreatic duct was 3 mm in the body of the pancreas  Pancreatic atrophy was seen  Dilation of the bile duct up to 12 mm  Visualized areas of the liver appeared to be normal   No mass lesions were seen  No celiac nodes were seen  SPECIMENS: ID Type Source Tests Collected by Time Destination 1 : pancreatic head mass Other FNA NON-GYNECOLOGIC CYTOLOGY, FINE NEEDLE ASPIRATION Hira Burnett MD 3/4/2022  1:50 PM       Impression: Large pancreatic head/ uncinate mass with upstream bile duct dilation  Portal confluence was not seen  No liver lesions seen and no lymphadenopathy present  Biopsy done  RECOMMENDATION: Await pathology results ERCP today  Hira Burnett MD       I independently reviewed and interpreted the above laboratory and imaging data, including CT scans of the chest abdomen and pelvis, ERCP, EUS, laboratory evaluation  I have discussed this patient with Dr Larissa Canseco  Discussion/Summary: This is a 57-year-old male with a newly diagnosed pancreatic cancer at the head of the pancreas  On my personal review of the images, this patient appears to be unresectable due to circumferential involvement of the SMA  However, since EUS suggested that the SMA was free of tumor, I think it is reasonable to obtain a triphasic CT scan with specific pancreas protocol to confirm unresectability  I will also order a CA 19 9 today to complete staging  Today we discussed port placement for ease of chemotherapy, which I do think represents the next best step for this patient  We discussed the risks to include bleeding, infection, pneumothorax  The patient is quite overwhelmed today but endorses understanding the nature of the procedure as well as these risks, benefits, alternatives    He would like to proceed  He is seeing Urology for an elevated PSA on Monday and would like to move for with port placement after this appointment  Most likely this patient will be treated with chemotherapy as well as radiation for local tumor control, and will not be a surgical candidate  We will present him at our tumor board for consensus  I have also encouraged the patient to continue seeing palliative care for his ongoing mental health struggles due to his diagnosis as well as his symptomatology  I will see the patient for port placement  Lori Edmond MD  3/30/2022  3:54 PM  Sign when Signing Visit  Surgical Oncology Consultation    [place] [address]  [address] 20 [facility]  [phone]    Patient:  Julian Dunn  1940  [id number]    Primary Care provider:  [facility]  [address] [facility] [address]    Referring provider:  Mark Horan MD  [address] [facility]  [address] [place], [place] [address]    Diagnoses and all orders for this visit:    Pancreatic adenocarcinoma [facility])  -     Ambulatory referral to Surgical Oncology        Chief Complaint   Patient presents with    New Patient Visit       No follow-ups on file  Oncology History   Pancreatic adenocarcinoma ([facility] [address] )   3/4/2022 Biopsy    A-B-C  Pancreas, pancreatic head mass   Malignant (PSC Category VI) -  Adenocarcinoma  3/14/2022 Initial Diagnosis    Pancreatic adenocarcinoma (HCC)         History of Present Illness  :   79 yo male with new diagnosis of likely unresectable pancreas cancer  The patient is very overwhelmed and is a poor historian today  In mid February the patient presented to the emergency department at [facility] for essentially failure to thrive with poor appetite, upper abdominal pain, jaundice, unexpected weight loss  Studies demonstrated a pancreatic head mass causing biliary obstruction with elevated bilirubin    He underwent ERCP with stent placement as well as EUS  Stent placement was successful at resolving his hyperbilirubinemia  EUS demonstrated an ill-defined pancreatic head mass  The portal confluence could not be visualized, though the celiac and SMA appeared to be clear of tumor  Biopsy confirmed pancreatic adenocarcinoma  The patient was seen by palliative care as well as Medical Oncology  He has been started on treatment for his symptoms as well as his anxiety/depression associated with his diagnosis  Dr Jesús Hernandez has proposed initiating chemotherapy  He presents today for surgical opinion  Note, cross-sectional imaging of the chest abdomen pelvis did not reveal any evidence of metastatic disease  He has not get had a CA 19 9  The patient is relatively healthy however he does endorse some mental health issues  He states he has had periods of depression since his wife's passing  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  He lives alone and does require assistance with cleaning and meal preparation  His ECOG is 0-1  He ambulates freely with no issues  Review of Systems  Complete ROS Surg Onc:   Constitutional: The patient ENDORSES recent history of general fatigue, recent weight loss, change in appetite  Eyes: No complaints of visual problems, ENDORSES scleral icterus  ENT: No complaints of ear pain, no hoarseness, no difficulty swallowing,  no tinnitus and no new masses in head, oral cavity, or neck  Cardiovascular: No complaints of chest pain, no palpitations, no ankle edema  Respiratory: No complaints of shortness of breath, no cough  Gastrointestinal: No complaints of jaundice, no bloody stools, no pale stools  Genitourinary: No complaints of dysuria, no hematuria, no nocturia, no frequent urination, no urethral discharge  Musculoskeletal: No complaints of weakness, paralysis, joint stiffness or arthralgias  Integumentary: No complaints of rash, no new lesions  Neurological: No complaints of convulsions, no seizures, no dizziness  Hematologic/Lymphatic: No complaints of easy bruising  Endocrine:  No hot or cold intolerance  No polydipsia, polyphagia, or polyuria  Allergy/immunology:  No environmental allergies  No food allergies  Not immunocompromised  Patient Active Problem List   Diagnosis    Type 2 diabetes mellitus without complication, with long-term current use of insulin ([facility])    GERD (gastroesophageal reflux disease)    Pancreatic mass    History of pulmonary embolism    Hyperbilirubinemia    Anxiety about health    Pancreatic adenocarcinoma [facility])     Past Medical History:   Diagnosis Date    [facility]     GERD (gastroesophageal reflux disease)      Past Surgical History:   Procedure Laterality Date    APPENDECTOMY      CATARACT EXTRACTION Right      No family history on file  Social History     Socioeconomic History    Marital status:      Spouse name: Not on file    Number of children: Not on file    Years of education: Not on file    Highest education level: Not on file   Occupational History    Not on file   Tobacco Use    Smoking status: Former Smoker    Smokeless tobacco: Never Used    Tobacco comment: quit in 1960s   Vaping Use    Vaping Use: Never used   Substance and Sexual Activity    Alcohol use: Yes     Comment: seldom    Drug use: No    Sexual activity: Not on file   Other Topics Concern    Not on file   Social History Narrative    Not on file     Social Determinants of Health     Financial Resource Strain: Not on file   Food Insecurity: No Food Insecurity    Worried About [address] [employer] in the Last Year: Never true    [address]  in the Last Year: Never true   Transportation Needs: No Transportation Needs    Lack of Transportation (Medical): No    Lack of Transportation (Non-Medical):  No   Physical Activity: Not on file   Stress: Not on file   Social Connections: Not on file Intimate Partner Violence: Not on file   Housing Stability: Unknown    Unable to Pay for Housing in the Last Year: No    Number of [place] in the Last Year: Not on file    Unstable Housing in the Last Year: No       Current Outpatient Medications:     Alcohol Swabs 70 % PADS, May substitute brand based on insurance coverage  Check glucose BID , Disp: 100 each, Rfl: 0    ALPRAZolam (XANAX) 0 5 mg tablet, Take 0 5 tablets (0 25 mg total) by mouth 3 (three) times a day as needed for anxiety, Disp: 90 tablet, Rfl: 0    apixaban (Eliquis) 5 mg, Take 5 mg by mouth 2 (two) times a day, Disp: , Rfl:     Blood Glucose Monitoring Suppl (OneTouch Verio Reflect) w/Device KIT, May substitute brand based on insurance coverage  Check glucose BID , Disp: 1 kit, Rfl: 0    glucose blood (OneTouch Verio) test strip, May substitute brand based on insurance coverage  Check glucose BID , Disp: 100 each, Rfl: 0    metFORMIN (GLUCOPHAGE) 1000 MG tablet, Take 1,000 mg by mouth daily with breakfast, Disp: , Rfl:     omeprazole (PriLOSEC) 40 MG capsule, Take 40 mg by mouth daily  , Disp: , Rfl:     OneTouch Delica Lancets 99H MISC, May substitute brand based on insurance coverage   Check glucose BID , Disp: 100 each, Rfl: 0    polyethylene glycol (MIRALAX) 17 g packet, Take 17 g by mouth daily, Disp: , Rfl:     traMADol (ULTRAM) 50 mg tablet, Take 1 tablet (50 mg total) by mouth 2 (two) times a day as needed for moderate pain, Disp: 60 tablet, Rfl: 0    ondansetron (Zofran ODT) 8 mg disintegrating tablet, Take 1 tablet (8 mg total) by mouth every 8 (eight) hours as needed for nausea or vomiting (Patient not taking: Reported on 3/30/2022 ), Disp: 20 tablet, Rfl: 0  Allergies   Allergen Reactions    Aleve [Naproxen] Swelling       Vitals:    03/30/22 1458   BP: 112/74   Pulse: 98   Resp: 17   Temp: (!) 97 3 °F (36 3 °C)   SpO2: 98%       Physical Exam   General: Appears well, appears stated age  Skin: Warm, mildly icteric  HEENT: Normocephalic, atraumatic; sclera aniceteric, mucous membranes moist; cervical nodes without adenopathy  Cardiopulmonary: RRR, Easy WOB, no BLE edema  Abd: Flat and soft, nontender, no masses appreciated, no hepatosplenomegaly  MSK: Symmetric, no cyanosis, no overt weakness  Lymphatic: No cervical, axillary or inguinal lymphadenopathy  Neuro: Affect appropriate, no gross motor abnormalities      Pathology:  Final Diagnosis   A-B-C  Pancreas, pancreatic head mass (ThinPrep, smear and cell block preparations)  Malignant (PSC Category VI) - See note  Adenocarcinoma      Satisfactory for evaluation      Note: The above diagnostic category is part of the six-tiered system proposed by The Papanicolaou Society of Cytopathology for the reporting of pancreaticobiliary specimens  This proposed scheme provides terminology that correlates the cytologic diagnostic nomenclature with the 2010 WHO classification of pancreatic tumors and standardizes the categorization of the various diseases of the pancreas, some of which are difficult to diagnose specifically by cytology  *The Papanicolaou Society of Cytopathology System for Reporting Pancreaticobiliary Cytology: Definitions, Criteria and Explanatory Notes  Alen Leo; Jacinto, 0896  Labs: Reviewed in EPIC    Imaging  ERCP    Result Date: 3/4/2022  Narrative: [facility] [address] [place] [address] [phone] [address] Street: 3/04/22 PHYSICIAN(S): Kelby Ruiz MD Proceduralist INDICATION: Hyperbilirubinemia, Pancreatic mass POST-OP DIAGNOSIS: See the impression below  PREPROCEDURE: Informed consent was obtained for the procedure, including sedation  Risks of perforation, hemorrhage, adverse drug reaction and aspiration were discussed  The patient was placed in the left lateral decubitus position  Patient was explained about the risks and benefits of the procedure   Risks including but not limited to bleeding, infection, and perforation were explained in detail  Also explained about less than 100% sensitivity with the exam and other alternatives  DETAILS OF PROCEDURE: Patient was taken to the procedure room where a time out was performed to confirm correct patient and correct procedure  The patient underwent general anesthesia, which was administered by an anesthesia professional  The patient's blood pressure, heart rate, level of consciousness, respirations and oxygen were monitored throughout the procedure  Clinical intention was achieved  The patient experienced no blood loss  The procedure was not difficult  The patient tolerated the procedure well  ANESTHESIA INFORMATION: ASA: II Anesthesia Type: General MEDICATIONS: iohexol (OMNIPAQUE) 240 MG/ML solution 50 mL 12 mL (Totals for administrations occurring from 1327 to 1504 on 03/04/22) FINDINGS: The esophagus appeared normal  The stomach appeared normal  Deeply cannulated the common bile duct after 1 attempt using a traction sphincterotome  Used 260 cm x 0 035 in guidewire  Cannulation was not difficult  Injected contrast  Distal stricture with upstream dilation  Performed small biliary sphincterotomy using a sphincterotome  No bleeding at the procedure site  Placed metal, covered stent with length of 6 cm and diameter of 10 mm in the common bile duct  SPECIMENS: ID Type Source Tests Collected by Time Destination 1 : pancreatic head mass Other FNA NON-GYNECOLOGIC CYTOLOGY, FINE NEEDLE ASPIRATION Gurdeep Hollis MD 3/4/2022  1:50 PM       Impression: Successful ERCP with stent placement (metal) traversing the distal biliary stricture  RECOMMENDATION: Follow LFT's  Diet Discharge if stable tomorrow  Gurdeep Hollis MD     CT chest w contrast    Result Date: 3/17/2022  Narrative: CT CHEST WITH IV CONTRAST INDICATION:  C25 9: Malignant neoplasm of pancreas, unspecified  COMPARISON:  None TECHNIQUE: CT examination of the chest was performed   Axial, sagittal, and coronal 2D reformatted images were created from the source data and submitted for interpretation  Radiation dose length product (DLP) for this visit:  225 mGy-cm  This examination, like all CT scans performed in the [facility], was performed utilizing techniques to minimize radiation dose exposure, including the use of iterative reconstruction and automated exposure control  IV Contrast:  100 mL of iohexol (OMNIPAQUE) FINDINGS: LUNGS:  -3 mm left lower lobe nodule series 3/51  -4 mm left lower lobe nodule series 3/67  -3-4 mm left lower lobe nodule series 3/79  -4 mm subpleural left lower lobe nodule series 3/91  -2 mm subpleural left lower lobe nodule series 3/98   -1 mm subpleural left lower lobe nodule series 3/104  -Tiny right middle lobe nodules on the order of 1 to 2 mm  -3 mm juxtapleural right lower lobe nodule series 3/68  -5 mm right lower lobe nodule adjacent to the diaphragm series 3/99  -3 mm right lower lobe nodule series 3/74  Calcified granulomas in the right lower lobe and lingula  No endobronchial lesions  There is displacement of the trachea to the right by enlarged left thyroid  PLEURA:  Unremarkable  HEART/GREAT VESSELS: Heart is unremarkable for patient's age  Atherosclerotic changes thoracic aorta and coronary arteries  No thoracic aortic aneurysm  MEDIASTINUM AND HEMANTH:  Unremarkable  CHEST WALL AND LOWER NECK: 4 9 x 2 9 cm left mid to lower pole nodule with coarse calcifications superiorly  Subcentimeter right midpole hypodensity  Incidental discovery of one or more thyroid nodule(s) measuring more than 1 5 cm and without suspicious features is noted in this patient who is above 28years old; according to guidelines published in the February 2015 white paper on incidental thyroid nodules in the Journal of the [employer] of Radiology Ronette Bence), further characterization with thyroid ultrasound is recommended   VISUALIZED STRUCTURES IN THE UPPER ABDOMEN:  Incompletely visualized hypodensity about the SMA, presumably corresponding to patient's history of pancreatic adenocarcinoma  The pancreatic body and tail are atrophic  Biliary stent in place with pneumobilia  Mesenteric collateral vessels are suggested  SMV cannot be evaluated  The stomach is largely collapsed, evaluation limited  Colonic diverticulosis  OSSEOUS STRUCTURES:  No acute fracture or destructive osseous lesion  Degenerative changes of the spine and bilateral glenohumeral joints  Impression: 1  Small bilateral pulmonary nodules, nonspecific  Although these could be part and parcel of granulomatous disease, metastases cannot be excluded  Interval follow-up CT chest in about 10 weeks recommended to reevaluate these findings  2  Near 5 cm dominant left thyroid nodule  Further characterization with thyroid ultrasound is recommended  3  Abdominal findings as above  Correlate with presumed outside imaging  If formal abdominal imaging workup has not been obtained, further characterization with contrast-enhanced CT imaging utilizing pancreatic protocol would provide further information  The study was [facility] for significant notification and follow-up  Workstation performed: [id number]     [place] ERCP biliary and pancreatic    Result Date: 3/4/2022  Narrative: ERCP INDICATION:  Hyperbilirubinemia COMPARISON:  Endoscopic ultrasound from 3/4/2022  IMAGES:  6 FLUOROSCOPY TIME:   1 min 25 sec CONTRAST: 12 mL of iohexol (OMNIPAQUE) FINDINGS: Fluoroscopic guidance was provided for performance of ERCP  BILIARY:  Images provided demonstrates performance of ERCP  Common bile duct was opacified with contrast, and was dilated proximally with narrowing distally  A metallic common bile duct stent was eventually placed  PANCREATIC: Not opacified with contrast      Impression: Fluoroscopic guidance was provided for performance of ERCP  Please see ERCP procedure note for full description   Workstation performed: [id number]     Endoscopic ultrasonography, GI (Upper)    Result Date: 3/4/2022  Narrative: [facility] [address] [facility] [address] DATE OF SERVICE: 3/04/22 PHYSICIAN(S): Jonathan Lugo MD Proceduralist INDICATION: Pancreatic mass POST-OP DIAGNOSIS: See the impression below  PREPROCEDURE: Informed consent was obtained for the procedure, including sedation  Risks of perforation, hemorrhage, adverse drug reaction and aspiration were discussed  The patient was placed in the left lateral decubitus position  Patient was explained about the risks and benefits of the procedure  Risks including but not limited to bleeding, infection, and perforation were explained in detail  Also explained about less than 100% sensitivity with the exam and other alternatives  DETAILS OF PROCEDURE: Patient was taken to the procedure room where a time out was performed to confirm correct patient and correct procedure  The patient underwent monitored anesthesia care, which was administered by an anesthesia professional  The patient's blood pressure, heart rate, oxygen, respirations and level of consciousness were monitored throughout the procedure  The linear scope was advanced to the duodenum  The patient experienced no blood loss  The procedure was not difficult  The patient tolerated the procedure well  There were no apparent complications  ANESTHESIA INFORMATION: ASA: II Anesthesia Type: General MEDICATIONS: No administrations occurring from 1327 to 1413 on 03/04/22 FINDINGS: Single round, hypoechoic and heterogeneous mass measuring 30 mm x 30 mm with well-defined margins in the head of the pancreas and uncinate process; 4 fine needle biopsy passes were taken with a 25 gauge needle using a transduodenal approach guided by Doppler and sent sample for cytology  Onsite cytologist was present  The mass measured at least 3 cm but this was only one view    It extended towards the uncinate process and the neck of the pancreas  The portal confluence could not be well seen  The SMA and the celiac artery appeared to be free of tumor  The bile duct appeared to be occluded and was dilated up to 12 mm and the common hepatic duct  The pancreatic duct was 3 mm in the body of the pancreas  Pancreatic atrophy was seen  Dilation of the bile duct up to 12 mm  Visualized areas of the liver appeared to be normal   No mass lesions were seen  No celiac nodes were seen  SPECIMENS: ID Type Source Tests Collected by Time Destination 1 : pancreatic head mass Other FNA NON-GYNECOLOGIC CYTOLOGY, FINE NEEDLE ASPIRATION Catrina Powell MD 3/4/2022  1:50 PM       Impression: Large pancreatic head/ uncinate mass with upstream bile duct dilation  Portal confluence was not seen  No liver lesions seen and no lymphadenopathy present  Biopsy done  RECOMMENDATION: Await pathology results ERCP today  Catrina Powell MD       I independently reviewed and interpreted the above laboratory and imaging data, including CT scans of the chest abdomen and pelvis, ERCP, EUS, laboratory evaluation  I have discussed this patient with Dr Anna Chin  Discussion/Summary: This is a 61-year-old male with a newly diagnosed pancreatic cancer at the head of the pancreas  On my personal review of the images, this patient appears to be unresectable due to circumferential involvement of the SMA  However, since EUS suggested that the SMA was free of tumor, I think it is reasonable to obtain a triphasic CT scan with specific pancreas protocol to confirm unresectability  I will also order a CA 19 9 today to complete staging  Today we discussed port placement for ease of chemotherapy, which I do think represents the next best step for this patient  We discussed the risks to include bleeding, infection, pneumothorax  The patient is quite overwhelmed today but endorses understanding the nature of the procedure as well as these risks, benefits, alternatives    He would like to proceed  He is seeing Urology for an elevated PSA on Monday and would like to move for with port placement after this appointment  Most likely this patient will be treated with chemotherapy as well as radiation for local tumor control, and will not be a surgical candidate  We will present him at our tumor board for consensus

## 2022-03-30 NOTE — PROGRESS NOTES
Surgical Oncology Consultation    [address] Zeina Mixon  [address] [place] [id number] [phone]    Patient:  Rachna Sampson  1940  [id number]    Primary Care provider:  Tana Dobbins  [address] [facility] [address]    Referring provider:  Gurdeep Hollis MD  [address] [facility]  [address] [place], [place] [address]    Diagnoses and all orders for this visit:    Pancreatic adenocarcinoma [facility])  -     Ambulatory referral to Surgical Oncology        Chief Complaint   Patient presents with    New Patient Visit       No follow-ups on file  Oncology History   Pancreatic adenocarcinoma ([facility] [address] )   3/4/2022 Biopsy    A-B-C  Pancreas, pancreatic head mass   Malignant (PSC Category VI) -  Adenocarcinoma  3/14/2022 Initial Diagnosis    Pancreatic adenocarcinoma (HCC)         History of Present Illness  :   79 yo male with new diagnosis of likely unresectable pancreas cancer  The patient is very overwhelmed and is a poor historian today  In mid February the patient presented to the emergency department at [facility] for essentially failure to thrive with poor appetite, upper abdominal pain, jaundice, unexpected weight loss  Studies demonstrated a pancreatic head mass causing biliary obstruction with elevated bilirubin  He underwent ERCP with stent placement as well as EUS  Stent placement was successful at resolving his hyperbilirubinemia  EUS demonstrated an ill-defined pancreatic head mass  The portal confluence could not be visualized, though the celiac and SMA appeared to be clear of tumor  Biopsy confirmed pancreatic adenocarcinoma  The patient was seen by palliative care as well as Medical Oncology  He has been started on treatment for his symptoms as well as his anxiety/depression associated with his diagnosis  Dr Mauri Salazar has proposed initiating chemotherapy  He presents today for surgical opinion    Note, cross-sectional imaging of the chest abdomen pelvis did not reveal any evidence of metastatic disease  He has not get had a CA 19 9  The patient is relatively healthy however he does endorse some mental health issues  He states he has had periods of depression since his wife's passing  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  He lives alone and does require assistance with cleaning and meal preparation  His ECOG is 0-1  He ambulates freely with no issues  Review of Systems  Complete ROS Surg Onc:   Constitutional: The patient ENDORSES recent history of general fatigue, recent weight loss, change in appetite  Eyes: No complaints of visual problems, ENDORSES scleral icterus  ENT: No complaints of ear pain, no hoarseness, no difficulty swallowing,  no tinnitus and no new masses in head, oral cavity, or neck  Cardiovascular: No complaints of chest pain, no palpitations, no ankle edema  Respiratory: No complaints of shortness of breath, no cough  Gastrointestinal: No complaints of jaundice, no bloody stools, no pale stools  Genitourinary: No complaints of dysuria, no hematuria, no nocturia, no frequent urination, no urethral discharge  Musculoskeletal: No complaints of weakness, paralysis, joint stiffness or arthralgias  Integumentary: No complaints of rash, no new lesions  Neurological: No complaints of convulsions, no seizures, no dizziness  Hematologic/Lymphatic: No complaints of easy bruising  Endocrine:  No hot or cold intolerance  No polydipsia, polyphagia, or polyuria  Allergy/immunology:  No environmental allergies  No food allergies  Not immunocompromised        Patient Active Problem List   Diagnosis    Type 2 diabetes mellitus without complication, with long-term current use of insulin ([facility] [address] )    GERD (gastroesophageal reflux disease)    Pancreatic mass    History of pulmonary embolism    Hyperbilirubinemia    Anxiety about health    Pancreatic adenocarcinoma [facility])     Past Medical History:   Diagnosis Date    Diabetes mellitus ([address] )     GERD (gastroesophageal reflux disease)      Past Surgical History:   Procedure Laterality Date    APPENDECTOMY      CATARACT EXTRACTION Right      No family history on file  Social History     Socioeconomic History    Marital status:      Spouse name: Not on file    Number of children: Not on file    Years of education: Not on file    Highest education level: Not on file   Occupational History    Not on file   Tobacco Use    Smoking status: Former Smoker    Smokeless tobacco: Never Used    Tobacco comment: quit in 1960s   Vaping Use    Vaping Use: Never used   Substance and Sexual Activity    Alcohol use: Yes     Comment: seldom    Drug use: No    Sexual activity: Not on file   Other Topics Concern    Not on file   Social History Narrative    Not on file     Social Determinants of Health     Financial Resource Strain: Not on file   Food Insecurity: No Food Insecurity    Worried About [address] in the Last Year: Never true    [address] in the Last Year: Never true   Transportation Needs: No Transportation Needs    Lack of Transportation (Medical): No    Lack of Transportation (Non-Medical): No   Physical Activity: Not on file   Stress: Not on file   Social Connections: Not on file   Intimate Partner Violence: Not on file   Housing Stability: Unknown    Unable to Pay for Housing in the Last Year: No    Number of Places Lived in the Last Year: Not on file    Unstable Housing in the Last Year: No       Current Outpatient Medications:     Alcohol Swabs 70 % PADS, May substitute brand based on insurance coverage   Check glucose BID , Disp: 100 each, Rfl: 0    ALPRAZolam (XANAX) 0 5 mg tablet, Take 0 5 tablets (0 25 mg total) by mouth 3 (three) times a day as needed for anxiety, Disp: 90 tablet, Rfl: 0    apixaban (Eliquis) 5 mg, Take 5 mg by mouth 2 (two) times a day, Disp: , Rfl:     Blood Glucose Monitoring Suppl (OneTouch Verio Reflect) w/Device KIT, May substitute brand based on insurance coverage  Check glucose BID , Disp: 1 kit, Rfl: 0    glucose blood (OneTouch Verio) test strip, May substitute brand based on insurance coverage  Check glucose BID , Disp: 100 each, Rfl: 0    metFORMIN (GLUCOPHAGE) 1000 MG tablet, Take 1,000 mg by mouth daily with breakfast, Disp: , Rfl:     omeprazole (PriLOSEC) 40 MG capsule, Take 40 mg by mouth daily  , Disp: , Rfl:     OneTouch Delica Lancets 93F MISC, May substitute brand based on insurance coverage  Check glucose BID , Disp: 100 each, Rfl: 0    polyethylene glycol (MIRALAX) 17 g packet, Take 17 g by mouth daily, Disp: , Rfl:     traMADol (ULTRAM) 50 mg tablet, Take 1 tablet (50 mg total) by mouth 2 (two) times a day as needed for moderate pain, Disp: 60 tablet, Rfl: 0    ondansetron (Zofran ODT) 8 mg disintegrating tablet, Take 1 tablet (8 mg total) by mouth every 8 (eight) hours as needed for nausea or vomiting (Patient not taking: Reported on 3/30/2022 ), Disp: 20 tablet, Rfl: 0  Allergies   Allergen Reactions    Aleve [Naproxen] Swelling       Vitals:    03/30/22 1458   BP: 112/74   Pulse: 98   Resp: 17   Temp: (!) 97 3 °F (36 3 °C)   SpO2: 98%       Physical Exam   General: Appears well, appears stated age  Skin: Warm, mildly icteric  HEENT: Normocephalic, atraumatic; sclera aniceteric, mucous membranes moist; cervical nodes without adenopathy  Cardiopulmonary: RRR, Easy WOB, no BLE edema  Abd: Flat and soft, nontender, no masses appreciated, no hepatosplenomegaly  MSK: Symmetric, no cyanosis, no overt weakness  Lymphatic: No cervical, axillary or inguinal lymphadenopathy  Neuro: Affect appropriate, no gross motor abnormalities      Pathology:  Final Diagnosis   A-B-C  Pancreas, pancreatic head mass (ThinPrep, smear and cell block preparations)  Malignant (PSC Category VI) - See note    Adenocarcinoma      Satisfactory for evaluation      Note: The above diagnostic category is part of the six-tiered system proposed by The Papanicolaou Society of Cytopathology for the reporting of pancreaticobiliary specimens  This proposed scheme provides terminology that correlates the cytologic diagnostic nomenclature with the 2010 WHO classification of pancreatic tumors and standardizes the categorization of the various diseases of the pancreas, some of which are difficult to diagnose specifically by cytology  *The Papanicolaou Society of Cytopathology System for Reporting Pancreaticobiliary Cytology: Definitions, Criteria and Explanatory Notes  Wilton Rosenthal; Casey, [id number]  Labs: Reviewed in EPIC    Imaging  ERCP    Result Date: 3/4/2022  Narrative: [address] [address] [address] [phone] [address] Street: 3/04/22 PHYSICIAN(S): Wesley Villagomez MD Proceduralist INDICATION: Hyperbilirubinemia, Pancreatic mass POST-OP DIAGNOSIS: See the impression below  PREPROCEDURE: Informed consent was obtained for the procedure, including sedation  Risks of perforation, hemorrhage, adverse drug reaction and aspiration were discussed  The patient was placed in the left lateral decubitus position  Patient was explained about the risks and benefits of the procedure  Risks including but not limited to bleeding, infection, and perforation were explained in detail  Also explained about less than 100% sensitivity with the exam and other alternatives  DETAILS OF PROCEDURE: Patient was taken to the procedure room where a time out was performed to confirm correct patient and correct procedure  The patient underwent general anesthesia, which was administered by an anesthesia professional  The patient's blood pressure, heart rate, level of consciousness, respirations and oxygen were monitored throughout the procedure  Clinical intention was achieved  The patient experienced no blood loss   The procedure was not difficult  The patient tolerated the procedure well  ANESTHESIA INFORMATION: ASA: II Anesthesia Type: General MEDICATIONS: iohexol (OMNIPAQUE) 240 MG/ML solution 50 mL 12 mL (Totals for administrations occurring from 1327 to 1504 on 03/04/22) FINDINGS: The esophagus appeared normal  The stomach appeared normal  Deeply cannulated the common bile duct after 1 attempt using a traction sphincterotome  Used 260 cm x 0 035 in guidewire  Cannulation was not difficult  Injected contrast  Distal stricture with upstream dilation  Performed small biliary sphincterotomy using a sphincterotome  No bleeding at the procedure site  Placed metal, covered stent with length of 6 cm and diameter of 10 mm in the common bile duct  SPECIMENS: ID Type Source Tests Collected by Time Destination 1 : pancreatic head mass Other FNA NON-GYNECOLOGIC CYTOLOGY, FINE NEEDLE ASPIRATION Kelby Ruiz MD 3/4/2022  1:50 PM       Impression: Successful ERCP with stent placement (metal) traversing the distal biliary stricture  RECOMMENDATION: Follow LFT's  Diet Discharge if stable tomorrow  Kelby Ruiz MD     CT chest w contrast    Result Date: 3/17/2022  Narrative: CT CHEST WITH IV CONTRAST INDICATION:  C25 9: Malignant neoplasm of pancreas, unspecified  COMPARISON:  None TECHNIQUE: CT examination of the chest was performed  Axial, sagittal, and coronal 2D reformatted images were created from the source data and submitted for interpretation  Radiation dose length product (DLP) for this visit:  225 mGy-cm  This examination, like all CT scans performed in the [facility], was performed utilizing techniques to minimize radiation dose exposure, including the use of iterative reconstruction and automated exposure control   IV Contrast:  100 mL of iohexol (OMNIPAQUE) FINDINGS: LUNGS:  -3 mm left lower lobe nodule series 3/51  -4 mm left lower lobe nodule series 3/67  -3-4 mm left lower lobe nodule series 3/79  -4 mm subpleural left lower lobe nodule series 3/91  -2 mm subpleural left lower lobe nodule series 3/98   -1 mm subpleural left lower lobe nodule series 3/104  -Tiny right middle lobe nodules on the order of 1 to 2 mm  -3 mm juxtapleural right lower lobe nodule series 3/68  -5 mm right lower lobe nodule adjacent to the diaphragm series 3/99  -3 mm right lower lobe nodule series 3/74  Calcified granulomas in the right lower lobe and lingula  No endobronchial lesions  There is displacement of the trachea to the right by enlarged left thyroid  PLEURA:  Unremarkable  HEART/GREAT VESSELS: Heart is unremarkable for patient's age  Atherosclerotic changes thoracic aorta and coronary arteries  No thoracic aortic aneurysm  MEDIASTINUM AND HEMANTH:  Unremarkable  CHEST WALL AND LOWER NECK: 4 9 x 2 9 cm left mid to lower pole nodule with coarse calcifications superiorly  Subcentimeter right midpole hypodensity  Incidental discovery of one or more thyroid nodule(s) measuring more than 1 5 cm and without suspicious features is noted in this patient who is above 28years old; according to guidelines published in the February 2015 white paper on incidental thyroid nodules in the Journal of the [employer] of Radiology Zuleyka Less), further characterization with thyroid ultrasound is recommended  VISUALIZED STRUCTURES IN THE UPPER ABDOMEN:  Incompletely visualized hypodensity about the SMA, presumably corresponding to patient's history of pancreatic adenocarcinoma  The pancreatic body and tail are atrophic  Biliary stent in place with pneumobilia  Mesenteric collateral vessels are suggested  SMV cannot be evaluated  The stomach is largely collapsed, evaluation limited  Colonic diverticulosis  OSSEOUS STRUCTURES:  No acute fracture or destructive osseous lesion  Degenerative changes of the spine and bilateral glenohumeral joints  Impression: 1  Small bilateral pulmonary nodules, nonspecific    Although these could be part and parcel of granulomatous disease, metastases cannot be excluded  Interval follow-up CT chest in about 10 weeks recommended to reevaluate these findings  2  Near 5 cm dominant left thyroid nodule  Further characterization with thyroid ultrasound is recommended  3  Abdominal findings as above  Correlate with presumed outside imaging  If formal abdominal imaging workup has not been obtained, further characterization with contrast-enhanced CT imaging utilizing pancreatic protocol would provide further information  The study was marked in [facility] for significant notification and follow-up  Workstation performed: [id number]     FL ERCP biliary and pancreatic    Result Date: 3/4/2022  Narrative: ERCP INDICATION:  Hyperbilirubinemia COMPARISON:  Endoscopic ultrasound from 3/4/2022  IMAGES:  6 FLUOROSCOPY TIME:   1 min 25 sec CONTRAST: 12 mL of iohexol (OMNIPAQUE) FINDINGS: Fluoroscopic guidance was provided for performance of ERCP  BILIARY:  Images provided demonstrates performance of ERCP  Common bile duct was opacified with contrast, and was dilated proximally with narrowing distally  A metallic common bile duct stent was eventually placed  PANCREATIC: Not opacified with contrast      Impression: Fluoroscopic guidance was provided for performance of ERCP  Please see ERCP procedure note for full description  Workstation performed: [id number]     Endoscopic ultrasonography, GI (Upper)    Result Date: 3/4/2022  Narrative: [address] [place] [address] Endoscopy [id number] [place] [address] [address] DATE OF SERVICE: 3/04/22 PHYSICIAN(S): Karely Brown MD Proceduralist INDICATION: Pancreatic mass POST-OP DIAGNOSIS: See the impression below  PREPROCEDURE: Informed consent was obtained for the procedure, including sedation  Risks of perforation, hemorrhage, adverse drug reaction and aspiration were discussed  The patient was placed in the left lateral decubitus position   Patient was explained about the risks and benefits of the procedure  Risks including but not limited to bleeding, infection, and perforation were explained in detail  Also explained about less than 100% sensitivity with the exam and other alternatives  DETAILS OF PROCEDURE: Patient was taken to the procedure room where a time out was performed to confirm correct patient and correct procedure  The patient underwent monitored anesthesia care, which was administered by an anesthesia professional  The patient's blood pressure, heart rate, oxygen, respirations and level of consciousness were monitored throughout the procedure  The linear scope was advanced to the duodenum  The patient experienced no blood loss  The procedure was not difficult  The patient tolerated the procedure well  There were no apparent complications  ANESTHESIA INFORMATION: ASA: II Anesthesia Type: General MEDICATIONS: No administrations occurring from 1327 to 1413 on 03/04/22 FINDINGS: Single round, hypoechoic and heterogeneous mass measuring 30 mm x 30 mm with well-defined margins in the head of the pancreas and uncinate process; 4 fine needle biopsy passes were taken with a 25 gauge needle using a transduodenal approach guided by Doppler and sent sample for cytology  Onsite cytologist was present  The mass measured at least 3 cm but this was only one view  It extended towards the uncinate process and the neck of the pancreas  The portal confluence could not be well seen  The SMA and the celiac artery appeared to be free of tumor  The bile duct appeared to be occluded and was dilated up to 12 mm and the common hepatic duct  The pancreatic duct was 3 mm in the body of the pancreas  Pancreatic atrophy was seen  Dilation of the bile duct up to 12 mm  Visualized areas of the liver appeared to be normal   No mass lesions were seen  No celiac nodes were seen   SPECIMENS: ID Type Source Tests Collected by Time Destination 1 : pancreatic head mass Other FNA NON-GYNECOLOGIC CYTOLOGY, FINE NEEDLE ASPIRATION Kelby Ruiz MD 3/4/2022  1:50 PM       Impression: Large pancreatic head/ uncinate mass with upstream bile duct dilation  Portal confluence was not seen  No liver lesions seen and no lymphadenopathy present  Biopsy done  RECOMMENDATION: Await pathology results ERCP today  Kelby Ruiz MD       I independently reviewed and interpreted the above laboratory and imaging data, including CT scans of the chest abdomen and pelvis, ERCP, EUS, laboratory evaluation  I have discussed this patient with Dr Maggy Beckford  Discussion/Summary: This is a 62-year-old male with a newly diagnosed pancreatic cancer at the head of the pancreas  On my personal review of the images, this patient appears to be unresectable due to circumferential involvement of the SMA  However, since EUS suggested that the SMA was free of tumor, I think it is reasonable to obtain a triphasic CT scan with specific pancreas protocol to confirm unresectability  I will also order a CA 19 9 today to complete staging  Today we discussed port placement for ease of chemotherapy, which I do think represents the next best step for this patient  We discussed the risks to include bleeding, infection, pneumothorax  The patient is quite overwhelmed today but endorses understanding the nature of the procedure as well as these risks, benefits, alternatives  He would like to proceed  He is seeing Urology for an elevated PSA on Monday and would like to move for with port placement after this appointment  Most likely this patient will be treated with chemotherapy as well as radiation for local tumor control, and will not be a surgical candidate  We will present him at our tumor board for consensus  I have also encouraged the patient to continue seeing palliative care for his ongoing mental health struggles due to his diagnosis as well as his symptomatology  I will see the patient for port placement

## 2022-03-30 NOTE — H&P (VIEW-ONLY)
Surgical Oncology Consultation    [address] Qing Roe  [address] [place] [id number] [phone]    Patient:  Lc Garrido  1940  [id number]    Primary Care provider:  Diana Dobbins  [address] [facility] [address]    Referring provider:  Jennifer Cochran MD  [address] [facility]  [address] [place], [place] [address]    Diagnoses and all orders for this visit:    Pancreatic adenocarcinoma [facility])  -     Ambulatory referral to Surgical Oncology        Chief Complaint   Patient presents with    New Patient Visit       No follow-ups on file  Oncology History   Pancreatic adenocarcinoma ([facility] [address] )   3/4/2022 Biopsy    A-B-C  Pancreas, pancreatic head mass   Malignant (PSC Category VI) -  Adenocarcinoma  3/14/2022 Initial Diagnosis    Pancreatic adenocarcinoma (HCC)         History of Present Illness  :   79 yo male with new diagnosis of likely unresectable pancreas cancer  The patient is very overwhelmed and is a poor historian today  In mid February the patient presented to the emergency department at [facility] for essentially failure to thrive with poor appetite, upper abdominal pain, jaundice, unexpected weight loss  Studies demonstrated a pancreatic head mass causing biliary obstruction with elevated bilirubin  He underwent ERCP with stent placement as well as EUS  Stent placement was successful at resolving his hyperbilirubinemia  EUS demonstrated an ill-defined pancreatic head mass  The portal confluence could not be visualized, though the celiac and SMA appeared to be clear of tumor  Biopsy confirmed pancreatic adenocarcinoma  The patient was seen by palliative care as well as Medical Oncology  He has been started on treatment for his symptoms as well as his anxiety/depression associated with his diagnosis  Dr Vivek Vázquez has proposed initiating chemotherapy  He presents today for surgical opinion    Note, cross-sectional imaging of the chest abdomen pelvis did not reveal any evidence of metastatic disease  He has not get had a CA 19 9  The patient is relatively healthy however he does endorse some mental health issues  He states he has had periods of depression since his wife's passing  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  He lives alone and does require assistance with cleaning and meal preparation  His ECOG is 0-1  He ambulates freely with no issues  Review of Systems  Complete ROS Surg Onc:   Constitutional: The patient ENDORSES recent history of general fatigue, recent weight loss, change in appetite  Eyes: No complaints of visual problems, ENDORSES scleral icterus  ENT: No complaints of ear pain, no hoarseness, no difficulty swallowing,  no tinnitus and no new masses in head, oral cavity, or neck  Cardiovascular: No complaints of chest pain, no palpitations, no ankle edema  Respiratory: No complaints of shortness of breath, no cough  Gastrointestinal: No complaints of jaundice, no bloody stools, no pale stools  Genitourinary: No complaints of dysuria, no hematuria, no nocturia, no frequent urination, no urethral discharge  Musculoskeletal: No complaints of weakness, paralysis, joint stiffness or arthralgias  Integumentary: No complaints of rash, no new lesions  Neurological: No complaints of convulsions, no seizures, no dizziness  Hematologic/Lymphatic: No complaints of easy bruising  Endocrine:  No hot or cold intolerance  No polydipsia, polyphagia, or polyuria  Allergy/immunology:  No environmental allergies  No food allergies  Not immunocompromised        Patient Active Problem List   Diagnosis    Type 2 diabetes mellitus without complication, with long-term current use of insulin ([facility] [address] )    GERD (gastroesophageal reflux disease)    Pancreatic mass    History of pulmonary embolism    Hyperbilirubinemia    Anxiety about health    Pancreatic adenocarcinoma [facility])     Past Medical History:   Diagnosis Date    Diabetes mellitus ([address] )     GERD (gastroesophageal reflux disease)      Past Surgical History:   Procedure Laterality Date    APPENDECTOMY      CATARACT EXTRACTION Right      No family history on file  Social History     Socioeconomic History    Marital status:      Spouse name: Not on file    Number of children: Not on file    Years of education: Not on file    Highest education level: Not on file   Occupational History    Not on file   Tobacco Use    Smoking status: Former Smoker    Smokeless tobacco: Never Used    Tobacco comment: quit in 1960s   Vaping Use    Vaping Use: Never used   Substance and Sexual Activity    Alcohol use: Yes     Comment: seldom    Drug use: No    Sexual activity: Not on file   Other Topics Concern    Not on file   Social History Narrative    Not on file     Social Determinants of Health     Financial Resource Strain: Not on file   Food Insecurity: No Food Insecurity    Worried About [address] in the Last Year: Never true    [address] in the Last Year: Never true   Transportation Needs: No Transportation Needs    Lack of Transportation (Medical): No    Lack of Transportation (Non-Medical): No   Physical Activity: Not on file   Stress: Not on file   Social Connections: Not on file   Intimate Partner Violence: Not on file   Housing Stability: Unknown    Unable to Pay for Housing in the Last Year: No    Number of Places Lived in the Last Year: Not on file    Unstable Housing in the Last Year: No       Current Outpatient Medications:     Alcohol Swabs 70 % PADS, May substitute brand based on insurance coverage   Check glucose BID , Disp: 100 each, Rfl: 0    ALPRAZolam (XANAX) 0 5 mg tablet, Take 0 5 tablets (0 25 mg total) by mouth 3 (three) times a day as needed for anxiety, Disp: 90 tablet, Rfl: 0    apixaban (Eliquis) 5 mg, Take 5 mg by mouth 2 (two) times a day, Disp: , Rfl:     Blood Glucose Monitoring Suppl (OneTouch Verio Reflect) w/Device KIT, May substitute brand based on insurance coverage  Check glucose BID , Disp: 1 kit, Rfl: 0    glucose blood (OneTouch Verio) test strip, May substitute brand based on insurance coverage  Check glucose BID , Disp: 100 each, Rfl: 0    metFORMIN (GLUCOPHAGE) 1000 MG tablet, Take 1,000 mg by mouth daily with breakfast, Disp: , Rfl:     omeprazole (PriLOSEC) 40 MG capsule, Take 40 mg by mouth daily  , Disp: , Rfl:     OneTouch Delica Lancets 18M MISC, May substitute brand based on insurance coverage  Check glucose BID , Disp: 100 each, Rfl: 0    polyethylene glycol (MIRALAX) 17 g packet, Take 17 g by mouth daily, Disp: , Rfl:     traMADol (ULTRAM) 50 mg tablet, Take 1 tablet (50 mg total) by mouth 2 (two) times a day as needed for moderate pain, Disp: 60 tablet, Rfl: 0    ondansetron (Zofran ODT) 8 mg disintegrating tablet, Take 1 tablet (8 mg total) by mouth every 8 (eight) hours as needed for nausea or vomiting (Patient not taking: Reported on 3/30/2022 ), Disp: 20 tablet, Rfl: 0  Allergies   Allergen Reactions    Aleve [Naproxen] Swelling       Vitals:    03/30/22 1458   BP: 112/74   Pulse: 98   Resp: 17   Temp: (!) 97 3 °F (36 3 °C)   SpO2: 98%       Physical Exam   General: Appears well, appears stated age  Skin: Warm, mildly icteric  HEENT: Normocephalic, atraumatic; sclera aniceteric, mucous membranes moist; cervical nodes without adenopathy  Cardiopulmonary: RRR, Easy WOB, no BLE edema  Abd: Flat and soft, nontender, no masses appreciated, no hepatosplenomegaly  MSK: Symmetric, no cyanosis, no overt weakness  Lymphatic: No cervical, axillary or inguinal lymphadenopathy  Neuro: Affect appropriate, no gross motor abnormalities      Pathology:  Final Diagnosis   A-B-C  Pancreas, pancreatic head mass (ThinPrep, smear and cell block preparations)  Malignant (PSC Category VI) - See note    Adenocarcinoma      Satisfactory for evaluation      Note: The above diagnostic category is part of the six-tiered system proposed by The Papanicolaou Society of Cytopathology for the reporting of pancreaticobiliary specimens  This proposed scheme provides terminology that correlates the cytologic diagnostic nomenclature with the 2010 WHO classification of pancreatic tumors and standardizes the categorization of the various diseases of the pancreas, some of which are difficult to diagnose specifically by cytology  *The Papanicolaou Society of Cytopathology System for Reporting Pancreaticobiliary Cytology: Definitions, Criteria and Explanatory Notes  Hossein Woodruff; [place], [id number]  Labs: Reviewed in EPIC    Imaging  ERCP    Result Date: 3/4/2022  Narrative: [facility] [address] [phone] [address] Street: 3/04/22 PHYSICIAN(S): Joy Cazares MD Proceduralist INDICATION: Hyperbilirubinemia, Pancreatic mass POST-OP DIAGNOSIS: See the impression below  PREPROCEDURE: Informed consent was obtained for the procedure, including sedation  Risks of perforation, hemorrhage, adverse drug reaction and aspiration were discussed  The patient was placed in the left lateral decubitus position  Patient was explained about the risks and benefits of the procedure  Risks including but not limited to bleeding, infection, and perforation were explained in detail  Also explained about less than 100% sensitivity with the exam and other alternatives  DETAILS OF PROCEDURE: Patient was taken to the procedure room where a time out was performed to confirm correct patient and correct procedure  The patient underwent general anesthesia, which was administered by an anesthesia professional  The patient's blood pressure, heart rate, level of consciousness, respirations and oxygen were monitored throughout the procedure  Clinical intention was achieved  The patient experienced no blood loss   The procedure was not difficult  The patient tolerated the procedure well  ANESTHESIA INFORMATION: ASA: II Anesthesia Type: General MEDICATIONS: iohexol (OMNIPAQUE) 240 MG/ML solution 50 mL 12 mL (Totals for administrations occurring from 1327 to 1504 on 03/04/22) FINDINGS: The esophagus appeared normal  The stomach appeared normal  Deeply cannulated the common bile duct after 1 attempt using a traction sphincterotome  Used 260 cm x 0 035 in guidewire  Cannulation was not difficult  Injected contrast  Distal stricture with upstream dilation  Performed small biliary sphincterotomy using a sphincterotome  No bleeding at the procedure site  Placed metal, covered stent with length of 6 cm and diameter of 10 mm in the common bile duct  SPECIMENS: ID Type Source Tests Collected by Time Destination 1 : pancreatic head mass Other FNA NON-GYNECOLOGIC CYTOLOGY, FINE NEEDLE ASPIRATION Margaret Bliss MD 3/4/2022  1:50 PM       Impression: Successful ERCP with stent placement (metal) traversing the distal biliary stricture  RECOMMENDATION: Follow LFT's  Diet Discharge if stable tomorrow  Margaret Bliss MD     CT chest w contrast    Result Date: 3/17/2022  Narrative: CT CHEST WITH IV CONTRAST INDICATION:  C25 9: Malignant neoplasm of pancreas, unspecified  COMPARISON:  None TECHNIQUE: CT examination of the chest was performed  Axial, sagittal, and coronal 2D reformatted images were created from the source data and submitted for interpretation  Radiation dose length product (DLP) for this visit:  225 mGy-cm  This examination, like all CT scans performed in the [facility], was performed utilizing techniques to minimize radiation dose exposure, including the use of iterative reconstruction and automated exposure control   IV Contrast:  100 mL of iohexol (OMNIPAQUE) FINDINGS: LUNGS:  -3 mm left lower lobe nodule series 3/51  -4 mm left lower lobe nodule series 3/67  -3-4 mm left lower lobe nodule series 3/79  -4 mm subpleural left lower lobe nodule series 3/91  -2 mm subpleural left lower lobe nodule series 3/98   -1 mm subpleural left lower lobe nodule series 3/104  -Tiny right middle lobe nodules on the order of 1 to 2 mm  -3 mm juxtapleural right lower lobe nodule series 3/68  -5 mm right lower lobe nodule adjacent to the diaphragm series 3/99  -3 mm right lower lobe nodule series 3/74  Calcified granulomas in the right lower lobe and lingula  No endobronchial lesions  There is displacement of the trachea to the right by enlarged left thyroid  PLEURA:  Unremarkable  HEART/GREAT VESSELS: Heart is unremarkable for patient's age  Atherosclerotic changes thoracic aorta and coronary arteries  No thoracic aortic aneurysm  MEDIASTINUM AND HEMANTH:  Unremarkable  CHEST WALL AND LOWER NECK: 4 9 x 2 9 cm left mid to lower pole nodule with coarse calcifications superiorly  Subcentimeter right midpole hypodensity  Incidental discovery of one or more thyroid nodule(s) measuring more than 1 5 cm and without suspicious features is noted in this patient who is above 28years old; according to guidelines published in the February 2015 white paper on incidental thyroid nodules in the Journal of the [employer] of Radiology [place] Pamela), further characterization with thyroid ultrasound is recommended  VISUALIZED STRUCTURES IN THE UPPER ABDOMEN:  Incompletely visualized hypodensity about the SMA, presumably corresponding to patient's history of pancreatic adenocarcinoma  The pancreatic body and tail are atrophic  Biliary stent in place with pneumobilia  Mesenteric collateral vessels are suggested  SMV cannot be evaluated  The stomach is largely collapsed, evaluation limited  Colonic diverticulosis  OSSEOUS STRUCTURES:  No acute fracture or destructive osseous lesion  Degenerative changes of the spine and bilateral glenohumeral joints  Impression: 1  Small bilateral pulmonary nodules, nonspecific    Although these could be part and parcel of granulomatous disease, metastases cannot be excluded  Interval follow-up CT chest in about 10 weeks recommended to reevaluate these findings  2  Near 5 cm dominant left thyroid nodule  Further characterization with thyroid ultrasound is recommended  3  Abdominal findings as above  Correlate with presumed outside imaging  If formal abdominal imaging workup has not been obtained, further characterization with contrast-enhanced CT imaging utilizing pancreatic protocol would provide further information  The study was marked in [facility] for significant notification and follow-up  Workstation performed: [id number]     FL ERCP biliary and pancreatic    Result Date: 3/4/2022  Narrative: ERCP INDICATION:  Hyperbilirubinemia COMPARISON:  Endoscopic ultrasound from 3/4/2022  IMAGES:  6 FLUOROSCOPY TIME:   1 min 25 sec CONTRAST: 12 mL of iohexol (OMNIPAQUE) FINDINGS: Fluoroscopic guidance was provided for performance of ERCP  BILIARY:  Images provided demonstrates performance of ERCP  Common bile duct was opacified with contrast, and was dilated proximally with narrowing distally  A metallic common bile duct stent was eventually placed  PANCREATIC: Not opacified with contrast      Impression: Fluoroscopic guidance was provided for performance of ERCP  Please see ERCP procedure note for full description  Workstation performed: [id number]     Endoscopic ultrasonography, GI (Upper)    Result Date: 3/4/2022  Narrative: [facility] [address] [facility] [address] [facility] [address] DATE OF SERVICE: 3/04/22 PHYSICIAN(S): Sherrie Flores MD Proceduralist INDICATION: Pancreatic mass POST-OP DIAGNOSIS: See the impression below  PREPROCEDURE: Informed consent was obtained for the procedure, including sedation  Risks of perforation, hemorrhage, adverse drug reaction and aspiration were discussed  The patient was placed in the left lateral decubitus position   Patient was explained about the risks and benefits of the procedure  Risks including but not limited to bleeding, infection, and perforation were explained in detail  Also explained about less than 100% sensitivity with the exam and other alternatives  DETAILS OF PROCEDURE: Patient was taken to the procedure room where a time out was performed to confirm correct patient and correct procedure  The patient underwent monitored anesthesia care, which was administered by an anesthesia professional  The patient's blood pressure, heart rate, oxygen, respirations and level of consciousness were monitored throughout the procedure  The linear scope was advanced to the duodenum  The patient experienced no blood loss  The procedure was not difficult  The patient tolerated the procedure well  There were no apparent complications  ANESTHESIA INFORMATION: ASA: II Anesthesia Type: General MEDICATIONS: No administrations occurring from 1327 to 1413 on 03/04/22 FINDINGS: Single round, hypoechoic and heterogeneous mass measuring 30 mm x 30 mm with well-defined margins in the head of the pancreas and uncinate process; 4 fine needle biopsy passes were taken with a 25 gauge needle using a transduodenal approach guided by Doppler and sent sample for cytology  Onsite cytologist was present  The mass measured at least 3 cm but this was only one view  It extended towards the uncinate process and the neck of the pancreas  The portal confluence could not be well seen  The SMA and the celiac artery appeared to be free of tumor  The bile duct appeared to be occluded and was dilated up to 12 mm and the common hepatic duct  The pancreatic duct was 3 mm in the body of the pancreas  Pancreatic atrophy was seen  Dilation of the bile duct up to 12 mm  Visualized areas of the liver appeared to be normal   No mass lesions were seen  No celiac nodes were seen   SPECIMENS: ID Type Source Tests Collected by Time Destination 1 : pancreatic head mass Other FNA NON-GYNECOLOGIC CYTOLOGY, FINE NEEDLE ASPIRATION Jennifer Cochran MD 3/4/2022  1:50 PM       Impression: Large pancreatic head/ uncinate mass with upstream bile duct dilation  Portal confluence was not seen  No liver lesions seen and no lymphadenopathy present  Biopsy done  RECOMMENDATION: Await pathology results ERCP today  Jennifer Cochran MD       I independently reviewed and interpreted the above laboratory and imaging data, including CT scans of the chest abdomen and pelvis, ERCP, EUS, laboratory evaluation  I have discussed this patient with Dr iVvek Vázquez  Discussion/Summary: This is a 68-year-old male with a newly diagnosed pancreatic cancer at the head of the pancreas  On my personal review of the images, this patient appears to be unresectable due to circumferential involvement of the SMA  However, since EUS suggested that the SMA was free of tumor, I think it is reasonable to obtain a triphasic CT scan with specific pancreas protocol to confirm unresectability  I will also order a CA 19 9 today to complete staging  Today we discussed port placement for ease of chemotherapy, which I do think represents the next best step for this patient  We discussed the risks to include bleeding, infection, pneumothorax  The patient is quite overwhelmed today but endorses understanding the nature of the procedure as well as these risks, benefits, alternatives  He would like to proceed  He is seeing Urology for an elevated PSA on Monday and would like to move for with port placement after this appointment  Most likely this patient will be treated with chemotherapy as well as radiation for local tumor control, and will not be a surgical candidate  We will present him at our tumor board for consensus  I have also encouraged the patient to continue seeing palliative care for his ongoing mental health struggles due to his diagnosis as well as his symptomatology  I will see the patient for port placement

## 2022-03-31 ENCOUNTER — PATIENT OUTREACH (OUTPATIENT)
Dept: HEMATOLOGY ONCOLOGY | Facility: CLINIC | Age: 82
End: 2022-03-31

## 2022-03-31 ENCOUNTER — TELEPHONE (OUTPATIENT)
Dept: HEMATOLOGY ONCOLOGY | Facility: CLINIC | Age: 82
End: 2022-03-31

## 2022-03-31 ENCOUNTER — TELEPHONE (OUTPATIENT)
Dept: SURGICAL ONCOLOGY | Facility: CLINIC | Age: 82
End: 2022-03-31

## 2022-03-31 NOTE — TELEPHONE ENCOUNTER
Tc to pt to let him know that Dr Aimna Huber does not need him to have the CT scan done on 4/10  Informed him that we have canceled this appt for him  Dr Amina Huber will place his port on 4/11 and further discuss this with him at that time  Pt verbalized understanding  Pt had questions about what the port was for  Explained to him that his is how he will get his chemo  Discussed once again that the CT scan was canceled for 4/10 and Dr Amina Huber will be placing his port on 4/11  Reminded pt that he will need a ride home from surgery  Pt verbalized understanding

## 2022-03-31 NOTE — TELEPHONE ENCOUNTER
CALL RETURN FORM   Reason for patient call? Patient called in stating that he needs a prior authorization number for scheduled CT scan     Patient states it is urgent     Patient's primary oncologist? Dr Louis Elizabeth   Name of person the patient was calling for? Dr Louis Elizabeth   Any additional information to add, if applicable? Informed patient that the message will be forwarded to the team and someone will get back to them as soon as possible    Did you relay this information to the patient?  Yes

## 2022-03-31 NOTE — TELEPHONE ENCOUNTER
Returned pts call  Pt confused with appts that have been made  Pt thought he was scheduled for for a test Friday 4/1 and had called central scheduling trying to move his CT scan  Told pt we were holding time to place his port this Friday 4/1, but after discussion yesterday and pt being overwhelmed with appts, we agreed to a new port date of 4/11  Discussed appts with pt and reassured him that we scheduled everything needed for Dr Bernarda Avendano yesterday prior to him leaving the office  Pt scheduled for a CT scan on 4/10 and a port placement with Dr Bernarda Avendano on 4/11  Pt verbalized understanding and agreeable to plan

## 2022-03-31 NOTE — PROGRESS NOTES
Pt called to ask where he was going to get his chemotherapy infusions  I told the pt at this moment there is no schedule made up for him but if he meant the port that he has scheduled to have on 4/11in [facility]  He asked what the difference was so I explained that on 4/11 he is supposed to have a port placed, the device that the chemotherapy will be going through at the time of his infusions  That is when he will be going to [facility] since he asked if [facility] was going to be the place where he would be getting his infusions  He read the address to me that was given to him  He asked me if he was going to need a ride there, I told him that he would since he would be getting sedation for the port placement and he could not use a transport service  He went on to explain that he was going to reach out to his sister to see if she could do it and if not she would pay a friend to help give him a ride there  I told him to call me if he had any issues getting a ride for that day and to start calling now to get someone to take him  He was agreeable and was thankful for my help

## 2022-04-04 ENCOUNTER — TELEPHONE (OUTPATIENT)
Dept: HEMATOLOGY ONCOLOGY | Facility: CLINIC | Age: 82
End: 2022-04-04

## 2022-04-04 NOTE — TELEPHONE ENCOUNTER
Patient has questions about the prep for his port placement on Monday  Looks like Dr Savi Cannon is placing it

## 2022-04-04 NOTE — TELEPHONE ENCOUNTER
CALL RETURN FORM   Reason for patient call? patient has questions regarding his port placement scheduled for 4/11   Patient's primary oncologist? Dr Volodymyr Hernandez   Name of person the patient was calling for? Savanah   Any additional information to add, if applicable? [phone]   Informed patient that the message will be forwarded to the team and someone will get back to them as soon as possible    Did you relay this information to the patient?  yes

## 2022-04-04 NOTE — TELEPHONE ENCOUNTER
Returned call and spoke with pt regarding questions he had before his port placement  Explained that aspirin and any over the counter vitamins needed to be stopped a week before surgery  Pt wanted to know when to stop his eliquis  Explained that we were waiting on medical clearance from his pcp with recommendations on how many days to hold eliquis prior to surgery  Told pt that I would call him back as soon as we received the clearance

## 2022-04-05 NOTE — PROGRESS NOTES
Hematology/Oncology Outpatient Office Note    Date of Service: 2022    [facility] HEMATOLOGY ONCOLOGY SPECIALISTS NIHARIKAKRUPA  bernardvon  [facility]  [phone]    Reason for Consultation:   Chief Complaint   Patient presents with    Follow-up       Cancer Stage at diagnosis: in process (pulm nodules being assessed closely)    Referral Physician: No ref  provider found    Primary Care Physician:  Orlando Moran     Nickname: Abraham Cordero        He lives alone    Original ECO    Today's ECO    He goes to the gym once a week and does exercise bikes, walking    Goals and Barriers:  Current Goal: Minimize effects of disease burden, extend life  Barriers to accomplishing this: abdominal pain (Tramadol reduces this)    Patient's Capacity to Self Care:  Patient is able to self care  His sister pays for a service to come several times a month to help clean, do laundry, organize    Code Status: not addressed today    Advanced directives: not addressed today    ASSESSMENT & PLAN      Diagnosis ICD-10-CM Associated Orders   1  Pancreatic adenocarcinoma (HCC)  C25 9 CT chest abdomen pelvis w contrast         This is a 80 y o  c PMHx notable for PE on Eliquis, Anxiety from diagnosis (hx of being in a psychiatric hospital in the past after his wife passed away), GERD, DMII, being seen in consultation for pancreatic adenocarcinoma    Thoughts on prediction for Grade III-V toxicity in elderly patients to systemic chemotherapy:  Age >72 years   GI/ cancers  Falls in last 6 months   Hearing impairment  Limited ability to walk 1 block  Requires assistance with medications  Decreased social activities   Mark Anthony Ascencio JCO 2011)  BMI<18 5 or moderate or severe weight loss or decrease in food intake in past 3 months  Mild vs moderate dementia/depression/  anxiety     After assessing this patient's ECOG PS to be 1, I have deemed him to be a candidate for systemic chemotherapy from a fitness perspective  However, he had significant liver dysfunction per March 5, 2022 labs with bilirubin above 8  He did have a metal stent placed in the biliary tract and his bilirubin improved significantly to 1 7 and is reassuring as a T  Bili <2 is needed at minimum for most of the components of FOLFIRINOX  After discussing the case with Dr Herb Fisher, he has been deemed to be a poor surgical candidate due to the degree of tumor encasement of the superior mesentery vasculature  Plan is for induction chemotherapy and re-staging after 6-8 cycles and then 2 months CRT (in between restaging CT CAP to be done at the end of July 2022)    Discussion of decision making   Oncology history updated, accordingly, during this visit   Goals of care/patient communication  o I discussed with the patient the clinical course leading up to their cancer diagnosis  I reviewed relevant office notes, imaging reports and pathology result as well   o I told the patient that this is a case of either curable or incurable disease depending on what the final status of the pulmonary nodules are and what this means  We discussed that the goal of anti-cancer therapy is to provide best quality of life, extend overall survival, and progression free survival as shown in clinical trials  We also discussed that there might be a point when the cancer will no longer respond to this anti-neoplastic therapy  As a result, we also discussed the role of the palliative care team being introduced early in the treatment course   He is seeing them already  o I explained the risks/benefits of the proposed cancer therapy: mFOLFORINOX and after discussion including understanding risks of possible life-threatening complications and therapy-related malignancy development, informed consent for blood products and treatment has been signed   TNM/Staging At Diagnosis  Cancer Staging  Pancreatic adenocarcinoma [facility])  Staging form: Pancreas, AJCC 8th Edition  - Clinical stage from 3/4/2022: Stage IB (cT2, cN0, cMx) - Unsigned  Stage prefix: Initial diagnosis  Total positive nodes: 0   Disease Features/Tumor Markers/Genetics  o Tumor Marker: n/a  Notable Path Features: 3/4/22 Pancreatic head EUS biopsy: (ThinPrep, smear and cell block preparations)  Malignant (PSC Category VI) Adenocarcinoma  Satisfactory for evaluation   Treatment: mFOLFIRINOX   Other Supportive care: Zofran ODT   Treatment Team Members  o Surgeon: Dr Chris Fisher  o Palliative: Fabian Villabeth 3/11/22: creat 0 39, lipase 147 (13-60), WBC 5 6k, Hgb 13 1, MCV 89, plt 199k, T  Bilirubin 2 9  3/25/22 T Bili 1 7   Diagnostics:  2/13/22 MRI MRCP abdomen w/wo c: 4x3 2cm pancreatic mass at uncinate process, 7mm R lobe liver hemangioma  2/26/22 CT Abd/pelv:  Stable infiltrating pancreatic head mass encasing the superior mesenteric artery and vein and narrows distal portal vein  3/17/22 CT Chest w/c: Small bilateral pulmonary nodules, nonspecific  Although these could be part and parcel of granulomatous disease, metastases cannot be excluded  Interval follow-up CT chest in about 10 weeks recommended to reevaluate these findings  Discussion of decision making    I personally reviewed the following lab results, the image studies, pathology, other specialty/physicians consult notes and recommendations, and outside medical records from [facility]  I had a lengthy discussion with the patient and shared the work-up findings  We discussed the diagnosis and management plan as below  I spent 34 minutes reviewing the records (labs, clinician notes, outside records, medical history, ordering medicine/tests/procedures, interpreting the imaging/labs previously done) and coordination of care as well as direct time with the patient today, of which greater than 50% of the time was spent in counseling and coordination of care with the patient/family        · Plan/Labs  · F/u RD   · F/u Surg Onc, he is currently deemed to not be a surgical candidate  · Continue indefinite Eliquis for cancer induced PE  · Infusion chairs to be ordered for mFOLFIRINOX (dose reduced Irinotecan 125mg/m2 due to anticipated tolerance as well as the Total Bilirubin being elevated between 1-2; Oxaliplatin dose reduced to 65 mg/m2) treatment to be administered q 2 weeks, about 20% dose reduction for Oxali-Irinotecan for anticipated tolerance for this gentleman with consideration to increase the dose if he tolerates the first few cycles  · Rad-Onc referral once we get closer to the end of 4 months of chemotherapy to plan 2 months of concurrent chemo-RT  · Repeat restaging CT CAP in 3 months ordered for 7/18/22 to follow up treatment response as well as trending b/l pulm nodules seen on 3/17/22 CT Chest      Follow Up: q 2 weeks while on systemic chemotherapy    All questions were answered to the patient's satisfaction during this encounter  The patient knows the contact information for our office and knows to reach out for any relevant concerns related to this encounter  They are to call for any temperature 100 4 or higher, new symptoms including but not restricted to shaking chills, decreased appetite, nausea, vomiting, diarrhea, increased fatigue, shortness of breath or chest pain, confusion, and not feeling the strength to come to the clinic  For all other listed problems and medical diagnosis in their chart - they are managed by PCP and/or other specialists, which the patient acknowledges  Thank you very much for your consultation and making us a part of this patient's care  We are continuing to follow closely with you  Please do not hesitate to reach out to me with any additional questions or concerns  Marlene Carballo MD  Hematology & Medical Oncology Staff Physician             Disclaimer: This document was prepared using [employer] Flu[employer] Direct technology   If a word or phrase is confusing, or does not make sense, this is likely due to recognition error which was not discovered during this clinician's review  If you believe an error has occurred, please contact me through [address] line for tan? cation  ONCOLOGY HISTORY OF PRESENT ILLNESS        Oncology History   Pancreatic adenocarcinoma ([facility] [address] )   3/4/2022 Biopsy    A-B-C  Pancreas, pancreatic head mass   Malignant (PSC Category VI) -  Adenocarcinoma  3/14/2022 Initial Diagnosis    Pancreatic adenocarcinoma ([facility] [address] )     4/19/2022 -  Chemotherapy    fluorouracil (ADRUCIL), 400 mg/m2 = 690 mg, Intravenous, Once, 0 of 8 cycles  irinotecan (CAMPTOSAR) chemo infusion, 180 mg/m2 = 311 mg, Intravenous, Once, 0 of 8 cycles  leucovorin calcium IVPB, 400 mg/m2 = 692 mg, Intravenous, Once, 0 of 8 cycles  oxaliplatin (ELOXATIN) chemo infusion, 85 mg/m2 = 147 05 mg, Intravenous, Once, 0 of 8 cycles  fluorouracil (ADRUCIL) ambulatory infusion Soln, 1,200 mg/m2/day = 4,150 mg, Intravenous, Over 46 hours, 0 of 8 cycles       2/13/22 went to [facility] for 3-6 months of progressing upper abd pain, dark urine, scleral icteri, 14-23 lb weight loss, decrease appetite  3/2/22 presentation for jaundice and liver enzymes elevation  3/4/22 ERCP/EUS with metal stent placed in the distal biliary tract  3x3 cm mass head of pancreas, extending to uncinate process and neck of pancreas, free of SMA and celiac artery    SUBJECTIVE  (INTERVAL HISTORY)      Clotting History PE  12/2021   Bleeding History None   Cancer History Pancreatic   Family Cancer History Brother (esophageal)   H/O Blood/Plt Transfusion None   Tobacco/etoh/drug abuse Former smoker (quit at age 27), etoh use (socially)   Hx COVID19 Infection and Vaccine Status  NO infection                            Vaccinated x3   Cancer Screening history n/a   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)   New medications in the last month: none but has not been on Tramadol for longer than a few months   Pain: aching lower abdominal pain that does not radiate (8/10 at its worse, significantly improved with Tramadol)      I have reviewed the relevant past medical, surgical, social and family history  I have also reviewed allergies and medications for this patient  He is dealing with significant anxiety from his cancer diagnosis  Review of Systems  Baseline weight: 145lbs    He has lost 11 lbs in the past few months  He denies F/C, N/V, SOB, CP, LH, HA, rash, itching, falls, hematuria, melena, hematochezia, generalized weakness, unilateral weakness, diplopia, loss of appetite, or diarrhea  He deals with chronic constipation and is now on Miralax  He is careful with his diet due to his DM  He uses a cullen for balance (since 2019) which he uses for significant walking but not all the time  A 10-point review of system was performed, pertinent positive and negative were detailed as above  Otherwise, the 10-point review of system was negative  Past Medical History:   Diagnosis Date    Ambulates with cane     Anxiety     Depression     Diabetes mellitus (HCC)     GERD (gastroesophageal reflux disease)     History of pulmonary embolus (PE)     Hyperlipidemia     Multiple thyroid nodules     pt states about 40yrs ago    Pancreatic mass    Hx SBO, incarcerated R inguinal hernia, HTN    Past Surgical History:   Procedure Laterality Date    APPENDECTOMY      CATARACT EXTRACTION Right     TUNNELED VENOUS PORT PLACEMENT Right 4/11/2022    Procedure: INSERTION VENOUS PORT (PORT-A-CATH); Surgeon: Black Gerardo MD;  [facility];  Service: Surgical Oncology       Family History   Problem Relation Age of Onset    Alcohol abuse Mother     Diabetes Father        Social History     Socioeconomic History    Marital status:       Spouse name: Not on file    Number of children: Not on file    Years of education: Not on file    Highest education level: Not on file   Occupational History    Not on file   Tobacco Use    Smoking status: Former Smoker    Smokeless tobacco: Never Used    Tobacco comment: quit in 1960s; smoked cigars for several yrs   Vaping Use    Vaping Use: Never used   Substance and Sexual Activity    Alcohol use: Yes     Comment: seldom; socially    Drug use: No    Sexual activity: Not on file   Other Topics Concern    Not on file   Social History Narrative    Not on file     Social Determinants of Health     Financial Resource Strain: Not on file   Food Insecurity: No Food Insecurity    Worried About Running Out of Food in the Last Year: Never true    Mazin of Food in the Last Year: Never true   Transportation Needs: No Transportation Needs    Lack of Transportation (Medical): No    Lack of Transportation (Non-Medical): No   Physical Activity: Not on file   Stress: Not on file   Social Connections: Not on file   Intimate Partner Violence: Not on file   Housing Stability: Unknown    Unable to Pay for Housing in the Last Year: No    Number of Places Lived in the Last Year: Not on file    Unstable Housing in the Last Year: No       Allergies   Allergen Reactions    Aleve [Naproxen] Swelling       Current Outpatient Medications   Medication Sig Dispense Refill    Alcohol Swabs 70 % PADS May substitute brand based on insurance coverage  Check glucose BID  100 each 0    ALPRAZolam (XANAX) 0 5 mg tablet Take 0 5 tablets (0 25 mg total) by mouth 3 (three) times a day as needed for anxiety 90 tablet 0    apixaban (Eliquis) 5 mg Take 5 mg by mouth 2 (two) times a day      Blood Glucose Monitoring Suppl (OneTouch Verio Reflect) w/Device KIT May substitute brand based on insurance coverage  Check glucose BID  1 kit 0    glucose blood (OneTouch Verio) test strip May substitute brand based on insurance coverage   Check glucose BID  100 each 0    metFORMIN (GLUCOPHAGE) 1000 MG tablet Take 1,000 mg by mouth 2 (two) times a day with meals        omeprazole (PriLOSEC) 40 MG capsule Take 40 mg by mouth every evening  OneTouch Delica Lancets 83K MISC May substitute brand based on insurance coverage  Check glucose BID  100 each 0    polyethylene glycol (MIRALAX) 17 g packet Take 17 g by mouth daily      traMADol (ULTRAM) 50 mg tablet Take 1 tablet (50 mg total) by mouth 2 (two) times a day as needed for moderate pain 60 tablet 0    ondansetron (Zofran ODT) 8 mg disintegrating tablet Take 1 tablet (8 mg total) by mouth every 8 (eight) hours as needed for nausea or vomiting (Patient not taking: Reported on 4/12/2022 ) 20 tablet 0     No current facility-administered medications for this visit  (Not in a hospital admission)      Objective:     24 Hour Vitals Assessment:     Vitals:    04/12/22 1252   BP: 106/68   Pulse: 97   Resp: 14   Temp: 98 4 °F (36 9 °C)   SpO2: 99%       PHYSICIAN EXAM:    General: Appearance: alert, cooperative, moderate distress from anxiety  HEENT: Normocephalic, atraumatic  No scleral icterus  conjunctivae clear  EOMI  Chest: No tenderness to palpation  No open wound noted  Lungs: Clear to auscultation bilaterally, Respirations unlabored  Cardiac: Regular rate and rhythm, +S1and S2  Abdomen: Soft, non-tender, non-distended  Bowel sounds are normal    Extremities:  No edema, cyanosis, clubbing  Skin: Skin color, turgor are normal  No rashes  Lymphatics: no palpable supra-cervical, axillary, or inguinal adenopathy  Neurologic: Awake, Alert, and oriented, no gross focal deficits noted b/l  Port: c/d/i       DATA REVIEW:    Pathology Result:    Final Diagnosis   Date Value Ref Range Status   03/04/2022   Final    A-B-C  Pancreas, pancreatic head mass (ThinPrep, smear and cell block preparations)  Malignant (PSC Category VI) - See note  Adenocarcinoma  Satisfactory for evaluation  Note: The above diagnostic category is part of the six-tiered system proposed by The Papanicolaou Society of Cytopathology for the reporting of pancreaticobiliary specimens   This proposed scheme provides terminology that correlates the cytologic diagnostic nomenclature with the 2010 WHO classification of pancreatic tumors and standardizes the categorization of the various diseases of the pancreas, some of which are difficult to diagnose specifically by cytology  *The Papanicolaou Society of Cytopathology System for Reporting Pancreaticobiliary Cytology: Definitions, Criteria and Explanatory Notes  Lopez Kimball; Casey, 0132  Image Results:   Image result are reviewed and documented in Hematology/Oncology history    XR chest portable  Narrative: CHEST     INDICATION:   port placement  COMPARISON:  Fluoroscopy study from today for port placement, chest CT from 3/17/2022  EXAM PERFORMED/VIEWS:  XR CHEST PORTABLE    FINDINGS:  Right port in SVC  Cardiomediastinal silhouette appears unremarkable  Deviation of the trachea to the right by a substernal goiter  Lungs clear  No effusion or pneumothorax  Skinfolds over the right base  Osseous structures appear within normal limits for patient age  Impression: Right port in SVC with no pneumothorax  Workstation performed: [id number]      LABS:  Lab data are reviewed and documented in HemOnc history  Lab Results   Component Value Date    HGB 13 2 03/25/2022    HCT 39 6 03/25/2022    MCV 88 2 03/25/2022     03/25/2022    WBC 4 9 03/25/2022    NRBC 0 03/03/2022     Lab Results   Component Value Date    K 4 2 03/25/2022    CL 93 (L) 03/25/2022    CO2 31 03/25/2022    BUN 19 03/25/2022    CREATININE 0 62 (L) 03/25/2022    CALCIUM 9 7 03/25/2022    CORRECTEDCA 10 3 (H) 03/05/2022    AST 24 03/25/2022    ALT 42 03/25/2022    ALKPHOS 142 03/25/2022    EGFR 97 03/05/2022       No results found for: IRON, TIBC, FERRITIN    No results found for: [id number]    No results for input(s): WBC, CREAT in the last 72 hours      Invalid input(s):  PLT    By:  Ravinder Barcenas MD, 4/12/2022, 1:20 PM

## 2022-04-06 ENCOUNTER — TELEPHONE (OUTPATIENT)
Dept: HEMATOLOGY ONCOLOGY | Facility: CLINIC | Age: 82
End: 2022-04-06

## 2022-04-06 ENCOUNTER — TELEPHONE (OUTPATIENT)
Dept: SURGICAL ONCOLOGY | Facility: CLINIC | Age: 82
End: 2022-04-06

## 2022-04-06 NOTE — TELEPHONE ENCOUNTER
CALL RETURN FORM   Reason for patient call? Patient is calling about surgery scheduled w Dr Kei Devries   Patient's primary oncologist? Dr Kei Devries   Name of person the patient was calling for? Dr Kei Devries   Any additional information to add, if applicable? Please call [phone]   Informed patient that the message will be forwarded to the team and someone will get back to them as soon as possible    Did you relay this information to the patient?  Yes

## 2022-04-06 NOTE — TELEPHONE ENCOUNTER
Returned call to pt  Reviewed information I had previously left him on his voicemail  Instructed pt to hold eliquis for 2 days prior to port placement on Monday 4/11  Pt may resume eliquis after surgery  Pt questioned how long he should hold his metformin  Instructed per the pre-admission note, he is to hold his metformin only the day of surgery  Encouraged pt to write down these instructions  Pt verbalized understanding

## 2022-04-06 NOTE — TELEPHONE ENCOUNTER
Tc to pt and left message  Instructed to stop eliquis 48hrs prior to his port placement on Monday 4/11  He may restart eliquis after surgery  Instructed to call with any questions or concerns

## 2022-04-06 NOTE — PRE-PROCEDURE INSTRUCTIONS
Pre-Surgery Instructions:   Medication Instructions    ALPRAZolam (XANAX) 0 5 mg tablet Ok to take DOP w/ a sip of water    apixaban (Eliquis) 5 mg Hold x48hrs    metFORMIN (GLUCOPHAGE) 1000 MG tablet Hold DOP    omeprazole (PriLOSEC) 40 MG capsule Ok to take DOP w/ a sip of water    ondansetron (Zofran ODT) 8 mg disintegrating tablet Ok to take DOP w/ a sip of water    polyethylene glycol (MIRALAX) 17 g packet Ok to take DOP w/ a sip of water    traMADol (ULTRAM) 50 mg tablet Ok to take DOP w/ a sip of water     Pt verbalizes understanding of the following:    - Bathing instructions, has chg, neck down, no genitals  - No lotions, powders, sprays, deodorant, jewelry    - NPO after MN  - Avoid OTC non-directed Vit/ Suppl/ Herbals 7 days prior to surgery to ensure no drug interactions with perioperative surgical/ anesthetic meds  - Avoid NSAIDs 3 days prior  - Avoid ASA containing products 5 days prior    - Bring list of meds with last dose noted  - [employer] cards & photo id

## 2022-04-08 ENCOUNTER — PATIENT OUTREACH (OUTPATIENT)
Dept: HEMATOLOGY ONCOLOGY | Facility: CLINIC | Age: 82
End: 2022-04-08

## 2022-04-08 DIAGNOSIS — E11.9 TYPE 2 DIABETES MELLITUS WITHOUT COMPLICATION, WITH LONG-TERM CURRENT USE OF INSULIN (HCC): ICD-10-CM

## 2022-04-08 DIAGNOSIS — Z79.4 TYPE 2 DIABETES MELLITUS WITHOUT COMPLICATION, WITH LONG-TERM CURRENT USE OF INSULIN (HCC): ICD-10-CM

## 2022-04-08 DIAGNOSIS — C25.9 PANCREATIC ADENOCARCINOMA (HCC): Primary | ICD-10-CM

## 2022-04-08 DIAGNOSIS — F41.8 ANXIETY ABOUT HEALTH: ICD-10-CM

## 2022-04-08 NOTE — PROGRESS NOTES
I have now sent a [facility] referral for him  If he needs other care besides that, I would have to defer to Social Work to help coordinate

## 2022-04-08 NOTE — PROGRESS NOTES
Pt called me today asking about assistance for a ride for his port placement sched on Monday  I told him that our [facility] transport and uber wont bring him back home due to the sedation he will be getting for the procedure  He can take one there but not one back home  I asked about his sister since last him and I spoke he said that she could pay someone to take him  He told me his daughter could pay for him to take an UBER there but he needed help getting a ride back home  I reached out to ANY Toams, to assist with coming up w transportation for him  She said she will look into finding a volunteer for [employer]  I let him know that I will reach out to him if we are able to get someone  I also told him to see if he can find a neighbor who could help him, he was agreeable stating that he would work on doing this  Pt's daughter called me to get details on the ride situation and to see how she could help  I explained to her that the pt was okay to pay for an UBER going to the hospital but that he would need someone to bring him back home since he would be undergoing sedation for his procedure on Monday  She told me she was trying to reach out to people to see if she could get someone to give him a ride, she even reached out to Montserrat Almonte in [facility]  As I was on the phone with her Lisa Dinero told me that she was able to get the transportation services to provide a ride for him  I let the daughter know that he was actually all set up for this  She asked if she should pay, I told her that we would get the cost covered  She mentioned it being hard for him with his mental health issues (she stated he was dx w schizophrenia and she was not raised by him)  However, she mentioned he is brilliant when it comes to math and he has a degree  She told me that he is between two churches right now and she asked me if she should contact someone from the churches for her dad   I told her that I would def recommend that she does so that at least someone knows about [facility] and could keep out for him in case anything was to happen  She asked about how he could get a home health aide to stay with him especially while he's undergoing tx  She mentioned her aunt and she may not be the most reliable to reach out to on behalf of Pj Greenwood since she travels a lot and lives far as well  She brought up how Pj Greenwood has some cousins he grew up with that do live in [place] but they may still be far out from him  She provided me w her phone number and said I could call if I needed anything from her, she would do her best since she works 9-6 Mon-Fri and she's busy throughout the day  She asked that I call [facility]wood to let him know about the transportation available to him in addition to mentioning speaking to someone from his [employer] to look out for him  I told her I would do that  Rahat Hannah was very nice to speak with  Called Sebastien, no answer, left  asking that he call me back before the end of the day  If I do not hear back I will call him again  Pj Greenwood called back, I told him we have a ride set for him there and back on the day of his procedure  He told me that he got a call from someone telling him to arrive around 1130AM  His procedure is sched for 115PM  I told him I needed to contact transport to let them know since the time I gave was between [phone] for him to be there  I emailed [address] dispatch so someone could look into this  I will be calling them first thing Monday morning as well to make sure he is there on time  I also asked if the pt goes to a [employer]  He told me did, not all the time, but he goes  I suggested that he reach out to someone in the [employer] or the  to call and check in on him throughout the week since he does live alone just to see that he is doing okay  He was agreeable to this and said that he would go to the [employer] this Sunday

## 2022-04-11 ENCOUNTER — ANESTHESIA (OUTPATIENT)
Dept: PERIOP | Facility: HOSPITAL | Age: 82
End: 2022-04-11
Payer: COMMERCIAL

## 2022-04-11 ENCOUNTER — PATIENT OUTREACH (OUTPATIENT)
Dept: HEMATOLOGY ONCOLOGY | Facility: CLINIC | Age: 82
End: 2022-04-11

## 2022-04-11 ENCOUNTER — APPOINTMENT (OUTPATIENT)
Dept: RADIOLOGY | Facility: HOSPITAL | Age: 82
End: 2022-04-11
Payer: COMMERCIAL

## 2022-04-11 ENCOUNTER — ANESTHESIA EVENT (OUTPATIENT)
Dept: PERIOP | Facility: HOSPITAL | Age: 82
End: 2022-04-11
Payer: COMMERCIAL

## 2022-04-11 ENCOUNTER — HOSPITAL ENCOUNTER (OUTPATIENT)
Dept: RADIOLOGY | Facility: HOSPITAL | Age: 82
Setting detail: OUTPATIENT SURGERY
Discharge: HOME | End: 2022-04-11
Payer: COMMERCIAL

## 2022-04-11 ENCOUNTER — HOSPITAL ENCOUNTER (OUTPATIENT)
Facility: HOSPITAL | Age: 82
Setting detail: OUTPATIENT SURGERY
Discharge: HOME | End: 2022-04-11
Attending: STUDENT IN AN ORGANIZED HEALTH CARE EDUCATION/TRAINING PROGRAM | Admitting: STUDENT IN AN ORGANIZED HEALTH CARE EDUCATION/TRAINING PROGRAM
Payer: COMMERCIAL

## 2022-04-11 VITALS
WEIGHT: 138 LBS | SYSTOLIC BLOOD PRESSURE: 139 MMHG | TEMPERATURE: 97.8 F | BODY MASS INDEX: 21.66 KG/M2 | HEART RATE: 86 BPM | RESPIRATION RATE: 16 BRPM | HEIGHT: 67 IN | DIASTOLIC BLOOD PRESSURE: 74 MMHG | OXYGEN SATURATION: 100 %

## 2022-04-11 DIAGNOSIS — C25.9 PANCREATIC ADENOCARCINOMA (HCC): ICD-10-CM

## 2022-04-11 LAB
GLUCOSE SERPL-MCNC: 133 MG/DL (ref 65–140)
GLUCOSE SERPL-MCNC: 135 MG/DL (ref 65–140)

## 2022-04-11 PROCEDURE — 77001 FLUOROGUIDE FOR VEIN DEVICE: CPT | Performed by: STUDENT IN AN ORGANIZED HEALTH CARE EDUCATION/TRAINING PROGRAM

## 2022-04-11 PROCEDURE — 82948 REAGENT STRIP/BLOOD GLUCOSE: CPT

## 2022-04-11 PROCEDURE — 36561 INSERT TUNNELED CV CATH: CPT | Performed by: STUDENT IN AN ORGANIZED HEALTH CARE EDUCATION/TRAINING PROGRAM

## 2022-04-11 PROCEDURE — 77001 FLUOROGUIDE FOR VEIN DEVICE: CPT

## 2022-04-11 PROCEDURE — C1788 PORT, INDWELLING, IMP: HCPCS | Performed by: STUDENT IN AN ORGANIZED HEALTH CARE EDUCATION/TRAINING PROGRAM

## 2022-04-11 DEVICE — 8F PLASTIC DIGNITY® MID-SIZED CT PORT W/SILICONE FILLED SUTURE HOLES W/ATTACHABLE CHRONOFLEX® POLYURETHANE CATHETER
Type: IMPLANTABLE DEVICE | Site: CHEST | Status: FUNCTIONAL
Brand: DIGNITY®

## 2022-04-11 RX ORDER — ONDANSETRON 2 MG/ML
4 INJECTION INTRAMUSCULAR; INTRAVENOUS ONCE AS NEEDED
Status: DISCONTINUED | OUTPATIENT
Start: 2022-04-11 | End: 2022-04-11 | Stop reason: HOSPADM

## 2022-04-11 RX ORDER — FENTANYL CITRATE/PF 50 MCG/ML
50 SYRINGE (ML) INJECTION
Status: DISCONTINUED | OUTPATIENT
Start: 2022-04-11 | End: 2022-04-11 | Stop reason: HOSPADM

## 2022-04-11 RX ORDER — LIDOCAINE HYDROCHLORIDE 10 MG/ML
0.5 INJECTION, SOLUTION EPIDURAL; INFILTRATION; INTRACAUDAL; PERINEURAL ONCE AS NEEDED
Status: DISCONTINUED | OUTPATIENT
Start: 2022-04-11 | End: 2022-04-11 | Stop reason: HOSPADM

## 2022-04-11 RX ORDER — SODIUM CHLORIDE, SODIUM LACTATE, POTASSIUM CHLORIDE, CALCIUM CHLORIDE 600; 310; 30; 20 MG/100ML; MG/100ML; MG/100ML; MG/100ML
125 INJECTION, SOLUTION INTRAVENOUS CONTINUOUS
Status: DISCONTINUED | OUTPATIENT
Start: 2022-04-11 | End: 2022-04-11

## 2022-04-11 RX ORDER — MIDAZOLAM HYDROCHLORIDE 2 MG/2ML
INJECTION, SOLUTION INTRAMUSCULAR; INTRAVENOUS AS NEEDED
Status: DISCONTINUED | OUTPATIENT
Start: 2022-04-11 | End: 2022-04-11

## 2022-04-11 RX ORDER — ONDANSETRON 2 MG/ML
INJECTION INTRAMUSCULAR; INTRAVENOUS AS NEEDED
Status: DISCONTINUED | OUTPATIENT
Start: 2022-04-11 | End: 2022-04-11

## 2022-04-11 RX ORDER — LIDOCAINE HYDROCHLORIDE 10 MG/ML
INJECTION, SOLUTION EPIDURAL; INFILTRATION; INTRACAUDAL; PERINEURAL AS NEEDED
Status: DISCONTINUED | OUTPATIENT
Start: 2022-04-11 | End: 2022-04-11

## 2022-04-11 RX ORDER — CEFAZOLIN SODIUM 1 G/50ML
1000 SOLUTION INTRAVENOUS ONCE
Status: COMPLETED | OUTPATIENT
Start: 2022-04-11 | End: 2022-04-11

## 2022-04-11 RX ORDER — PROPOFOL 10 MG/ML
INJECTION, EMULSION INTRAVENOUS CONTINUOUS PRN
Status: DISCONTINUED | OUTPATIENT
Start: 2022-04-11 | End: 2022-04-11

## 2022-04-11 RX ORDER — SODIUM CHLORIDE, SODIUM LACTATE, POTASSIUM CHLORIDE, CALCIUM CHLORIDE 600; 310; 30; 20 MG/100ML; MG/100ML; MG/100ML; MG/100ML
125 INJECTION, SOLUTION INTRAVENOUS CONTINUOUS
Status: DISCONTINUED | OUTPATIENT
Start: 2022-04-11 | End: 2022-04-11 | Stop reason: HOSPADM

## 2022-04-11 RX ORDER — FENTANYL CITRATE 50 UG/ML
INJECTION, SOLUTION INTRAMUSCULAR; INTRAVENOUS AS NEEDED
Status: DISCONTINUED | OUTPATIENT
Start: 2022-04-11 | End: 2022-04-11

## 2022-04-11 RX ADMIN — PROPOFOL 80 MCG/KG/MIN: 10 INJECTION, EMULSION INTRAVENOUS at 13:17

## 2022-04-11 RX ADMIN — MIDAZOLAM 2 MG: 1 INJECTION INTRAMUSCULAR; INTRAVENOUS at 13:08

## 2022-04-11 RX ADMIN — FENTANYL CITRATE 25 MCG: 50 INJECTION INTRAMUSCULAR; INTRAVENOUS at 13:31

## 2022-04-11 RX ADMIN — CEFAZOLIN SODIUM 1000 MG: 1 SOLUTION INTRAVENOUS at 13:08

## 2022-04-11 RX ADMIN — FENTANYL CITRATE 25 MCG: 50 INJECTION INTRAMUSCULAR; INTRAVENOUS at 14:05

## 2022-04-11 RX ADMIN — ONDANSETRON 4 MG: 2 INJECTION INTRAMUSCULAR; INTRAVENOUS at 13:56

## 2022-04-11 RX ADMIN — SODIUM CHLORIDE, SODIUM LACTATE, POTASSIUM CHLORIDE, AND CALCIUM CHLORIDE 125 ML/HR: .6; .31; .03; .02 INJECTION, SOLUTION INTRAVENOUS at 12:39

## 2022-04-11 RX ADMIN — LIDOCAINE HYDROCHLORIDE 50 MG: 10 INJECTION, SOLUTION EPIDURAL; INFILTRATION; INTRACAUDAL; PERINEURAL at 13:17

## 2022-04-11 RX ADMIN — SODIUM CHLORIDE, SODIUM LACTATE, POTASSIUM CHLORIDE, AND CALCIUM CHLORIDE 125 ML/HR: .6; .31; .03; .02 INJECTION, SOLUTION INTRAVENOUS at 12:38

## 2022-04-11 RX ADMIN — FENTANYL CITRATE 25 MCG: 50 INJECTION INTRAMUSCULAR; INTRAVENOUS at 13:19

## 2022-04-11 RX ADMIN — FENTANYL CITRATE 25 MCG: 50 INJECTION INTRAMUSCULAR; INTRAVENOUS at 13:54

## 2022-04-11 NOTE — PROGRESS NOTES
Phone outreach to Denise Cao this morning to let him know that I was reached out to by Carlin from [address] transport saying that they have him down for 12 PM arrival time at the hospital  I told him that he should be ready about 90 minutes before then so he should make sure he is available by 1030AM  I told him that someone was going to call him before picking him up and he would then be able to tell when he should go downstairs to wait for the ride  He understood, was agreeable, and appreciative of the information given

## 2022-04-11 NOTE — OP NOTE
OPERATIVE REPORT  PATIENT NAME: Jessica Bacon    :  1940  MRN: [id number]  Pt Location: [facility] OR ROOM 05    SURGERY DATE: 2022    Surgeon(s) and Role:     * Timoteo Watson MD - Primary    Preop Diagnosis:  Pancreatic adenocarcinoma ([address] ) [C25 9]    Post-Op Diagnosis Codes:     * Pancreatic adenocarcinoma ([address] ) [C25 9]    Procedure(s) (LRB):  INSERTION VENOUS PORT (PORT-A-CATH) (Right)    Specimen(s):  * No specimens in log *    Estimated Blood Loss:   Minimal    Drains:  * No LDAs found *    Anesthesia Type:   General    Operative Indications:  Pancreatic adenocarcinoma ([facility] ) [C25 9]  Need for infusional therapy    Operative Findings:  Fluoroscopic guided placement of a right subclavian central Port-A-Cath    Complications:   None    Procedure and Technique:  The patient was brought to the operating suite and identified  Sedation was achieved  The right arm was padded and tucked  A shoulder roll was placed  The right neck and chest were prepped and draped in the usual sterile fashion  The port device was assembled and flushed  Timeout was performed  Local anesthesia was injected over the planned needle injection site as well as the planned 3 cm pocket over the right chest   The patient was placed in reverse Trendelenburg  Starting at the deltopectoral groove, the needle was advanced toward the subclavian vein x 2 with successful access  The wire was passed through the needle and into the vein, confirming placement of the wire into the vein with fluoroscopy  An incision was made sharply over the pocket site and carried down with electrocautery to the pectoral fascia  A 2 cm region of subcutaneous fat was excised to facilitate port access  An inferior pocket was then created just deep to the dermis to accommodate the port  The pocket was inspected for hemostastis   The subcutaneous tunnel from the port site to the injection site was then infiltrated with local anesthetic, and the pre loaded port with the catheter attached was brought from the port site to the wire insertion site with the use of this subcutaneous tunneler  Using fluoroscopic guidance, the catheter was cut to terminate at the cavoatrial junction  The dilator and sheath were then introduced over the wire and into the vein  The wire and dilator were removed  The sheath was then divided and the port introduced through the sheath into the vein until the sheath was completely removed  Fluoroscopy confirmed proper placement of the catheter into the vein  The port was then accesses with successful draw back of blood and easy forward flushing  The port was then fixed with 3-0 Prolene to the pectoral fascia  The port site was inspected for hemostasis and found to be hemostatic  The dermis was then closed with running 3-0 Vicryl and the skin of both the pocket and the needle insertion site were closed with 4-0 Monocryl followed by skin glue        I was present for the entire procedure    Patient Disposition:  PACU       SIGNATURE: Tiera Whalen MD  DATE: April 11, 2022  TIME: 2:21 PM

## 2022-04-11 NOTE — ANESTHESIA POSTPROCEDURE EVALUATION
Post-Op Assessment Note    CV Status:  Stable  Pain Score: 0    Pain management: adequate     Mental Status:  Alert and awake   Hydration Status:  Euvolemic and stable   PONV Controlled:  Controlled   Airway Patency:  Patent      Post Op Vitals Reviewed: Yes      Staff: CRNA, Anesthesiologist   Comments: Report given to recovering RN, VSS, Pt states he is comfortable        No complications documented      BP (P) 142/82 (04/11/22 1414)    Temp (P) 97 9 °F (36 6 °C) (04/11/22 1414)    Pulse (P) 70 (04/11/22 1414)   Resp (P) 18 (04/11/22 1414)    SpO2   100

## 2022-04-11 NOTE — DISCHARGE INSTRUCTIONS
Implanted Venous Access Port   WHAT YOU NEED TO KNOW:   An implanted venous access port is a device used to give treatments and take blood  It may also be called a central venous access device (CVAD)  The port is a small container that is placed under your skin, usually in your upper chest  A port can also be placed in your arm or abdomen  The port is attached to a catheter that enters a large vein  Your healthcare provider may show you or a family member how to give medicines or liquids through your port  A healthcare provider may also visit you at home to give you medicines or treatments  DISCHARGE INSTRUCTIONS:   Call your local emergency number (911 in the [facility] [facility]) if:   · You have pain in your arm, neck, shoulder, or chest     · You have trouble breathing that is getting worse over time  Call your healthcare provider if:   · Blood soaks through your bandage  · You hear a bubbling noise when your port is flushed  · The skin over or around your port breaks open  · Your heart is jumping or fluttering  · You have a headache, blurred vision, and feel confused  · You have a fever  · Your port site is red, swollen, or draining pus  · Your port site turns cold, changes color, or you cannot feel it  · The veins in your neck or chest bulge  · You have questions or concerns about your condition or care  Prevent an infection:   · Wash your hands often  Use soap and water  Clean your hands before and after you care for your port  Remind everyone who cares for your port to wash their hands  · Clean the skin around your port every day  Ask your healthcare provider what to use to clean your skin  · Check your skin for infection every day  Look for redness, swelling, or fluid oozing from the port site  Follow up with your healthcare provider as directed: You may need to return to have your stitches removed in 1 week  Dissolvable stitches will not need to be removed   Your body will absorb the stitches, or they will fall out on their own  Medical glue will peel off on its own in 5 to 10 days  Write down your questions so you remember to ask them during your visits  Implanted venous access information card: Your healthcare provider will give you a card with information about your port  Keep the card in a safe place that is easy to find  Activity:  You may return to your daily activities when the area heals  You will be able to bathe, shower, or swim after the area heals  © Copyright [employer] 2022 Information is for End User's use only and may not be sold, redistributed or otherwise used for commercial purposes  All illustrations and images included in CareNotes® are the copyrighted property of [employer]  or [facility] [employer]   The above information is an  only  It is not intended as medical advice for individual conditions or treatments  Talk to your doctor, nurse or pharmacist before following any medical regimen to see if it is safe and effective for you

## 2022-04-11 NOTE — NURSING NOTE
Doctor Jaqueline Arenas and doctor khurram aware that patient is taking a lyft home alone and that patient lives alone - both are okay with this  Patient stable, walking without issues

## 2022-04-11 NOTE — PROGRESS NOTES
MSW made outreach to the pt this day to offer support and to assess his anxiety as he is scheduled to get his port this day  Pt sounded overwhelmed and had many questions about his transport, the time of his procedure, how he would feel post procedure, when he could drive again and if he could be left alone  MSW answered all of the pt's question and assured him that he would be ok after his procedure  Pt was requesting a nurse come to the home for the next several days and MSW assured the pt that would not be necessary but that this service would be arranged if he was to need this  The pt asked multiple times if he would be ok following the procedure and why would he not need a nurse  The level of the pt's anxiety was concerning given the procedure he was having this day  It took a great deal of time to help the pt feel less anxious and to reassure him that he will have whatever services he needs as he feels he needs them  MSW reminded the pt that his transport was coming at 6 and that he would have a LYFT home  At the end of the call, the pt appeared to understand this  Significant support offered  MSW will continue to follow pt

## 2022-04-11 NOTE — ANESTHESIA PREPROCEDURE EVALUATION
Procedure:  INSERTION VENOUS PORT (PORT-A-CATH) (Bilateral Chest)    Relevant Problems   ENDO   (+) Type 2 diabetes mellitus without complication, with long-term current use of insulin (HCC)      GI/HEPATIC   (+) GERD (gastroesophageal reflux disease)   (+) Pancreatic adenocarcinoma (HCC)      NEURO/PSYCH   (+) Anxiety about health   (+) History of pulmonary embolism        Physical Exam    Airway    Mallampati score: II  TM Distance: >3 FB  Neck ROM: full     Dental   No notable dental hx     Cardiovascular      Pulmonary      Other Findings        Anesthesia Plan  ASA Score- 3     Anesthesia Type- IV sedation with anesthesia with ASA Monitors  Additional Monitors:   Airway Plan:           Plan Factors-Exercise tolerance (METS): >4 METS  Chart reviewed  Existing labs reviewed  Patient summary reviewed  Patient is not a current smoker  Obstructive sleep apnea risk education given perioperatively  Induction- intravenous  Postoperative Plan-     Informed Consent- Anesthetic plan and risks discussed with patient  I personally reviewed this patient with the CRNA  Discussed and agreed on the Anesthesia Plan with the CRNA  Slim Bolivar

## 2022-04-11 NOTE — INTERVAL H&P NOTE
H&P reviewed  After examining the patient I find no changes in the patients condition since the H&P had been written      Vitals:    04/11/22 1229   BP: 124/78   Pulse: 78   Resp: 18   Temp: 98 2 °F (36 8 °C)   SpO2: 99%

## 2022-04-12 ENCOUNTER — TELEPHONE (OUTPATIENT)
Dept: GENETICS | Facility: CLINIC | Age: 82
End: 2022-04-12

## 2022-04-12 ENCOUNTER — OFFICE VISIT (OUTPATIENT)
Dept: HEMATOLOGY ONCOLOGY | Facility: HOSPITAL | Age: 82
End: 2022-04-12
Payer: COMMERCIAL

## 2022-04-12 VITALS
RESPIRATION RATE: 14 BRPM | HEART RATE: 97 BPM | WEIGHT: 134 LBS | HEIGHT: 67 IN | BODY MASS INDEX: 21.03 KG/M2 | DIASTOLIC BLOOD PRESSURE: 68 MMHG | SYSTOLIC BLOOD PRESSURE: 106 MMHG | OXYGEN SATURATION: 99 % | TEMPERATURE: 98.4 F

## 2022-04-12 DIAGNOSIS — E87.6 HYPOKALEMIA: ICD-10-CM

## 2022-04-12 DIAGNOSIS — T45.1X5A CHEMOTHERAPY INDUCED NEUTROPENIA (HCC): ICD-10-CM

## 2022-04-12 DIAGNOSIS — D70.1 CHEMOTHERAPY INDUCED NEUTROPENIA (HCC): ICD-10-CM

## 2022-04-12 DIAGNOSIS — C25.9 PANCREATIC ADENOCARCINOMA (HCC): Primary | ICD-10-CM

## 2022-04-12 PROCEDURE — 99214 OFFICE O/P EST MOD 30 MIN: CPT | Performed by: INTERNAL MEDICINE

## 2022-04-13 ENCOUNTER — TELEPHONE (OUTPATIENT)
Dept: GENETICS | Facility: CLINIC | Age: 82
End: 2022-04-13

## 2022-04-13 DIAGNOSIS — C25.9 PANCREATIC ADENOCARCINOMA (HCC): Primary | ICD-10-CM

## 2022-04-13 NOTE — TELEPHONE ENCOUNTER
Post-Test Genetic Counseling Consult Note  Today I spoke with Luis Lorenz over the phone to review the results of his genetic test for hereditary cancer  He met previously with Ez Jauregui on 3/24 for pre-test counseling  A copy of this consult note and genetic test result will be shared with the patient  SUMMARY:    Test(s): CancerNext + RNA (36 genes): APC, JACQUELINE, AXIN2 BARD1, BRCA1, BRCA2, BRIP1, BMPR1A, CDH1, CDK4, CDKN2A, CHEK2, DICER1, EPCAM, GREM1, HOXB13, MLH1, MSH2, MSH3, MSH6, MUTYH, NBN, NF1, NTHL1, PALB2, PMS2, POLD1, POLE, PTEN, RAD51C, RAD51D, RECQL SMAD4, SMARCA4, STK11, TP53    Result: Negative - No Clinically Significant Variants Detected      Assessment:   A negative result significantly reduces the likelihood that Luis Lorenz has a hereditary cancer syndrome  However, this testing is unable to completely rule out the presence of hereditary cancer  It remains possible that:  - There is a variant in an area of a gene which was not tested or there is a variant not detectable due to technical limitations of this test      - There is a variant in another gene that was not included in this test or in a gene not known to be linked to cancer or tumors  - A family member has a genetic variant that the patient did not inherit  - The cancer in the family is sporadic and is related to non-hereditary factors  Risks and Testing for Family Members:    Despite a negative result, Sebastien's first-degree relatives may be at increased risk for the cancers based on the family history  We recommend they discuss screening and management recommendations with their healthcare providers  At this time we do not recommend testing for Luis Lorenz 's daughter based on his negative test result  Luis Lorenz 's daughter still needs to consider the history of cancer on the other side of her family when determining her risks       If Luis Lorenz has any affected family members with a cancer diagnosis, especially at a young age, they may still consider genetic testing  Relatives who wish to pursue genetic testing can reach out to the [phone]) to schedule an appointment or visit www [facility] to identify a local genetic counselor  Plan:   There are no additional recommendations based on Sebastien's negative result  he should continue cancer screening and medical management as clinically indicated and as determined appropriate by his healthcare providers  Negative Result: Ailyn Bermudezclaude was strongly encouraged to contact us regarding any changes in his personal or family history of cancer as these changes could alter our recommendation regarding genetic testing and/or cancer screening

## 2022-04-14 ENCOUNTER — TELEPHONE (OUTPATIENT)
Dept: PALLIATIVE MEDICINE | Facility: CLINIC | Age: 82
End: 2022-04-14

## 2022-04-14 ENCOUNTER — TELEPHONE (OUTPATIENT)
Dept: GENETICS | Facility: CLINIC | Age: 82
End: 2022-04-14

## 2022-04-14 DIAGNOSIS — T45.1X5A CHEMOTHERAPY INDUCED NAUSEA AND VOMITING: ICD-10-CM

## 2022-04-14 DIAGNOSIS — R11.2 CHEMOTHERAPY INDUCED NAUSEA AND VOMITING: ICD-10-CM

## 2022-04-14 LAB
ALBUMIN SERPL-MCNC: 3.8 G/DL (ref 3.6–5.1)
ALBUMIN/GLOB SERPL: 1.6 (CALC) (ref 1–2.5)
ALP SERPL-CCNC: 152 U/L (ref 35–144)
ALT SERPL-CCNC: 35 U/L (ref 9–46)
AST SERPL-CCNC: 23 U/L (ref 10–35)
BASOPHILS # BLD AUTO: 50 CELLS/UL (ref 0–200)
BASOPHILS NFR BLD AUTO: 1.2 %
BILIRUB SERPL-MCNC: 1.2 MG/DL (ref 0.2–1.2)
BUN SERPL-MCNC: 29 MG/DL (ref 7–25)
BUN/CREAT SERPL: 44 (CALC) (ref 6–22)
CALCIUM SERPL-MCNC: 9.3 MG/DL (ref 8.6–10.3)
CHLORIDE SERPL-SCNC: 101 MMOL/L (ref 98–110)
CO2 SERPL-SCNC: 27 MMOL/L (ref 20–32)
CREAT SERPL-MCNC: 0.66 MG/DL (ref 0.7–1.18)
EOSINOPHIL # BLD AUTO: 160 CELLS/UL (ref 15–500)
EOSINOPHIL NFR BLD AUTO: 3.8 %
ERYTHROCYTE [DISTWIDTH] IN BLOOD BY AUTOMATED COUNT: 12.9 % (ref 11–15)
GLOBULIN SER CALC-MCNC: 2.4 G/DL (CALC) (ref 1.9–3.7)
GLUCOSE SERPL-MCNC: 227 MG/DL (ref 65–139)
HCT VFR BLD AUTO: 34.6 % (ref 38.5–50)
HGB BLD-MCNC: 11.3 G/DL (ref 13.2–17.1)
LYMPHOCYTES # BLD AUTO: 924 CELLS/UL (ref 850–3900)
LYMPHOCYTES NFR BLD AUTO: 22 %
MCH RBC QN AUTO: 28.6 PG (ref 27–33)
MCHC RBC AUTO-ENTMCNC: 32.7 G/DL (ref 32–36)
MCV RBC AUTO: 87.6 FL (ref 80–100)
MONOCYTES # BLD AUTO: 382 CELLS/UL (ref 200–950)
MONOCYTES NFR BLD AUTO: 9.1 %
NEUTROPHILS # BLD AUTO: 2684 CELLS/UL (ref 1500–7800)
NEUTROPHILS NFR BLD AUTO: 63.9 %
PLATELET # BLD AUTO: 268 THOUSAND/UL (ref 140–400)
PMV BLD REES-ECKER: 10.4 FL (ref 7.5–12.5)
POTASSIUM SERPL-SCNC: 4 MMOL/L (ref 3.5–5.3)
PROT SERPL-MCNC: 6.2 G/DL (ref 6.1–8.1)
RBC # BLD AUTO: 3.95 MILLION/UL (ref 4.2–5.8)
SL AMB EGFR AFRICAN AMERICAN: 107 ML/MIN/1.73M2
SL AMB EGFR NON AFRICAN AMERICAN: 92 ML/MIN/1.73M2
SODIUM SERPL-SCNC: 135 MMOL/L (ref 135–146)
WBC # BLD AUTO: 4.2 THOUSAND/UL (ref 3.8–10.8)

## 2022-04-14 NOTE — TELEPHONE ENCOUNTER
Primary palliative medicine provider:   Aleksander Prescott    Medication requested: Ondansetron    If for pain, how has the patient been taking their pain medicine? Last appointment: 3/24/22    Next scheduled appointment: 4/21/22    PDMP review:  Candi    Pt requesting Rich Earing refill his medication for nausea  Unsure if he is out of med  Spoke very quickly on message  Read lat ov note and med profile  States pt never used this medication  LMOM to verify pt request      Thank you     Will not generate a refill until pt returns call

## 2022-04-14 NOTE — TELEPHONE ENCOUNTER
----- Message from Beth Jeffers sent at 4/13/2022 12:32 PM EDT -----  Regarding: complete  GC Completed Chart     Result Type: Negative    Result Delivery: KRAFTWERKhart Message    Monthly Review: Does not need monthly review- COMPLETE

## 2022-04-15 ENCOUNTER — TELEPHONE (OUTPATIENT)
Dept: HEMATOLOGY ONCOLOGY | Facility: HOSPITAL | Age: 82
End: 2022-04-15

## 2022-04-15 RX ORDER — ONDANSETRON 8 MG/1
8 TABLET, ORALLY DISINTEGRATING ORAL EVERY 8 HOURS PRN
Qty: 20 TABLET | Refills: 0 | Status: SHIPPED | OUTPATIENT
Start: 2022-04-15 | End: 2022-05-09 | Stop reason: SDUPTHER

## 2022-04-15 NOTE — TELEPHONE ENCOUNTER
Thank you for helping with this  As we approach the weekend, I will send Zofran for nausea  to [facility] pharmacy in case he is out

## 2022-04-15 NOTE — TELEPHONE ENCOUNTER
Patient called, very anxious, asking questions about all of his upcoming appointments  He spoke with my coworker and myself  I will be reaching out to his Rn Coordinator and MSW

## 2022-04-19 ENCOUNTER — HOSPITAL ENCOUNTER (OUTPATIENT)
Dept: INFUSION CENTER | Facility: HOSPITAL | Age: 82
Discharge: HOME | End: 2022-04-19
Payer: COMMERCIAL

## 2022-04-19 ENCOUNTER — PATIENT OUTREACH (OUTPATIENT)
Dept: HEMATOLOGY ONCOLOGY | Facility: CLINIC | Age: 82
End: 2022-04-19

## 2022-04-19 VITALS
DIASTOLIC BLOOD PRESSURE: 58 MMHG | HEART RATE: 85 BPM | SYSTOLIC BLOOD PRESSURE: 98 MMHG | WEIGHT: 133.38 LBS | OXYGEN SATURATION: 99 % | RESPIRATION RATE: 14 BRPM | BODY MASS INDEX: 20.93 KG/M2 | HEIGHT: 67 IN | TEMPERATURE: 97.2 F

## 2022-04-19 DIAGNOSIS — C25.9 PANCREATIC ADENOCARCINOMA (HCC): Primary | ICD-10-CM

## 2022-04-19 PROCEDURE — 96417 CHEMO IV INFUS EACH ADDL SEQ: CPT

## 2022-04-19 PROCEDURE — 96367 TX/PROPH/DG ADDL SEQ IV INF: CPT

## 2022-04-19 PROCEDURE — 96368 THER/DIAG CONCURRENT INF: CPT

## 2022-04-19 PROCEDURE — 96411 CHEMO IV PUSH ADDL DRUG: CPT

## 2022-04-19 PROCEDURE — 96415 CHEMO IV INFUSION ADDL HR: CPT

## 2022-04-19 PROCEDURE — 96375 TX/PRO/DX INJ NEW DRUG ADDON: CPT

## 2022-04-19 PROCEDURE — 96413 CHEMO IV INFUSION 1 HR: CPT

## 2022-04-19 PROCEDURE — G0498 CHEMO EXTEND IV INFUS W/PUMP: HCPCS

## 2022-04-19 RX ORDER — ATROPINE SULFATE 1 MG/ML
0.25 INJECTION, SOLUTION INTRAMUSCULAR; INTRAVENOUS; SUBCUTANEOUS ONCE AS NEEDED
Status: DISCONTINUED | OUTPATIENT
Start: 2022-04-19 | End: 2022-04-22 | Stop reason: HOSPADM

## 2022-04-19 RX ORDER — SODIUM CHLORIDE 9 MG/ML
20 INJECTION, SOLUTION INTRAVENOUS ONCE AS NEEDED
Status: DISCONTINUED | OUTPATIENT
Start: 2022-04-19 | End: 2022-04-22 | Stop reason: HOSPADM

## 2022-04-19 RX ORDER — DEXTROSE MONOHYDRATE 50 MG/ML
20 INJECTION, SOLUTION INTRAVENOUS ONCE
Status: COMPLETED | OUTPATIENT
Start: 2022-04-19 | End: 2022-04-19

## 2022-04-19 RX ORDER — ATROPINE SULFATE 1 MG/ML
0.25 INJECTION, SOLUTION INTRAMUSCULAR; INTRAVENOUS; SUBCUTANEOUS ONCE
Status: COMPLETED | OUTPATIENT
Start: 2022-04-19 | End: 2022-04-19

## 2022-04-19 RX ORDER — FLUOROURACIL 50 MG/ML
400 INJECTION, SOLUTION INTRAVENOUS ONCE
Status: COMPLETED | OUTPATIENT
Start: 2022-04-19 | End: 2022-04-19

## 2022-04-19 RX ADMIN — LEUCOVORIN CALCIUM 700 MG: 500 INJECTION, POWDER, LYOPHILIZED, FOR SOLUTION INTRAMUSCULAR; INTRAVENOUS at 11:46

## 2022-04-19 RX ADMIN — DEXTROSE 20 ML/HR: 5 SOLUTION INTRAVENOUS at 09:31

## 2022-04-19 RX ADMIN — DEXAMETHASONE SODIUM PHOSPHATE: 10 INJECTION, SOLUTION INTRAMUSCULAR; INTRAVENOUS at 09:02

## 2022-04-19 RX ADMIN — FLUOROURACIL 690 MG: 50 INJECTION, SOLUTION INTRAVENOUS at 13:29

## 2022-04-19 RX ADMIN — ATROPINE SULFATE 0.25 MG: 1 INJECTION, SOLUTION INTRAMUSCULAR; INTRAVENOUS; SUBCUTANEOUS at 11:40

## 2022-04-19 RX ADMIN — DEXTROSE 216 MG: 5 SOLUTION INTRAVENOUS at 11:51

## 2022-04-19 RX ADMIN — OXALIPLATIN 112.45 MG: 5 INJECTION, SOLUTION, CONCENTRATE INTRAVENOUS at 09:43

## 2022-04-19 RX ADMIN — SODIUM CHLORIDE 20 ML/HR: 9 INJECTION, SOLUTION INTRAVENOUS at 09:02

## 2022-04-19 NOTE — PROGRESS NOTES
MSW met with the pt in the infusion center this day as this was the pt's first day of treatment  The pt was open to conversation and he had many questions about his treatment and the pump  The pt drove himself to his treatment this day and does not wish to use [facility] for his local appointments  The pt was more concerned about leaving with the pump than any other issue  Much of the conversation was answering the same questions as the pt was repeating the same questions  Pt presents as anxious at baseline and benefits from repeated assurance and support  MSW gave the pt contact information again and reminded him of his treatment team   Pt spoke of his daughter and sister and recognizes that while they are not local, he can call if he needs something and they can send him things  The pt did not feel as though he needed anything specific at this time  MSW sat and offered support and will continue to follow

## 2022-04-19 NOTE — PROGRESS NOTES
Patient completed chemotherapy infusion with no adverse reactions  CADD pump infusing, green light blinking  Reviewed with patient when to return for disconnect  AVS provided, patient left unit ambulatory with steady gait

## 2022-04-21 ENCOUNTER — HOSPITAL ENCOUNTER (OUTPATIENT)
Dept: INFUSION CENTER | Facility: HOSPITAL | Age: 82
Discharge: HOME | End: 2022-04-21

## 2022-04-21 ENCOUNTER — OFFICE VISIT (OUTPATIENT)
Dept: PALLIATIVE MEDICINE | Age: 82
End: 2022-04-21
Payer: COMMERCIAL

## 2022-04-21 VITALS
RESPIRATION RATE: 16 BRPM | HEART RATE: 101 BPM | DIASTOLIC BLOOD PRESSURE: 58 MMHG | WEIGHT: 137.6 LBS | BODY MASS INDEX: 21.55 KG/M2 | TEMPERATURE: 98 F | OXYGEN SATURATION: 98 % | SYSTOLIC BLOOD PRESSURE: 90 MMHG

## 2022-04-21 VITALS — TEMPERATURE: 97.8 F

## 2022-04-21 DIAGNOSIS — C25.9 PANCREATIC ADENOCARCINOMA (HCC): Primary | ICD-10-CM

## 2022-04-21 DIAGNOSIS — K86.89 PANCREATIC MASS: ICD-10-CM

## 2022-04-21 DIAGNOSIS — F41.8 ANXIETY ABOUT HEALTH: ICD-10-CM

## 2022-04-21 DIAGNOSIS — G89.3 CANCER-RELATED PAIN: ICD-10-CM

## 2022-04-21 PROCEDURE — 99214 OFFICE O/P EST MOD 30 MIN: CPT | Performed by: PHYSICIAN ASSISTANT

## 2022-04-21 RX ORDER — TRAMADOL HYDROCHLORIDE 50 MG/1
50 TABLET ORAL EVERY 6 HOURS PRN
Qty: 60 TABLET | Refills: 0
Start: 2022-04-21

## 2022-04-21 NOTE — PROGRESS NOTES
Outpatient Follow-Up - Palliative and Supportive Care   Altagracia Bonner 80 y o  male [id number]    Assessment & Plan  1  Pancreatic adenocarcinoma ([address] )    2  Pancreatic mass    3  Anxiety about health    4  Cancer-related pain        · Giles Angulo endorses anxiety about health, improved from prior  · We discussed initiating SSRI/SNRI; Giles Angulo wants to defer this for now  Satisfied with p r n  Xanax  · Reports significant improvement of pain with tramadol 50 mg Q6 PRN  Updated medication order; no refill needed at this time  · Return to care in 2 months  · Please bring Living Will to next appointment  · Call in the meantime with any questions or concerns  Medications adjusted this encounter:  Requested Prescriptions     Signed Prescriptions Disp Refills    traMADol (ULTRAM) 50 mg tablet 60 tablet 0     Sig: Take 1 tablet (50 mg total) by mouth every 6 (six) hours as needed for moderate pain     No orders of the defined types were placed in this encounter  Medications Discontinued During This Encounter   Medication Reason    traMADol (ULTRAM) 50 mg tablet          Altagracia Bonner was seen today for symptoms and planning cares related to above illnesses  I have reviewed the patient's controlled substance dispensing history in the Prescription Drug Monitoring Program in compliance with the [facility] regulations before prescribing any controlled substances  They are invited to continue to follow with us  If there are questions or concerns, please contact us through our clinic/answering service 24 hours a day, seven days a week  [address] PA-C  [facility] Palliative and Supportive Care        Visit Information    Accompanied By: No one    Source of History: Self    History Limitations: None     History of Present Illness      Altagracia Bonner is a 80 y o  male who presents in follow up of symptoms related to pancreatic cancer    Pertinent issues include: symptom management, pain, neoplasm related, depression or anxiety, fatigue, assessment of goals of care, disease process education and discussion of prognosis, advance care planning  Johnson Chandler is under the care of Dr Stephanie Loera for pancreatic cancer; staging is still being determined as pulmonary nodules are being assessed  Johnson Chandler has been deemed poor surgical candidate by surgical oncology as the tumor encases the SMA  He is being started on FOLFORINOX  With plan for restaging after 6-8 cycles and 2 months of radiation  Johnson Chandler presented for initial palliative care consultation on 2022  At that time, he complained of extreme anxiety  For pain, he was on tramadol 50 mg BID which gave him adequate relief  Johnson Chandler presents today unaccompanied for follow-up  In the meantime, he reports he developed worsening pain for which a doctor recommended he increase tramadol to 50 mg QID PRN  He reports excellent pain control with this and improved sleep  Johnson Chandler also reports improved mood on the 0 5 mg Xanax  He has been splitting pills to take less than this  He reports his endeavor to focus on the positives which limits his anxiety  He reports in the past, he has done extensive therapy although he is not currently undergoing therapy at this time  We discussed initiating a baseline anti anxiety medication  Johnson Chandler would prefer not to take something daily as he does not experience anxiety every day  He prefers to remain with a medication that can be taken p r n  Johnson Chandler has not adequate appetite  He denies nausea or anorexia  He has mild diarrhea however it is manageable  Socially, Johnson Chandler lives alone  He states he lives in apartment in [facility] and has social interactions in the community room  He states his wife  7 years ago  While he is on good terms with his family, they live in [facility] and in [place] and he does not see them very often  Johnson Chandler states he has a living will at home    He believes he named his sister as his medical POA   I requested he bring living well to his next appointment and he is agreeable  Past medical, surgical, social, and family histories are reviewed and pertinent updates are made  Review of Systems   Constitutional: Negative for decreased appetite and malaise/fatigue  Respiratory: Negative for cough, shortness of breath and sleep disturbances due to breathing  Skin: Negative for dry skin and nail changes  Musculoskeletal: Negative for arthritis and falls  Gastrointestinal: Positive for abdominal pain  Negative for anorexia, diarrhea, dysphagia, nausea and vomiting  Neurological: Negative for aphonia, difficulty with concentration and loss of balance  Psychiatric/Behavioral: Positive for hypervigilance  Negative for altered mental status and depression  The patient is nervous/anxious  Vital Signs    BP 90/58 (BP Location: Left arm, Cuff Size: Standard)   Pulse 101   Temp 98 °F (36 7 °C) (Temporal)   Resp 16   Wt 62 4 kg (137 lb 9 6 oz)   SpO2 98%   BMI 21 55 kg/m²      Physical Exam and Objective Data  Physical Exam  Vitals and nursing note reviewed  Exam conducted with a chaperone present  Constitutional:       Appearance: He is well-developed  He is ill-appearing (chronically )  HENT:      Head: Normocephalic and atraumatic  Eyes:      Conjunctiva/sclera: Conjunctivae normal    Pulmonary:      Effort: Pulmonary effort is normal  No respiratory distress  Breath sounds: Normal breath sounds  Musculoskeletal:      Cervical back: Neck supple  Skin:     General: Skin is warm and dry  Coloration: Skin is pale  Skin is not jaundiced  Neurological:      Mental Status: He is alert and oriented to person, place, and time  Psychiatric:         Mood and Affect: Mood is anxious          Radiology and Laboratory:  I personally reviewed and interpreted the following results:  Reviewed recent lab work, oncology notes    35 minutes was spent face to face with Teofilo Agarwal with greater than 50% of the time spent in counseling or coordination of care including discussions of etiology of diagnosis, treatment instructions, follow up requirements, risk factors and risk reduction of disease, patient and family counseling/involvement in care and compliance with treatment regimen  All of the patient's or agent's questions were answered during this discussion

## 2022-04-24 NOTE — PROGRESS NOTES
Hematology/Oncology Outpatient Office Note    Date of Service: 5/3/2022     Stephanie Rivera  [facility]  [phone]    Reason for Consultation:   No chief complaint on file  Cancer Stage at diagnosis: in process (pulm nodules being assessed closely)    Referral Physician: No ref  provider found    Primary Care Physician:  Davin Peterson     Nickname: Guillaume Choi        He lives alone    Original ECO    Today's ECO    He goes to the gym once a week and does exercise bikes, walking    Goals and Barriers:  Current Goal: Minimize effects of disease burden, extend life  Barriers to accomplishing this: abdominal pain (Tramadol reduces this)    Patient's Capacity to Self Care:  Patient is able to self care  His sister pays for a service to come several times a month to help clean, do laundry, organize    Code Status: not addressed today    Advanced directives: not addressed today    ASSESSMENT & PLAN      Diagnosis ICD-10-CM Associated Orders   1  Pancreatic adenocarcinoma ([facility] [address] )  C25 9          This is a 80 y o  c PMHx notable for PE on Eliquis, Anxiety from diagnosis (hx of being in a psychiatric hospital in the past after his wife passed away), GERD, DMII, being seen in consultation for pancreatic adenocarcinoma    Thoughts on prediction for Grade III-V toxicity in elderly patients to systemic chemotherapy:  Age >72 years   GI/ cancers  Falls in last 6 months   Hearing impairment  Limited ability to walk 1 block  Requires assistance with medications  Decreased social activities   Luis KONGO 2011)  BMI<18 5 or moderate or severe weight loss or decrease in food intake in past 3 months  Mild vs moderate dementia/depression/  anxiety     After assessing this patient's ECOG PS to be 1, I have deemed him to be a candidate for systemic chemotherapy from a fitness perspective    However, he had significant liver dysfunction per March 5, 2022 labs with bilirubin above 8  He did have a metal stent placed in the biliary tract and his bilirubin improved significantly to 1 7 and is reassuring as a T  Bili <2 is needed at minimum for most of the components of FOLFIRINOX  After discussing the case with Dr Darshan Brown, he has been deemed to be a poor surgical candidate due to the degree of tumor encasement of the superior mesentery vasculature  Plan is for induction chemotherapy and re-staging after 6-8 cycles and then 2 months CRT (in between restaging CT CAP to be done at the end of July 2022)  C1 chemotherapy tolerated well  Discussion of decision making   Oncology history updated, accordingly, during this visit   Goals of care/patient communication  o I discussed with the patient the clinical course leading up to their cancer diagnosis  I reviewed relevant office notes, imaging reports and pathology result as well   o I told the patient that this is a case of either curable or incurable disease depending on what the final status of the pulmonary nodules are and what this means  We discussed that the goal of anti-cancer therapy is to provide best quality of life, extend overall survival, and progression free survival as shown in clinical trials  We also discussed that there might be a point when the cancer will no longer respond to this anti-neoplastic therapy  As a result, we also discussed the role of the palliative care team being introduced early in the treatment course   He is seeing them already  o I explained the risks/benefits of the proposed cancer therapy: mFOLFORINOX and after discussion including understanding risks of possible life-threatening complications and therapy-related malignancy development, informed consent for blood products and treatment has been signed   TNM/Staging At Diagnosis  Cancer Staging  Pancreatic adenocarcinoma [facility])  Staging form: Pancreas, AJCC 8th Edition  - Clinical stage from 3/4/2022: Stage IB (cT2, cN0, cMx) - Unsigned  Stage prefix: Initial diagnosis  Total positive nodes: 0   Disease Features/Tumor Markers/Genetics  o Tumor Marker: n/a  Notable Path Features: 3/4/22 Pancreatic head EUS biopsy: (ThinPrep, smear and cell block preparations)  Malignant (PSC Category VI) Adenocarcinoma  Satisfactory for evaluation   Treatment: mFOLFIRINOX   Other Supportive care: Zofran ODT   Treatment Team Members  o Surgeon: Dr Chris Fisher  o Palliative: Fabian Verde 3/11/22: creat 0 39, lipase 147 (13-60), WBC 5 6k, Hgb 13 1, MCV 89, plt 199k, T  Bilirubin 2 9  3/25/22 T Bili 1 7   Diagnostics:  2/13/22 MRI MRCP abdomen w/wo c: 4x3 2cm pancreatic mass at uncinate process, 7mm R lobe liver hemangioma  2/26/22 CT Abd/pelv:  Stable infiltrating pancreatic head mass encasing the superior mesenteric artery and vein and narrows distal portal vein  3/17/22 CT Chest w/c: Small bilateral pulmonary nodules, nonspecific  Although these could be part and parcel of granulomatous disease, metastases cannot be excluded  Interval follow-up CT chest in about 10 weeks recommended to reevaluate these findings  Discussion of decision making    I personally reviewed the following lab results, the image studies, pathology, other specialty/physicians consult notes and recommendations, and outside medical records from [facility]  I had a lengthy discussion with the patient and shared the work-up findings  We discussed the diagnosis and management plan as below  I spent 32 minutes reviewing the records (labs, clinician notes, outside records, medical history, ordering medicine/tests/procedures, interpreting the imaging/labs previously done) and coordination of care as well as direct time with the patient today, of which greater than 50% of the time was spent in counseling and coordination of care with the patient/family        · Plan/Labs  · F/u RD   · F/u Surg Onc, he is currently deemed to not be a surgical candidate  · Continue indefinite Eliquis for cancer induced PE  · Infusion chairs scheduled for mFOLFIRINOX (dose reduced Irinotecan 125mg/m2 due to anticipated tolerance as well as the Total Bilirubin being elevated; Oxaliplatin dose reduced to 65 mg/m2) treatment to be administered q 2 weeks, about 20% dose reduction for Oxali-Irinotecan for anticipated tolerance for this gentleman with consideration to increase the dose if he tolerates the first few cycles  C2 coming up  · Rad-Onc referral once we get closer to the end of 4 months of chemotherapy to plan 2 months of concurrent chemo-RT  · Repeat restaging CT CAP in 3 months scheduled for 7/19/22 to follow up treatment response as well as trending b/l pulm nodules seen on 3/17/22 CT Chest      Follow Up: 2 weeks    All questions were answered to the patient's satisfaction during this encounter  The patient knows the contact information for our office and knows to reach out for any relevant concerns related to this encounter  They are to call for any temperature 100 4 or higher, new symptoms including but not restricted to shaking chills, decreased appetite, nausea, vomiting, diarrhea, increased fatigue, shortness of breath or chest pain, confusion, and not feeling the strength to come to the clinic  For all other listed problems and medical diagnosis in their chart - they are managed by PCP and/or other specialists, which the patient acknowledges  Thank you very much for your consultation and making us a part of this patient's care  We are continuing to follow closely with you  Please do not hesitate to reach out to me with any additional questions or concerns  Marlene Choi MD  Hematology & Medical Oncology Staff Physician             Disclaimer: This document was prepared using [employer] Direct technology   If a word or phrase is confusing, or does not make sense, this is likely due to recognition error which was not discovered during this clinician's review  If you believe an error has occurred, please contact me through [address] [place] line for tan? cation  ONCOLOGY HISTORY OF PRESENT ILLNESS        Oncology History   Pancreatic adenocarcinoma ([facility] [address] )   3/4/2022 Biopsy    A-B-C  Pancreas, pancreatic head mass   Malignant (PSC Category VI) -  Adenocarcinoma  3/14/2022 Initial Diagnosis    Pancreatic adenocarcinoma ([facility] [address] )     4/19/2022 -  Chemotherapy    fluorouracil (ADRUCIL), 400 mg/m2 = 690 mg, Intravenous, Once, 2 of 8 cycles  Administration: 690 mg (4/19/2022)  irinotecan (CAMPTOSAR) chemo infusion, 125 mg/m2 = 216 mg (69 4 % of original dose 180 mg/m2), Intravenous, Once, 2 of 8 cycles  Dose modification: 125 mg/m2 (original dose 180 mg/m2, Cycle 1, Reason: Anticipated Tolerance, Comment: Anticipated tolerance and Bilirubin being between 1-2)  Administration: 216 mg (4/19/2022)  leucovorin calcium IVPB, 692 mg, Intravenous, Once, 2 of 8 cycles  Administration: 700 mg (4/19/2022)  oxaliplatin (ELOXATIN) chemo infusion, 65 mg/m2 = 112 45 mg (76 5 % of original dose 85 mg/m2), Intravenous, Once, 2 of 8 cycles  Dose modification: 65 mg/m2 (original dose 85 mg/m2, Cycle 1, Reason: Anticipated Tolerance)  Administration: 112 45 mg (4/19/2022)  fluorouracil (ADRUCIL) ambulatory infusion Soln, 1,200 mg/m2/day = 4,150 mg, Intravenous, Over 46 hours, 2 of 8 cycles       2/13/22 went to [facility] for 3-6 months of progressing upper abd pain, dark urine, scleral icteri, 14-23 lb weight loss, decrease appetite  3/2/22 presentation for jaundice and liver enzymes elevation  3/4/22 ERCP/EUS with metal stent placed in the distal biliary tract   3x3 cm mass head of pancreas, extending to uncinate process and neck of pancreas, free of SMA and celiac artery    SUBJECTIVE  (INTERVAL HISTORY)      Clotting History PE  12/2021   Bleeding History None   Cancer History Pancreatic   Family Cancer History Brother (esophageal)   H/O Blood/Plt Transfusion None Tobacco/etoh/drug abuse Former smoker (quit at age 27), etoh use (socially)   Hx COVID19 Infection and Vaccine Status  NO infection  Vaccinated x3   Cancer Screening history n/a   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)   New medications in the last month: none but has not been on Tramadol for longer than a few months   Pain: aching lower abdominal pain that does not radiate (8/10 at its worse, significantly improved with Tramadol)      I have reviewed the relevant past medical, surgical, social and family history  I have also reviewed allergies and medications for this patient  He is dealing with significant anxiety from his cancer diagnosis but this has significantly improved after knowing that he is undergoing treatment for his cancer  Interval events: he has dealt with several days of mild nausea treated well with Zofran  No change in appetite, strength  Has had partially former loose stools for the past week and spoke to his PCP about using Loperamide OTC  No new numbness, tingling, jaw pain  Otherwise, no other acute issues  Review of Systems  Baseline weight: 145lbs    He has lost 11 lbs in the past 3 months  He denies F/C, N/V, SOB, CP, LH, HA, rash, itching, falls, hematuria, melena, hematochezia, generalized weakness, unilateral weakness, diplopia, loss of appetite, or diarrhea  He deals with chronic constipation and is now on Miralax  He is careful with his diet due to his DM  He uses a cullen for balance (since 2019) which he uses for significant walking but not all the time  A 10-point review of system was performed, pertinent positive and negative were detailed as above  Otherwise, the 10-point review of system was negative        Past Medical History:   Diagnosis Date    Ambulates with cane     Anxiety     Depression     Diabetes mellitus (HCC)     GERD (gastroesophageal reflux disease)     History of pulmonary embolus (PE)     Hyperlipidemia  Multiple thyroid nodules     pt states about 40yrs ago    Pancreatic mass    Hx SBO, incarcerated R inguinal hernia, HTN    Past Surgical History:   Procedure Laterality Date    APPENDECTOMY      CATARACT EXTRACTION Right     FL GUIDED CENTRAL VENOUS ACCESS DEVICE INSERTION  4/11/2022    TUNNELED VENOUS PORT PLACEMENT Right 4/11/2022    Procedure: INSERTION VENOUS PORT (PORT-A-CATH); Surgeon: Rupal Tirado MD;  [facility];  Service: Surgical Oncology       Family History   Problem Relation Age of Onset    Alcohol abuse Mother     Diabetes Father        Social History     Socioeconomic History    Marital status:      Spouse name: Not on file    Number of children: Not on file    Years of education: Not on file    Highest education level: Not on file   Occupational History    Not on file   Tobacco Use    Smoking status: Former Smoker    Smokeless tobacco: Never Used    Tobacco comment: quit in 1960s; smoked cigars for several yrs   Vaping Use    Vaping Use: Never used   Substance and Sexual Activity    Alcohol use: Yes     Comment: seldom; socially    Drug use: No    Sexual activity: Not on file   Other Topics Concern    Not on file   Social History Narrative    Not on file     Social Determinants of Health     Financial Resource Strain: Not on file   Food Insecurity: No Food Insecurity    Worried About [address] in the Last Year: Never true    [facility] [facility] in the Last Year: Never true   Transportation Needs: No Transportation Needs    Lack of Transportation (Medical): No    Lack of Transportation (Non-Medical):  No   Physical Activity: Not on file   Stress: Not on file   Social Connections: Not on file   Intimate Partner Violence: Not on file   Housing Stability: Unknown    Unable to Pay for Housing in the Last Year: No    Number of Places Lived in the Last Year: Not on file    Unstable Housing in the Last Year: No       Allergies   Allergen Reactions  Aleve [Naproxen] Swelling       Current Outpatient Medications   Medication Sig Dispense Refill    Alcohol Swabs 70 % PADS May substitute brand based on insurance coverage  Check glucose BID  100 each 0    ALPRAZolam (XANAX) 0 5 mg tablet Take 0 5 tablets (0 25 mg total) by mouth 3 (three) times a day as needed for anxiety 90 tablet 0    apixaban (Eliquis) 5 mg Take 5 mg by mouth 2 (two) times a day      Blood Glucose Monitoring Suppl (OneTouch Verio Reflect) w/Device KIT May substitute brand based on insurance coverage  Check glucose BID  1 kit 0    fluorouracil 4,150 mg in CADD infusion pump Infuse 4,150 mg (1,200 mg/m2/day x 1 73 m2) into a venous catheter over 46 hours for 2 days  Do not start before May 3, 2022  1 each 0    glucose blood (OneTouch Verio) test strip May substitute brand based on insurance coverage  Check glucose BID  100 each 0    metFORMIN (GLUCOPHAGE) 1000 MG tablet Take 1,000 mg by mouth 2 (two) times a day with meals        omeprazole (PriLOSEC) 40 MG capsule Take 40 mg by mouth every evening        ondansetron (Zofran ODT) 8 mg disintegrating tablet Take 1 tablet (8 mg total) by mouth every 8 (eight) hours as needed for nausea or vomiting 20 tablet 0    OneTouch Delica Lancets 69W MISC May substitute brand based on insurance coverage  Check glucose BID  100 each 0    polyethylene glycol (MIRALAX) 17 g packet Take 17 g by mouth daily (Patient not taking: Reported on 4/21/2022 )      traMADol (ULTRAM) 50 mg tablet Take 1 tablet (50 mg total) by mouth every 6 (six) hours as needed for moderate pain 60 tablet 0     No current facility-administered medications for this visit       Facility-Administered Medications Ordered in Other Visits   Medication Dose Route Frequency Provider Last Rate Last Admin    atropine injection 0 25 mg  0 25 mg Intravenous Once Brain Valdes MD        atropine injection 0 25 mg  0 25 mg Intravenous Once PRN Brain Valdes MD       [facility] fluorouracil (ADRUCIL) injection 690 mg  400 mg/m2 (Treatment Plan Recorded) Intravenous Once Christy Damon MD        irinotecan (CAMPTOSAR) 216 mg in dextrose 5 % 500 mL chemo infusion  125 mg/m2 (Treatment Plan Recorded) Intravenous Once Christy Damon MD        leucovorin 700 mg in dextrose 5 % 250 mL IVPB  700 mg Intravenous Once Christy Damon MD        oxaliplatin (ELOXATIN) 112 45 mg in dextrose 5 % 250 mL chemo infusion  65 mg/m2 (Treatment Plan Recorded) Intravenous Once Christy Damon MD        sodium chloride 0 9 % infusion  20 mL/hr Intravenous Once PRN Christy Damon MD   Paused at 05/03/22 1036       (Not in a hospital admission)      Objective:     24 Hour Vitals Assessment:     There were no vitals filed for this visit  PHYSICIAN EXAM:    General: Appearance: alert, cooperative, moderate distress from anxiety  HEENT: Normocephalic, atraumatic  No scleral icterus  conjunctivae clear  EOMI  Chest: No tenderness to palpation  No open wound noted  Lungs: No cyanosis  Respirations unlabored  Cardiac: Regular rate, +S1and S2  Abdomen: Soft, non-tender, non-distended  Extremities:  No edema, cyanosis, clubbing  Skin: Skin color, turgor are normal  No rashes  Neurologic: Awake, Alert, and oriented, no gross focal deficits noted b/l  Port: c/d/i       DATA REVIEW:    Pathology Result:    Final Diagnosis   Date Value Ref Range Status   03/04/2022   Final    A-B-C  Pancreas, pancreatic head mass (ThinPrep, smear and cell block preparations)  Malignant (PSC Category VI) - See note  Adenocarcinoma  Satisfactory for evaluation  Note: The above diagnostic category is part of the six-tiered system proposed by The Papanicolaou Society of Cytopathology for the reporting of pancreaticobiliary specimens   This proposed scheme provides terminology that correlates the cytologic diagnostic nomenclature with the 2010 WHO classification of pancreatic tumors and standardizes the categorization of the various diseases of the pancreas, some of which are difficult to diagnose specifically by cytology  *The Papanicolaou Society of Cytopathology System for Reporting Pancreaticobiliary Cytology: Definitions, Criteria and Explanatory Notes  Virginia Palmer Mary Burgos; [place], 3625  Image Results:   Image result are reviewed and documented in Hematology/Oncology history    FL guided central venous access device insertion  Narrative: C-arm - Chest    INDICATION: C25 9: Malignant neoplasm of pancreas, unspecified  Infusion port placement  COMPARISON:  None    IMAGES:  1    FLUOROSCOPY TIME:   4 seconds    TECHNIQUE:    FINDINGS:    Fluoroscopic guidance provided for implantable infusion port placement  Tip of port catheter terminates in the region of the cavoatrial junction  Impression: Fluoroscopic guidance provided for infusion port placement as above  Workstation performed: [id number]      LABS:  Lab data are reviewed and documented in HemOnc history         Lab Results   Component Value Date    HGB 10 6 (L) 05/03/2022    HCT 32 8 (L) 05/03/2022    MCV 90 05/03/2022     05/03/2022    WBC 2 73 (L) 05/03/2022    NRBC 0 05/03/2022     Lab Results   Component Value Date    K 3 8 05/03/2022     05/03/2022    CO2 27 05/03/2022    BUN 27 (H) 05/03/2022    CREATININE 0 90 05/03/2022    CALCIUM 8 5 05/03/2022    CORRECTEDCA 9 1 05/03/2022    AST 20 05/03/2022    ALT 49 05/03/2022    ALKPHOS 140 (H) 05/03/2022    EGFR 80 05/03/2022       No results found for: IRON, TIBC, FERRITIN    No results found for: SACMODGW88    Recent Labs     05/03/22  0815   WBC 2 73*       By:  Wilman Dickinson MD, 5/3/2022, 10:58 AM

## 2022-04-25 ENCOUNTER — TELEPHONE (OUTPATIENT)
Dept: HEMATOLOGY ONCOLOGY | Facility: CLINIC | Age: 82
End: 2022-04-25

## 2022-04-25 ENCOUNTER — TELEPHONE (OUTPATIENT)
Dept: HEMATOLOGY ONCOLOGY | Facility: HOSPITAL | Age: 82
End: 2022-04-25

## 2022-04-25 DIAGNOSIS — C25.9 PANCREATIC ADENOCARCINOMA (HCC): Primary | ICD-10-CM

## 2022-04-25 NOTE — TELEPHONE ENCOUNTER
Called patient and LVM that CT scan appt tomorrow 4/26/22 was cancelled and rescheduled to 7/19/22  LVM with new appt date time and location  LVM to call [facility] which was provided if this appt needed to be rescheduled

## 2022-04-25 NOTE — TELEPHONE ENCOUNTER
Left voicemail for patient that Dr Andres Clifford is agreeable to seeing patient in infusion center due to conflicting appointments

## 2022-04-25 NOTE — TELEPHONE ENCOUNTER
CALL RETURN FORM   Reason for patient call? Patient is calling to discuss his treatments  He wants to make sure his appointments are not interfering with one another  Patient would like to talk to a nurse    Patient's primary oncologist? Dr Dipti Carballo   Name of person the patient was calling for? [place]   Any additional information to add, if applicable? Best call back number is [phone]   Informed patient that the message will be forwarded to the team and someone will get back to them as soon as possible    Did you relay this information to the patient?  yes

## 2022-04-25 NOTE — TELEPHONE ENCOUNTER
Appointment Confirmation (to confirm pre existing appointments - ONLY)     Appointment with  Dr Jessica Willard   Appointment date & time  5/3/22 10:40 am &  Infusion 8:30am    Location [place]   Patient verbilized Understanding  yes,  Also verify CT  appt 7/19/22 11:00 am

## 2022-05-03 ENCOUNTER — HOSPITAL ENCOUNTER (OUTPATIENT)
Dept: INFUSION CENTER | Facility: HOSPITAL | Age: 82
Discharge: HOME | End: 2022-05-03
Payer: COMMERCIAL

## 2022-05-03 ENCOUNTER — TELEPHONE (OUTPATIENT)
Dept: HEMATOLOGY ONCOLOGY | Facility: HOSPITAL | Age: 82
End: 2022-05-03

## 2022-05-03 ENCOUNTER — OFFICE VISIT (OUTPATIENT)
Dept: HEMATOLOGY ONCOLOGY | Facility: HOSPITAL | Age: 82
End: 2022-05-03
Payer: COMMERCIAL

## 2022-05-03 VITALS
DIASTOLIC BLOOD PRESSURE: 56 MMHG | WEIGHT: 128.8 LBS | HEART RATE: 80 BPM | RESPIRATION RATE: 14 BRPM | OXYGEN SATURATION: 99 % | HEIGHT: 67 IN | SYSTOLIC BLOOD PRESSURE: 101 MMHG | BODY MASS INDEX: 20.21 KG/M2 | TEMPERATURE: 96.8 F

## 2022-05-03 DIAGNOSIS — C25.9 PANCREATIC ADENOCARCINOMA (HCC): Primary | ICD-10-CM

## 2022-05-03 DIAGNOSIS — E80.6 HYPERBILIRUBINEMIA: Primary | ICD-10-CM

## 2022-05-03 DIAGNOSIS — C25.9 PANCREATIC ADENOCARCINOMA (HCC): ICD-10-CM

## 2022-05-03 LAB
ALBUMIN SERPL BCP-MCNC: 3.2 G/DL (ref 3.5–5)
ALP SERPL-CCNC: 140 U/L (ref 46–116)
ALT SERPL W P-5'-P-CCNC: 49 U/L (ref 12–78)
ANION GAP SERPL CALCULATED.3IONS-SCNC: 8 MMOL/L (ref 4–13)
AST SERPL W P-5'-P-CCNC: 20 U/L (ref 5–45)
BASOPHILS # BLD AUTO: 0.03 THOUSANDS/ΜL (ref 0–0.1)
BASOPHILS NFR BLD AUTO: 1 % (ref 0–1)
BILIRUB SERPL-MCNC: 0.6 MG/DL (ref 0.2–1)
BUN SERPL-MCNC: 27 MG/DL (ref 5–25)
CALCIUM ALBUM COR SERPL-MCNC: 9.1 MG/DL (ref 8.3–10.1)
CALCIUM SERPL-MCNC: 8.5 MG/DL (ref 8.3–10.1)
CHLORIDE SERPL-SCNC: 102 MMOL/L (ref 100–108)
CO2 SERPL-SCNC: 27 MMOL/L (ref 21–32)
CREAT SERPL-MCNC: 0.9 MG/DL (ref 0.6–1.3)
EOSINOPHIL # BLD AUTO: 0.09 THOUSAND/ΜL (ref 0–0.61)
EOSINOPHIL NFR BLD AUTO: 3 % (ref 0–6)
ERYTHROCYTE [DISTWIDTH] IN BLOOD BY AUTOMATED COUNT: 14.3 % (ref 11.6–15.1)
GFR SERPL CREATININE-BSD FRML MDRD: 80 ML/MIN/1.73SQ M
GLUCOSE SERPL-MCNC: 144 MG/DL (ref 65–140)
HCT VFR BLD AUTO: 32.8 % (ref 36.5–49.3)
HGB BLD-MCNC: 10.6 G/DL (ref 12–17)
IMM GRANULOCYTES # BLD AUTO: 0 THOUSAND/UL (ref 0–0.2)
IMM GRANULOCYTES NFR BLD AUTO: 0 % (ref 0–2)
LYMPHOCYTES # BLD AUTO: 0.91 THOUSANDS/ΜL (ref 0.6–4.47)
LYMPHOCYTES NFR BLD AUTO: 33 % (ref 14–44)
MCH RBC QN AUTO: 29 PG (ref 26.8–34.3)
MCHC RBC AUTO-ENTMCNC: 32.3 G/DL (ref 31.4–37.4)
MCV RBC AUTO: 90 FL (ref 82–98)
MONOCYTES # BLD AUTO: 0.39 THOUSAND/ΜL (ref 0.17–1.22)
MONOCYTES NFR BLD AUTO: 14 % (ref 4–12)
NEUTROPHILS # BLD AUTO: 1.31 THOUSANDS/ΜL (ref 1.85–7.62)
NEUTS SEG NFR BLD AUTO: 49 % (ref 43–75)
NRBC BLD AUTO-RTO: 0 /100 WBCS
PLATELET # BLD AUTO: 183 THOUSANDS/UL (ref 149–390)
PMV BLD AUTO: 10.4 FL (ref 8.9–12.7)
POTASSIUM SERPL-SCNC: 3.8 MMOL/L (ref 3.5–5.3)
PROT SERPL-MCNC: 6.3 G/DL (ref 6.4–8.2)
RBC # BLD AUTO: 3.66 MILLION/UL (ref 3.88–5.62)
SODIUM SERPL-SCNC: 137 MMOL/L (ref 136–145)
WBC # BLD AUTO: 2.73 THOUSAND/UL (ref 4.31–10.16)

## 2022-05-03 PROCEDURE — 85025 COMPLETE CBC W/AUTO DIFF WBC: CPT | Performed by: INTERNAL MEDICINE

## 2022-05-03 PROCEDURE — G0498 CHEMO EXTEND IV INFUS W/PUMP: HCPCS

## 2022-05-03 PROCEDURE — 96413 CHEMO IV INFUSION 1 HR: CPT

## 2022-05-03 PROCEDURE — 96417 CHEMO IV INFUS EACH ADDL SEQ: CPT

## 2022-05-03 PROCEDURE — 96367 TX/PROPH/DG ADDL SEQ IV INF: CPT

## 2022-05-03 PROCEDURE — 96375 TX/PRO/DX INJ NEW DRUG ADDON: CPT

## 2022-05-03 PROCEDURE — 80053 COMPREHEN METABOLIC PANEL: CPT | Performed by: INTERNAL MEDICINE

## 2022-05-03 PROCEDURE — 96415 CHEMO IV INFUSION ADDL HR: CPT

## 2022-05-03 PROCEDURE — 99214 OFFICE O/P EST MOD 30 MIN: CPT | Performed by: INTERNAL MEDICINE

## 2022-05-03 PROCEDURE — 96411 CHEMO IV PUSH ADDL DRUG: CPT

## 2022-05-03 PROCEDURE — 96368 THER/DIAG CONCURRENT INF: CPT

## 2022-05-03 RX ORDER — ATROPINE SULFATE 1 MG/ML
0.25 INJECTION, SOLUTION INTRAMUSCULAR; INTRAVENOUS; SUBCUTANEOUS ONCE
Status: COMPLETED | OUTPATIENT
Start: 2022-05-03 | End: 2022-05-03

## 2022-05-03 RX ORDER — ATROPINE SULFATE 1 MG/ML
0.25 INJECTION, SOLUTION INTRAMUSCULAR; INTRAVENOUS; SUBCUTANEOUS ONCE AS NEEDED
Status: DISCONTINUED | OUTPATIENT
Start: 2022-05-03 | End: 2022-05-06 | Stop reason: HOSPADM

## 2022-05-03 RX ORDER — FLUOROURACIL 50 MG/ML
400 INJECTION, SOLUTION INTRAVENOUS ONCE
Status: COMPLETED | OUTPATIENT
Start: 2022-05-03 | End: 2022-05-03

## 2022-05-03 RX ORDER — SODIUM CHLORIDE 9 MG/ML
20 INJECTION, SOLUTION INTRAVENOUS ONCE AS NEEDED
Status: DISCONTINUED | OUTPATIENT
Start: 2022-05-03 | End: 2022-05-06 | Stop reason: HOSPADM

## 2022-05-03 RX ORDER — DEXTROSE MONOHYDRATE 50 MG/ML
20 INJECTION, SOLUTION INTRAVENOUS ONCE
Status: COMPLETED | OUTPATIENT
Start: 2022-05-03 | End: 2022-05-03

## 2022-05-03 RX ADMIN — LEUCOVORIN CALCIUM 700 MG: 500 INJECTION, POWDER, LYOPHILIZED, FOR SOLUTION INTRAMUSCULAR; INTRAVENOUS at 13:19

## 2022-05-03 RX ADMIN — ATROPINE SULFATE 0.25 MG: 1 INJECTION, SOLUTION INTRAMUSCULAR; INTRAVENOUS; SUBCUTANEOUS at 13:14

## 2022-05-03 RX ADMIN — OXALIPLATIN 112.45 MG: 5 INJECTION, SOLUTION INTRAVENOUS at 10:58

## 2022-05-03 RX ADMIN — DEXAMETHASONE SODIUM PHOSPHATE: 10 INJECTION, SOLUTION INTRAMUSCULAR; INTRAVENOUS at 09:43

## 2022-05-03 RX ADMIN — DEXTROSE 20 ML/HR: 50 INJECTION, SOLUTION INTRAVENOUS at 10:36

## 2022-05-03 RX ADMIN — FLUOROURACIL 690 MG: 50 INJECTION, SOLUTION INTRAVENOUS at 15:03

## 2022-05-03 RX ADMIN — DEXTROSE 216 MG: 5 SOLUTION INTRAVENOUS at 13:25

## 2022-05-03 RX ADMIN — SODIUM CHLORIDE 20 ML/HR: 9 INJECTION, SOLUTION INTRAVENOUS at 09:43

## 2022-05-03 NOTE — PLAN OF CARE
Problem: Potential for Falls  Goal: Patient will remain free of falls  Description: INTERVENTIONS:  - Educate patient/family on patient safety including physical limitations  - Instruct patient to call for assistance with activity   - Keep Call bell within reach  - Keep care items and personal belongings within reach  - Initiate and maintain comfort rounds  - Consider moving patient to room near nurses station  Outcome: Progressing     Problem: Knowledge Deficit  Goal: Patient/family/caregiver demonstrates understanding of disease process, treatment plan, medications, and discharge instructions  Description: Complete learning assessment and assess knowledge base    Interventions:  - Provide teaching at level of understanding  - Provide teaching via preferred learning methods  Outcome: Progressing

## 2022-05-03 NOTE — TELEPHONE ENCOUNTER
Called patient and LVM with new appt date and time and LVM that I let Start know his appt  LVM to call [facility] # if this appt needed to be rescheduled

## 2022-05-03 NOTE — PROGRESS NOTES
Pt here today for chemo  Tolerated well no adverse reactions  CADD connected and infusing/green light flashing  AVS provided  Pt left ambulatory with two canes and steady gait

## 2022-05-04 ENCOUNTER — TELEPHONE (OUTPATIENT)
Dept: HEMATOLOGY ONCOLOGY | Facility: CLINIC | Age: 82
End: 2022-05-04

## 2022-05-04 NOTE — TELEPHONE ENCOUNTER
Appointment Confirmation (to confirm pre existing appointments - ONLY)     Appointment with  Dr Herman Vidal   Appointment date & time 5/18/22 at 8   Location [place]   Patient verbilized Understanding  Yes   Patient states because the appointment in in [facility] he does not need ST[facility]

## 2022-05-05 ENCOUNTER — HOSPITAL ENCOUNTER (OUTPATIENT)
Dept: INFUSION CENTER | Facility: HOSPITAL | Age: 82
Discharge: HOME | End: 2022-05-05

## 2022-05-05 DIAGNOSIS — C25.9 PANCREATIC ADENOCARCINOMA (HCC): Primary | ICD-10-CM

## 2022-05-05 NOTE — PROGRESS NOTES
Pt here today for cadd dc  Tolerated well no adverse reactions  Declined AVS and is aware of next infusion appointment and is aware to get labs within 7 days of infusion tx  Pt left ambulatory with steady gait

## 2022-05-09 DIAGNOSIS — T45.1X5A CHEMOTHERAPY INDUCED NAUSEA AND VOMITING: ICD-10-CM

## 2022-05-09 DIAGNOSIS — R11.2 CHEMOTHERAPY INDUCED NAUSEA AND VOMITING: ICD-10-CM

## 2022-05-09 RX ORDER — ONDANSETRON 8 MG/1
8 TABLET, ORALLY DISINTEGRATING ORAL EVERY 8 HOURS PRN
Qty: 60 TABLET | Refills: 0 | Status: SHIPPED | OUTPATIENT
Start: 2022-05-09

## 2022-05-09 NOTE — TELEPHONE ENCOUNTER
Primary palliative medicine provider: Felicita Kolb    Medication requested: Zofran 8 mg     If for pain, how has the patient been taking their pain medicine?       Last appointment: 3 8  Next scheduled appointment:  6 15    PDMP review:    NA

## 2022-05-10 DIAGNOSIS — C25.9 PANCREATIC ADENOCARCINOMA (HCC): Primary | ICD-10-CM

## 2022-05-15 LAB
ALBUMIN SERPL-MCNC: 3.5 G/DL (ref 3.6–5.1)
ALBUMIN/GLOB SERPL: 1.8 (CALC) (ref 1–2.5)
ALP SERPL-CCNC: 109 U/L (ref 35–144)
ALT SERPL-CCNC: 17 U/L (ref 9–46)
AST SERPL-CCNC: 14 U/L (ref 10–35)
BASOPHILS # BLD AUTO: 10 CELLS/UL (ref 0–200)
BASOPHILS NFR BLD AUTO: 0.4 %
BILIRUB SERPL-MCNC: 0.7 MG/DL (ref 0.2–1.2)
BUN SERPL-MCNC: 20 MG/DL (ref 7–25)
BUN/CREAT SERPL: 31 (CALC) (ref 6–22)
CALCIUM SERPL-MCNC: 8.5 MG/DL (ref 8.6–10.3)
CHLORIDE SERPL-SCNC: 103 MMOL/L (ref 98–110)
CO2 SERPL-SCNC: 27 MMOL/L (ref 20–32)
CREAT SERPL-MCNC: 0.65 MG/DL (ref 0.7–1.18)
EOSINOPHIL # BLD AUTO: 10 CELLS/UL (ref 15–500)
EOSINOPHIL NFR BLD AUTO: 0.4 %
ERYTHROCYTE [DISTWIDTH] IN BLOOD BY AUTOMATED COUNT: 14 % (ref 11–15)
GLOBULIN SER CALC-MCNC: 2 G/DL (CALC) (ref 1.9–3.7)
GLUCOSE SERPL-MCNC: 206 MG/DL (ref 65–99)
HCT VFR BLD AUTO: 30.7 % (ref 38.5–50)
HGB BLD-MCNC: 10.3 G/DL (ref 13.2–17.1)
LYMPHOCYTES # BLD AUTO: 762 CELLS/UL (ref 850–3900)
LYMPHOCYTES NFR BLD AUTO: 29.3 %
MCH RBC QN AUTO: 28.8 PG (ref 27–33)
MCHC RBC AUTO-ENTMCNC: 33.6 G/DL (ref 32–36)
MCV RBC AUTO: 85.8 FL (ref 80–100)
MONOCYTES # BLD AUTO: 322 CELLS/UL (ref 200–950)
MONOCYTES NFR BLD AUTO: 12.4 %
NEUTROPHILS # BLD AUTO: 1495 CELLS/UL (ref 1500–7800)
NEUTROPHILS NFR BLD AUTO: 57.5 %
PLATELET # BLD AUTO: 188 THOUSAND/UL (ref 140–400)
PMV BLD REES-ECKER: 10.5 FL (ref 7.5–12.5)
POTASSIUM SERPL-SCNC: 3.4 MMOL/L (ref 3.5–5.3)
PROT SERPL-MCNC: 5.5 G/DL (ref 6.1–8.1)
RBC # BLD AUTO: 3.58 MILLION/UL (ref 4.2–5.8)
SL AMB EGFR AFRICAN AMERICAN: 107 ML/MIN/1.73M2
SL AMB EGFR NON AFRICAN AMERICAN: 93 ML/MIN/1.73M2
SODIUM SERPL-SCNC: 137 MMOL/L (ref 135–146)
WBC # BLD AUTO: 2.6 THOUSAND/UL (ref 3.8–10.8)

## 2022-05-17 ENCOUNTER — HOSPITAL ENCOUNTER (OUTPATIENT)
Dept: INFUSION CENTER | Facility: HOSPITAL | Age: 82
Discharge: HOME | End: 2022-05-17
Payer: COMMERCIAL

## 2022-05-17 VITALS
HEART RATE: 72 BPM | TEMPERATURE: 97.7 F | WEIGHT: 135.14 LBS | BODY MASS INDEX: 21.21 KG/M2 | RESPIRATION RATE: 16 BRPM | OXYGEN SATURATION: 98 % | SYSTOLIC BLOOD PRESSURE: 94 MMHG | HEIGHT: 67 IN | DIASTOLIC BLOOD PRESSURE: 59 MMHG

## 2022-05-17 DIAGNOSIS — C25.9 PANCREATIC ADENOCARCINOMA (HCC): Primary | ICD-10-CM

## 2022-05-17 PROCEDURE — 96417 CHEMO IV INFUS EACH ADDL SEQ: CPT

## 2022-05-17 PROCEDURE — 96375 TX/PRO/DX INJ NEW DRUG ADDON: CPT

## 2022-05-17 PROCEDURE — 96368 THER/DIAG CONCURRENT INF: CPT

## 2022-05-17 PROCEDURE — 96413 CHEMO IV INFUSION 1 HR: CPT

## 2022-05-17 PROCEDURE — 96415 CHEMO IV INFUSION ADDL HR: CPT

## 2022-05-17 PROCEDURE — G0498 CHEMO EXTEND IV INFUS W/PUMP: HCPCS

## 2022-05-17 PROCEDURE — 96367 TX/PROPH/DG ADDL SEQ IV INF: CPT

## 2022-05-17 PROCEDURE — 96411 CHEMO IV PUSH ADDL DRUG: CPT

## 2022-05-17 RX ORDER — DEXTROSE MONOHYDRATE 50 MG/ML
20 INJECTION, SOLUTION INTRAVENOUS ONCE
Status: COMPLETED | OUTPATIENT
Start: 2022-05-17 | End: 2022-05-17

## 2022-05-17 RX ORDER — SODIUM CHLORIDE 9 MG/ML
20 INJECTION, SOLUTION INTRAVENOUS ONCE AS NEEDED
Status: DISCONTINUED | OUTPATIENT
Start: 2022-05-17 | End: 2022-05-20 | Stop reason: HOSPADM

## 2022-05-17 RX ORDER — FLUOROURACIL 50 MG/ML
400 INJECTION, SOLUTION INTRAVENOUS ONCE
Status: COMPLETED | OUTPATIENT
Start: 2022-05-17 | End: 2022-05-17

## 2022-05-17 RX ORDER — ATROPINE SULFATE 1 MG/ML
0.25 INJECTION, SOLUTION INTRAVENOUS ONCE AS NEEDED
Status: DISCONTINUED | OUTPATIENT
Start: 2022-05-17 | End: 2022-05-20 | Stop reason: HOSPADM

## 2022-05-17 RX ORDER — ATROPINE SULFATE 1 MG/ML
0.25 INJECTION, SOLUTION INTRAVENOUS ONCE
Status: COMPLETED | OUTPATIENT
Start: 2022-05-17 | End: 2022-05-17

## 2022-05-17 RX ADMIN — ATROPINE SULFATE 0.25 MG: 1 INJECTION, SOLUTION INTRAMUSCULAR; INTRAVENOUS; SUBCUTANEOUS at 11:31

## 2022-05-17 RX ADMIN — LEUCOVORIN CALCIUM 700 MG: 200 INJECTION, POWDER, LYOPHILIZED, FOR SUSPENSION INTRAMUSCULAR; INTRAVENOUS at 11:35

## 2022-05-17 RX ADMIN — FLUOROURACIL 690 MG: 50 INJECTION, SOLUTION INTRAVENOUS at 13:14

## 2022-05-17 RX ADMIN — SODIUM CHLORIDE 20 ML/HR: 9 INJECTION, SOLUTION INTRAVENOUS at 08:55

## 2022-05-17 RX ADMIN — OXALIPLATIN 112.45 MG: 5 INJECTION, SOLUTION INTRAVENOUS at 09:33

## 2022-05-17 RX ADMIN — DEXTROSE 20 ML/HR: 50 INJECTION, SOLUTION INTRAVENOUS at 11:00

## 2022-05-17 RX ADMIN — DEXTROSE 220 MG: 5 SOLUTION INTRAVENOUS at 11:39

## 2022-05-17 RX ADMIN — DEXAMETHASONE SODIUM PHOSPHATE: 10 INJECTION, SOLUTION INTRAMUSCULAR; INTRAVENOUS at 08:55

## 2022-05-17 NOTE — PROGRESS NOTES
Pt tolerated treatment with no adv reactions; Elastometric pump connected with all clamps and connections taped; pump running; pt aware of next appt; left unit ambulatory with steady gait

## 2022-05-18 ENCOUNTER — OFFICE VISIT (OUTPATIENT)
Dept: HEMATOLOGY ONCOLOGY | Facility: HOSPITAL | Age: 82
End: 2022-05-18
Payer: COMMERCIAL

## 2022-05-18 VITALS
BODY MASS INDEX: 21.35 KG/M2 | HEIGHT: 67 IN | TEMPERATURE: 97.8 F | RESPIRATION RATE: 14 BRPM | HEART RATE: 80 BPM | DIASTOLIC BLOOD PRESSURE: 58 MMHG | WEIGHT: 136 LBS | OXYGEN SATURATION: 99 % | SYSTOLIC BLOOD PRESSURE: 102 MMHG

## 2022-05-18 DIAGNOSIS — C25.9 PANCREATIC ADENOCARCINOMA (HCC): Primary | ICD-10-CM

## 2022-05-18 PROCEDURE — 99215 OFFICE O/P EST HI 40 MIN: CPT | Performed by: INTERNAL MEDICINE

## 2022-05-18 NOTE — PATIENT INSTRUCTIONS
You are getting a chemotherapy called oxaliplatin  This can make you very sensitive to cold for the the first 2-3 days after receiving it  Avoid drinking cold liquids and avoid putting your hands in the freezer or refrigerator without gloves  Try to drink 6, 12 oz bottles of water or flavored water

## 2022-05-18 NOTE — PROGRESS NOTES
HEMATOLOGY / [address] FOLLOW UP NOTE    Primary Care Provider: Erin Henriquez  Referring Provider:    MRN: [id number]  : 1940    Reason for Encounter: follow up pancreatic cancer cycle 3 FOLFIRINOX       Oncology History:    3/2022 - locally advanced, unresectable adenocarcinoma of the pancreas    2022 - start FOLFIRINOX with 20% dose reduction in oxaliplatin and irinotecan due to age       Interval History: patient presents for evaluation during cycle 3 of FOLFIRINOX  He got treated yesterday and has his pump disconnect tomorrow  He has done remarkably well  His BP is low, but he is not symptomatic from it  He knows that he is not drinking enough fluids and doesn't like to drink plain water  He is controlling diarrhea with immodium  No neuropathy  Nausea is controlled with zofran at home  No hand foot syndrome or mouth sores  His pain from the cancer is controlled with tramadol  No fevers  No falls  His weight is stable  REVIEW OF SYSTEMS:  Please note that a 14-point review of systems was performed to include Constitutional, HEENT, Respiratory, CVS, GI, , Musculoskeletal, Integumentary, Neurologic, Rheumatologic, Endocrinologic, Psychiatric, Lymphatic, and Hematologic/Oncologic systems were reviewed and are negative unless otherwise stated in HPI  Positive and negative findings pertinent to this evaluation are incorporated into the history of present illness  ECOG PS: 0    PROBLEM LIST:  Patient Active Problem List:    Type 2 diabetes mellitus without complication, with long-term current use of insulin (HCC)    GERD (gastroesophageal reflux disease)    Pancreatic mass    History of pulmonary embolism    Hyperbilirubinemia    Anxiety about health    Pancreatic adenocarcinoma (HCC)      Assessment / Plan: 81 yo male with unresectable pancreatic cancer  We will plan on abdominal imaging after 6 doses of FOLFIRINOX  He is tolerating it very well    I am going to add 1 L IVF on treatment day and pump disconnect day to help him with hydration  We also talked about different flavored water he could try  His ANC was only 1 3 prior to this cycle, so I will add neulasta on pump disconnect days as well  I will see him prior to his next dose in 2 weeks  he knows to call in the interim with any questions or concerns  I spent 40 minutes on chart review, face to face counseling time, coordination of care and documentation  Past Medical History:  has a past medical history of Ambulates with cane, Anxiety, Depression, Diabetes mellitus ([address] ), GERD (gastroesophageal reflux disease), History of pulmonary embolus (PE), Hyperlipidemia, Multiple thyroid nodules, and Pancreatic mass  PAST SURGICAL HISTORY:  has a past surgical history that includes Cataract extraction (Right); Appendectomy; Tunneled venous port placement (Right, 4/11/2022); and FL guided central venous access device insertion (4/11/2022)  CURRENT MEDICATIONS  Current Outpatient Medications:  Alcohol Swabs 70 % PADS, May substitute brand based on insurance coverage  Check glucose BID , Disp: 100 each, Rfl: 0  ALPRAZolam (XANAX) 0 5 mg tablet, Take 0 5 tablets (0 25 mg total) by mouth 3 (three) times a day as needed for anxiety, Disp: 90 tablet, Rfl: 0  apixaban (ELIQUIS) 5 mg, Take 5 mg by mouth 2 (two) times a day, Disp: , Rfl:   Blood Glucose Monitoring Suppl (OneTouch Verio Reflect) w/Device KIT, May substitute brand based on insurance coverage  Check glucose BID , Disp: 1 kit, Rfl: 0  fluorouracil 4,150 mg in CADD/Elastomeric Infusion Device, Infuse 4,150 mg (1,200 mg/m2/day x 1 73 m2) into a venous catheter over 46 hours for 2 days  Do not start before May 17, 2022 , Disp: 1 each, Rfl: 0  glucose blood (OneTouch Verio) test strip, May substitute brand based on insurance coverage   Check glucose BID , Disp: 100 each, Rfl: 0  metFORMIN (GLUCOPHAGE) 1000 MG tablet, Take 1,000 mg by mouth 2 (two) times a day with meals  , Disp: , Rfl:   omeprazole (PriLOSEC) 40 MG capsule, Take 40 mg by mouth every evening  , Disp: , Rfl:   ondansetron (Zofran ODT) 8 mg disintegrating tablet, Take 1 tablet (8 mg total) by mouth every 8 (eight) hours as needed for nausea or vomiting, Disp: 60 tablet, Rfl: 0  OneTouch Delica Lancets 03B MISC, May substitute brand based on insurance coverage  Check glucose BID , Disp: 100 each, Rfl: 0  traMADol (ULTRAM) 50 mg tablet, Take 1 tablet (50 mg total) by mouth every 6 (six) hours as needed for moderate pain, Disp: 60 tablet, Rfl: 0  polyethylene glycol (MIRALAX) 17 g packet, Take 17 g by mouth daily (Patient not taking: No sig reported), Disp: , Rfl:     No current facility-administered medications for this visit  Facility-Administered Medications Ordered in Other Visits:  Atropine Sulfate injection 0 25 mg, 0 25 mg, Intravenous, Once PRN, Nena Nair MD  sodium chloride 0 9 % infusion, 20 mL/hr, Intravenous, Once PRN, Nena Nair MD, Stopped at 05/17/22 1320      [unfilled]    SOCIAL HISTORY:  reports that he has quit smoking  He has never used smokeless tobacco  He reports current alcohol use  He reports that he does not use drugs  FAMILY HISTORY:  family history includes Alcohol abuse in his mother; Diabetes in his father  ALLERGIES:  is allergic to aleve [naproxen]  Physical Exam:  Vital Signs:   /58 (BP Location: Left arm, Patient Position: Sitting, Cuff Size: Adult)   Pulse 80   Temp 97 8 °F (36 6 °C) (Tympanic)   Resp 14   Ht 5' 7" (1 702 m)   Wt 61 7 kg (136 lb)   SpO2 99%   BMI 21 30 kg/m²   Smoking Status Former Smoker   BSA 1 72 m²   Body mass index is 21 3 kg/m²  Body surface area is 1 72 meters squared  GEN: Alert, awake oriented x3, in no acute distress  HEENT- No pallor, icterus, cyanosis, no oral mucosal lesions,   LAD - no palpable cervical, clavicle, axillary, inguinal LAD  Heart- normal S1 S2, regular rate and rhythm, No murmur, rubs  Lungs- clear breathing sound bilateral    Abdomen- soft, Non tender, bowel sounds present  Extremities- No cyanosis, clubbing, edema  Neuro- No focal neurological deficit    Labs:  Lab Results      Component                Value               Date                      WBC                      2 6 (L)             05/14/2022                HGB                      10 3 (L)            05/14/2022                HCT                      30 7 (L)            05/14/2022                MCV                      85 8                05/14/2022                PLT                      188                 05/14/2022            Lab Results      Component                Value               Date                      SODIUM                   137                 05/14/2022                K                        3 4 (L)             05/14/2022                CL                       103                 05/14/2022                CO2                      27                  05/14/2022                AGAP                     8                   05/03/2022                BUN                      20                  05/14/2022                CREATININE               0 65 (L)            05/14/2022                GLUC                     206 (H)             05/14/2022                CALCIUM                  8 5 (L)             05/14/2022                AST                      14                  05/14/2022                ALT                      17                  05/14/2022                ALKPHOS                  109                 05/14/2022                TP                       5 5 (L)             05/14/2022                TBILI                    0 7                 05/14/2022                EGFR                     80                  05/03/2022

## 2022-05-19 ENCOUNTER — HOSPITAL ENCOUNTER (OUTPATIENT)
Dept: INFUSION CENTER | Facility: HOSPITAL | Age: 82
Discharge: HOME | End: 2022-05-19
Payer: COMMERCIAL

## 2022-05-19 VITALS — TEMPERATURE: 97.9 F

## 2022-05-19 DIAGNOSIS — C25.9 PANCREATIC ADENOCARCINOMA (HCC): Primary | ICD-10-CM

## 2022-05-19 PROCEDURE — 96360 HYDRATION IV INFUSION INIT: CPT

## 2022-05-19 RX ADMIN — SODIUM CHLORIDE 1000 ML: 0.9 INJECTION, SOLUTION INTRAVENOUS at 12:29

## 2022-05-19 NOTE — PROGRESS NOTES
Pt arrived amb for pump d/c  Offers no c/o  Port de-accessed, dsd applied  Orders released for Neulasta, Onpro not ordered, only regular Neulasta shot  Pt needs to get it tomorrow  Also Hydration ordered for today  Port re-accessed, Hydration infused over 1 hr   Gave pt his pump bag to bring back with him in 2 wks for his next chemo visit  Port de-accessed, dsd applied  Neulasta not given, because it was not Onpro and regular Neulasta states it can't be given before 24hrs after completion of chemo  Another appt made for pt for tomorrow for Neulasta injection  Updated schedule will be given to him tomorrow with all his day 4 appts  Disch amb to home, steady gait

## 2022-05-20 ENCOUNTER — HOSPITAL ENCOUNTER (OUTPATIENT)
Dept: INFUSION CENTER | Facility: HOSPITAL | Age: 82
Discharge: HOME | End: 2022-05-20
Payer: COMMERCIAL

## 2022-05-20 DIAGNOSIS — C25.9 PANCREATIC ADENOCARCINOMA (HCC): Primary | ICD-10-CM

## 2022-05-20 PROCEDURE — 96372 THER/PROPH/DIAG INJ SC/IM: CPT

## 2022-05-20 RX ADMIN — PEGFILGRASTIM 6 MG: 6 INJECTION SUBCUTANEOUS at 15:31

## 2022-05-20 NOTE — PROGRESS NOTES
Pt tolerated Neulasta injection in [facility] with no complications  Pt educated on s/s to report to MD  Pt left unit ambulatory with steady gait  AVS printed and given to pt

## 2022-05-26 ENCOUNTER — TELEPHONE (OUTPATIENT)
Dept: HEMATOLOGY ONCOLOGY | Facility: CLINIC | Age: 82
End: 2022-05-26

## 2022-05-26 PROBLEM — D70.1 CHEMOTHERAPY INDUCED NEUTROPENIA (HCC): Status: ACTIVE | Noted: 2022-05-26

## 2022-05-26 PROBLEM — T45.1X5A CHEMOTHERAPY INDUCED NEUTROPENIA (HCC): Status: ACTIVE | Noted: 2022-05-26

## 2022-05-27 ENCOUNTER — TELEPHONE (OUTPATIENT)
Dept: HEMATOLOGY ONCOLOGY | Facility: CLINIC | Age: 82
End: 2022-05-27

## 2022-05-27 ENCOUNTER — TELEMEDICINE (OUTPATIENT)
Dept: HEMATOLOGY ONCOLOGY | Facility: CLINIC | Age: 82
End: 2022-05-27
Payer: COMMERCIAL

## 2022-05-27 DIAGNOSIS — C25.9 PANCREATIC ADENOCARCINOMA (HCC): ICD-10-CM

## 2022-05-27 DIAGNOSIS — D70.1 CHEMOTHERAPY INDUCED NEUTROPENIA (HCC): Primary | ICD-10-CM

## 2022-05-27 DIAGNOSIS — T45.1X5A CHEMOTHERAPY INDUCED NEUTROPENIA (HCC): Primary | ICD-10-CM

## 2022-05-27 DIAGNOSIS — K86.89 PANCREATIC MASS: Primary | ICD-10-CM

## 2022-05-27 PROCEDURE — 99441 PR PHYS/QHP TELEPHONE EVALUATION 5-10 MIN: CPT | Performed by: INTERNAL MEDICINE

## 2022-05-27 RX ORDER — FLUOROURACIL 50 MG/ML
400 INJECTION, SOLUTION INTRAVENOUS ONCE
Status: CANCELLED | OUTPATIENT
Start: 2022-05-31

## 2022-05-27 RX ORDER — DEXTROSE MONOHYDRATE 50 MG/ML
20 INJECTION, SOLUTION INTRAVENOUS ONCE
Status: CANCELLED | OUTPATIENT
Start: 2022-05-31

## 2022-05-27 RX ORDER — ATROPINE SULFATE 1 MG/ML
0.25 INJECTION, SOLUTION INTRAVENOUS ONCE AS NEEDED
Status: CANCELLED | OUTPATIENT
Start: 2022-05-31

## 2022-05-27 RX ORDER — ATROPINE SULFATE 1 MG/ML
0.25 INJECTION, SOLUTION INTRAVENOUS ONCE
Status: CANCELLED | OUTPATIENT
Start: 2022-05-31

## 2022-05-27 RX ORDER — SODIUM CHLORIDE 9 MG/ML
20 INJECTION, SOLUTION INTRAVENOUS ONCE AS NEEDED
Status: CANCELLED | OUTPATIENT
Start: 2022-05-31

## 2022-05-27 NOTE — TELEPHONE ENCOUNTER
CALL TRANSFER   Reason for patient call? Patient waiting for virtual appmt with Dr David Walsh    Patient's primary physician? Kala Hatch RN call was transferred to and time it was transferred? Alyce Billings 8:52AM   Informed patient that the message will be forwarded to the team and someone will get back to them as soon as possible    Did you relay this information to the patient?  yes

## 2022-05-27 NOTE — PROGRESS NOTES
HEMATOLOGY / ONCOLOGY CLINIC FOLLOW UP NOTE TELEMEDICINE    Primary Care Provider: Jayson Ruiz  Referring Provider:    MRN: [id number]  : 1940    Reason for Encounter: evaluation prior to cycle 4 of FOLFIRINOX for unresectable pancreatic cancer    Oncology History Overview Note   3/2022 - locally advanced, unresectable adenocarcinoma of the pancreas       2022 - start FOLFIRINOX with 20% dose reduction in oxaliplatin and    irinotecan due to age      Interval History: patient presents for evaluation prior to cycle 4 of FOLFIRINOX  The memorial day holiday made it difficult for us to do an in person visit in [facility] before his treatment on Tuesday next week  He felt that the additional liter of fluid added at the pump disconnect made him feel better  He is having 4 episodes of diarrhea per day  He is controlling it with imodium  I also added growth factor for that last cycle  It didn't cause him any side effects  No fevers or symptoms of infection  His abdominal pain has improved over time  No neuropathy  REVIEW OF SYSTEMS:  Please note that a 14-point review of systems was performed to include Constitutional, HEENT, Respiratory, CVS, GI, , Musculoskeletal, Integumentary, Neurologic, Rheumatologic, Endocrinologic, Psychiatric, Lymphatic, and Hematologic/Oncologic systems were reviewed and are negative unless otherwise stated in HPI  Positive and negative findings pertinent to this evaluation are incorporated into the history of present illness        ECOG PS: 0    PROBLEM LIST:  Patient Active Problem List:    Type 2 diabetes mellitus without complication, with long-term current use of insulin (HCC)    GERD (gastroesophageal reflux disease)    Pancreatic mass    History of pulmonary embolism    Hyperbilirubinemia    Anxiety about health    Pancreatic adenocarcinoma (HCC)    Chemotherapy induced neutropenia ([address] [facility] [address] )      Assessment / Plan: patient is doing remarkable well with FOLFIRINOX  We will continue with cycle 4 without further dose reductions  He will continue with growth factor on his pump disconnect day  He will also get 1L NS on treatment and pump disconnect day going forward  We are planning on getting new scans after cycle 6   he knows to call in the interim with any questions or concerns  I spent 10 minutes on chart review, coordination of care and documentation  Past Medical History:  has a past medical history of Ambulates with cane, Anxiety, Depression, Diabetes mellitus ([address] ), GERD (gastroesophageal reflux disease), History of pulmonary embolus (PE), Hyperlipidemia, Multiple thyroid nodules, and Pancreatic mass  PAST SURGICAL HISTORY:  has a past surgical history that includes Cataract extraction (Right); Appendectomy; Tunneled venous port placement (Right, 4/11/2022); and FL guided central venous access device insertion (4/11/2022)  CURRENT MEDICATIONS  Current Outpatient Medications:  Alcohol Swabs 70 % PADS, May substitute brand based on insurance coverage  Check glucose BID , Disp: 100 each, Rfl: 0  ALPRAZolam (XANAX) 0 5 mg tablet, Take 0 5 tablets (0 25 mg total) by mouth 3 (three) times a day as needed for anxiety, Disp: 90 tablet, Rfl: 0  apixaban (ELIQUIS) 5 mg, Take 5 mg by mouth 2 (two) times a day, Disp: , Rfl:   Blood Glucose Monitoring Suppl (OneTouch Verio Reflect) w/Device KIT, May substitute brand based on insurance coverage  Check glucose BID , Disp: 1 kit, Rfl: 0  glucose blood (OneTouch Verio) test strip, May substitute brand based on insurance coverage   Check glucose BID , Disp: 100 each, Rfl: 0  metFORMIN (GLUCOPHAGE) 1000 MG tablet, Take 1,000 mg by mouth 2 (two) times a day with meals  , Disp: , Rfl:   omeprazole (PriLOSEC) 40 MG capsule, Take 40 mg by mouth every evening  , Disp: , Rfl:   ondansetron (Zofran ODT) 8 mg disintegrating tablet, Take 1 tablet (8 mg total) by mouth every 8 (eight) hours as needed for nausea or vomiting, Disp: 60 tablet, Rfl: 0  OneTouch Delica Lancets 90W MISC, May substitute brand based on insurance coverage  Check glucose BID , Disp: 100 each, Rfl: 0  traMADol (ULTRAM) 50 mg tablet, Take 1 tablet (50 mg total) by mouth every 6 (six) hours as needed for moderate pain, Disp: 60 tablet, Rfl: 0  [START ON 5/31/2022] fluorouracil 4,150 mg in CADD/Elastomeric Infusion Device, Infuse 4,150 mg (1,200 mg/m2/day x 1 73 m2) into a venous catheter over 46 hours for 2 days  Do not start before May 31, 2022 , Disp: 1 each, Rfl: 0  polyethylene glycol (MIRALAX) 17 g packet, Take 17 g by mouth daily (Patient not taking: No sig reported), Disp: , Rfl:     No current facility-administered medications for this visit  SOCIAL HISTORY:  reports that he has quit smoking  He has never used smokeless tobacco  He reports current alcohol use  He reports that he does not use drugs  FAMILY HISTORY:  family history includes Alcohol abuse in his mother; Diabetes in his father  ALLERGIES:  is allergic to aleve [naproxen]        Labs:  Lab Results      Component                Value               Date                      WBC                      2 6 (L)             05/14/2022                HGB                      10 3 (L)            05/14/2022                HCT                      30 7 (L)            05/14/2022                MCV                      85 8                05/14/2022                PLT                      188                 05/14/2022            Lab Results      Component                Value               Date                      SODIUM                   137                 05/14/2022                K                        3 4 (L)             05/14/2022                CL                       103                 05/14/2022                CO2                      27                  05/14/2022                AGAP                     8                   05/03/2022                BUN                      20 05/14/2022                CREATININE               0 65 (L)            05/14/2022                GLUC                     206 (H)             05/14/2022                CALCIUM                  8 5 (L)             05/14/2022                AST                      14                  05/14/2022                ALT                      17                  05/14/2022                ALKPHOS                  109                 05/14/2022                TP                       5 5 (L)             05/14/2022                TBILI                    0 7                 05/14/2022                EGFR                     80                  05/03/2022                  REQUIRED DOCUMENTATION:     1  This service was provided via Telemedicine  2  Provider located at [facility] onc office in [place]  3  TeleMed provider: Judith Moreau DO   4  Identify all parties in room with patient during tele consult:  Patient only    5  Patient was then informed that this was a Telemedicine visit and that the exam was being conducted confidentially over secure lines  I informed the patient that I have reviewed their record in Epic and presented the opportunity for them to ask any questions regarding the visit today  The patient agreed to participate

## 2022-05-30 ENCOUNTER — TELEPHONE (OUTPATIENT)
Dept: HEMATOLOGY ONCOLOGY | Facility: CLINIC | Age: 82
End: 2022-05-30

## 2022-05-30 ENCOUNTER — TELEPHONE (OUTPATIENT)
Dept: OTHER | Facility: OTHER | Age: 82
End: 2022-05-30

## 2022-05-30 LAB
ALBUMIN SERPL-MCNC: 3.4 G/DL (ref 3.6–5.1)
ALBUMIN/GLOB SERPL: 1.5 (CALC) (ref 1–2.5)
ALP SERPL-CCNC: 197 U/L (ref 35–144)
ALT SERPL-CCNC: 25 U/L (ref 9–46)
AST SERPL-CCNC: 16 U/L (ref 10–35)
BASOPHILS # BLD AUTO: 0 CELLS/UL (ref 0–200)
BASOPHILS NFR BLD AUTO: 0 %
BILIRUB SERPL-MCNC: 0.7 MG/DL (ref 0.2–1.2)
BUN SERPL-MCNC: 12 MG/DL (ref 7–25)
BUN/CREAT SERPL: 19 (CALC) (ref 6–22)
CALCIUM SERPL-MCNC: 8.6 MG/DL (ref 8.6–10.3)
CHLORIDE SERPL-SCNC: 103 MMOL/L (ref 98–110)
CO2 SERPL-SCNC: 24 MMOL/L (ref 20–32)
CREAT SERPL-MCNC: 0.63 MG/DL (ref 0.7–1.18)
EOSINOPHIL # BLD AUTO: 0 CELLS/UL (ref 15–500)
EOSINOPHIL NFR BLD AUTO: 0 %
ERYTHROCYTE [DISTWIDTH] IN BLOOD BY AUTOMATED COUNT: 15.5 % (ref 11–15)
GLOBULIN SER CALC-MCNC: 2.3 G/DL (CALC) (ref 1.9–3.7)
GLUCOSE SERPL-MCNC: 140 MG/DL (ref 65–99)
HCT VFR BLD AUTO: 31.5 % (ref 38.5–50)
HGB BLD-MCNC: 10.7 G/DL (ref 13.2–17.1)
LYMPHOCYTES # BLD MANUAL: 1037 CELLS/UL (ref 850–3900)
LYMPHOCYTES NFR BLD AUTO: 9.1 %
MCH RBC QN AUTO: 29.7 PG (ref 27–33)
MCHC RBC AUTO-ENTMCNC: 34 G/DL (ref 32–36)
MCV RBC AUTO: 87.5 FL (ref 80–100)
MONOCYTES # BLD AUTO: 581 CELLS/UL (ref 200–950)
MONOCYTES NFR BLD AUTO: 5.1 %
MYELOCYTES # BLD: 228 CELLS/UL
MYELOCYTES NFR BLD MANUAL: 2 %
NEUTROPHILS # BLD AUTO: 9211 CELLS/UL (ref 1500–7800)
NEUTROPHILS NFR BLD AUTO: 80.8 %
NEUTS BAND # BLD: 342 CELLS/UL (ref 0–750)
NEUTS BAND NFR BLD MANUAL: 3 %
PLATELET # BLD AUTO: 193 THOUSAND/UL (ref 140–400)
PMV BLD REES-ECKER: 11.2 FL (ref 7.5–12.5)
POTASSIUM SERPL-SCNC: 2.7 MMOL/L (ref 3.5–5.3)
PROT SERPL-MCNC: 5.7 G/DL (ref 6.1–8.1)
RBC # BLD AUTO: 3.6 MILLION/UL (ref 4.2–5.8)
SL AMB EGFR AFRICAN AMERICAN: 109 ML/MIN/1.73M2
SL AMB EGFR NON AFRICAN AMERICAN: 94 ML/MIN/1.73M2
SODIUM SERPL-SCNC: 138 MMOL/L (ref 135–146)
WBC # BLD AUTO: 11.4 THOUSAND/UL (ref 3.8–10.8)

## 2022-05-30 NOTE — TELEPHONE ENCOUNTER
Spoke with patient  Lab called this AM ([facility]) about potassium 2 7  he states he is eating and drinking well  He continues with diarrhea, even on week off chemo  He is taking 4 imodium a day and still having 2-3 episodes of diarrhea per day  He feels well otherwise though  Will send KCL 40 mEq to pharmacy  He is instructed he needs to start taking today

## 2022-05-30 NOTE — TELEPHONE ENCOUNTER
Lab Result: Potassium 2 7   Date/Time Drawn: 5 28 22 @ 1008 am   Ordering Provider: Dr Jasmin Newsome Name: Yury De Leon following critical/stat result was read back to the lab as stated above and [employer] to the on-call provider

## 2022-05-31 ENCOUNTER — HOSPITAL ENCOUNTER (OUTPATIENT)
Dept: INFUSION CENTER | Facility: HOSPITAL | Age: 82
Discharge: HOME | End: 2022-05-31
Payer: COMMERCIAL

## 2022-05-31 ENCOUNTER — TELEPHONE (OUTPATIENT)
Dept: HEMATOLOGY ONCOLOGY | Facility: CLINIC | Age: 82
End: 2022-05-31

## 2022-05-31 VITALS
BODY MASS INDEX: 20.48 KG/M2 | RESPIRATION RATE: 16 BRPM | HEIGHT: 67 IN | DIASTOLIC BLOOD PRESSURE: 71 MMHG | SYSTOLIC BLOOD PRESSURE: 118 MMHG | WEIGHT: 130.51 LBS | HEART RATE: 77 BPM | TEMPERATURE: 98.8 F | OXYGEN SATURATION: 100 %

## 2022-05-31 DIAGNOSIS — T45.1X5A CHEMOTHERAPY INDUCED NEUTROPENIA (HCC): ICD-10-CM

## 2022-05-31 DIAGNOSIS — D70.1 CHEMOTHERAPY INDUCED NEUTROPENIA (HCC): ICD-10-CM

## 2022-05-31 DIAGNOSIS — E87.6 HYPOKALEMIA: Primary | ICD-10-CM

## 2022-05-31 DIAGNOSIS — C25.9 PANCREATIC ADENOCARCINOMA (HCC): Primary | ICD-10-CM

## 2022-05-31 DIAGNOSIS — E87.6 HYPOKALEMIA: ICD-10-CM

## 2022-05-31 PROCEDURE — G0498 CHEMO EXTEND IV INFUS W/PUMP: HCPCS

## 2022-05-31 PROCEDURE — 96417 CHEMO IV INFUS EACH ADDL SEQ: CPT

## 2022-05-31 PROCEDURE — 96415 CHEMO IV INFUSION ADDL HR: CPT

## 2022-05-31 PROCEDURE — 96411 CHEMO IV PUSH ADDL DRUG: CPT

## 2022-05-31 PROCEDURE — 96361 HYDRATE IV INFUSION ADD-ON: CPT

## 2022-05-31 PROCEDURE — 96413 CHEMO IV INFUSION 1 HR: CPT

## 2022-05-31 PROCEDURE — 96375 TX/PRO/DX INJ NEW DRUG ADDON: CPT

## 2022-05-31 PROCEDURE — 96368 THER/DIAG CONCURRENT INF: CPT

## 2022-05-31 PROCEDURE — 96367 TX/PROPH/DG ADDL SEQ IV INF: CPT

## 2022-05-31 RX ORDER — DEXTROSE MONOHYDRATE 50 MG/ML
20 INJECTION, SOLUTION INTRAVENOUS ONCE
Status: COMPLETED | OUTPATIENT
Start: 2022-05-31 | End: 2022-05-31

## 2022-05-31 RX ORDER — FLUOROURACIL 50 MG/ML
320 INJECTION, SOLUTION INTRAVENOUS ONCE
Status: CANCELLED | OUTPATIENT
Start: 2022-05-31

## 2022-05-31 RX ORDER — FLUOROURACIL 50 MG/ML
320 INJECTION, SOLUTION INTRAVENOUS ONCE
Status: COMPLETED | OUTPATIENT
Start: 2022-05-31 | End: 2022-05-31

## 2022-05-31 RX ORDER — POTASSIUM CHLORIDE 29.8 MG/ML
40 INJECTION INTRAVENOUS ONCE
Status: CANCELLED
Start: 2022-06-02

## 2022-05-31 RX ORDER — ATROPINE SULFATE 1 MG/ML
0.25 INJECTION, SOLUTION INTRAVENOUS ONCE
Status: DISCONTINUED | OUTPATIENT
Start: 2022-05-31 | End: 2022-06-03 | Stop reason: HOSPADM

## 2022-05-31 RX ORDER — SODIUM CHLORIDE 9 MG/ML
20 INJECTION, SOLUTION INTRAVENOUS ONCE AS NEEDED
Status: DISCONTINUED | OUTPATIENT
Start: 2022-05-31 | End: 2022-06-03 | Stop reason: HOSPADM

## 2022-05-31 RX ORDER — ATROPINE SULFATE 1 MG/ML
0.25 INJECTION, SOLUTION INTRAVENOUS ONCE AS NEEDED
Status: DISCONTINUED | OUTPATIENT
Start: 2022-05-31 | End: 2022-06-03 | Stop reason: HOSPADM

## 2022-05-31 RX ADMIN — OXALIPLATIN 112.45 MG: 5 INJECTION, SOLUTION INTRAVENOUS at 10:40

## 2022-05-31 RX ADMIN — IRINOTECAN HYDROCHLORIDE 156 MG: 20 INJECTION, SOLUTION INTRAVENOUS at 12:53

## 2022-05-31 RX ADMIN — DEXTROSE 20 ML/HR: 5 SOLUTION INTRAVENOUS at 10:16

## 2022-05-31 RX ADMIN — FLUOROURACIL 555 MG: 50 INJECTION, SOLUTION INTRAVENOUS at 14:32

## 2022-05-31 RX ADMIN — SODIUM CHLORIDE 20 ML/HR: 0.9 INJECTION, SOLUTION INTRAVENOUS at 09:00

## 2022-05-31 RX ADMIN — DEXAMETHASONE SODIUM PHOSPHATE: 10 INJECTION, SOLUTION INTRAMUSCULAR; INTRAVENOUS at 09:46

## 2022-05-31 RX ADMIN — ATROPINE SULFATE 0.25 MG: 1 INJECTION, SOLUTION INTRAMUSCULAR; INTRAVENOUS; SUBCUTANEOUS at 12:41

## 2022-05-31 RX ADMIN — LEUCOVORIN CALCIUM 700 MG: 200 INJECTION, POWDER, LYOPHILIZED, FOR SUSPENSION INTRAMUSCULAR; INTRAVENOUS at 12:45

## 2022-05-31 RX ADMIN — SODIUM CHLORIDE 1000 ML: 0.9 INJECTION, SOLUTION INTRAVENOUS at 09:20

## 2022-05-31 NOTE — PROGRESS NOTES
K = 2 7   PO potassium called in over the weekend  Will try to add IV dose on pump disconnect day  He is having diarrhea  I cut irinotecan back for this cycle by a total of 50% reduction from original dose    I cut the 5-FU infusion back by 20%

## 2022-05-31 NOTE — TELEPHONE ENCOUNTER
Title: Irinotecan and 5 FU bolus dose reduction    Date patient scheduled:5/31/22    Original medication ordered:n/a    New Medication ordered: adding 40 mEq of IV potassium to d/c day on 6/2  Chemo drugs dose reduced and spoke to [facility] regarding dose reduction  Patient also needs day 4 neulasta cancelled  Receiving neulasta on pump d/c day  Thanks! Office to route to [facility] [place]    Infusion tech to receive message, confirm scheduled treatment duration matches ordered treatment duration or adjust accordingly, and re-link appointment request orders  Infusion tech to notify pharmacy

## 2022-05-31 NOTE — PROGRESS NOTES
Rec'd a call from [facility] (with Dr Og Olson office) at [phone]  informing me that the treatment plan has been modified and dose reduced related to pt's ongoing diarrhea and Hydration fluids have been added for today  Pharmacy notified of changes  Pt confirms that he is taking his Potassium supplements and Imodium and  feels much better with decreased diarrhea  No c/o cold sensitivities, neuropathy or pain  Port easily obtained, hydration completed and Oxaliplatin in now infusing

## 2022-05-31 NOTE — PROGRESS NOTES
Infusions tolerated well  Elastomeric Pump intact and unclamped  Pt is aware of next appt time on Jun 2 at 1pm  AVS in hand  Pt then discharged to home with steady gait

## 2022-06-01 ENCOUNTER — TELEPHONE (OUTPATIENT)
Dept: HEMATOLOGY ONCOLOGY | Facility: CLINIC | Age: 82
End: 2022-06-01

## 2022-06-01 NOTE — TELEPHONE ENCOUNTER
Appointment Confirmation (to confirm pre existing appointments - ONLY)     Appointment with  Fabian Jeter   Appointment date & time  6/13 9AM   Location [place]   Patient verbilized Understanding  yes

## 2022-06-02 ENCOUNTER — HOSPITAL ENCOUNTER (OUTPATIENT)
Dept: INFUSION CENTER | Facility: HOSPITAL | Age: 82
Discharge: HOME | End: 2022-06-02
Payer: COMMERCIAL

## 2022-06-02 ENCOUNTER — TELEPHONE (OUTPATIENT)
Dept: HEMATOLOGY ONCOLOGY | Facility: CLINIC | Age: 82
End: 2022-06-02

## 2022-06-02 VITALS
HEART RATE: 84 BPM | TEMPERATURE: 97.9 F | OXYGEN SATURATION: 100 % | SYSTOLIC BLOOD PRESSURE: 106 MMHG | RESPIRATION RATE: 14 BRPM | DIASTOLIC BLOOD PRESSURE: 56 MMHG

## 2022-06-02 DIAGNOSIS — E87.6 HYPOKALEMIA: Primary | ICD-10-CM

## 2022-06-02 DIAGNOSIS — C25.9 PANCREATIC ADENOCARCINOMA (HCC): ICD-10-CM

## 2022-06-02 DIAGNOSIS — D70.1 CHEMOTHERAPY INDUCED NEUTROPENIA (HCC): ICD-10-CM

## 2022-06-02 DIAGNOSIS — T45.1X5A CHEMOTHERAPY INDUCED NEUTROPENIA (HCC): ICD-10-CM

## 2022-06-02 PROBLEM — E86.0 DEHYDRATION: Status: ACTIVE | Noted: 2022-06-02

## 2022-06-02 PROCEDURE — 96372 THER/PROPH/DIAG INJ SC/IM: CPT

## 2022-06-02 PROCEDURE — 96365 THER/PROPH/DIAG IV INF INIT: CPT

## 2022-06-02 PROCEDURE — 96361 HYDRATE IV INFUSION ADD-ON: CPT

## 2022-06-02 PROCEDURE — 96366 THER/PROPH/DIAG IV INF ADDON: CPT

## 2022-06-02 RX ORDER — POTASSIUM CHLORIDE 29.8 MG/ML
40 INJECTION INTRAVENOUS ONCE
Status: DISCONTINUED | OUTPATIENT
Start: 2022-06-02 | End: 2022-06-05 | Stop reason: HOSPADM

## 2022-06-02 RX ORDER — POTASSIUM CHLORIDE 14.9 MG/ML
20 INJECTION INTRAVENOUS ONCE
Status: COMPLETED | OUTPATIENT
Start: 2022-06-02 | End: 2022-06-02

## 2022-06-02 RX ADMIN — PEGFILGRASTIM 6 MG: 6 INJECTION SUBCUTANEOUS at 13:23

## 2022-06-02 RX ADMIN — POTASSIUM CHLORIDE 20 MEQ: 200 INJECTION, SOLUTION INTRAVENOUS at 13:19

## 2022-06-02 RX ADMIN — POTASSIUM CHLORIDE 20 MEQ: 14.9 INJECTION, SOLUTION INTRAVENOUS at 11:48

## 2022-06-02 RX ADMIN — SODIUM CHLORIDE 1000 ML: 9 INJECTION, SOLUTION INTRAVENOUS at 11:34

## 2022-06-02 NOTE — PROGRESS NOTES
CLARIFY DX RESPONSE NOTE    The diagnosis if dehydration for the IVF    It was added to the problem list

## 2022-06-02 NOTE — PROGRESS NOTES
Infusions tolerated well  PIV removed and Port deaccessed per protocol  Pt discharged to home with steady gait ( not using his cane)  Pt aware of next infusion appt

## 2022-06-02 NOTE — ADDENDUM NOTE
Encounter addended by: Angie Joseph DO on: 6/2/2022 7:55 AM   Actions taken: Problem List modified, Clinical Note Signed

## 2022-06-02 NOTE — TELEPHONE ENCOUNTER
Spoke with patient regarding his message about transportation for his appt today at 1230  I told him that I will call [facility] and see if there's anything that I can do, but I cannot guarantee anything because it is the same day  Pt verbalized understanding

## 2022-06-02 NOTE — TELEPHONE ENCOUNTER
CALL RETURN FORM   Reason for patient call? Patient would like transportation arrangements to infusion today  Problem with car tire  Will go to appmt with bad tire if cant get a ride elsewhere  Patient's primary oncologist?  Barrett George   Name of person the patient was calling for? [place]   Any additional information to add, if applicable? Please [phone]   Informed patient that the message will be forwarded to the team and someone will get back to them as soon as possible    Did you relay this information to the patient?   yes

## 2022-06-02 NOTE — PROGRESS NOTES
Pt here for Elastomeric D/C, hydration, K+ and Neulasta; hydration and K+ currently running; Elastomeric pump d/c'd per protocol; Neulasta given with no adv reactions; bandaid dry and intact; will cont to monitor

## 2022-06-02 NOTE — PROGRESS NOTES
CLARIFY DX RESPONSE NOTE    Chemotherapy induced neutropenia is the reason for the neulasta    It is already in the problem list

## 2022-06-02 NOTE — TELEPHONE ENCOUNTER
Spoke with patient after calling [facility] to let him know that they cannot transport him to his INF appt today  Patient states that he can still get there, he just has a donut on his car because he had a flat tire  I told the patient to call if for any reason he cannot make it  Pt verbalized understanding

## 2022-06-03 ENCOUNTER — HOSPITAL ENCOUNTER (OUTPATIENT)
Dept: INFUSION CENTER | Facility: HOSPITAL | Age: 82
End: 2022-06-03

## 2022-06-10 DIAGNOSIS — D70.1 CHEMOTHERAPY INDUCED NEUTROPENIA (HCC): ICD-10-CM

## 2022-06-10 DIAGNOSIS — T45.1X5A CHEMOTHERAPY INDUCED NEUTROPENIA (HCC): ICD-10-CM

## 2022-06-10 DIAGNOSIS — E87.6 HYPOKALEMIA: Primary | ICD-10-CM

## 2022-06-10 DIAGNOSIS — C25.9 PANCREATIC ADENOCARCINOMA (HCC): ICD-10-CM

## 2022-06-10 LAB
ALBUMIN SERPL-MCNC: 3.6 G/DL (ref 3.6–5.1)
ALBUMIN/GLOB SERPL: 1.6 (CALC) (ref 1–2.5)
ALP SERPL-CCNC: 425 U/L (ref 35–144)
ALT SERPL-CCNC: 50 U/L (ref 9–46)
AST SERPL-CCNC: 32 U/L (ref 10–35)
BASOPHILS # BLD AUTO: 68 CELLS/UL (ref 0–200)
BASOPHILS NFR BLD AUTO: 1.2 %
BILIRUB SERPL-MCNC: 0.7 MG/DL (ref 0.2–1.2)
BUN SERPL-MCNC: 9 MG/DL (ref 7–25)
BUN/CREAT SERPL: 14 (CALC) (ref 6–22)
CALCIUM SERPL-MCNC: 9 MG/DL (ref 8.6–10.3)
CHLORIDE SERPL-SCNC: 109 MMOL/L (ref 98–110)
CO2 SERPL-SCNC: 25 MMOL/L (ref 20–32)
CREAT SERPL-MCNC: 0.64 MG/DL (ref 0.7–1.18)
EOSINOPHIL # BLD AUTO: 11 CELLS/UL (ref 15–500)
EOSINOPHIL NFR BLD AUTO: 0.2 %
ERYTHROCYTE [DISTWIDTH] IN BLOOD BY AUTOMATED COUNT: 16.3 % (ref 11–15)
GLOBULIN SER CALC-MCNC: 2.3 G/DL (CALC) (ref 1.9–3.7)
GLUCOSE SERPL-MCNC: 181 MG/DL (ref 65–139)
HCT VFR BLD AUTO: 29.8 % (ref 38.5–50)
HGB BLD-MCNC: 9.9 G/DL (ref 13.2–17.1)
LYMPHOCYTES # BLD AUTO: 1191 CELLS/UL (ref 850–3900)
LYMPHOCYTES NFR BLD AUTO: 20.9 %
MCH RBC QN AUTO: 28.9 PG (ref 27–33)
MCHC RBC AUTO-ENTMCNC: 33.2 G/DL (ref 32–36)
MCV RBC AUTO: 87.1 FL (ref 80–100)
MONOCYTES # BLD AUTO: 1106 CELLS/UL (ref 200–950)
MONOCYTES NFR BLD AUTO: 19.4 %
NEUTROPHILS # BLD AUTO: 3323 CELLS/UL (ref 1500–7800)
NEUTROPHILS NFR BLD AUTO: 58.3 %
PLATELET # BLD AUTO: 219 THOUSAND/UL (ref 140–400)
PMV BLD REES-ECKER: 11.7 FL (ref 7.5–12.5)
POTASSIUM SERPL-SCNC: 3.2 MMOL/L (ref 3.5–5.3)
PROT SERPL-MCNC: 5.9 G/DL (ref 6.1–8.1)
RBC # BLD AUTO: 3.42 MILLION/UL (ref 4.2–5.8)
SL AMB EGFR AFRICAN AMERICAN: 108 ML/MIN/1.73M2
SL AMB EGFR NON AFRICAN AMERICAN: 93 ML/MIN/1.73M2
SODIUM SERPL-SCNC: 140 MMOL/L (ref 135–146)
WBC # BLD AUTO: 5.7 THOUSAND/UL (ref 3.8–10.8)

## 2022-06-10 RX ORDER — DEXTROSE MONOHYDRATE 50 MG/ML
20 INJECTION, SOLUTION INTRAVENOUS ONCE
Status: CANCELLED | OUTPATIENT
Start: 2022-06-14

## 2022-06-10 RX ORDER — SODIUM CHLORIDE 9 MG/ML
20 INJECTION, SOLUTION INTRAVENOUS ONCE AS NEEDED
Status: CANCELLED | OUTPATIENT
Start: 2022-06-14

## 2022-06-10 RX ORDER — ATROPINE SULFATE 1 MG/ML
0.25 INJECTION, SOLUTION INTRAVENOUS ONCE AS NEEDED
Status: CANCELLED | OUTPATIENT
Start: 2022-06-14

## 2022-06-10 RX ORDER — FLUOROURACIL 50 MG/ML
320 INJECTION, SOLUTION INTRAVENOUS ONCE
Status: CANCELLED | OUTPATIENT
Start: 2022-06-14

## 2022-06-10 RX ORDER — ATROPINE SULFATE 1 MG/ML
0.25 INJECTION, SOLUTION INTRAVENOUS ONCE
Status: CANCELLED | OUTPATIENT
Start: 2022-06-14

## 2022-06-13 ENCOUNTER — OFFICE VISIT (OUTPATIENT)
Dept: HEMATOLOGY ONCOLOGY | Facility: HOSPITAL | Age: 82
End: 2022-06-13
Payer: COMMERCIAL

## 2022-06-13 VITALS
SYSTOLIC BLOOD PRESSURE: 100 MMHG | DIASTOLIC BLOOD PRESSURE: 60 MMHG | HEART RATE: 83 BPM | TEMPERATURE: 98 F | RESPIRATION RATE: 14 BRPM | HEIGHT: 67 IN | BODY MASS INDEX: 19.78 KG/M2 | OXYGEN SATURATION: 98 % | WEIGHT: 126 LBS

## 2022-06-13 DIAGNOSIS — E44.1 MILD PROTEIN-CALORIE MALNUTRITION (HCC): Primary | ICD-10-CM

## 2022-06-13 DIAGNOSIS — E87.6 HYPOKALEMIA: ICD-10-CM

## 2022-06-13 DIAGNOSIS — J44.1 ACUTE EXACERBATION OF CHRONIC OBSTRUCTIVE PULMONARY DISEASE (HCC): ICD-10-CM

## 2022-06-13 DIAGNOSIS — E11.69 TYPE 2 DIABETES MELLITUS WITH OTHER SPECIFIED COMPLICATION, UNSPECIFIED WHETHER LONG TERM INSULIN USE (HCC): ICD-10-CM

## 2022-06-13 PROCEDURE — 99214 OFFICE O/P EST MOD 30 MIN: CPT | Performed by: INTERNAL MEDICINE

## 2022-06-13 RX ORDER — PRAVASTATIN SODIUM 20 MG
20 TABLET ORAL DAILY
COMMUNITY

## 2022-06-13 RX ORDER — POTASSIUM CHLORIDE 20 MEQ/1
40 TABLET, EXTENDED RELEASE ORAL DAILY
Qty: 60 TABLET | Refills: 2 | Status: SHIPPED | OUTPATIENT
Start: 2022-06-13 | End: 2022-07-05

## 2022-06-13 NOTE — PROGRESS NOTES
HEMATOLOGY / [address] FOLLOW UP NOTE    Primary Care Provider: Lesa Cochran  Referring Provider:    MRN: [id number]  : 1940    Reason for Encounter: follow up of locally advance pancreatic cancer prior to cycle 5 FOLFIRINOX      Oncology History Overview Note     3/2022 - locally advanced, unresectable adenocarcinoma of the pancreas       2022 - start FOLFIRINOX with 20% dose reduction in oxaliplatin and    irinotecan due to age      2022 - 5FU dose reduced by 20% due to diarrhea    Interval History: patient presents for evaluation prior to cycle 5 of FOLFIRINOX  He is having 4-5 episodes of diarrhea per day  He is taking no more than 4 imodium per day  He had to be started on PO potassium replacement  He denies fevers or chills  No neuropathy  No hand foot syndrome  REVIEW OF SYSTEMS:  Please note that a 14-point review of systems was performed to include Constitutional, HEENT, Respiratory, CVS, GI, , Musculoskeletal, Integumentary, Neurologic, Rheumatologic, Endocrinologic, Psychiatric, Lymphatic, and Hematologic/Oncologic systems were reviewed and are negative unless otherwise stated in HPI  Positive and negative findings pertinent to this evaluation are incorporated into the history of present illness  ECOG PS: 1    PROBLEM LIST:  Patient Active Problem List:    Type 2 diabetes mellitus without complication, with long-term current use of insulin (HCC)    GERD (gastroesophageal reflux disease)    Pancreatic mass    History of pulmonary embolism    Hyperbilirubinemia    Anxiety about health    Pancreatic adenocarcinoma (HCC)    Chemotherapy induced neutropenia (HCC)    Hypokalemia    Dehydration    Mild protein-calorie malnutrition (HCC)    Acute exacerbation of chronic obstructive pulmonary disease (HCC)      Assessment / Plan: patient is tolerating FOLFIRINOX with dose reductions very well  He states that his appetite is improved over time    I told him that he can troy up to 8 imodium per day  If that amount of imodium doesn't control his diarrhea I will prescribe lomotil  I am also going to keep him on 40 Meq of PO Kdur per day  I will see him in 2 weeks in follow up and the plan will be to reimage after cycle 6  I spent 30 minutes on chart review, face to face counseling time, coordination of care and documentation  Past Medical History:  has a past medical history of Ambulates with cane, Anxiety, Depression, Diabetes mellitus ([address] ), GERD (gastroesophageal reflux disease), History of pulmonary embolus (PE), Hyperlipidemia, Multiple thyroid nodules, and Pancreatic mass  PAST SURGICAL HISTORY:  has a past surgical history that includes Cataract extraction (Right); Appendectomy; Tunneled venous port placement (Right, 4/11/2022); and FL guided central venous access device insertion (4/11/2022)  CURRENT MEDICATIONS  Current Outpatient Medications:  Alcohol Swabs 70 % PADS, May substitute brand based on insurance coverage  Check glucose BID , Disp: 100 each, Rfl: 0  ALPRAZolam (XANAX) 0 5 mg tablet, Take 0 5 tablets (0 25 mg total) by mouth 3 (three) times a day as needed for anxiety, Disp: 90 tablet, Rfl: 0  apixaban (ELIQUIS) 5 mg, Take 5 mg by mouth 2 (two) times a day, Disp: , Rfl:   Blood Glucose Monitoring Suppl (OneTouch Verio Reflect) w/Device KIT, May substitute brand based on insurance coverage  Check glucose BID , Disp: 1 kit, Rfl: 0  [START ON 6/14/2022] fluorouracil 4,150 mg in CADD/Elastomeric Infusion Device, Infuse 4,150 mg (1,200 mg/m2/day x 1 73 m2) into a venous catheter over 46 hours for 2 days  Do not start before June 14, 2022 , Disp: 1 each, Rfl: 0  glucose blood (OneTouch Verio) test strip, May substitute brand based on insurance coverage   Check glucose BID , Disp: 100 each, Rfl: 0  metFORMIN (GLUCOPHAGE) 1000 MG tablet, Take 1,000 mg by mouth 2 (two) times a day with meals  , Disp: , Rfl:   Multiple Vitamin (MULTIVITAMIN ADULT PO), Take 1 tablet by mouth daily, Disp: , Rfl:   omeprazole (PriLOSEC) 40 MG capsule, Take 40 mg by mouth every evening  , Disp: , Rfl:   ondansetron (Zofran ODT) 8 mg disintegrating tablet, Take 1 tablet (8 mg total) by mouth every 8 (eight) hours as needed for nausea or vomiting, Disp: 60 tablet, Rfl: 0  OneTouch Delica Lancets 89Z MISC, May substitute brand based on insurance coverage  Check glucose BID , Disp: 100 each, Rfl: 0  potassium chloride (K-DUR,KLOR-CON) 20 mEq tablet, Take 2 tablets (40 mEq total) by mouth daily, Disp: 60 tablet, Rfl: 2  pravastatin (PRAVACHOL) 20 mg tablet, Take 20 mg by mouth daily, Disp: , Rfl:   traMADol (ULTRAM) 50 mg tablet, Take 1 tablet (50 mg total) by mouth every 6 (six) hours as needed for moderate pain, Disp: 60 tablet, Rfl: 0  polyethylene glycol (MIRALAX) 17 g packet, Take 17 g by mouth daily (Patient not taking: No sig reported), Disp: , Rfl:     No current facility-administered medications for this visit  [unfilled]    SOCIAL HISTORY:  reports that he has quit smoking  He has never used smokeless tobacco  He reports previous alcohol use  He reports that he does not use drugs  FAMILY HISTORY:  family history includes Alcohol abuse in his mother; Diabetes in his father  ALLERGIES:  is allergic to aleve [naproxen]  Physical Exam:  Vital Signs:   /60 (BP Location: Left arm, Patient Position: Sitting, Cuff Size: Adult)   Pulse 83   Temp 98 °F (36 7 °C) (Temporal)   Resp 14   Ht 5' 7" (1 702 m)   Wt 57 2 kg (126 lb)   SpO2 98%   BMI 19 73 kg/m²   Smoking Status Former Smoker   BSA 1 66 m²   Body mass index is 19 73 kg/m²  Body surface area is 1 66 meters squared  GEN: Alert, awake oriented x3, in no acute distress  HEENT- No pallor, icterus, cyanosis, no oral mucosal lesions,   LAD - no palpable cervical, clavicle, axillary, inguinal LAD  Heart- normal S1 S2, regular rate and rhythm, No murmur, rubs     Lungs- clear breathing sound bilateral    Abdomen- soft, Non tender, bowel sounds present  Extremities- No cyanosis, clubbing, edema  Neuro- No focal neurological deficit    Labs:  Lab Results      Component                Value               Date                      WBC                      5 7                 06/10/2022                HGB                      9 9 (L)             06/10/2022                HCT                      29 8 (L)            06/10/2022                MCV                      87 1                06/10/2022                PLT                      219                 06/10/2022            Lab Results      Component                Value               Date                      SODIUM                   140                 06/10/2022                K                        3 2 (L)             06/10/2022                CL                       109                 06/10/2022                CO2                      25                  06/10/2022                AGAP                     8                   05/03/2022                BUN                      9                   06/10/2022                CREATININE               0 64 (L)            06/10/2022                GLUC                     181 (H)             06/10/2022                CALCIUM                  9 0                 06/10/2022                AST                      32                  06/10/2022                ALT                      50 (H)              06/10/2022                ALKPHOS                  425 (H)             06/10/2022                TP                       5 9 (L)             06/10/2022                TBILI                    0 7                 06/10/2022                EGFR                     80                  05/03/2022

## 2022-06-14 ENCOUNTER — HOSPITAL ENCOUNTER (OUTPATIENT)
Dept: INFUSION CENTER | Facility: HOSPITAL | Age: 82
Discharge: HOME | End: 2022-06-14
Payer: COMMERCIAL

## 2022-06-14 VITALS
BODY MASS INDEX: 20 KG/M2 | OXYGEN SATURATION: 100 % | RESPIRATION RATE: 14 BRPM | TEMPERATURE: 98.8 F | SYSTOLIC BLOOD PRESSURE: 138 MMHG | WEIGHT: 127.43 LBS | HEIGHT: 67 IN | HEART RATE: 87 BPM | DIASTOLIC BLOOD PRESSURE: 71 MMHG

## 2022-06-14 DIAGNOSIS — D70.1 CHEMOTHERAPY INDUCED NEUTROPENIA (HCC): ICD-10-CM

## 2022-06-14 DIAGNOSIS — C25.9 PANCREATIC ADENOCARCINOMA (HCC): ICD-10-CM

## 2022-06-14 DIAGNOSIS — E87.6 HYPOKALEMIA: Primary | ICD-10-CM

## 2022-06-14 DIAGNOSIS — T45.1X5A CHEMOTHERAPY INDUCED NEUTROPENIA (HCC): ICD-10-CM

## 2022-06-14 PROCEDURE — 96375 TX/PRO/DX INJ NEW DRUG ADDON: CPT

## 2022-06-14 PROCEDURE — 96417 CHEMO IV INFUS EACH ADDL SEQ: CPT

## 2022-06-14 PROCEDURE — 96361 HYDRATE IV INFUSION ADD-ON: CPT

## 2022-06-14 PROCEDURE — 96367 TX/PROPH/DG ADDL SEQ IV INF: CPT

## 2022-06-14 PROCEDURE — 96411 CHEMO IV PUSH ADDL DRUG: CPT

## 2022-06-14 PROCEDURE — 96413 CHEMO IV INFUSION 1 HR: CPT

## 2022-06-14 PROCEDURE — 96415 CHEMO IV INFUSION ADDL HR: CPT

## 2022-06-14 PROCEDURE — 96368 THER/DIAG CONCURRENT INF: CPT

## 2022-06-14 RX ORDER — DEXTROSE MONOHYDRATE 50 MG/ML
20 INJECTION, SOLUTION INTRAVENOUS ONCE
Status: COMPLETED | OUTPATIENT
Start: 2022-06-14 | End: 2022-06-14

## 2022-06-14 RX ORDER — ATROPINE SULFATE 1 MG/ML
0.25 INJECTION, SOLUTION INTRAVENOUS ONCE AS NEEDED
Status: DISCONTINUED | OUTPATIENT
Start: 2022-06-14 | End: 2022-06-17 | Stop reason: HOSPADM

## 2022-06-14 RX ORDER — ATROPINE SULFATE 1 MG/ML
0.25 INJECTION, SOLUTION INTRAVENOUS ONCE
Status: COMPLETED | OUTPATIENT
Start: 2022-06-14 | End: 2022-06-14

## 2022-06-14 RX ORDER — FLUOROURACIL 50 MG/ML
320 INJECTION, SOLUTION INTRAVENOUS ONCE
Status: COMPLETED | OUTPATIENT
Start: 2022-06-14 | End: 2022-06-14

## 2022-06-14 RX ORDER — SODIUM CHLORIDE 9 MG/ML
20 INJECTION, SOLUTION INTRAVENOUS ONCE AS NEEDED
Status: DISCONTINUED | OUTPATIENT
Start: 2022-06-14 | End: 2022-06-17 | Stop reason: HOSPADM

## 2022-06-14 RX ADMIN — OXALIPLATIN 112.45 MG: 5 INJECTION, SOLUTION INTRAVENOUS at 10:14

## 2022-06-14 RX ADMIN — ATROPINE SULFATE 0.25 MG: 1 INJECTION, SOLUTION INTRAVENOUS at 12:13

## 2022-06-14 RX ADMIN — LEUCOVORIN CALCIUM 700 MG: 200 INJECTION, POWDER, LYOPHILIZED, FOR SOLUTION INTRAMUSCULAR; INTRAVENOUS at 12:17

## 2022-06-14 RX ADMIN — DEXAMETHASONE SODIUM PHOSPHATE: 10 INJECTION, SOLUTION INTRAMUSCULAR; INTRAVENOUS at 09:07

## 2022-06-14 RX ADMIN — DEXTROSE 160 MG: 5 SOLUTION INTRAVENOUS at 12:20

## 2022-06-14 RX ADMIN — DEXTROSE 20 ML/HR: 5 SOLUTION INTRAVENOUS at 10:11

## 2022-06-14 RX ADMIN — SODIUM CHLORIDE 1000 ML: 0.9 INJECTION, SOLUTION INTRAVENOUS at 09:04

## 2022-06-14 RX ADMIN — FLUOROURACIL 555 MG: 50 INJECTION, SOLUTION INTRAVENOUS at 13:54

## 2022-06-14 RX ADMIN — SODIUM CHLORIDE 20 ML/HR: 9 INJECTION, SOLUTION INTRAVENOUS at 08:58

## 2022-06-14 NOTE — PROGRESS NOTES
Pt tolerated treatment with no adv reactions; pump connected with all clamps and connections taped; pump running; pt aware of next appt; left unit ambulatory with steady gait

## 2022-06-16 ENCOUNTER — HOSPITAL ENCOUNTER (OUTPATIENT)
Dept: INFUSION CENTER | Facility: HOSPITAL | Age: 82
Discharge: HOME | End: 2022-06-16
Payer: COMMERCIAL

## 2022-06-16 VITALS
HEART RATE: 61 BPM | DIASTOLIC BLOOD PRESSURE: 81 MMHG | OXYGEN SATURATION: 98 % | RESPIRATION RATE: 16 BRPM | SYSTOLIC BLOOD PRESSURE: 116 MMHG | TEMPERATURE: 97.6 F

## 2022-06-16 DIAGNOSIS — E87.6 HYPOKALEMIA: Primary | ICD-10-CM

## 2022-06-16 DIAGNOSIS — T45.1X5A CHEMOTHERAPY INDUCED NEUTROPENIA (HCC): ICD-10-CM

## 2022-06-16 DIAGNOSIS — D70.1 CHEMOTHERAPY INDUCED NEUTROPENIA (HCC): ICD-10-CM

## 2022-06-16 DIAGNOSIS — C25.9 PANCREATIC ADENOCARCINOMA (HCC): ICD-10-CM

## 2022-06-16 PROCEDURE — 96372 THER/PROPH/DIAG INJ SC/IM: CPT

## 2022-06-16 PROCEDURE — 96360 HYDRATION IV INFUSION INIT: CPT

## 2022-06-16 RX ADMIN — PEGFILGRASTIM 6 MG: 6 INJECTION SUBCUTANEOUS at 13:18

## 2022-06-16 RX ADMIN — SODIUM CHLORIDE 1000 ML: 0.9 INJECTION, SOLUTION INTRAVENOUS at 13:18

## 2022-06-16 NOTE — PROGRESS NOTES
Patient completed 1L NSS bolus, had CADD pump d/c'd and received Neulasta injection  AVS provided, left unit ambulatory with cane, steady gait

## 2022-06-24 DIAGNOSIS — E87.6 HYPOKALEMIA: Primary | ICD-10-CM

## 2022-06-24 DIAGNOSIS — C25.9 PANCREATIC ADENOCARCINOMA (HCC): ICD-10-CM

## 2022-06-24 DIAGNOSIS — T45.1X5A CHEMOTHERAPY INDUCED NEUTROPENIA (HCC): ICD-10-CM

## 2022-06-24 DIAGNOSIS — D70.1 CHEMOTHERAPY INDUCED NEUTROPENIA (HCC): ICD-10-CM

## 2022-06-24 LAB
ALBUMIN SERPL-MCNC: 3.8 G/DL (ref 3.6–5.1)
ALBUMIN/GLOB SERPL: 1.7 (CALC) (ref 1–2.5)
ALP SERPL-CCNC: 287 U/L (ref 35–144)
ALT SERPL-CCNC: 30 U/L (ref 9–46)
AST SERPL-CCNC: 28 U/L (ref 10–35)
BASOPHILS # BLD AUTO: 80 CELLS/UL (ref 0–200)
BASOPHILS NFR BLD AUTO: 1 %
BILIRUB SERPL-MCNC: 0.6 MG/DL (ref 0.2–1.2)
BUN SERPL-MCNC: 6 MG/DL (ref 7–25)
BUN/CREAT SERPL: 9 (CALC) (ref 6–22)
CALCIUM SERPL-MCNC: 9.2 MG/DL (ref 8.6–10.3)
CHLORIDE SERPL-SCNC: 107 MMOL/L (ref 98–110)
CO2 SERPL-SCNC: 23 MMOL/L (ref 20–32)
CREAT SERPL-MCNC: 0.69 MG/DL (ref 0.7–1.18)
EOSINOPHIL # BLD AUTO: 16 CELLS/UL (ref 15–500)
EOSINOPHIL NFR BLD AUTO: 0.2 %
ERYTHROCYTE [DISTWIDTH] IN BLOOD BY AUTOMATED COUNT: 18 % (ref 11–15)
GLOBULIN SER CALC-MCNC: 2.3 G/DL (CALC) (ref 1.9–3.7)
GLUCOSE SERPL-MCNC: 232 MG/DL (ref 65–139)
HCT VFR BLD AUTO: 30.1 % (ref 38.5–50)
HGB BLD-MCNC: 10 G/DL (ref 13.2–17.1)
LYMPHOCYTES # BLD AUTO: 1040 CELLS/UL (ref 850–3900)
LYMPHOCYTES NFR BLD AUTO: 13 %
MCH RBC QN AUTO: 29.2 PG (ref 27–33)
MCHC RBC AUTO-ENTMCNC: 33.2 G/DL (ref 32–36)
MCV RBC AUTO: 87.8 FL (ref 80–100)
MONOCYTES # BLD AUTO: 1248 CELLS/UL (ref 200–950)
MONOCYTES NFR BLD AUTO: 15.6 %
NEUTROPHILS # BLD AUTO: 5616 CELLS/UL (ref 1500–7800)
NEUTROPHILS NFR BLD AUTO: 70.2 %
PLATELET # BLD AUTO: 232 THOUSAND/UL (ref 140–400)
PMV BLD REES-ECKER: 11.8 FL (ref 7.5–12.5)
POTASSIUM SERPL-SCNC: 3.9 MMOL/L (ref 3.5–5.3)
PROT SERPL-MCNC: 6.1 G/DL (ref 6.1–8.1)
RBC # BLD AUTO: 3.43 MILLION/UL (ref 4.2–5.8)
SL AMB EGFR AFRICAN AMERICAN: 105 ML/MIN/1.73M2
SL AMB EGFR NON AFRICAN AMERICAN: 90 ML/MIN/1.73M2
SODIUM SERPL-SCNC: 140 MMOL/L (ref 135–146)
WBC # BLD AUTO: 8 THOUSAND/UL (ref 3.8–10.8)

## 2022-06-27 ENCOUNTER — OFFICE VISIT (OUTPATIENT)
Dept: HEMATOLOGY ONCOLOGY | Facility: HOSPITAL | Age: 82
End: 2022-06-27
Payer: COMMERCIAL

## 2022-06-27 VITALS
RESPIRATION RATE: 16 BRPM | BODY MASS INDEX: 19.15 KG/M2 | WEIGHT: 122 LBS | SYSTOLIC BLOOD PRESSURE: 120 MMHG | HEIGHT: 67 IN | TEMPERATURE: 97.5 F | DIASTOLIC BLOOD PRESSURE: 78 MMHG | OXYGEN SATURATION: 99 % | HEART RATE: 80 BPM

## 2022-06-27 DIAGNOSIS — C25.9 PANCREATIC ADENOCARCINOMA (HCC): Primary | ICD-10-CM

## 2022-06-27 PROCEDURE — 99214 OFFICE O/P EST MOD 30 MIN: CPT | Performed by: INTERNAL MEDICINE

## 2022-06-27 RX ORDER — SODIUM CHLORIDE 9 MG/ML
20 INJECTION, SOLUTION INTRAVENOUS ONCE AS NEEDED
Status: CANCELLED | OUTPATIENT
Start: 2022-07-20

## 2022-06-27 RX ORDER — FLUOROURACIL 50 MG/ML
320 INJECTION, SOLUTION INTRAVENOUS ONCE
Status: CANCELLED | OUTPATIENT
Start: 2022-06-28

## 2022-06-27 RX ORDER — FLUOROURACIL 50 MG/ML
320 INJECTION, SOLUTION INTRAVENOUS ONCE
Status: CANCELLED | OUTPATIENT
Start: 2022-07-20

## 2022-06-27 RX ORDER — ATROPINE SULFATE 1 MG/ML
0.25 INJECTION, SOLUTION INTRAVENOUS ONCE
Status: CANCELLED | OUTPATIENT
Start: 2022-06-28

## 2022-06-27 RX ORDER — DEXTROSE MONOHYDRATE 50 MG/ML
20 INJECTION, SOLUTION INTRAVENOUS ONCE
Status: CANCELLED | OUTPATIENT
Start: 2022-06-28

## 2022-06-27 RX ORDER — SODIUM CHLORIDE 9 MG/ML
20 INJECTION, SOLUTION INTRAVENOUS ONCE AS NEEDED
Status: CANCELLED | OUTPATIENT
Start: 2022-06-28

## 2022-06-27 RX ORDER — ATROPINE SULFATE 1 MG/ML
0.25 INJECTION, SOLUTION INTRAVENOUS ONCE AS NEEDED
Status: CANCELLED | OUTPATIENT
Start: 2022-07-20

## 2022-06-27 RX ORDER — ATROPINE SULFATE 1 MG/ML
0.25 INJECTION, SOLUTION INTRAVENOUS ONCE AS NEEDED
Status: CANCELLED | OUTPATIENT
Start: 2022-06-28

## 2022-06-27 RX ORDER — DEXTROSE MONOHYDRATE 50 MG/ML
20 INJECTION, SOLUTION INTRAVENOUS ONCE
Status: CANCELLED | OUTPATIENT
Start: 2022-07-20

## 2022-06-27 RX ORDER — ATROPINE SULFATE 1 MG/ML
0.25 INJECTION, SOLUTION INTRAVENOUS ONCE
Status: CANCELLED | OUTPATIENT
Start: 2022-07-20

## 2022-06-28 ENCOUNTER — TELEPHONE (OUTPATIENT)
Dept: HEMATOLOGY ONCOLOGY | Facility: CLINIC | Age: 82
End: 2022-06-28

## 2022-06-28 ENCOUNTER — HOSPITAL ENCOUNTER (OUTPATIENT)
Dept: INFUSION CENTER | Facility: HOSPITAL | Age: 82
Discharge: HOME | End: 2022-06-28
Payer: COMMERCIAL

## 2022-06-28 VITALS
SYSTOLIC BLOOD PRESSURE: 116 MMHG | WEIGHT: 122.58 LBS | DIASTOLIC BLOOD PRESSURE: 72 MMHG | BODY MASS INDEX: 19.24 KG/M2 | RESPIRATION RATE: 16 BRPM | OXYGEN SATURATION: 100 % | HEIGHT: 67 IN | HEART RATE: 78 BPM | TEMPERATURE: 97.2 F

## 2022-06-28 DIAGNOSIS — E87.6 HYPOKALEMIA: Primary | ICD-10-CM

## 2022-06-28 DIAGNOSIS — T45.1X5A CHEMOTHERAPY INDUCED NEUTROPENIA (HCC): ICD-10-CM

## 2022-06-28 DIAGNOSIS — C25.9 PANCREATIC ADENOCARCINOMA (HCC): ICD-10-CM

## 2022-06-28 DIAGNOSIS — D70.1 CHEMOTHERAPY INDUCED NEUTROPENIA (HCC): ICD-10-CM

## 2022-06-28 PROCEDURE — 96417 CHEMO IV INFUS EACH ADDL SEQ: CPT

## 2022-06-28 PROCEDURE — 96375 TX/PRO/DX INJ NEW DRUG ADDON: CPT

## 2022-06-28 PROCEDURE — 96361 HYDRATE IV INFUSION ADD-ON: CPT

## 2022-06-28 PROCEDURE — 96367 TX/PROPH/DG ADDL SEQ IV INF: CPT

## 2022-06-28 PROCEDURE — G0498 CHEMO EXTEND IV INFUS W/PUMP: HCPCS

## 2022-06-28 PROCEDURE — 96411 CHEMO IV PUSH ADDL DRUG: CPT

## 2022-06-28 PROCEDURE — 96368 THER/DIAG CONCURRENT INF: CPT

## 2022-06-28 PROCEDURE — 96415 CHEMO IV INFUSION ADDL HR: CPT

## 2022-06-28 PROCEDURE — 96413 CHEMO IV INFUSION 1 HR: CPT

## 2022-06-28 RX ORDER — ATROPINE SULFATE 1 MG/ML
0.25 INJECTION, SOLUTION INTRAVENOUS ONCE
Status: COMPLETED | OUTPATIENT
Start: 2022-06-28 | End: 2022-06-28

## 2022-06-28 RX ORDER — SODIUM CHLORIDE 9 MG/ML
20 INJECTION, SOLUTION INTRAVENOUS ONCE AS NEEDED
Status: DISCONTINUED | OUTPATIENT
Start: 2022-06-28 | End: 2022-07-01 | Stop reason: HOSPADM

## 2022-06-28 RX ORDER — DEXTROSE MONOHYDRATE 50 MG/ML
20 INJECTION, SOLUTION INTRAVENOUS ONCE
Status: COMPLETED | OUTPATIENT
Start: 2022-06-28 | End: 2022-06-28

## 2022-06-28 RX ORDER — ATROPINE SULFATE 1 MG/ML
0.25 INJECTION, SOLUTION INTRAVENOUS ONCE AS NEEDED
Status: DISCONTINUED | OUTPATIENT
Start: 2022-06-28 | End: 2022-07-01 | Stop reason: HOSPADM

## 2022-06-28 RX ORDER — FLUOROURACIL 50 MG/ML
320 INJECTION, SOLUTION INTRAVENOUS ONCE
Status: COMPLETED | OUTPATIENT
Start: 2022-06-28 | End: 2022-06-28

## 2022-06-28 RX ADMIN — ATROPINE SULFATE 0.25 MG: 1 INJECTION, SOLUTION INTRAVENOUS at 11:54

## 2022-06-28 RX ADMIN — SODIUM CHLORIDE 1000 ML: 0.9 INJECTION, SOLUTION INTRAVENOUS at 08:44

## 2022-06-28 RX ADMIN — DEXTROSE 20 ML/HR: 50 INJECTION, SOLUTION INTRAVENOUS at 09:53

## 2022-06-28 RX ADMIN — OXALIPLATIN 112.45 MG: 5 INJECTION, SOLUTION INTRAVENOUS at 09:57

## 2022-06-28 RX ADMIN — SODIUM CHLORIDE 20 ML/HR: 0.9 INJECTION, SOLUTION INTRAVENOUS at 08:44

## 2022-06-28 RX ADMIN — IRINOTECAN HYDROCHLORIDE 160 MG: 20 INJECTION, SOLUTION INTRAVENOUS at 11:57

## 2022-06-28 RX ADMIN — FLUOROURACIL 555 MG: 50 INJECTION, SOLUTION INTRAVENOUS at 13:39

## 2022-06-28 RX ADMIN — DEXAMETHASONE SODIUM PHOSPHATE: 10 INJECTION, SOLUTION INTRAMUSCULAR; INTRAVENOUS at 09:00

## 2022-06-28 RX ADMIN — LEUCOVORIN CALCIUM 700 MG: 200 INJECTION, POWDER, LYOPHILIZED, FOR SUSPENSION INTRAMUSCULAR; INTRAVENOUS at 11:55

## 2022-06-28 NOTE — PROGRESS NOTES
HEMATOLOGY / [address] FOLLOW UP NOTE    Primary Care Provider: Shae Watkins  Referring Provider:    MRN: [id number]  : 1940    Reason for Encounter: follow up pancreatic cancer    Oncology History Overview Note     3/2022 - locally advanced, unresectable adenocarcinoma of the pancreas       2022 - start FOLFIRINOX with 20% dose reduction in oxaliplatin and    irinotecan due to age      2022 - 5FU dose reduced by 20% due to diarrhea      Interval History: patient presents for follow up prior to cycle 6 of FOLFIRINOX  His diarrhea is being controlled with 3-4 imodium per day  He denies any neuropathy  His abdominal pain continues to improve  His weight is stable and he is eating and drinking in adequate amounts  No N/V   ANC has improved since the addition of growth factor  He is taking kdur 40 meq daily for hypokalemia  REVIEW OF SYSTEMS:  Please note that a 14-point review of systems was performed to include Constitutional, HEENT, Respiratory, CVS, GI, , Musculoskeletal, Integumentary, Neurologic, Rheumatologic, Endocrinologic, Psychiatric, Lymphatic, and Hematologic/Oncologic systems were reviewed and are negative unless otherwise stated in HPI  Positive and negative findings pertinent to this evaluation are incorporated into the history of present illness  ECOG PS: 1    PROBLEM LIST:  Patient Active Problem List:    Type 2 diabetes mellitus without complication, with long-term current use of insulin (HCC)    GERD (gastroesophageal reflux disease)    Pancreatic mass    History of pulmonary embolism    Hyperbilirubinemia    Anxiety about health    Pancreatic adenocarcinoma (HCC)    Chemotherapy induced neutropenia (HCC)    Hypokalemia    Dehydration    Mild protein-calorie malnutrition (HCC)    Acute exacerbation of chronic obstructive pulmonary disease ([facility] [address] )      Assessment / Plan: we will continue with cycle 6 of FOLFIRINOX without further dose reduction    I will see him prior to cycle 7 with a CT of the c/a/p to assess disease response  He will continue with kdur and imodium as well  He is doing very well with treatment  She knows to call in the interim with any questions or concerns  I spent 30 minutes on chart review, face to face counseling time, coordination of care and documentation  Past Medical History:  has a past medical history of Ambulates with cane, Anxiety, Depression, Diabetes mellitus ([address] ), GERD (gastroesophageal reflux disease), History of pulmonary embolus (PE), Hyperlipidemia, Multiple thyroid nodules, and Pancreatic mass  PAST SURGICAL HISTORY:  has a past surgical history that includes Cataract extraction (Right); Appendectomy; Tunneled venous port placement (Right, 4/11/2022); and FL guided central venous access device insertion (4/11/2022)  CURRENT MEDICATIONS  Current Outpatient Medications:  Alcohol Swabs 70 % PADS, May substitute brand based on insurance coverage  Check glucose BID , Disp: 100 each, Rfl: 0  ALPRAZolam (XANAX) 0 5 mg tablet, Take 0 5 tablets (0 25 mg total) by mouth 3 (three) times a day as needed for anxiety, Disp: 90 tablet, Rfl: 0  apixaban (ELIQUIS) 5 mg, Take 5 mg by mouth 2 (two) times a day, Disp: , Rfl:   Blood Glucose Monitoring Suppl (OneTouch Verio Reflect) w/Device KIT, May substitute brand based on insurance coverage  Check glucose BID , Disp: 1 kit, Rfl: 0  fluorouracil 4,150 mg in CADD/Elastomeric Infusion Device, Infuse 4,150 mg (1,200 mg/m2/day x 1 73 m2) into a venous catheter over 46 hours for 2 days  Do not start before June 28, 2022 , Disp: 1 each, Rfl: 0  glucose blood (OneTouch Verio) test strip, May substitute brand based on insurance coverage   Check glucose BID , Disp: 100 each, Rfl: 0  metFORMIN (GLUCOPHAGE) 1000 MG tablet, Take 1,000 mg by mouth 2 (two) times a day with meals  , Disp: , Rfl:   Multiple Vitamin (MULTIVITAMIN ADULT PO), Take 1 tablet by mouth daily, Disp: , Rfl:   omeprazole (PriLOSEC) 40 MG capsule, Take 40 mg by mouth every evening  , Disp: , Rfl:   ondansetron (Zofran ODT) 8 mg disintegrating tablet, Take 1 tablet (8 mg total) by mouth every 8 (eight) hours as needed for nausea or vomiting, Disp: 60 tablet, Rfl: 0  OneTouch Delica Lancets 11U MISC, May substitute brand based on insurance coverage  Check glucose BID , Disp: 100 each, Rfl: 0  potassium chloride (K-DUR,KLOR-CON) 20 mEq tablet, Take 2 tablets (40 mEq total) by mouth daily, Disp: 60 tablet, Rfl: 2  pravastatin (PRAVACHOL) 20 mg tablet, Take 20 mg by mouth daily, Disp: , Rfl:   traMADol (ULTRAM) 50 mg tablet, Take 1 tablet (50 mg total) by mouth every 6 (six) hours as needed for moderate pain, Disp: 60 tablet, Rfl: 0  polyethylene glycol (MIRALAX) 17 g packet, Take 17 g by mouth daily (Patient not taking: No sig reported), Disp: , Rfl:     No current facility-administered medications for this visit  Facility-Administered Medications Ordered in Other Visits:  Atropine Sulfate injection 0 25 mg, 0 25 mg, Intravenous, Once PRN, Lieutenant Shown, DO  sodium chloride 0 9 % infusion, 20 mL/hr, Intravenous, Once PRN, Lieutenant Shown, DO, Stopped at 06/28/22 1337      [unfilled]    SOCIAL HISTORY:  reports that he has quit smoking  He has never used smokeless tobacco  He reports previous alcohol use  He reports that he does not use drugs  FAMILY HISTORY:  family history includes Alcohol abuse in his mother; Diabetes in his father  ALLERGIES:  is allergic to aleve [naproxen]  Physical Exam:  Vital Signs:   /78 (BP Location: Left arm, Patient Position: Sitting, Cuff Size: Adult)   Pulse 80   Temp 97 5 °F (36 4 °C) (Tympanic)   Resp 16   Ht 5' 7" (1 702 m)   Wt 55 3 kg (122 lb)   SpO2 99%   BMI 19 11 kg/m²   Smoking Status Former Smoker   BSA 1 64 m²   Body mass index is 19 11 kg/m²  Body surface area is 1 64 meters squared      GEN: Alert, awake oriented x3, in no acute distress  HEENT- No pallor, icterus, cyanosis, no oral mucosal lesions,   LAD - no palpable cervical, clavicle, axillary, inguinal LAD  Heart- normal S1 S2, regular rate and rhythm, No murmur, rubs     Lungs- clear breathing sound bilateral    Abdomen- soft, Non tender, bowel sounds present  Extremities- No cyanosis, clubbing, edema  Neuro- No focal neurological deficit    Labs:  Lab Results      Component                Value               Date                      WBC                      8 0                 06/24/2022                HGB                      10 0 (L)            06/24/2022                HCT                      30 1 (L)            06/24/2022                MCV                      87 8                06/24/2022                PLT                      232                 06/24/2022            Lab Results      Component                Value               Date                      SODIUM                   140                 06/24/2022                K                        3 9                 06/24/2022                CL                       107                 06/24/2022                CO2                      23                  06/24/2022                AGAP                     8                   05/03/2022                BUN                      6 (L)               06/24/2022                CREATININE               0 69 (L)            06/24/2022                GLUC                     232 (H)             06/24/2022                CALCIUM                  9 2                 06/24/2022                AST                      28                  06/24/2022                ALT                      30                  06/24/2022                ALKPHOS                  287 (H)             06/24/2022                TP                       6 1                 06/24/2022                TBILI                    0 6                 06/24/2022                EGFR                     80                  05/03/2022

## 2022-06-28 NOTE — TELEPHONE ENCOUNTER
Appointment Confirmation (to confirm pre existing appointments - ONLY)  No need to route   Appointment with [facility] CT SCAN   Appointment date & time 7/6 at 1pm    Location [place]   Patient verbilized Understanding  yes

## 2022-06-28 NOTE — PROGRESS NOTES
Pt tolerated treatment with no adv reactions; pump connected with all clamps and connections taped; pump running; AVS given; pt left unit ambulatory with steady gait

## 2022-06-30 ENCOUNTER — OFFICE VISIT (OUTPATIENT)
Dept: PALLIATIVE MEDICINE | Age: 82
End: 2022-06-30
Payer: COMMERCIAL

## 2022-06-30 ENCOUNTER — HOSPITAL ENCOUNTER (OUTPATIENT)
Dept: INFUSION CENTER | Facility: HOSPITAL | Age: 82
Discharge: HOME | End: 2022-06-30
Payer: COMMERCIAL

## 2022-06-30 VITALS
RESPIRATION RATE: 14 BRPM | SYSTOLIC BLOOD PRESSURE: 110 MMHG | BODY MASS INDEX: 19.08 KG/M2 | DIASTOLIC BLOOD PRESSURE: 64 MMHG | HEART RATE: 78 BPM | WEIGHT: 122 LBS | TEMPERATURE: 97.9 F | OXYGEN SATURATION: 99 %

## 2022-06-30 VITALS
RESPIRATION RATE: 14 BRPM | SYSTOLIC BLOOD PRESSURE: 103 MMHG | OXYGEN SATURATION: 98 % | DIASTOLIC BLOOD PRESSURE: 65 MMHG | HEART RATE: 89 BPM | TEMPERATURE: 98.4 F

## 2022-06-30 DIAGNOSIS — E44.1 MILD PROTEIN-CALORIE MALNUTRITION (HCC): ICD-10-CM

## 2022-06-30 DIAGNOSIS — F41.8 ANXIETY ABOUT HEALTH: ICD-10-CM

## 2022-06-30 DIAGNOSIS — T45.1X5A CHEMOTHERAPY INDUCED NAUSEA AND VOMITING: ICD-10-CM

## 2022-06-30 DIAGNOSIS — T45.1X5A CHEMOTHERAPY INDUCED NEUTROPENIA (HCC): Primary | ICD-10-CM

## 2022-06-30 DIAGNOSIS — E86.0 DEHYDRATION: ICD-10-CM

## 2022-06-30 DIAGNOSIS — E11.9 TYPE 2 DIABETES MELLITUS WITHOUT COMPLICATION, WITH LONG-TERM CURRENT USE OF INSULIN (HCC): ICD-10-CM

## 2022-06-30 DIAGNOSIS — T45.1X5A CHEMOTHERAPY INDUCED NEUTROPENIA (HCC): ICD-10-CM

## 2022-06-30 DIAGNOSIS — D70.1 CHEMOTHERAPY INDUCED NEUTROPENIA (HCC): ICD-10-CM

## 2022-06-30 DIAGNOSIS — J44.1 ACUTE EXACERBATION OF CHRONIC OBSTRUCTIVE PULMONARY DISEASE (HCC): ICD-10-CM

## 2022-06-30 DIAGNOSIS — Z79.4 TYPE 2 DIABETES MELLITUS WITHOUT COMPLICATION, WITH LONG-TERM CURRENT USE OF INSULIN (HCC): ICD-10-CM

## 2022-06-30 DIAGNOSIS — C25.9 PANCREATIC ADENOCARCINOMA (HCC): ICD-10-CM

## 2022-06-30 DIAGNOSIS — R11.2 CHEMOTHERAPY INDUCED NAUSEA AND VOMITING: ICD-10-CM

## 2022-06-30 DIAGNOSIS — E87.6 HYPOKALEMIA: Primary | ICD-10-CM

## 2022-06-30 DIAGNOSIS — D70.1 CHEMOTHERAPY INDUCED NEUTROPENIA (HCC): Primary | ICD-10-CM

## 2022-06-30 DIAGNOSIS — C25.9 PANCREATIC ADENOCARCINOMA (HCC): Primary | ICD-10-CM

## 2022-06-30 PROCEDURE — 96372 THER/PROPH/DIAG INJ SC/IM: CPT

## 2022-06-30 PROCEDURE — 96360 HYDRATION IV INFUSION INIT: CPT

## 2022-06-30 PROCEDURE — 99213 OFFICE O/P EST LOW 20 MIN: CPT | Performed by: PHYSICIAN ASSISTANT

## 2022-06-30 RX ORDER — PROCHLORPERAZINE MALEATE 10 MG
10 TABLET ORAL EVERY 6 HOURS PRN
Qty: 30 TABLET | Refills: 0 | Status: SHIPPED | OUTPATIENT
Start: 2022-06-30

## 2022-06-30 RX ADMIN — PEGFILGRASTIM 6 MG: 6 INJECTION SUBCUTANEOUS at 12:47

## 2022-06-30 RX ADMIN — SODIUM CHLORIDE 1000 ML: 0.9 INJECTION, SOLUTION INTRAVENOUS at 12:46

## 2022-06-30 NOTE — PROGRESS NOTES
Outpatient Follow-Up - Palliative and Supportive Care   Tracee Noble 80 y o  male [id number]    Assessment & Plan  1  Pancreatic adenocarcinoma ([facility] [address] )    2  Type 2 diabetes mellitus without complication, with long-term current use of insulin ([facility] )    3  Acute exacerbation of chronic obstructive pulmonary disease ([facility] )    4  Anxiety about health    5  Chemotherapy induced neutropenia (HCC)    6  Mild protein-calorie malnutrition ([facility] [address] )    7  Dehydration         PLAN:  · Pain significantly improved  No refill of tramadol 50MG needed  · Oncologist sent compazine as Zofran not effective for nausea  Please call if compazine is ineffective  Appetite currently limited 2/2 nausea  · Mood stable - we discussed initiating SSRI for anxiety about health however he is not interested  No longer requiring prn Xanax  · Keep referring to nutrition booklet  · Please bring Living Will to next appointment  · Return to Care in 8 - 10 weeks for symptom check up  Medications adjusted this encounter:  Requested Prescriptions      No prescriptions requested or ordered in this encounter     No orders of the defined types were placed in this encounter  There are no discontinued medications  Tracee Noble was seen today for symptoms and planning cares related to above illnesses  I have reviewed the patient's controlled substance dispensing history in the Prescription Drug Monitoring Program in compliance with the [facility] regulations before prescribing any controlled substances  They are invited to continue to follow with us  If there are questions or concerns, please contact us through our clinic/answering service 24 hours a day, seven days a week      Samia Bojorquez PA-C  [facility] Palliative and Supportive Care        Visit Information    Accompanied By: No one    Source of History: Self    History Limitations: None     History of Present Illness      Tracee Noble is a 80 y o  male who presents in follow up of symptoms related to pancreatic cancer  Pertinent issues include: symptom management, pain, neoplasm related, depression or anxiety, fatigue, assessment of goals of care, disease process education and discussion of prognosis, advance care planning  Heather Gabriel is under the care of Dr Colin Matthews for pancreatic cancer; staging is still being determined as pulmonary nodules are being assessed  Heather Gabriel is a poor surgical candidate according to surgical oncology, as the tumor encases the SMA  He has been started on FOLFORINOX with plan for restaging after 6-8 cycles in 2 months of radiation  Heather Gabriel presented for initial palliative care consultation on 03/24/2022  At that time, he complained of extreme anxiety  For pain, he was started on tramadol 50 mg b i d  which gave him adequate relief  Heatherjosé antonio Gabriel was seen by Oncology most recently on 06/27/2022  He was noted to be tolerating dose reduced FOLFIRINOX without complication  He is scheduled to do CT C/A/P to assess disease after next cycle  I met with Heather Gabriel for follow-up of symptoms on 06/30/2022  Nausea is his primary complaint today  He states after his recent treatment, he was up throughout the night vomiting  He told his oncologist and they have prescribed Compazine as his Zofran was not effective  I encouraged Heather Gabriel to start the Compazine and if he has continued problems with this to please call our office as we can try other agents for nausea  Heather Gabriel reports that he has poor appetite due to nausea at this time and hopes he will be able to eat with this symptom is managed  Heather Gabriel states his pain is significantly improved and he no longer requires tramadol  He has some of this medication left over and does not need a refill  Heather Gabriel reports his mood has been improved since he has started treatments  He no longer requires the Xanax anxiety  On prior appointments we have discussed baseline anxiety medication however he is not interested      Heather Gabriel reports he continues to consult a book that he received from nutrition to keep up with his weight and food intake  We will continue to follow with Jeane Zavala to ensure symptoms are optimized and that he is able to have p o  intake  Social: Jeane Zavala lives alone  He states he lives in apartment in [facility] and has social interactions in the community room  He states his wife  7 years ago  While he is on good terms with his family, they live in [facility] and in [place] and he does not see them very often  Jeane Zavala states he has a living will at home  He believes he named his sister as his medical POA  I again requested the Jeane Zavala bring his living will to his next appointment  Past medical, surgical, social, and family histories are reviewed and pertinent updates are made  Review of Systems   Constitutional: Positive for weight loss  Negative for decreased appetite, fever and malaise/fatigue  HENT: Negative for congestion and ear pain  Eyes: Negative for blurred vision  Cardiovascular: Negative for chest pain and dyspnea on exertion  Respiratory: Negative for cough and hemoptysis  Musculoskeletal: Negative for arthritis, back pain and falls  Gastrointestinal: Positive for nausea and vomiting  Negative for bloating, abdominal pain, anorexia, change in bowel habit, bowel incontinence, constipation, diarrhea, dysphagia and excessive appetite  Genitourinary: Negative for bladder incontinence and dysuria  Neurological: Negative for aphonia, brief paralysis and difficulty with concentration  Psychiatric/Behavioral: Negative for altered mental status, depression and suicidal ideas  The patient is nervous/anxious  The patient does not have insomnia          Vital Signs    /64 (BP Location: Left arm, Patient Position: Sitting, Cuff Size: Standard)   Pulse 78   Temp 97 9 °F (36 6 °C) (Temporal)   Resp 14   Wt 55 3 kg (122 lb)   SpO2 99%   BMI 19 08 kg/m²      Physical Exam and Objective Data  Physical Exam  Vitals and nursing note reviewed  Constitutional:       Appearance: He is underweight  He is ill-appearing (chronically)  HENT:      Head: Normocephalic and atraumatic  Eyes:      Conjunctiva/sclera: Conjunctivae normal    Cardiovascular:      Heart sounds: No murmur heard  Pulmonary:      Effort: Pulmonary effort is normal  No respiratory distress  Musculoskeletal:      Cervical back: Neck supple  Skin:     General: Skin is warm and dry  Coloration: Skin is pale  Neurological:      Mental Status: He is alert  Psychiatric:         Mood and Affect: Mood is anxious  Cognition and Memory: Memory is not impaired  Radiology and Laboratory:  I personally reviewed and interpreted the following results:  CBC, CMP  25 minutes was spent face to face with Steffi Mejia with greater than 50% of the time spent in counseling or coordination of care including discussions of etiology of diagnosis, treatment instructions, follow up requirements, risk factors and risk reduction of disease, patient and family counseling/involvement in care and compliance with treatment regimen  All of the patient's or agent's questions were answered during this discussion

## 2022-06-30 NOTE — PROGRESS NOTES
Pt here for pump d/c, hydration and neulasta; pump d/c'd per protocol; neulasta given with no adv reactions; bandaid dry and intact; hydration tolerated; pt reported vomiting Tuesday after treatment and using Zofran with no relief; Joie Mera RN/ Dr Neela Kathleen notified and is sending in a prescription for pt; relayed info to pt who verb understanding; reminded pt to call physician with any problems  no

## 2022-07-05 DIAGNOSIS — E87.6 HYPOKALEMIA: ICD-10-CM

## 2022-07-05 RX ORDER — POTASSIUM CHLORIDE 1500 MG/1
TABLET, EXTENDED RELEASE ORAL
Qty: 180 TABLET | Refills: 1 | Status: SHIPPED | OUTPATIENT
Start: 2022-07-05

## 2022-07-06 ENCOUNTER — HOSPITAL ENCOUNTER (OUTPATIENT)
Dept: CT IMAGING | Facility: HOSPITAL | Age: 82
Discharge: HOME | End: 2022-07-06
Attending: INTERNAL MEDICINE
Payer: COMMERCIAL

## 2022-07-06 DIAGNOSIS — C25.9 PANCREATIC ADENOCARCINOMA (HCC): ICD-10-CM

## 2022-07-06 PROCEDURE — 71260 CT THORAX DX C+: CPT

## 2022-07-06 PROCEDURE — 74177 CT ABD & PELVIS W/CONTRAST: CPT

## 2022-07-06 PROCEDURE — G1004 CDSM NDSC: HCPCS

## 2022-07-06 RX ADMIN — IOHEXOL 70 ML: 350 INJECTION, SOLUTION INTRAVENOUS at 13:42

## 2022-07-08 DIAGNOSIS — D70.1 CHEMOTHERAPY INDUCED NEUTROPENIA (HCC): ICD-10-CM

## 2022-07-08 DIAGNOSIS — E87.6 HYPOKALEMIA: Primary | ICD-10-CM

## 2022-07-08 DIAGNOSIS — T45.1X5A CHEMOTHERAPY INDUCED NEUTROPENIA (HCC): ICD-10-CM

## 2022-07-08 DIAGNOSIS — C25.9 PANCREATIC ADENOCARCINOMA (HCC): ICD-10-CM

## 2022-07-11 ENCOUNTER — OFFICE VISIT (OUTPATIENT)
Dept: HEMATOLOGY ONCOLOGY | Facility: HOSPITAL | Age: 82
End: 2022-07-11
Payer: COMMERCIAL

## 2022-07-11 ENCOUNTER — LAB (OUTPATIENT)
Dept: LAB | Facility: HOSPITAL | Age: 82
End: 2022-07-11
Payer: COMMERCIAL

## 2022-07-11 VITALS
DIASTOLIC BLOOD PRESSURE: 58 MMHG | HEIGHT: 67 IN | TEMPERATURE: 98.7 F | OXYGEN SATURATION: 99 % | HEART RATE: 86 BPM | BODY MASS INDEX: 18.21 KG/M2 | RESPIRATION RATE: 16 BRPM | WEIGHT: 116 LBS | SYSTOLIC BLOOD PRESSURE: 96 MMHG

## 2022-07-11 DIAGNOSIS — Z00.00 ROUTINE GENERAL MEDICAL EXAMINATION AT A HEALTH CARE FACILITY: ICD-10-CM

## 2022-07-11 DIAGNOSIS — C25.9 PANCREATIC ADENOCARCINOMA (HCC): Primary | ICD-10-CM

## 2022-07-11 LAB
ALBUMIN SERPL BCP-MCNC: 3.1 G/DL (ref 3.5–5)
ALP SERPL-CCNC: 227 U/L (ref 46–116)
ALT SERPL W P-5'-P-CCNC: 26 U/L (ref 12–78)
ANION GAP SERPL CALCULATED.3IONS-SCNC: 8 MMOL/L (ref 4–13)
AST SERPL W P-5'-P-CCNC: 16 U/L (ref 5–45)
BASOPHILS # BLD AUTO: 0.07 THOUSANDS/ΜL (ref 0–0.1)
BASOPHILS NFR BLD AUTO: 1 % (ref 0–1)
BILIRUB SERPL-MCNC: 0.52 MG/DL (ref 0.2–1)
BUN SERPL-MCNC: 11 MG/DL (ref 5–25)
CALCIUM ALBUM COR SERPL-MCNC: 9.6 MG/DL (ref 8.3–10.1)
CALCIUM SERPL-MCNC: 8.9 MG/DL (ref 8.3–10.1)
CHLORIDE SERPL-SCNC: 111 MMOL/L (ref 100–108)
CO2 SERPL-SCNC: 20 MMOL/L (ref 21–32)
CREAT SERPL-MCNC: 0.93 MG/DL (ref 0.6–1.3)
EOSINOPHIL # BLD AUTO: 0.03 THOUSAND/ΜL (ref 0–0.61)
EOSINOPHIL NFR BLD AUTO: 0 % (ref 0–6)
ERYTHROCYTE [DISTWIDTH] IN BLOOD BY AUTOMATED COUNT: 21.1 % (ref 11.6–15.1)
GFR SERPL CREATININE-BSD FRML MDRD: 77 ML/MIN/1.73SQ M
GLUCOSE SERPL-MCNC: 168 MG/DL (ref 65–140)
HCT VFR BLD AUTO: 33 % (ref 36.5–49.3)
HGB BLD-MCNC: 10.3 G/DL (ref 12–17)
IMM GRANULOCYTES # BLD AUTO: 0.06 THOUSAND/UL (ref 0–0.2)
IMM GRANULOCYTES NFR BLD AUTO: 1 % (ref 0–2)
LYMPHOCYTES # BLD AUTO: 1 THOUSANDS/ΜL (ref 0.6–4.47)
LYMPHOCYTES NFR BLD AUTO: 12 % (ref 14–44)
MCH RBC QN AUTO: 29.9 PG (ref 26.8–34.3)
MCHC RBC AUTO-ENTMCNC: 31.2 G/DL (ref 31.4–37.4)
MCV RBC AUTO: 96 FL (ref 82–98)
MONOCYTES # BLD AUTO: 1.06 THOUSAND/ΜL (ref 0.17–1.22)
MONOCYTES NFR BLD AUTO: 13 % (ref 4–12)
NEUTROPHILS # BLD AUTO: 6.17 THOUSANDS/ΜL (ref 1.85–7.62)
NEUTS SEG NFR BLD AUTO: 73 % (ref 43–75)
NRBC BLD AUTO-RTO: 0 /100 WBCS
PLATELET # BLD AUTO: 165 THOUSANDS/UL (ref 149–390)
PMV BLD AUTO: 10.9 FL (ref 8.9–12.7)
POTASSIUM SERPL-SCNC: 3.8 MMOL/L (ref 3.5–5.3)
PROT SERPL-MCNC: 6.7 G/DL (ref 6.4–8.2)
RBC # BLD AUTO: 3.44 MILLION/UL (ref 3.88–5.62)
SODIUM SERPL-SCNC: 139 MMOL/L (ref 136–145)
WBC # BLD AUTO: 8.39 THOUSAND/UL (ref 4.31–10.16)

## 2022-07-11 PROCEDURE — 36415 COLL VENOUS BLD VENIPUNCTURE: CPT

## 2022-07-11 PROCEDURE — 85025 COMPLETE CBC W/AUTO DIFF WBC: CPT

## 2022-07-11 PROCEDURE — 80053 COMPREHEN METABOLIC PANEL: CPT

## 2022-07-11 PROCEDURE — 99215 OFFICE O/P EST HI 40 MIN: CPT | Performed by: INTERNAL MEDICINE

## 2022-07-11 RX ORDER — ATROPINE SULFATE 1 MG/ML
0.25 INJECTION, SOLUTION INTRAVENOUS ONCE
Status: CANCELLED | OUTPATIENT
Start: 2022-08-03

## 2022-07-11 RX ORDER — SODIUM CHLORIDE 9 MG/ML
20 INJECTION, SOLUTION INTRAVENOUS ONCE AS NEEDED
Status: CANCELLED | OUTPATIENT
Start: 2022-08-03

## 2022-07-11 RX ORDER — ATROPINE SULFATE 1 MG/ML
0.25 INJECTION, SOLUTION INTRAVENOUS ONCE AS NEEDED
Status: CANCELLED | OUTPATIENT
Start: 2022-08-03

## 2022-07-11 RX ORDER — FLUOROURACIL 50 MG/ML
320 INJECTION, SOLUTION INTRAVENOUS ONCE
Status: CANCELLED | OUTPATIENT
Start: 2022-08-03

## 2022-07-11 RX ORDER — DEXTROSE MONOHYDRATE 50 MG/ML
20 INJECTION, SOLUTION INTRAVENOUS ONCE
Status: CANCELLED | OUTPATIENT
Start: 2022-08-03

## 2022-07-12 ENCOUNTER — TELEPHONE (OUTPATIENT)
Dept: HEMATOLOGY ONCOLOGY | Facility: CLINIC | Age: 82
End: 2022-07-12

## 2022-07-12 ENCOUNTER — HOSPITAL ENCOUNTER (OUTPATIENT)
Dept: INFUSION CENTER | Facility: HOSPITAL | Age: 82
Discharge: HOME | End: 2022-07-12
Payer: COMMERCIAL

## 2022-07-12 VITALS
OXYGEN SATURATION: 100 % | TEMPERATURE: 98.3 F | WEIGHT: 118.61 LBS | HEIGHT: 67 IN | DIASTOLIC BLOOD PRESSURE: 69 MMHG | HEART RATE: 80 BPM | SYSTOLIC BLOOD PRESSURE: 116 MMHG | BODY MASS INDEX: 18.62 KG/M2 | RESPIRATION RATE: 16 BRPM

## 2022-07-12 DIAGNOSIS — R19.7 DIARRHEA, UNSPECIFIED TYPE: Primary | ICD-10-CM

## 2022-07-12 DIAGNOSIS — E87.6 HYPOKALEMIA: Primary | ICD-10-CM

## 2022-07-12 DIAGNOSIS — D70.1 CHEMOTHERAPY INDUCED NEUTROPENIA (HCC): ICD-10-CM

## 2022-07-12 DIAGNOSIS — C25.9 PANCREATIC ADENOCARCINOMA (HCC): ICD-10-CM

## 2022-07-12 DIAGNOSIS — T45.1X5A CHEMOTHERAPY INDUCED NEUTROPENIA (HCC): ICD-10-CM

## 2022-07-12 PROCEDURE — 96360 HYDRATION IV INFUSION INIT: CPT

## 2022-07-12 RX ORDER — DIPHENOXYLATE HYDROCHLORIDE AND ATROPINE SULFATE 2.5; .025 MG/1; MG/1
1 TABLET ORAL 4 TIMES DAILY PRN
Qty: 60 TABLET | Refills: 0 | Status: SHIPPED | OUTPATIENT
Start: 2022-07-12 | End: 2022-08-10 | Stop reason: SDUPTHER

## 2022-07-12 RX ADMIN — SODIUM CHLORIDE 1000 ML: 0.9 INJECTION, SOLUTION INTRAVENOUS at 09:46

## 2022-07-12 NOTE — TELEPHONE ENCOUNTER
I spoke with the infusion room nurse when the patient showed up for FOLFIRINOX today  He is now telling her that he is having 10 episodes of diarrhea per day, when he hold me yesterday that he has 5 per day and they are controlling in their volume by imodium  I am going to delay his treatment by one week and send in lomotil to take in the place of imodium  I will see him for a visit next week and we can reassess  His diarrhea at that time

## 2022-07-12 NOTE — PROGRESS NOTES
Pt received 1000ml NSS bolus as per order  Chemo tx not given today d/t chronic diarrhea  Chemo to be delayed one week per Dr Jeni Serra  Appointment rescheduled and AVS provided  Pt is aware to go to the pharmacy to  new prescription for lomotil  Pt left ambulatory with steady gait utilizing a cane

## 2022-07-12 NOTE — TELEPHONE ENCOUNTER
[facility] pt is scheduled, after next week I had to go back to [facility], our schedule is so full anymore

## 2022-07-12 NOTE — PLAN OF CARE
Problem: Potential for Falls  Goal: Patient will remain free of falls  Description: INTERVENTIONS:  - Educate patient/family on patient safety including physical limitations  - Instruct patient to call for assistance with activity  - Keep Call bell within reach  - Keep bed low and locked with side rails adjusted as appropriate  - Keep care items and personal belongings within reach  - Initiate and maintain comfort rounds  - Make Fall Risk Sign visible to staff  - Consider moving patient to room near nurses station  Outcome: Progressing     Problem: Knowledge Deficit  Goal: Patient/family/caregiver demonstrates understanding of disease process, treatment plan, medications, and discharge instructions  Description: Complete learning assessment and assess knowledge base    Interventions:  - Provide teaching at level of understanding  - Provide teaching via preferred learning methods  Outcome: Progressing

## 2022-07-12 NOTE — PROGRESS NOTES
Spoke to [employer] regarding the medication leucovorin not being included on the chemo consent  [facility] states that Dr Mil Akins will sign new consent with Mr Charlene Whaley at his next office visit

## 2022-07-12 NOTE — PROGRESS NOTES
Patient presented to infusion center today for treatment, c/o chronic diarrhea 10x per day not relieved by imodium  As per Dr Sunita Dockery, treatment will be delayed x 1 week and she will prescribe Lomotil  F/u in office 1 week prior to treatment

## 2022-07-13 DIAGNOSIS — T45.1X5A CHEMOTHERAPY INDUCED NEUTROPENIA (HCC): ICD-10-CM

## 2022-07-13 DIAGNOSIS — C25.9 PANCREATIC ADENOCARCINOMA (HCC): ICD-10-CM

## 2022-07-13 DIAGNOSIS — D70.1 CHEMOTHERAPY INDUCED NEUTROPENIA (HCC): ICD-10-CM

## 2022-07-13 DIAGNOSIS — E87.6 HYPOKALEMIA: Primary | ICD-10-CM

## 2022-07-13 NOTE — PROGRESS NOTES
HEMATOLOGY / [address] FOLLOW UP NOTE    Primary Care Provider: Reva Whaley  Referring Provider:    MRN: [id number]  : 1940    Reason for Encounter: follow up prior to cycle 7 of FOLFIRINOX    Oncology History Overview Note     3/2022 - locally advanced, unresectable adenocarcinoma of the pancreas       2022 - start FOLFIRINOX with 20% dose reduction in oxaliplatin and    irinotecan due to age      2022 - 5FU dose reduced by 20% due to diarrhea      Interval History: patient presents for evaluation prior to cycle 7 of FOLFIRINOX for a locally advance pancreatic cancer  CT done prior to this visit shows that the pancreatic lesion is slightly smaller and lung nodules are stable  There is no new metastatic disease  He states that he has 5 episodes of diarrhea daily  He takes an imodium with each event and feels that it is adequately controlled and it has not worsening over time  No neuropathy  No fevers  ANC has been much better since growth factor treatment  REVIEW OF SYSTEMS:  Please note that a 14-point review of systems was performed to include Constitutional, HEENT, Respiratory, CVS, GI, , Musculoskeletal, Integumentary, Neurologic, Rheumatologic, Endocrinologic, Psychiatric, Lymphatic, and Hematologic/Oncologic systems were reviewed and are negative unless otherwise stated in HPI  Positive and negative findings pertinent to this evaluation are incorporated into the history of present illness        ECOG PS:    PROBLEM LIST:  Patient Active Problem List:    Type 2 diabetes mellitus without complication, with long-term current use of insulin (HCC)    GERD (gastroesophageal reflux disease)    Pancreatic mass    History of pulmonary embolism    Hyperbilirubinemia    Anxiety about health    Pancreatic adenocarcinoma (HCC)    Chemotherapy induced neutropenia (HCC)    Hypokalemia    Dehydration    Mild protein-calorie malnutrition (HCC)    Acute exacerbation of chronic obstructive pulmonary disease (HCC)      Assessment / Plan:  We discussed his scans  I think it is unlikely that the patient will be candidate for surgery as we did not get much shrinkage on his pancreatic lesion  However, there are also no new obvious areas of metastatic disease  I am going to put him on for discussion on our GI tumor board next week to discuss concurrent chemo/RT  We will continue with FOLFIRINOX cycle 7 without further dose reduction tomorrow  He will continue taking 1 imodium per episode of diarrhea  The patient also wanted to have a discussion with me about his prognosis  I did tell him that it is ultimately unlikely that we will cure his pancreatic cancer  Even if the best situation, pancreatic cancer has a high likely carter for metastatic relapse  When I try to answer his questions realistically in this manner, he could very anxious and asked me to stop talking about it  I will gently try to support him going forward about the true nature of his prognosis while also respecting his wishes  I spent 40 minutes on chart review, face to face counseling time, coordination of care and documentation  Past Medical History:  has a past medical history of Ambulates with cane, Anxiety, Depression, Diabetes mellitus ([address] ), GERD (gastroesophageal reflux disease), History of pulmonary embolus (PE), Hyperlipidemia, Multiple thyroid nodules, and Pancreatic mass  PAST SURGICAL HISTORY:  has a past surgical history that includes Cataract extraction (Right); Appendectomy; Tunneled venous port placement (Right, 4/11/2022); and FL guided central venous access device insertion (4/11/2022)  CURRENT MEDICATIONS  Current Outpatient Medications:  Alcohol Swabs 70 % PADS, May substitute brand based on insurance coverage   Check glucose BID , Disp: 100 each, Rfl: 0  apixaban (ELIQUIS) 5 mg, Take 5 mg by mouth 2 (two) times a day, Disp: , Rfl:   Blood Glucose Monitoring Suppl (OneTouch Verio Reflect) w/Device KIT, May substitute brand based on insurance coverage  Check glucose BID , Disp: 1 kit, Rfl: 0  fluorouracil 4,150 mg in CADD/Elastomeric Infusion Device, Infuse 4,150 mg (1,200 mg/m2/day x 1 73 m2) into a venous catheter over 46 hours for 2 days  Do not start before July 12, 2022 , Disp: 1 each, Rfl: 0  glucose blood (OneTouch Verio) test strip, May substitute brand based on insurance coverage  Check glucose BID , Disp: 100 each, Rfl: 0  Klor-Con M20 20 MEQ tablet, TAKE 2 TABLETS BY MOUTH DAILY, Disp: 180 tablet, Rfl: 1  metFORMIN (GLUCOPHAGE) 1000 MG tablet, Take 1,000 mg by mouth 2 (two) times a day with meals  , Disp: , Rfl:   omeprazole (PriLOSEC) 40 MG capsule, Take 40 mg by mouth every evening  , Disp: , Rfl:   ondansetron (Zofran ODT) 8 mg disintegrating tablet, Take 1 tablet (8 mg total) by mouth every 8 (eight) hours as needed for nausea or vomiting, Disp: 60 tablet, Rfl: 0  OneTouch Delica Lancets 55K MISC, May substitute brand based on insurance coverage   Check glucose BID , Disp: 100 each, Rfl: 0  pravastatin (PRAVACHOL) 20 mg tablet, Take 20 mg by mouth daily, Disp: , Rfl:   prochlorperazine (COMPAZINE) 10 mg tablet, Take 1 tablet (10 mg total) by mouth every 6 (six) hours as needed for nausea or vomiting, Disp: 30 tablet, Rfl: 0  traMADol (ULTRAM) 50 mg tablet, Take 1 tablet (50 mg total) by mouth every 6 (six) hours as needed for moderate pain, Disp: 60 tablet, Rfl: 0  ALPRAZolam (XANAX) 0 5 mg tablet, Take 0 5 tablets (0 25 mg total) by mouth 3 (three) times a day as needed for anxiety (Patient not taking: No sig reported), Disp: 90 tablet, Rfl: 0  diphenoxylate-atropine (LOMOTIL) 2 5-0 025 mg per tablet, Take 1 tablet by mouth 4 (four) times a day as needed for diarrhea, Disp: 60 tablet, Rfl: 0  [START ON 7/20/2022] fluorouracil 4,150 mg in CADD/Elastomeric Infusion Device, Infuse 4,150 mg (1,200 mg/m2/day x 1 73 m2) into a venous catheter over 46 hours for 2 days  Do not start before July 20, 2022 , Disp: 1 each, Rfl: 0  Multiple Vitamin (MULTIVITAMIN ADULT PO), Take 1 tablet by mouth daily (Patient not taking: No sig reported), Disp: , Rfl:   polyethylene glycol (MIRALAX) 17 g packet, Take 17 g by mouth daily (Patient not taking: No sig reported), Disp: , Rfl:     No current facility-administered medications for this visit  [unfilled]    SOCIAL HISTORY:  reports that he has quit smoking  He has never used smokeless tobacco  He reports previous alcohol use  He reports that he does not use drugs  FAMILY HISTORY:  family history includes Alcohol abuse in his mother; Diabetes in his father  ALLERGIES:  is allergic to aleve [naproxen]  Physical Exam:  Vital Signs:   BP 96/58 (BP Location: Left arm, Patient Position: Sitting, Cuff Size: Adult)   Pulse 86   Temp 98 7 °F (37 1 °C) (Tympanic)   Resp 16   Ht 5' 7" (1 702 m)   Wt 52 6 kg (116 lb)   SpO2 99%   BMI 18 17 kg/m²   Smoking Status Former Smoker   BSA 1 6 m²   Body mass index is 18 17 kg/m²  Body surface area is 1 6 meters squared  GEN: Alert, awake oriented x3, in no acute distress  HEENT- No pallor, icterus, cyanosis, no oral mucosal lesions,   LAD - no palpable cervical, clavicle, axillary, inguinal LAD  Heart- normal S1 S2, regular rate and rhythm, No murmur, rubs     Lungs- clear breathing sound bilateral    Abdomen- soft, Non tender, bowel sounds present  Extremities- No cyanosis, clubbing, edema  Neuro- No focal neurological deficit    Labs:  Lab Results      Component                Value               Date                      WBC                      8 39                07/11/2022                HGB                      10 3 (L)            07/11/2022                HCT                      33 0 (L)            07/11/2022                MCV                      96                  07/11/2022                PLT                      165                 07/11/2022            Lab Results      Component Value               Date                      SODIUM                   139                 07/11/2022                K                        3 8                 07/11/2022                CL                       111 (H)             07/11/2022                CO2                      20 (L)              07/11/2022                AGAP                     8                   07/11/2022                BUN                      11                  07/11/2022                CREATININE               0 93                07/11/2022                GLUC                     168 (H)             07/11/2022                CALCIUM                  8 9                 07/11/2022                AST                      16                  07/11/2022                ALT                      26                  07/11/2022                ALKPHOS                  227 (H)             07/11/2022                TP                       6 7                 07/11/2022                TBILI                    0 52                07/11/2022                EGFR                     77                  07/11/2022

## 2022-07-14 ENCOUNTER — HOSPITAL ENCOUNTER (OUTPATIENT)
Dept: INFUSION CENTER | Facility: HOSPITAL | Age: 82
End: 2022-07-14

## 2022-07-16 LAB
ALBUMIN SERPL-MCNC: 3.6 G/DL (ref 3.6–5.1)
ALBUMIN/GLOB SERPL: 1.6 (CALC) (ref 1–2.5)
ALP SERPL-CCNC: 187 U/L (ref 35–144)
ALT SERPL-CCNC: 15 U/L (ref 9–46)
AST SERPL-CCNC: 15 U/L (ref 10–35)
BASOPHILS # BLD AUTO: 62 CELLS/UL (ref 0–200)
BASOPHILS NFR BLD AUTO: 1.1 %
BILIRUB SERPL-MCNC: 0.5 MG/DL (ref 0.2–1.2)
BUN SERPL-MCNC: 10 MG/DL (ref 7–25)
BUN/CREAT SERPL: ABNORMAL (CALC) (ref 6–22)
CALCIUM SERPL-MCNC: 8.4 MG/DL (ref 8.6–10.3)
CHLORIDE SERPL-SCNC: 111 MMOL/L (ref 98–110)
CO2 SERPL-SCNC: 20 MMOL/L (ref 20–32)
CREAT SERPL-MCNC: 0.88 MG/DL (ref 0.7–1.28)
EOSINOPHIL # BLD AUTO: 62 CELLS/UL (ref 15–500)
EOSINOPHIL NFR BLD AUTO: 1.1 %
ERYTHROCYTE [DISTWIDTH] IN BLOOD BY AUTOMATED COUNT: 18.7 % (ref 11–15)
GFR/BSA.PRED SERPLBLD CYS-BASED-ARV: 87 ML/MIN/1.73M2
GLOBULIN SER CALC-MCNC: 2.2 G/DL (CALC) (ref 1.9–3.7)
GLUCOSE SERPL-MCNC: 152 MG/DL (ref 65–139)
HCT VFR BLD AUTO: 31.2 % (ref 38.5–50)
HGB BLD-MCNC: 10.3 G/DL (ref 13.2–17.1)
LYMPHOCYTES # BLD AUTO: 1114 CELLS/UL (ref 850–3900)
LYMPHOCYTES NFR BLD AUTO: 19.9 %
MCH RBC QN AUTO: 30.3 PG (ref 27–33)
MCHC RBC AUTO-ENTMCNC: 33 G/DL (ref 32–36)
MCV RBC AUTO: 91.8 FL (ref 80–100)
MONOCYTES # BLD AUTO: 666 CELLS/UL (ref 200–950)
MONOCYTES NFR BLD AUTO: 11.9 %
NEUTROPHILS # BLD AUTO: 3696 CELLS/UL (ref 1500–7800)
NEUTROPHILS NFR BLD AUTO: 66 %
PLATELET # BLD AUTO: 173 THOUSAND/UL (ref 140–400)
PMV BLD REES-ECKER: 10.7 FL (ref 7.5–12.5)
POTASSIUM SERPL-SCNC: 4.2 MMOL/L (ref 3.5–5.3)
PROT SERPL-MCNC: 5.8 G/DL (ref 6.1–8.1)
RBC # BLD AUTO: 3.4 MILLION/UL (ref 4.2–5.8)
SODIUM SERPL-SCNC: 138 MMOL/L (ref 135–146)
WBC # BLD AUTO: 5.6 THOUSAND/UL (ref 3.8–10.8)

## 2022-07-18 ENCOUNTER — OFFICE VISIT (OUTPATIENT)
Dept: HEMATOLOGY ONCOLOGY | Facility: HOSPITAL | Age: 82
End: 2022-07-18
Payer: COMMERCIAL

## 2022-07-18 ENCOUNTER — TELEPHONE (OUTPATIENT)
Dept: HEMATOLOGY ONCOLOGY | Facility: CLINIC | Age: 82
End: 2022-07-18

## 2022-07-18 ENCOUNTER — TELEPHONE (OUTPATIENT)
Dept: HEMATOLOGY ONCOLOGY | Facility: HOSPITAL | Age: 82
End: 2022-07-18

## 2022-07-18 VITALS
DIASTOLIC BLOOD PRESSURE: 64 MMHG | HEIGHT: 67 IN | TEMPERATURE: 97.9 F | RESPIRATION RATE: 18 BRPM | BODY MASS INDEX: 19.93 KG/M2 | HEART RATE: 64 BPM | OXYGEN SATURATION: 99 % | SYSTOLIC BLOOD PRESSURE: 106 MMHG | WEIGHT: 127 LBS

## 2022-07-18 DIAGNOSIS — C25.9 PANCREATIC ADENOCARCINOMA (HCC): ICD-10-CM

## 2022-07-18 DIAGNOSIS — E87.6 HYPOKALEMIA: Primary | ICD-10-CM

## 2022-07-18 DIAGNOSIS — T45.1X5A CHEMOTHERAPY INDUCED NEUTROPENIA (HCC): ICD-10-CM

## 2022-07-18 DIAGNOSIS — D70.1 CHEMOTHERAPY INDUCED NEUTROPENIA (HCC): ICD-10-CM

## 2022-07-18 PROCEDURE — 99214 OFFICE O/P EST MOD 30 MIN: CPT | Performed by: INTERNAL MEDICINE

## 2022-07-18 RX ORDER — FLUOROURACIL 50 MG/ML
280 INJECTION, SOLUTION INTRAVENOUS ONCE
Status: CANCELLED | OUTPATIENT
Start: 2022-07-20

## 2022-07-18 RX ORDER — METFORMIN HYDROCHLORIDE 500 MG/1
500 TABLET, EXTENDED RELEASE ORAL 2 TIMES DAILY
COMMUNITY
Start: 2022-07-14

## 2022-07-18 RX ORDER — OMEPRAZOLE 20 MG/1
CAPSULE, DELAYED RELEASE ORAL
COMMUNITY
Start: 2022-07-15

## 2022-07-18 NOTE — TELEPHONE ENCOUNTER
Dr Parker Espino wanted to see Boom Conklin back on 8/3 but there is no availability in infusion for that day, Could he be scheduled as a virtual follow up?

## 2022-07-18 NOTE — TELEPHONE ENCOUNTER
Title: Medication time out/change to orders    Date patient scheduled: 7/20    Original medication ordered:   Fluorouracil (adrucil) 320mg/m2    Fluourouracil CADD 1,200mg/m2/day    New Medication ordered:  Fluorouracil (adrucil) 280mg/m2    Fluourouracil CADD 1,000mg/m2/day      Office to route to [place] [address]    Infusion tech to receive message, confirm scheduled treatment duration matches ordered treatment duration or adjust accordingly, and re-link appointment request orders  Infusion tech to notify pharmacy  Spoke with Cyndi Estrada RN from [address] to confirm medication time out

## 2022-07-18 NOTE — TELEPHONE ENCOUNTER
Biju Simmons! Could we get him in for a f/up appt on 8/1 in [address] [place] [address]? His treatment is good for 8/2  Thanks! Attending with

## 2022-07-18 NOTE — TELEPHONE ENCOUNTER
We had to schedule him on 8/2 because there is not enough room on 8/3  Usually ok with office to move by one day, is that still ok or do you need me to move him if we get a cancellation?

## 2022-07-18 NOTE — PROGRESS NOTES
HEMATOLOGY / [address] FOLLOW UP NOTE    Primary Care Provider: Shanna Brizuela  Referring Provider:    MRN: [id number]  : 1940    Reason for Encounter:    Oncology History Overview Note     3/2022 - locally advanced, unresectable adenocarcinoma of the pancreas       2022 - start FOLFIRINOX with 20% dose reduction in oxaliplatin and    irinotecan due to age      2022 - 5FU dose reduced by 20% due to diarrhea      2022 - cycle 7 delayed due to diarrhea - bolus 5-FU further reduced    by a total of 30% and infusional 5-FU dose reduced by 20%    Interval History: patient presents for re-evaluation prior to cycle 7 of FOLFIRINOX for pancreatic cancer  Since I saw him last week, there was an addendum put on the CT report by radiology commenting on 2 small nodularities in the RLQ associated with the peritoneum  The patient's chemo last week got delayed because when he went the day after our appointment to get it, he told the nursing staff that he was having 10 episodes of diarrhea per day  I started him on lomotil and he is taking about 3 a day  This has gotten his diarrhea down to 3 episodes per day  He isn't having any abdominal pain  He is very fixated today on my not "taking away his hope" with bad news  REVIEW OF SYSTEMS:  Please note that a 14-point review of systems was performed to include Constitutional, HEENT, Respiratory, CVS, GI, , Musculoskeletal, Integumentary, Neurologic, Rheumatologic, Endocrinologic, Psychiatric, Lymphatic, and Hematologic/Oncologic systems were reviewed and are negative unless otherwise stated in HPI  Positive and negative findings pertinent to this evaluation are incorporated into the history of present illness        ECOG PS: 1    PROBLEM LIST:  Patient Active Problem List:    Type 2 diabetes mellitus without complication, with long-term current use of insulin (HCC)    GERD (gastroesophageal reflux disease)    Pancreatic mass    History of pulmonary embolism    Hyperbilirubinemia    Anxiety about health    Pancreatic adenocarcinoma ([facility] [address] )    Chemotherapy induced neutropenia (HCC)    Hypokalemia    Dehydration    Mild protein-calorie malnutrition (HCC)    Acute exacerbation of chronic obstructive pulmonary disease (HCC)      Assessment / Plan: the patient's diarrhea is better controlled with lomotil  I asked him to keep the doses at least 4 hours apart because this is a much stronger antidiarrhea medication  I am also going to cut back the bolus 5-FU by a total of 30% and the infusional 5-FU by 20%  We also discussed what type of communication we would have with each other about this prognosis  He doesn't want me to tell him that he has "weeks to live "  I explained that I usually do not stamp people with time amounts because these are often inaccurate guesses that can be emotionally damaging to a patient  However, if his cancer gets worse I will need to explain the rationale behind any changes in treatment  He has had long standing issues with anxiety and Is looking into get back in counseling  He also has a daughter in [address] who is a source of support  I am happy to call her with any updates in his treatment or prognosis so that she can support him through those tough times  I will attempt to respect his communication wishes while telling him what he needs to know to make sound treatment decisions  I am presenting his case at our GI TB this week and I will see him in 2 weeks  I have also added a ca 19-9 onto his labs prior to chemo  I spent 30 minutes on chart review, face to face counseling time, coordination of care and documentation  Past Medical History:  has a past medical history of Ambulates with cane, Anxiety, Depression, Diabetes mellitus ([facility] [address] ), GERD (gastroesophageal reflux disease), History of pulmonary embolus (PE), Hyperlipidemia, Multiple thyroid nodules, and Pancreatic mass      PAST SURGICAL HISTORY:  has a past surgical history that includes Cataract extraction (Right); Appendectomy; Tunneled venous port placement (Right, 4/11/2022); and FL guided central venous access device insertion (4/11/2022)  CURRENT MEDICATIONS  Current Outpatient Medications:  Alcohol Swabs 70 % PADS, May substitute brand based on insurance coverage  Check glucose BID , Disp: 100 each, Rfl: 0  apixaban (ELIQUIS) 5 mg, Take 5 mg by mouth 2 (two) times a day, Disp: , Rfl:   Blood Glucose Monitoring Suppl (OneTouch Verio Reflect) w/Device KIT, May substitute brand based on insurance coverage  Check glucose BID , Disp: 1 kit, Rfl: 0  diphenoxylate-atropine (LOMOTIL) 2 5-0 025 mg per tablet, Take 1 tablet by mouth 4 (four) times a day as needed for diarrhea, Disp: 60 tablet, Rfl: 0  [START ON 7/20/2022] fluorouracil 4,150 mg in CADD/Elastomeric Infusion Device, Infuse 4,150 mg (1,200 mg/m2/day x 1 73 m2) into a venous catheter over 46 hours for 2 days  Do not start before July 20, 2022 , Disp: 1 each, Rfl: 0  glucose blood (OneTouch Verio) test strip, May substitute brand based on insurance coverage  Check glucose BID , Disp: 100 each, Rfl: 0  Klor-Con M20 20 MEQ tablet, TAKE 2 TABLETS BY MOUTH DAILY, Disp: 180 tablet, Rfl: 1  metFORMIN (GLUCOPHAGE) 1000 MG tablet, Take 1,000 mg by mouth 2 (two) times a day with meals  , Disp: , Rfl:   metFORMIN (GLUCOPHAGE-XR) 500 mg 24 hr tablet, Take 500 mg by mouth 2 (two) times a day, Disp: , Rfl:   omeprazole (PriLOSEC) 20 mg delayed release capsule, , Disp: , Rfl:   omeprazole (PriLOSEC) 40 MG capsule, Take 40 mg by mouth every evening  , Disp: , Rfl:   ondansetron (Zofran ODT) 8 mg disintegrating tablet, Take 1 tablet (8 mg total) by mouth every 8 (eight) hours as needed for nausea or vomiting, Disp: 60 tablet, Rfl: 0  OneTouch Delica Lancets 50X MISC, May substitute brand based on insurance coverage   Check glucose BID , Disp: 100 each, Rfl: 0  pravastatin (PRAVACHOL) 20 mg tablet, Take 20 mg by mouth daily, Disp: , Rfl:   prochlorperazine (COMPAZINE) 10 mg tablet, Take 1 tablet (10 mg total) by mouth every 6 (six) hours as needed for nausea or vomiting, Disp: 30 tablet, Rfl: 0  ALPRAZolam (XANAX) 0 5 mg tablet, Take 0 5 tablets (0 25 mg total) by mouth 3 (three) times a day as needed for anxiety (Patient not taking: No sig reported), Disp: 90 tablet, Rfl: 0  [START ON 7/20/2022] fluorouracil 3,460 mg in CADD/Elastomeric Infusion Device, Infuse 3,460 mg (1,000 mg/m2/day x 1 73 m2) into a venous catheter over 46 hours for 2 days  Do not start before July 20, 2022 , Disp: 1 each, Rfl: 0  Multiple Vitamin (MULTIVITAMIN ADULT PO), Take 1 tablet by mouth daily (Patient not taking: No sig reported), Disp: , Rfl:   polyethylene glycol (MIRALAX) 17 g packet, Take 17 g by mouth daily (Patient not taking: No sig reported), Disp: , Rfl:   traMADol (ULTRAM) 50 mg tablet, Take 1 tablet (50 mg total) by mouth every 6 (six) hours as needed for moderate pain (Patient not taking: Reported on 7/18/2022), Disp: 60 tablet, Rfl: 0    No current facility-administered medications for this visit  [unfilled]    SOCIAL HISTORY:  reports that he has quit smoking  He has never used smokeless tobacco  He reports previous alcohol use  He reports that he does not use drugs  FAMILY HISTORY:  family history includes Alcohol abuse in his mother; Diabetes in his father  ALLERGIES:  is allergic to aleve [naproxen]  Physical Exam:  Vital Signs:   /64 (BP Location: Left arm, Patient Position: Sitting, Cuff Size: Adult)   Pulse 64   Temp 97 9 °F (36 6 °C) (Tympanic)   Resp 18   Ht 5' 7 01" (1 702 m)   Wt 57 6 kg (127 lb)   SpO2 99%   BMI 19 89 kg/m²   Smoking Status Former Smoker   BSA 1 67 m²   Body mass index is 19 89 kg/m²  Body surface area is 1 67 meters squared      GEN: Alert, awake oriented x3, in no acute distress  HEENT- No pallor, icterus, cyanosis, no oral mucosal lesions,   LAD - no palpable cervical, clavicle, axillary, inguinal LAD  Heart- normal S1 S2, regular rate and rhythm, No murmur, rubs     Lungs- clear breathing sound bilateral    Abdomen- soft, Non tender, bowel sounds present  Extremities- No cyanosis, clubbing, edema  Neuro- No focal neurological deficit    Labs:  Lab Results      Component                Value               Date                      WBC                      5 6                 07/15/2022                HGB                      10 3 (L)            07/15/2022                HCT                      31 2 (L)            07/15/2022                MCV                      91 8                07/15/2022                PLT                      173                 07/15/2022            Lab Results      Component                Value               Date                      SODIUM                   138                 07/15/2022                K                        4 2                 07/15/2022                CL                       111 (H)             07/15/2022                CO2                      20                  07/15/2022                AGAP                     8                   07/11/2022                BUN                      10                  07/15/2022                CREATININE               0 88                07/15/2022                GLUC                     152 (H)             07/15/2022                CALCIUM                  8 4 (L)             07/15/2022                AST                      15                  07/15/2022                ALT                      15                  07/15/2022                ALKPHOS                  187 (H)             07/15/2022                TP                       5 8 (L)             07/15/2022                TBILI                    0 5                 07/15/2022                EGFR                     87                  07/15/2022

## 2022-07-18 NOTE — TELEPHONE ENCOUNTER
Dr Beverly Zambrano will see Selin Syed on 8/1 - confirmed follow up & infusion dates & times w/ Selin Syed

## 2022-07-20 ENCOUNTER — PATIENT OUTREACH (OUTPATIENT)
Dept: HEMATOLOGY ONCOLOGY | Facility: CLINIC | Age: 82
End: 2022-07-20

## 2022-07-20 ENCOUNTER — HOSPITAL ENCOUNTER (OUTPATIENT)
Dept: INFUSION CENTER | Facility: HOSPITAL | Age: 82
Discharge: HOME | End: 2022-07-20
Payer: COMMERCIAL

## 2022-07-20 VITALS
BODY MASS INDEX: 19.83 KG/M2 | OXYGEN SATURATION: 100 % | DIASTOLIC BLOOD PRESSURE: 63 MMHG | SYSTOLIC BLOOD PRESSURE: 131 MMHG | TEMPERATURE: 96.8 F | WEIGHT: 126.32 LBS | HEIGHT: 67 IN | RESPIRATION RATE: 16 BRPM | HEART RATE: 66 BPM

## 2022-07-20 DIAGNOSIS — C25.9 PANCREATIC ADENOCARCINOMA (HCC): ICD-10-CM

## 2022-07-20 DIAGNOSIS — T45.1X5A CHEMOTHERAPY INDUCED NEUTROPENIA (HCC): ICD-10-CM

## 2022-07-20 DIAGNOSIS — E87.6 HYPOKALEMIA: Primary | ICD-10-CM

## 2022-07-20 DIAGNOSIS — D70.1 CHEMOTHERAPY INDUCED NEUTROPENIA (HCC): ICD-10-CM

## 2022-07-20 LAB — CANCER AG19-9 SERPL-ACNC: 2528 U/ML

## 2022-07-20 PROCEDURE — 96413 CHEMO IV INFUSION 1 HR: CPT

## 2022-07-20 PROCEDURE — 96367 TX/PROPH/DG ADDL SEQ IV INF: CPT

## 2022-07-20 PROCEDURE — 96375 TX/PRO/DX INJ NEW DRUG ADDON: CPT

## 2022-07-20 PROCEDURE — G0498 CHEMO EXTEND IV INFUS W/PUMP: HCPCS

## 2022-07-20 PROCEDURE — 96415 CHEMO IV INFUSION ADDL HR: CPT

## 2022-07-20 PROCEDURE — 96411 CHEMO IV PUSH ADDL DRUG: CPT

## 2022-07-20 PROCEDURE — 96361 HYDRATE IV INFUSION ADD-ON: CPT

## 2022-07-20 PROCEDURE — 96368 THER/DIAG CONCURRENT INF: CPT

## 2022-07-20 PROCEDURE — 96417 CHEMO IV INFUS EACH ADDL SEQ: CPT

## 2022-07-20 PROCEDURE — 86301 IMMUNOASSAY TUMOR CA 19-9: CPT | Performed by: INTERNAL MEDICINE

## 2022-07-20 RX ORDER — DEXTROSE MONOHYDRATE 50 MG/ML
20 INJECTION, SOLUTION INTRAVENOUS ONCE
Status: COMPLETED | OUTPATIENT
Start: 2022-07-20 | End: 2022-07-20

## 2022-07-20 RX ORDER — FLUOROURACIL 50 MG/ML
280 INJECTION, SOLUTION INTRAVENOUS ONCE
Status: COMPLETED | OUTPATIENT
Start: 2022-07-20 | End: 2022-07-20

## 2022-07-20 RX ORDER — ATROPINE SULFATE 1 MG/ML
0.25 INJECTION, SOLUTION INTRAVENOUS ONCE
Status: COMPLETED | OUTPATIENT
Start: 2022-07-20 | End: 2022-07-20

## 2022-07-20 RX ORDER — SODIUM CHLORIDE 9 MG/ML
20 INJECTION, SOLUTION INTRAVENOUS ONCE AS NEEDED
Status: DISCONTINUED | OUTPATIENT
Start: 2022-07-20 | End: 2022-07-21

## 2022-07-20 RX ADMIN — DEXAMETHASONE SODIUM PHOSPHATE: 10 INJECTION, SOLUTION INTRAMUSCULAR; INTRAVENOUS at 09:20

## 2022-07-20 RX ADMIN — FLUOROURACIL 485 MG: 50 INJECTION, SOLUTION INTRAVENOUS at 14:24

## 2022-07-20 RX ADMIN — LEUCOVORIN CALCIUM 700 MG: 350 INJECTION, POWDER, LYOPHILIZED, FOR SUSPENSION INTRAMUSCULAR; INTRAVENOUS at 12:40

## 2022-07-20 RX ADMIN — OXALIPLATIN 112.45 MG: 5 INJECTION, SOLUTION INTRAVENOUS at 10:41

## 2022-07-20 RX ADMIN — DEXTROSE 160 MG: 5 SOLUTION INTRAVENOUS at 12:41

## 2022-07-20 RX ADMIN — SODIUM CHLORIDE 20 ML/HR: 9 INJECTION, SOLUTION INTRAVENOUS at 09:10

## 2022-07-20 RX ADMIN — SODIUM CHLORIDE 1000 ML: 0.9 INJECTION, SOLUTION INTRAVENOUS at 09:09

## 2022-07-20 RX ADMIN — DEXTROSE 20 ML/HR: 5 SOLUTION INTRAVENOUS at 10:10

## 2022-07-20 RX ADMIN — ATROPINE SULFATE 0.25 MG: 1 INJECTION, SOLUTION INTRAMUSCULAR; INTRAVENOUS; SUBCUTANEOUS at 12:36

## 2022-07-20 NOTE — PROGRESS NOTES
MSW met with the pt in the infusion center this day  Pt was open and receptive to chairside conversation  He denied any pain or discomfort at this time  MSW addressed the pt's emotional well being and his anxiety and he shared that this has become "an issue more recently" and he is considering therapy  MSW offered to assist with this and pt had a newsletter from his [employer] where a number was provided to call for counseling  Pt states that he was planning on calling this day  Pt states that he had a therapist for over 30 years, who retired, and he never identified anyone after that as he felt he had no need  The pt states that when he was first diagnosed, his liver enzymes were too high and he was not able to receive treatment and he was "sent home to die "  He shared that was when his anxiety started to ":get out of control "  He expressed that if he feels he has no hope that is when he is not able to control his anxiety  MSW listened to the pt and acknowledged what he was sharing  MSW apologized tot he pt that was his understanding of his treatment plan as his oncology team would never send that direct message to him  As this was processed , the pt verbalized having his anxiety decreased  MSW explained how there are times when blood counts are off and patients have to have their treatment held and this also does not mean they are "sent home to die "  MSW wanted him to be aware of this in the event it ever occurred to him  Pt states that he was asked to give his will to [facility] [place] and he thought, this too, meant they were "telling me nicely that I am dying "  MSW spoke with the pt about the importance of being prepared and having a dona dn AD in place  The pt has been bringing his will to each infusion and MSW assured him this was not necessary  Pt expressed relief and states that seeing it each time causes anxiety    MSW suggested he put the will in a safe place and not think about it as it   MSW encouraged the pt to focus on living rather than dying and offered ways he could do this when his mind is triggered with penetrating thoughts of death  Pt was open to suggestions  Significant support was offered and MSW will continue to follow

## 2022-07-20 NOTE — PROGRESS NOTES
Patient here for chemotherapy infusion  Notes that diarrhea has improved since started taking lomotil  Also states he hasn't had any abdominal pain since increasing his fluids and has not been taking tramadol  Today's treatment is currently in process, will continue to monitor

## 2022-07-21 ENCOUNTER — HOSPITAL ENCOUNTER (OUTPATIENT)
Dept: INFUSION CENTER | Facility: HOSPITAL | Age: 82
Discharge: HOME | End: 2022-07-21

## 2022-07-21 ENCOUNTER — TELEPHONE (OUTPATIENT)
Dept: HEMATOLOGY ONCOLOGY | Facility: HOSPITAL | Age: 82
End: 2022-07-21

## 2022-07-21 ENCOUNTER — TELEPHONE (OUTPATIENT)
Dept: HEMATOLOGY ONCOLOGY | Facility: CLINIC | Age: 82
End: 2022-07-21

## 2022-07-21 DIAGNOSIS — C25.9 PANCREATIC ADENOCARCINOMA (HCC): Primary | ICD-10-CM

## 2022-07-21 LAB — CANCER AG19-9 SERPL-ACNC: 1360 U/ML (ref 0–35)

## 2022-07-21 RX ORDER — CAPECITABINE 500 MG/1
TABLET, FILM COATED ORAL
Qty: 120 TABLET | Refills: 0 | Status: SHIPPED | OUTPATIENT
Start: 2022-07-21 | End: 2022-07-27 | Stop reason: SDUPTHER

## 2022-07-21 NOTE — TELEPHONE ENCOUNTER
Patient is currently connected to the CADD pump  Please keep Day 3 treatment prior to discontinuing treatment plan

## 2022-07-21 NOTE — PROGRESS NOTES
Patient arrived today without appointment d/t elastomeric pump tubing becoming unattached to skin  This happened despite all the extra tape applied to patient yesterday when he was here for tx  He had applied duct tape to try to fix it but to no avail  Pump had less volume and therefore appeared to be working  Office was notified; as per Tamela Aleman RN, we are ok to secure the tubing again to the patient and have him return tomorrow as scheduled  We will evaluate when patient returns to make sure he got his full dose of 5FU  Patient left unit ambulatory with steady gait

## 2022-07-21 NOTE — TELEPHONE ENCOUNTER
I called Jackson Cannon to let him know about the discussion we had at tumor board this morning  I am going to discontinue further FOLFIRINOX and refer him for radiation to the bulk of his local disease as this is abutting the duodenum and could cause obstruction  He will need ride support for radiation and I will get my  involved  I am also going to write his xeloda rx for concurrent treatment and will work on [facility]  His dose will be 1000 mg BID M-F while on chemotherapy  I also called his daughter, Mohan Wood, to give her an update  Jackson Cannon has his daughter and his sister  His sister travels a lot and is not involved in his care  Mohan Wood lives in [place] and was not actually raised by Jackson Cannon  She was raised by her maternal grandparents because of alicja's history of mental illness  She doesn't have a close relationship with him, but does call and check on him and will send him packages of things he needs  She is willing to help him make medical decisions but there has never been a formal medical POA designated  I did tell her that he gets very upset and refuses to talk to me about his prognosis and doesn't want me to give him any bad news  I may need to involve her in conversations when his condition gets worse  I gave her an update of his treatment regimen  She would also like our social work team to look into getting him an aide at home

## 2022-07-22 ENCOUNTER — DOCUMENTATION (OUTPATIENT)
Dept: HEMATOLOGY ONCOLOGY | Facility: CLINIC | Age: 82
End: 2022-07-22

## 2022-07-22 ENCOUNTER — HOSPITAL ENCOUNTER (OUTPATIENT)
Dept: INFUSION CENTER | Facility: HOSPITAL | Age: 82
Discharge: HOME | End: 2022-07-22
Payer: COMMERCIAL

## 2022-07-22 VITALS — TEMPERATURE: 98.1 F

## 2022-07-22 DIAGNOSIS — T45.1X5A CHEMOTHERAPY INDUCED NEUTROPENIA (HCC): ICD-10-CM

## 2022-07-22 DIAGNOSIS — C25.9 PANCREATIC ADENOCARCINOMA (HCC): ICD-10-CM

## 2022-07-22 DIAGNOSIS — D70.1 CHEMOTHERAPY INDUCED NEUTROPENIA (HCC): ICD-10-CM

## 2022-07-22 DIAGNOSIS — E87.6 HYPOKALEMIA: Primary | ICD-10-CM

## 2022-07-22 PROCEDURE — 96372 THER/PROPH/DIAG INJ SC/IM: CPT

## 2022-07-22 RX ADMIN — PEGFILGRASTIM 6 MG: 6 INJECTION SUBCUTANEOUS at 12:56

## 2022-07-22 NOTE — PROGRESS NOTES
Pt here today for pump disconnect and neulasta SQ injection tolerated well no adverse reactions AVS provided and pt left ambulatory with use of a cane and steady gait

## 2022-07-22 NOTE — PROGRESS NOTES
RECTAL/GI MULTIDISCIPLINARY CASE REVIEW    DATE: 7/21/2022      PRESENTING DOCTOR: Dr Simi Morse      DIAGNOSIS: Pancreatic cancer      Marc Knox was presented at the Rectal/GI Multidisciplinary Conference today  PHYSICIAN RECOMMENDED PLAN:    -Recommend concurrent chemotherapy and radiation therapy  -Refer patient to radiation oncology  Referral placed on 7/21/2022   -Switch systemic chemotherapy to [place]/Abraxane  Team agreed to plan  The final treatment plan will be left to the discretion of the patient and the treating physician  DISCLAIMERS:  TO THE TREATING PHYSICIAN:  This conference is a meeting of clinicians from various specialty areas who evaluate and discuss patients for whom a multidisciplinary treatment approach is being considered  Please note that the above opinion was a consensus of the conference attendees and is intended only to assist in quality care of the discussed patient  The responsibility for follow up on the input given during the conference, along with any final decisions regarding plan of care, is that of the patient and the patient's provider  Accordingly, appointments have only been recommended based on this information and have NOT been scheduled unless otherwise noted  TO THE PATIENT:  This summary is a brief record of major aspects of your cancer treatment  You may choose to share a copy with any of your doctors or nurses  However, this is not a detailed or comprehensive record of your care        NCCN guidelines were readily available for review at this discussion

## 2022-07-27 ENCOUNTER — DOCUMENTATION (OUTPATIENT)
Dept: HEMATOLOGY ONCOLOGY | Facility: CLINIC | Age: 82
End: 2022-07-27

## 2022-07-27 DIAGNOSIS — C25.9 PANCREATIC ADENOCARCINOMA (HCC): ICD-10-CM

## 2022-07-27 RX ORDER — CAPECITABINE 500 MG/1
TABLET, FILM COATED ORAL
Qty: 120 TABLET | Refills: 0 | Status: SHIPPED | OUTPATIENT
Start: 2022-07-27 | End: 2022-08-05 | Stop reason: SDUPTHER

## 2022-07-27 NOTE — PROGRESS NOTES
Received request from clinical for patient to start on Capecitabine 1000 BID concurrent with radiation  I submitted auth via the phone  [phone]    BIN:       [id number]  ROMULO:      STARR  ID:          [id number]    This has been approved from 7/27/2022-7/27/2023  Waiting to see if [facility] is able to fill

## 2022-07-28 ENCOUNTER — CLINICAL SUPPORT (OUTPATIENT)
Dept: RADIATION ONCOLOGY | Facility: HOSPITAL | Age: 82
End: 2022-07-28
Attending: INTERNAL MEDICINE
Payer: COMMERCIAL

## 2022-07-28 VITALS
TEMPERATURE: 98.3 F | DIASTOLIC BLOOD PRESSURE: 70 MMHG | HEART RATE: 90 BPM | BODY MASS INDEX: 19.58 KG/M2 | RESPIRATION RATE: 18 BRPM | SYSTOLIC BLOOD PRESSURE: 120 MMHG | WEIGHT: 125 LBS | OXYGEN SATURATION: 99 %

## 2022-07-28 DIAGNOSIS — C25.9 PANCREATIC ADENOCARCINOMA (HCC): Primary | ICD-10-CM

## 2022-07-28 DIAGNOSIS — C25.9 PANCREATIC ADENOCARCINOMA (HCC): ICD-10-CM

## 2022-07-28 PROCEDURE — 77263 THER RADIOLOGY TX PLNG CPLX: CPT | Performed by: INTERNAL MEDICINE

## 2022-07-28 PROCEDURE — 99211 OFF/OP EST MAY X REQ PHY/QHP: CPT | Performed by: INTERNAL MEDICINE

## 2022-07-28 PROCEDURE — 99204 OFFICE O/P NEW MOD 45 MIN: CPT | Performed by: INTERNAL MEDICINE

## 2022-07-28 NOTE — PROGRESS NOTES
Consultation - Radiation Oncology      Patient Name: Missael Guzman [facility]:[id number] : 1940  Encounter: [id number]  Referring Provider: Antoni Ziegler DO    ASSESSMENT  Cancer Staging  Pancreatic adenocarcinoma [facility])  Staging form: Pancreas, AJCC 8th Edition  - Clinical stage from 3/4/2022: Stage IB (cT2, cN0, cM0) - Unsigned  Stage prefix: Initial diagnosis  Total positive nodes: 0    PLAN  Missael Guzman is a 80 y o  male with unresectable cT2N0 pancreatic cancer due to vessel encasement who was initially diagnosed on pancreatic biopsy on 3/4/22  He had imaging, surgical and medical oncology evaluation, and tumor board discussion (report not available) in 2022 and was deemed likely unresectable and was started on FOLFIRINOX  Post-chemo CT chest abdomen pelvis on 22 demonstrated the pancreas mass measuring 5 9 cm although appearing mildly decreased in size from the exam of 2022  There is upstream pancreatic ductal dilatation measuring 7 mm worsened from prior study  Mass continues to encase the superior mesenteric artery as well as the upper portion of the SMV which is not well visualized due to occlusion there is attenuation of the SMA the past mass  The mass abuts the 3rd segment of the duodenum without a clear intervening fat plane with the question of a partial duodenal obstruction secondary to mass effect  There were multiple stable enlarging pulmonary nodules, a left thyroid nodule and an arterially hyperenhancing subcapsular focus on posterior aspect of the liver  There is an addendum regarding two subcentimeter nodular densities within the RLQ that raise suspicion for early peritoneal metastases  His last cycle of FOLFOX was held due to significant GI toxicity  He was reviewed at tumor board and consensus was for chemoRT for local control and then subsequent [place]/Abraxane      We discussed treatment with external beam radiation therapy to 50 4 Gy in 28 fractions for local control and staving off potential duodenal obstruction  There is a chance that his other low abdomen findings indicate early metastatic disease  Acute and long-term side effects were discussed and include, but are not limited to, fatigue, nausea, diarrhea, abdominal pain, fistulization, ulceration, permanent change in bowel habits, malnutrition, or development of adhesions  We also discussed that the disease may progress/ worsen despite radiation therapy  The patient agreed to proceed with radiation therapy, and informed consent was signed  CT simulation to be scheduled at [place] [facility], with treatments at [facility] per his preference  Thank you for the opportunity to participate in the care of this patient  Rylee Laureano MD  Department of [address] [place] [address] 12    Orders Placed This Encounter   Procedures    Radiation Simulation Treatment     1  Pancreatic adenocarcinoma [facility])  Ambulatory referral to Radiation Oncology    Radiation Simulation Treatment       CHIEF COMPLAINT  Chief Complaint   Patient presents with    Consult     Radiation Oncology        History of Present Illness  Lo [place] 1940 is a 80 y o  male presents for Radiation Oncology consult (RT concurrent with Xeloda)  He is referred by Dr Ruddy Sibley  Dx: Pancreatic Cancer     Pt is a 79 yo male presenting w/Pancreatic Cancer  PMH includes anxiety, depression, Hx of psychiatric hospital stay after death of spouse, DM II, GERD, PE, Hyperlipidemia, multiple thyroid nodules, pt ambulates w/a cane  Daughter lives in [place]  Pt with hospital stay noted in early March 2022-elevated liver enzymes, jaundice, epigastric pain x one month, nausea and decreased appetite  Percocet for pain control at that time  Prior h[facility] of care at [facility]       EUS on 3/3/22 had shown a 3cm mass with well differentiated borders in the head of the pancreas and uncinate       CT at [facility] on 2/26/22 showed stable infiltrating pancreatic head mass which encases the superior mesenteric artery and vein and narrows the distal portal vein  No enlarged lymph nodes  MRI MRCP on 02/13/2022 at [facility] showed heterogenous signal mass centered on the uncinate process measuring 4 x 3 2 cm with anterior displacement partial encasement of the superior mesenteric artery and vein         3 4 22 ERCP/EUS-s/p stent placement(metal) traversing the distal biliary stricture  Large pancreatic head/ uncinate mass with upstream bile duct dilation  Portal confluence was not seen  No liver lesions seen and no lymphadenopathy present  Biopsy done   Path noted below       4 11 22 Port Placement /RCW     4 13 22 Genetic test results-Call to pt/Ariana Moise     4 19 22 Initiation of Chemotherapy   (7/12/2022 - cycle 7 delayed due to diarrhea - bolus 5-FU further reduced by a total of 30% and infusional 5-FU dose reduced by 20%)     6 30 22 Palliative Care OV/Dianna Garcia PA-C  Pain significantly improved  No refill of Tramadol 50mg needed  Compazine per Med Onc orders  Discussed initiating SSRI for anxiety about health however the pt is not interested  No longer using Xanax  Refer to nutrition book  Living will to next appt    Follow appt in 8-10 weeks for symptom check up        7 6 22 CT CAP  IMPRESSION:  CHEST  1   Numerous bilateral pulmonary nodules as listed above, stable from March 2022   No new or enlarging pulmonary nodules      2   Left thyroid nodule measuring 4 6 cm   Further characterization with thyroid ultrasound is recommended      ABDOMEN/PELVIS  1   Redemonstration of hypoenhancing pancreatic mass centered within the uncinate process   The mass is slightly decreased in size as compared to prior CT of October 2022  Gerardine Frias is worsening upstream pancreatic ductal dilatation   The mass again encases   the superior mesenteric artery which is attenuated   The mass also encases the 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, similar to CT of December 2020  Amilcar Jimenez is a similar degree of common bile duct dilatation      2   The aforementioned pancreatic mass does abut the 3rd segment duodenum and there is mild prominence of the 2nd segment duodenum raising concern for a partial duodenal obstruction secondary to mass effect   Clinical correlation recommended      3   Arterially hyperenhancing subcapsular focus along the posterior aspect of the right hepatic lobe   A subcentimeter hyperenhancing lesion was described in a similar location on prior outside MRI of February 2022   The study is not available for direct comparison at the time this dictation  Phillip Alex remains indeterminate and metastasis is occluded   Close attention on follow-up is recommended      4   Fluid density within the lumen of the colon as can be seen in the setting of diarrheal illness         7 18 22 Med Onc/Dr Adriana Sage  OV  Plan: Pt's diarrhea is better controlled with Lomotil  Cut back bolus 5FU by a total of 30% and the infusional 5FU by 20%  Discussed type of communication that would occur between the pt and myself concerning prognosis and treatment decisions  Present his case to the GI TB this week  Follow appt in two weeks/add CA 19-9 to labs prior to chemo       7/21/22 RECTAL/GI MULTIDISCIPLINARY CASE REVIEW  PHYSICIAN RECOMMENDED PLAN:   -Recommend concurrent chemotherapy and radiation therapy  -Refer patient to radiation oncology  Referral placed on 7/21/2022   -Switch systemic chemotherapy to [place]/Abraxane         7/21/22 Dr Adriana Sage called pt with plan after Tumor board discussion:  Vince Gómez  Refer to Rad Onc for radiation to the bulk of his local disease as this is abutting the duodenum and could cause obstruction  Concurrent with Xeloda 1000 mg BID M-F            Appts    8 1 22 Med Onc/Dr Adriana Sage  QU  9 6 22 Palliative Care QU    Today, the patient feels well overall   He has mild residual diarrhea and nausea  He has fatigue and mild COLE  He is significantly anxious  Oncology History   Pancreatic adenocarcinoma ([facility] [address] )   3/4/2022 Biopsy    A-B-C  Pancreas, pancreatic head mass   Malignant (PSC Category VI) -  Adenocarcinoma       3/4/2022 -  Cancer Staged    Staging form: Pancreas, AJCC 8th Edition  - Clinical stage from 3/4/2022: Stage IB (cT2, cN0, cM0) - Signed by Donavan Simon MD on 7/28/2022  Stage prefix: Initial diagnosis  Total positive nodes: 0       3/14/2022 Initial Diagnosis    Pancreatic adenocarcinoma ([facility] [address] )     4/19/2022 - 7/20/2022 Chemotherapy    fluorouracil (ADRUCIL), 400 mg/m2 = 690 mg, Intravenous, Once, 7 of 8 cycles  Dose modification: 320 mg/m2 (original dose 400 mg/m2, Cycle 4), 280 mg/m2 (original dose 400 mg/m2, Cycle 7, Reason: Dose Not Tolerated, Comment: 30% dose reduction due to diarrhea)  Administration: 690 mg (4/19/2022), 690 mg (5/3/2022), 690 mg (5/17/2022), 555 mg (5/31/2022), 555 mg (6/14/2022), 555 mg (6/28/2022), 485 mg (7/20/2022)  pegfilgrastim (NEULASTA), 6 mg, Subcutaneous, Once, 5 of 6 cycles  Administration: 6 mg (6/2/2022), 6 mg (6/16/2022), 6 mg (6/30/2022), 6 mg (5/20/2022)  irinotecan (CAMPTOSAR) chemo infusion, 125 mg/m2 = 216 mg (69 4 % of original dose 180 mg/m2), Intravenous, Once, 7 of 8 cycles  Dose modification: 125 mg/m2 (original dose 180 mg/m2, Cycle 1, Reason: Anticipated Tolerance, Comment: Anticipated tolerance and Bilirubin being between 1-2), 90 mg/m2 (original dose 180 mg/m2, Cycle 4, Reason: Dose Not Tolerated, Comment: 50% dose reduction from original dose due to diarrhea)  Administration: 216 mg (4/19/2022), 216 mg (5/3/2022), 220 mg (5/17/2022), 156 mg (5/31/2022), 160 mg (6/14/2022), 160 mg (6/28/2022), 160 mg (7/20/2022)  leucovorin calcium IVPB, 692 mg, Intravenous, Once, 7 of 8 cycles  Administration: 700 mg (4/19/2022), 700 mg (5/3/2022), 700 mg (5/17/2022), 700 mg (5/31/2022), 700 mg (6/14/2022), 700 mg (6/28/2022), 700 mg (7/20/2022)  oxaliplatin (ELOXATIN) chemo infusion, 65 mg/m2 = 112 45 mg (76 5 % of original dose 85 mg/m2), Intravenous, Once, 7 of 8 cycles  Dose modification: 65 mg/m2 (original dose 85 mg/m2, Cycle 1, Reason: Anticipated Tolerance)  Administration: 112 45 mg (4/19/2022), 112 45 mg (5/3/2022), 112 45 mg (5/17/2022), 112 45 mg (5/31/2022), 112 45 mg (6/14/2022), 112 45 mg (6/28/2022), 112 45 mg (7/20/2022)  fluorouracil (ADRUCIL) ambulatory infusion Soln, 1,200 mg/m2/day = 4,150 mg, Intravenous, Over 46 hours, 7 of 8 cycles  Dose modification: 1,000 mg/m2/day (original dose 1,200 mg/m2/day, Cycle 8, Reason: Dose Not Tolerated, Comment: dose reduction due to diarrhea), 1,000 mg/m2/day (original dose 1,200 mg/m2/day, Cycle 7, Reason: Dose Not Tolerated, Comment: dose reduction due to diarrhea)     7/22/2022 -  Chemotherapy    pegfilgrastim (NEULASTA), 6 mg, Subcutaneous, Once, 0 of 1 cycle     7/27/2022 Adverse Reaction    3/2022 - locally advanced, unresectable adenocarcinoma of the pancreas     4/19/2022 - start FOLFIRINOX with 20% dose reduction in oxaliplatin and irinotecan due to age    5/2022 - 5FU dose reduced by 20% due to diarrhea    7/12/2022 - cycle 7 delayed due to diarrhea - bolus 5-FU further reduced by a total of 30% and infusional 5-FU dose reduced by 20%        Chemotherapy    Xeloda 1000mg BID M-F concurrent with RT       Historical Information   Past Medical History:   Diagnosis Date    Ambulates with cane     Anxiety     Depression     Diabetes mellitus ([facility] [address] )     GERD (gastroesophageal reflux disease)     History of pulmonary embolus (PE)     Hyperlipidemia     Multiple thyroid nodules     pt states about 40yrs ago    Pancreatic cancer ([address] )     Pancreatic mass      Past Surgical History:   Procedure Laterality Date    APPENDECTOMY      CATARACT EXTRACTION Right     FL GUIDED CENTRAL VENOUS ACCESS DEVICE INSERTION  4/11/2022    TUNNELED VENOUS PORT PLACEMENT Right 4/11/2022 Procedure: INSERTION VENOUS PORT (PORT-A-CATH); Surgeon: Buck Torres MD;  Location: [facility];  Service: Surgical Oncology     Family History   Problem Relation Age of Onset    Alcohol abuse Mother     Diabetes Father     Throat cancer Brother      Social History   Social History     Substance and Sexual Activity   Alcohol Use Not Currently    Comment: seldom; socially     Social History     Substance and Sexual Activity   Drug Use No     Social History     Tobacco Use   Smoking Status Former Smoker    Years: 5 00    Start date:     Quit date:     Years since quittin 6   Smokeless Tobacco Never Used   Tobacco Comment    quit in ; smoked cigars for several yrs     Meds/Allergies     Current Outpatient Medications:     Alcohol Swabs 70 % PADS, May substitute brand based on insurance coverage  Check glucose BID , Disp: 100 each, Rfl: 0    apixaban (ELIQUIS) 5 mg, Take 5 mg by mouth 2 (two) times a day, Disp: , Rfl:     Blood Glucose Monitoring Suppl (OneTouch Verio Reflect) w/Device KIT, May substitute brand based on insurance coverage  Check glucose BID , Disp: 1 kit, Rfl: 0    diphenoxylate-atropine (LOMOTIL) 2 5-0 025 mg per tablet, Take 1 tablet by mouth 4 (four) times a day as needed for diarrhea, Disp: 60 tablet, Rfl: 0    glucose blood (OneTouch Verio) test strip, May substitute brand based on insurance coverage  Check glucose BID , Disp: 100 each, Rfl: 0    Klor-Con M20 20 MEQ tablet, TAKE 2 TABLETS BY MOUTH DAILY, Disp: 180 tablet, Rfl: 1    metFORMIN (GLUCOPHAGE-XR) 500 mg 24 hr tablet, Take 500 mg by mouth 2 (two) times a day, Disp: , Rfl:     omeprazole (PriLOSEC) 20 mg delayed release capsule, , Disp: , Rfl:     OneTouch Delica Lancets 61Z MISC, May substitute brand based on insurance coverage   Check glucose BID , Disp: 100 each, Rfl: 0    pravastatin (PRAVACHOL) 20 mg tablet, Take 20 mg by mouth daily, Disp: , Rfl:     prochlorperazine (COMPAZINE) 10 mg tablet, Take 1 tablet (10 mg total) by mouth every 6 (six) hours as needed for nausea or vomiting, Disp: 30 tablet, Rfl: 0    ALPRAZolam (XANAX) 0 5 mg tablet, Take 0 5 tablets (0 25 mg total) by mouth 3 (three) times a day as needed for anxiety (Patient not taking: No sig reported), Disp: 90 tablet, Rfl: 0    capecitabine (XELODA) 500 MG tablet, Take 2 pills twice a day Monday through Friday while on radiation  , Disp: 120 tablet, Rfl: 0    metFORMIN (GLUCOPHAGE) 1000 MG tablet, Take 1,000 mg by mouth 2 (two) times a day with meals   (Patient not taking: Reported on 7/28/2022), Disp: , Rfl:     Multiple Vitamin (MULTIVITAMIN ADULT PO), Take 1 tablet by mouth daily (Patient not taking: No sig reported), Disp: , Rfl:     omeprazole (PriLOSEC) 40 MG capsule, Take 40 mg by mouth every evening  , Disp: , Rfl:     ondansetron (Zofran ODT) 8 mg disintegrating tablet, Take 1 tablet (8 mg total) by mouth every 8 (eight) hours as needed for nausea or vomiting (Patient not taking: Reported on 7/28/2022), Disp: 60 tablet, Rfl: 0    polyethylene glycol (MIRALAX) 17 g packet, Take 17 g by mouth daily (Patient not taking: No sig reported), Disp: , Rfl:     traMADol (ULTRAM) 50 mg tablet, Take 1 tablet (50 mg total) by mouth every 6 (six) hours as needed for moderate pain (Patient not taking: No sig reported), Disp: 60 tablet, Rfl: 0  Allergies   Allergen Reactions    Aleve [Naproxen] Swelling       Lab Results/Imaging Studies       Chemistry        Component Value Date/Time    K 4 2 07/15/2022 0943     (H) 07/15/2022 0943    CO2 20 07/15/2022 0943    BUN 10 07/15/2022 0943    CREATININE 0 88 07/15/2022 0943    CREATININE 0 93 07/11/2022 1433        Component Value Date/Time    CALCIUM 8 4 (L) 07/15/2022 0943    ALKPHOS 187 (H) 07/15/2022 0943    AST 15 07/15/2022 0943    ALT 15 07/15/2022 0943          Lab Results   Component Value Date    WBC 5 6 07/15/2022    HGB 10 3 (L) 07/15/2022    HCT 31 2 (L) 07/15/2022 MCV 91 8 07/15/2022     07/15/2022     Imaging Studies  CT chest abdomen pelvis w contrast    Addendum Date: 7/12/2022 Addendum:   ADDENDUM: Upon review, there are two subcentimeter nodular densities within the right lower quadrant seen on series 4 images 79 and 81  The larger density measures 6 mm in maximum dimension  While these densities would not meet size criteria for enlarged abdominal lymph nodes, they are not definitively seen on prior exams and their anterior positioning within the abdomen does raise suspicion for early peritoneal metastasis  Close attention on short-term follow-up is recommended  I personally discussed this study with Taco Sweeney on 7/12/2022 at 9:36 AM      Addendum Date: 7/11/2022 Addendum:   ADDENDUM: There is a voice recognition error within impression #3 under ABDOMEN/PELVIS  Sentence 4 should read as follows "Finding remains indeterminate and metastasis is not excluded"  I personally discussed this study and addendum with Taco Sweeney on 7/11/2022 at 2:00 PM      Addendum Date: 7/11/2022 Addendum:   ADDENDUM: There is a voice recognition error regarding impression #3 under abdomen/pelvis  The month of prior comparison CT abdomen pelvis is incorrect in the original impression  The prior CT was obtained February 2022 as listed under prior comparisons  Result Date: 7/12/2022  Narrative: CT CHEST, ABDOMEN AND PELVIS WITH IV CONTRAST INDICATION:   C25 9: Malignant neoplasm of pancreas, unspecified  COMPARISON:  CT chest 3/17/2022  CT abdomen pelvis 2/26/2022  TECHNIQUE: CT examination of the chest, abdomen and pelvis was performed  Axial, sagittal, and coronal 2D reformatted images were created from the source data and submitted for interpretation  Radiation dose length product (DLP) for this visit:  1122 mGy-cm     This examination, like all CT scans performed in the [facility], was performed utilizing techniques to minimize radiation dose exposure, including the use of iterative reconstruction and automated exposure control  IV Contrast:  70 mL of iohexol (OMNIPAQUE) Enteric Contrast: Enteric contrast was administered  FINDINGS: CHEST LUNGS:  No focal consolidations  Multiple nodules are seen as listed below  -3 mm left lower lobe nodule (5:56), stable  -Left lower lobe nodule measuring 5 mm (5:74), stable when accounting for differences in measuring technique  -Left lower lobe nodule measuring 4 mm (5:85), stable  -3 mm left lower lobe nodule (5:97), stable  -2 mm left lower lobe nodule (5:105), stable  -Left lower lobe nodule measuring 1 mm (5:111), stable  -Multiple tiny right middle lobe nodules measuring 1 to 2 mm are stable  -Perifissural nodule within the right lower lobe measuring 3 mm (5:72), stable  -Right lower lobe nodule measures 5 mm (5:104), stable -Right lower lobe nodule measuring 3 mm (5:79), stable  -No new or enlarging pulmonary nodules  Small calcified granulomas are again noted within the right lower lobe and lingula No discrete tracheal or endobronchial lesions  PLEURA:  Unremarkable  HEART/GREAT VESSELS: Heart is normal in size  Coronary calcifications are noted  No thoracic aortic aneurysm  Right-sided Iuuxgn-u-Eiml is seen with its terminus in the mid SVC  MEDIASTINUM AND HEMANTH:  Unremarkable  CHEST WALL AND LOWER NECK:  Incidental left thyroid nodule measuring 4 6 x 2 9 cm identified on this CT  According to guidelines published in the February 2015 white paper on incidental thyroid nodules in the Journal of the [employer] of Radiology [facility]), Because the nodule(s) are greater than 1 5 cm in size, further characterization with thyroid ultrasound is recommended  There is mass effect on the trachea and esophagus secondary to the enlarged left thyroid lobe  ABDOMEN LIVER/BILIARY TREE:  Pneumobilia is noted within the left hepatic lobe  This is new from CT abdomen February 2022 and is likely from interval ERCP    It is similar in appearance to chest CT of March 2020  There is dilatation of the common bile duct which measures up to 1 2 cm in diameter with abrupt distal tapering (603:83)  This is similar in appearance to prior exam of December 2022 and is presumably from secondary to mass effect from pancreatic lesion described below  Within segment 7 there is a rounded subcapsular focus of arterial hyperenhancement measuring 0 6 x 0 6 cm (2:21)  No definitive correlate is seen on portal venous imaging  Per internal medicine note of 3/3/2022, a prior outside MRI of 2/13/2022 described a subcapsular lesion in the right lobe of the liver of similar size  No other discrete liver lesions are seen  GALLBLADDER:  Nondependent air is noted within the gallbladder lumen  No evidence of pericholecystic fluid, pericholecystic inflammation, or calcified gallstones  SPLEEN:  Unremarkable  PANCREAS:  Centered within the uncinate process there is a heterogeneously hypoenhancing mass which 5 9 x 4 5 x 3 9 cm (4:73, 603:76)  This mass appears mildly decreased in size from prior exam of February 2022 where it measured approximately 5 8 x 5 1 x 4 5 cm utilizing similar measuring technique  There is upstream pancreatic ductal dilatation measuring up to 7 mm (603:69) which is worsened from prior study  The mass encases the superior mesenteric artery as well as the upper portions of the superior mesenteric vein  The upper portion of the superior mesenteric vein is not well-visualized at this likely occluded or nearly completely occluded secondary to mass effect from the pancreatic lesion  A thin sliver of contrast is questionably seen  passing through the upper portion of the superior mesenteric vein (603:71)  The lower portions of the superior mesenteric vein as well as inferior mesenteric vein are grossly patent  There is attenuation of the superior mesenteric artery as it passes through the mass    This appears worsened from prior exam  The lesion again abuts the adjacent portions of the duodenum without a clear intervening fat plane (4:73- 75)  ADRENAL GLANDS:  Unremarkable  KIDNEYS/URETERS:  Unremarkable  No hydronephrosis  STOMACH AND BOWEL:  Aforementioned pancreatic mass is about the 3rd segment of the duodenum without a clear intervening fat plane as mentioned above  The proximal duodenum is slightly prominent raising the question of a partial duodenal obstruction secondary to mass effect from the pancreatic lesion  Otherwise, no evidence of small bowel dilatation  Scattered areas of fluid density are seen within the colon as can be seen in the setting of diarrheal illness  Scattered colonic diverticula are noted  APPENDIX:  No findings to suggest appendicitis  ABDOMINOPELVIC CAVITY: No pneumoperitoneum  No lymphadenopathy  Trace free fluid is noted within the lower abdomen  VESSELS:  Vascular interfaces with pancreatic mass as described above including occlusion/near complete occlusion of the superior mesenteric vein  Mild to moderate atherosclerotic calcifications are noted within the abdominal aorta and branching vessels  PELVIS REPRODUCTIVE ORGANS:  Prostate is enlarged measuring 5 2 cm in transverse dimension  URINARY BLADDER:  Incompletely distended, grossly unremarkable within these confines  ABDOMINAL WALL/INGUINAL REGIONS:  Unremarkable  OSSEOUS STRUCTURES:  Degenerative changes are seen throughout the spine  There is a grade 1 anterolisthesis of L5 on S1 with associated bilateral pars defects, stable from prior CT abdomen pelvis  No evidence of acute fracture or destructive osseous lesions  Impression: CHEST 1  Numerous bilateral pulmonary nodules as listed above, stable from March 2022  No new or enlarging pulmonary nodules  2   Left thyroid nodule measuring 4 6 cm  Further characterization with thyroid ultrasound is recommended  ABDOMEN/PELVIS 1    Redemonstration of hypoenhancing pancreatic mass centered within the uncinate process  The mass is slightly decreased in size as compared to prior CT of October 2022  There is worsening upstream pancreatic ductal dilatation  The mass again encases  the superior mesenteric artery which is attenuated  The mass also encases the 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, similar to CT of December 2020  There is a similar degree of common bile duct dilatation  2   The aforementioned pancreatic mass does abut the 3rd segment duodenum and there is mild prominence of the 2nd segment duodenum raising concern for a partial duodenal obstruction secondary to mass effect  Clinical correlation recommended  3   Arterially hyperenhancing subcapsular focus along the posterior aspect of the right hepatic lobe  A subcentimeter hyperenhancing lesion was described in a similar location on prior outside MRI of February 2022  The study is not available for direct  comparison at the time this dictation  Finding remains indeterminate and metastasis is occluded  Close attention on follow-up is recommended  4   Fluid density within the lumen of the colon as can be seen in the setting of diarrheal illness  The study was marked in EPIC for significant notification  Workstation performed: [id number]        Review of Systems  Constitutional: Positive for fatigue  HENT: Negative  Eyes: Negative  Respiratory: Positive for shortness of breath (at times with exertion)  Cardiovascular: Negative  Gastrointestinal: Positive for diarrhea (taking lomotil prn) and nausea (only after chemo, taking compazine (zofran not as effective))  Endocrine: Negative  Genitourinary: Negative  Musculoskeletal: Positive for gait problem (ambulates with cane)  Skin: Negative  Allergic/Immunologic: Negative  Neurological: Positive for weakness and light-headedness  Hematological: Bruises/bleeds easily     Psychiatric/Behavioral: The patient is nervous/anxious  OBJECTIVE:   /70 (BP Location: Right arm)   Pulse 90   Temp 98 3 °F (36 8 °C) (Temporal)   Resp 18   Wt 56 7 kg (125 lb)   SpO2 99%   BMI 19 58 kg/m²   Pain Assessment:  0  Performance Status: ECO - Symptomatic but completely ambulatory    Physical Exam  General Appearance:  Alert, cooperative, no distress, appears stated age  HEENT: normocephalic/atraumatic  Cardiovascular:  Extremities warm and well perfused, no lower extremity edema  Lungs: Respirations unlabored, no cyanosis, able to speak in complete sentences without dyspnea  Abdomen: Non-distended  Extremities: No cyanosis or edema  Skin: No rash or dermatitis  Neurologic: ANOx3    Pathology:  See above  Portions of the record may have been created with voice recognition software  Occasional wrong word or "sound a like" substitutions may have occurred due to the inherent limitations of voice recognition software  Read the chart carefully and recognize, using context, where substitutions have occurred

## 2022-07-28 NOTE — ASSESSMENT & PLAN NOTE
Patient's workup has all been done by [facility] gastroenterology  Currently with elevated liver enzymes from previous; sent to [facility] for further gastroenterology intervention with ERCP possibly stenting  At this point will consult Gastroenterology  Will leave to gastroenterology if patient needs further inpatient consult with Oncology and Surgical Oncology  NPO now  Will put on hold Eliquis  Metabolic profile with CBC and coagulation profile tomorrow  no

## 2022-07-28 NOTE — PROGRESS NOTES
Demi Scale 1940 is a 80 y o  male presents for Radiation Oncology consult (RT concurrent with Xeloda)  He is referred by Dr Montiel Estimable  Dx: Pancreatic Cancer    Pt is a 81 yo male presenting w/Pancreatic Cancer  PMH includes anxiety, depression, Hx of psychiatric hospital stay after death of spouse, DM II, GERD, PE, Hyperlipidemia, multiple thyroid nodules, pt ambulates w/a cane  Daughter lives in [place]  Pt with hospital stay noted in early March 2022-elevated liver enzymes, jaundice, epigastric pain x one month, nausea and decreased appetite  Percocet for pain control at that time  Prior hx of care at [facility]       3 4 22 ERCP/EUS-s/p stent placement(metal) traversing the distal biliary stricture  Large pancreatic head/ uncinate mass with upstream bile duct dilation  Portal confluence was not seen  No liver lesions seen and no lymphadenopathy present  Biopsy done  Path noted below  4 11 22 Port Placement /RCW    4 13 22 Genetic test results-Call to pt/Ariana Moise    4 19 22 Initiation of Chemotherapy   (7/12/2022 - cycle 7 delayed due to diarrhea - bolus 5-FU further reduced by a total of 30% and infusional 5-FU dose reduced by 20%)    6 30 22 Palliative Care OV/Dianna Garcia PA-C  Pain significantly improved  No refill of Tramadol 50mg needed  Compazine per Med Onc orders  Discussed initiating SSRI for anxiety about health however the pt is not interested  No longer using Xanax  Refer to nutrition book  Living will to next appt  Follow appt in 8-10 weeks for symptom check up  7 6 22 CT CAP  IMPRESSION:  CHEST  1  Numerous bilateral pulmonary nodules as listed above, stable from March 2022  No new or enlarging pulmonary nodules  2   Left thyroid nodule measuring 4 6 cm  Further characterization with thyroid ultrasound is recommended  ABDOMEN/PELVIS  1  Redemonstration of hypoenhancing pancreatic mass centered within the uncinate process  The mass is slightly decreased in size as compared to prior CT of October 2022  There is worsening upstream pancreatic ductal dilatation  The mass again encases   the superior mesenteric artery which is attenuated  The mass also encases the 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, similar to CT of December 2020  There is a similar degree of common bile duct dilatation  2   The aforementioned pancreatic mass does abut the 3rd segment duodenum and there is mild prominence of the 2nd segment duodenum raising concern for a partial duodenal obstruction secondary to mass effect  Clinical correlation recommended  3   Arterially hyperenhancing subcapsular focus along the posterior aspect of the right hepatic lobe  A subcentimeter hyperenhancing lesion was described in a similar location on prior outside MRI of February 2022  The study is not available for direct comparison at the time this dictation  Finding remains indeterminate and metastasis is occluded  Close attention on follow-up is recommended  4   Fluid density within the lumen of the colon as can be seen in the setting of diarrheal illness  7 18 22 Med Onc/Dr Erickson Chandler  OV  Plan: Pt's diarrhea is better controlled with Lomotil  Cut back bolus 5FU by a total of 30% and the infusional 5FU by 20%  Discussed type of communication that would occur between the pt and myself concerning prognosis and treatment decisions  Present his case to the GI TB this week  Follow appt in two weeks/add CA 19-9 to labs prior to chemo  7/21/22 RECTAL/GI MULTIDISCIPLINARY CASE REVIEW  PHYSICIAN RECOMMENDED PLAN:   -Recommend concurrent chemotherapy and radiation therapy  -Refer patient to radiation oncology  Referral placed on 7/21/2022   -Switch systemic chemotherapy to [place]/Abraxane  7/21/22 Dr Erickson Chandler called pt with plan after Tumor board discussion:  Altagracia Stevenson     Refer to Rad Onc for radiation to the bulk of his local disease as this is abutting the duodenum and could cause obstruction  Concurrent with Xeloda 1000 mg BID M-F  Appts    8 1 22 Med Onc/Dr Zoe Lee  QU  9 6 22 Palliative Care QU          Oncology History   Pancreatic adenocarcinoma ([facility] [address] )   3/4/2022 Biopsy    A-B-C  Pancreas, pancreatic head mass   Malignant (PSC Category VI) -  Adenocarcinoma       3/14/2022 Initial Diagnosis    Pancreatic adenocarcinoma ([facility] [address] )     4/19/2022 - 7/20/2022 Chemotherapy    fluorouracil (ADRUCIL), 400 mg/m2 = 690 mg, Intravenous, Once, 7 of 8 cycles  Dose modification: 320 mg/m2 (original dose 400 mg/m2, Cycle 4), 280 mg/m2 (original dose 400 mg/m2, Cycle 7, Reason: Dose Not Tolerated, Comment: 30% dose reduction due to diarrhea)  Administration: 690 mg (4/19/2022), 690 mg (5/3/2022), 690 mg (5/17/2022), 555 mg (5/31/2022), 555 mg (6/14/2022), 555 mg (6/28/2022), 485 mg (7/20/2022)  pegfilgrastim (NEULASTA), 6 mg, Subcutaneous, Once, 5 of 6 cycles  Administration: 6 mg (6/2/2022), 6 mg (6/16/2022), 6 mg (6/30/2022), 6 mg (5/20/2022)  irinotecan (CAMPTOSAR) chemo infusion, 125 mg/m2 = 216 mg (69 4 % of original dose 180 mg/m2), Intravenous, Once, 7 of 8 cycles  Dose modification: 125 mg/m2 (original dose 180 mg/m2, Cycle 1, Reason: Anticipated Tolerance, Comment: Anticipated tolerance and Bilirubin being between 1-2), 90 mg/m2 (original dose 180 mg/m2, Cycle 4, Reason: Dose Not Tolerated, Comment: 50% dose reduction from original dose due to diarrhea)  Administration: 216 mg (4/19/2022), 216 mg (5/3/2022), 220 mg (5/17/2022), 156 mg (5/31/2022), 160 mg (6/14/2022), 160 mg (6/28/2022), 160 mg (7/20/2022)  leucovorin calcium IVPB, 692 mg, Intravenous, Once, 7 of 8 cycles  Administration: 700 mg (4/19/2022), 700 mg (5/3/2022), 700 mg (5/17/2022), 700 mg (5/31/2022), 700 mg (6/14/2022), 700 mg (6/28/2022), 700 mg (7/20/2022)  oxaliplatin (ELOXATIN) chemo infusion, 65 mg/m2 = 112 45 mg (76 5 % of original dose 85 mg/m2), Intravenous, Once, 7 of 8 cycles  Dose modification: 65 mg/m2 (original dose 85 mg/m2, Cycle 1, Reason: Anticipated Tolerance)  Administration: 112 45 mg (4/19/2022), 112 45 mg (5/3/2022), 112 45 mg (5/17/2022), 112 45 mg (5/31/2022), 112 45 mg (6/14/2022), 112 45 mg (6/28/2022), 112 45 mg (7/20/2022)  fluorouracil (ADRUCIL) ambulatory infusion Soln, 1,200 mg/m2/day = 4,150 mg, Intravenous, Over 46 hours, 7 of 8 cycles  Dose modification: 1,000 mg/m2/day (original dose 1,200 mg/m2/day, Cycle 8, Reason: Dose Not Tolerated, Comment: dose reduction due to diarrhea), 1,000 mg/m2/day (original dose 1,200 mg/m2/day, Cycle 7, Reason: Dose Not Tolerated, Comment: dose reduction due to diarrhea)     7/22/2022 -  Chemotherapy    pegfilgrastim (NEULASTA), 6 mg, Subcutaneous, Once, 0 of 1 cycle     7/27/2022 Adverse Reaction    3/2022 - locally advanced, unresectable adenocarcinoma of the pancreas     4/19/2022 - start FOLFIRINOX with 20% dose reduction in oxaliplatin and irinotecan due to age    5/2022 - 5FU dose reduced by 20% due to diarrhea    7/12/2022 - cycle 7 delayed due to diarrhea - bolus 5-FU further reduced by a total of 30% and infusional 5-FU dose reduced by 20%        Chemotherapy    Xeloda 1000mg BID M-F concurrent with RT         Review of Systems:  Review of Systems   Constitutional: Positive for fatigue  HENT: Negative  Eyes: Negative  Respiratory: Positive for shortness of breath (at times with exertion)  Cardiovascular: Negative  Gastrointestinal: Positive for diarrhea (taking lomotil prn) and nausea (only after chemo, taking compazine (zofran not as effective))  Endocrine: Negative  Genitourinary: Negative  Musculoskeletal: Positive for gait problem (ambulates with cane)  Skin: Negative  Allergic/Immunologic: Negative  Neurological: Positive for weakness and light-headedness  Hematological: Bruises/bleeds easily     Psychiatric/Behavioral: The patient is nervous/anxious  Clinical Trial: no      Pain assessment: 0    Prior Radiation: no    Teaching: [employer] radiation packet     MST completed     Implantable Devices (Port, pacemaker, pain stimulator): RCW port     Hip Replacement: no    Covid Vaccine Status: vaccinated     Health Maintenance   Topic Date Due    Hepatitis C Screening  Never done   [employer] Annual Wellness Visit (AWV)  Never done    Diabetic Foot Exam  Never done    DM Eye Exam  Never done    URINE MICROALBUMIN  Never done    Depression Screening  Never done    Fall Risk  Never done    COVID-19 Vaccine (4 - Booster for Allen Peter series) 2022    HEMOGLOBIN A1C  2022    Influenza Vaccine (1) 2022    BMI: Adult  2023    Pneumococcal Vaccine: 65+ Years  Completed    HIB Vaccine  Aged Out    Hepatitis B Vaccine  Aged Out    IPV Vaccine  Aged Out    Hepatitis A Vaccine  Aged Out    Meningococcal ACWY Vaccine  Aged Out    HPV Vaccine  Aged Out       Past Medical History:   Diagnosis Date    Ambulates with cane     Anxiety     Depression     [facility] mellitus ([facility] [address]     GERD (gastroesophageal reflux disease)     History of pulmonary embolus (PE)     Hyperlipidemia     Multiple thyroid nodules     pt states about 40yrs ago    Pancreatic cancer ([facility] [address]     Pancreatic mass        Past Surgical History:   Procedure Laterality Date    APPENDECTOMY      CATARACT EXTRACTION Right     FL GUIDED CENTRAL VENOUS ACCESS DEVICE INSERTION  2022    TUNNELED VENOUS PORT PLACEMENT Right 2022    Procedure: INSERTION VENOUS PORT (PORT-A-CATH);   Surgeon: Bang Gould MD;  Location: [facility];  Service: Surgical Oncology       Family History   Problem Relation Age of Onset    Alcohol abuse Mother     Diabetes Father     Throat cancer Brother        Social History     Tobacco Use    Smoking status: Former Smoker     Years: 5 00     Start date:      Quit date:      Years since quittin 11    Smokeless tobacco: Never Used    Tobacco comment: quit in 1960s; smoked cigars for several yrs   Vaping Use    Vaping Use: Never used   Substance Use Topics    Alcohol use: Not Currently     Comment: seldom; socially    Drug use: No          Current Outpatient Medications:     Alcohol Swabs 70 % PADS, May substitute brand based on insurance coverage  Check glucose BID , Disp: 100 each, Rfl: 0    apixaban (ELIQUIS) 5 mg, Take 5 mg by mouth 2 (two) times a day, Disp: , Rfl:     Blood Glucose Monitoring Suppl (OneTouch Verio Reflect) w/Device KIT, May substitute brand based on insurance coverage  Check glucose BID , Disp: 1 kit, Rfl: 0    diphenoxylate-atropine (LOMOTIL) 2 5-0 025 mg per tablet, Take 1 tablet by mouth 4 (four) times a day as needed for diarrhea, Disp: 60 tablet, Rfl: 0    glucose blood (OneTouch Verio) test strip, May substitute brand based on insurance coverage  Check glucose BID , Disp: 100 each, Rfl: 0    Klor-Con M20 20 MEQ tablet, TAKE 2 TABLETS BY MOUTH DAILY, Disp: 180 tablet, Rfl: 1    metFORMIN (GLUCOPHAGE-XR) 500 mg 24 hr tablet, Take 500 mg by mouth 2 (two) times a day, Disp: , Rfl:     omeprazole (PriLOSEC) 20 mg delayed release capsule, , Disp: , Rfl:     OneTouch Delica Lancets 18P MISC, May substitute brand based on insurance coverage  Check glucose BID , Disp: 100 each, Rfl: 0    pravastatin (PRAVACHOL) 20 mg tablet, Take 20 mg by mouth daily, Disp: , Rfl:     prochlorperazine (COMPAZINE) 10 mg tablet, Take 1 tablet (10 mg total) by mouth every 6 (six) hours as needed for nausea or vomiting, Disp: 30 tablet, Rfl: 0    ALPRAZolam (XANAX) 0 5 mg tablet, Take 0 5 tablets (0 25 mg total) by mouth 3 (three) times a day as needed for anxiety (Patient not taking: No sig reported), Disp: 90 tablet, Rfl: 0    capecitabine (XELODA) 500 MG tablet, Take 2 pills twice a day Monday through Friday while on radiation  , Disp: 120 tablet, Rfl: 0    metFORMIN (GLUCOPHAGE) 1000 MG tablet, Take 1,000 mg by mouth 2 (two) times a day with meals   (Patient not taking: Reported on 7/28/2022), Disp: , Rfl:     Multiple Vitamin (MULTIVITAMIN ADULT PO), Take 1 tablet by mouth daily (Patient not taking: No sig reported), Disp: , Rfl:     omeprazole (PriLOSEC) 40 MG capsule, Take 40 mg by mouth every evening  , Disp: , Rfl:     ondansetron (Zofran ODT) 8 mg disintegrating tablet, Take 1 tablet (8 mg total) by mouth every 8 (eight) hours as needed for nausea or vomiting (Patient not taking: Reported on 7/28/2022), Disp: 60 tablet, Rfl: 0    polyethylene glycol (MIRALAX) 17 g packet, Take 17 g by mouth daily (Patient not taking: No sig reported), Disp: , Rfl:     traMADol (ULTRAM) 50 mg tablet, Take 1 tablet (50 mg total) by mouth every 6 (six) hours as needed for moderate pain (Patient not taking: No sig reported), Disp: 60 tablet, Rfl: 0    Allergies   Allergen Reactions    Aleve [Naproxen] Swelling        Vitals:    07/28/22 1237   BP: 120/70   BP Location: Right arm   Pulse: 90   Resp: 18   Temp: 98 3 °F (36 8 °C)   TempSrc: Temporal   SpO2: 99%   Weight: 56 7 kg (125 lb)

## 2022-07-28 NOTE — LETTER
2022     Timothy Robles, [address] [address]    Patient: Parvez Rooney   YOB: 1940   Date of Visit: 2022       Dear Dr Carmen Solomon: Thank you for referring Krystal Apodaca to me for evaluation  Below are my notes for this consultation  If you have questions, please do not hesitate to call me  I look forward to following your patient along with you  Sincerely,        Stefany Alejo MD        CC: No Recipients  Stefany Alejo MD  2022  3:28 PM  Sign when Signing Visit  Consultation - Radiation Oncology      Patient Name: Parvez Rooney EOJ:[id number] : 1940  Encounter: [id number]  Referring Provider: Jc Clancy DO    ASSESSMENT  Cancer Staging  Pancreatic adenocarcinoma [facility])  Staging form: Pancreas, AJCC 8th Edition  - Clinical stage from 3/4/2022: Stage IB (cT2, cN0, cM0) - Unsigned  Stage prefix: Initial diagnosis  Total positive nodes: 0    PLAN  Parvez Rooney is a 80 y o  male with unresectable cT2N0 pancreatic cancer due to vessel encasement who was initially diagnosed on pancreatic biopsy on 3/4/22  He had imaging, surgical and medical oncology evaluation, and tumor board discussion (report not available) in 2022 and was deemed likely unresectable and was started on FOLFIRINOX  Post-chemo CT chest abdomen pelvis on 22 demonstrated the pancreas mass measuring 5 9 cm although appearing mildly decreased in size from the exam of 2022  There is upstream pancreatic ductal dilatation measuring 7 mm worsened from prior study  Mass continues to encase the superior mesenteric artery as well as the upper portion of the SMV which is not well visualized due to occlusion there is attenuation of the SMA the past mass  The mass abuts the 3rd segment of the duodenum without a clear intervening fat plane with the question of a partial duodenal obstruction secondary to mass effect    There were multiple stable enlarging pulmonary nodules, a left thyroid nodule and an arterially hyperenhancing subcapsular focus on posterior aspect of the liver  There is an addendum regarding two subcentimeter nodular densities within the RLQ that raise suspicion for early peritoneal metastases  His last cycle of FOLFOX was held due to significant GI toxicity  He was reviewed at tumor board and consensus was for chemoRT for local control and then subsequent Donley/Abraxane  We discussed treatment with external beam radiation therapy to 50 4 Gy in 28 fractions for local control and staving off potential duodenal obstruction  There is a chance that his other low abdomen findings indicate early metastatic disease  Acute and long-term side effects were discussed and include, but are not limited to, fatigue, nausea, diarrhea, abdominal pain, fistulization, ulceration, permanent change in bowel habits, malnutrition, or development of adhesions  We also discussed that the disease may progress/ worsen despite radiation therapy  The patient agreed to proceed with radiation therapy, and informed consent was signed  CT simulation to be scheduled at [facility] with treatments at [place] per his preference  Thank you for the opportunity to participate in the care of this patient  David Guzman MD  Department of [address] [place] [address] 12    Orders Placed This Encounter   Procedures    Radiation Simulation Treatment     1  Pancreatic adenocarcinoma [facility])  Ambulatory referral to Radiation Oncology    Radiation Simulation Treatment       CHIEF COMPLAINT  Chief Complaint   Patient presents with    Consult     Radiation Oncology        History of Present Illness  Kathi Phillip 1940 is a 80 y o  male presents for Radiation Oncology consult (RT concurrent with Xeloda)  He is referred by Dr Miguelina Jacome  Dx: Pancreatic Cancer     Pt is a 81 yo male presenting w/Pancreatic Cancer   PMH includes anxiety, depression, Hx of psychiatric hospital stay after death of spouse, DM II, GERD, PE, Hyperlipidemia, multiple thyroid nodules, pt ambulates w/a cane  Daughter lives in [place]  Pt with hospital stay noted in early March 2022-elevated liver enzymes, jaundice, epigastric pain x one month, nausea and decreased appetite  Percocet for pain control at that time  Prior hx of care at [facility]       EUS on 3/3/22 had shown a 3cm mass with well differentiated borders in the head of the pancreas and uncinate  CT at [facility], [place] on 2/26/22 showed stable infiltrating pancreatic head mass which encases the superior mesenteric artery and vein and narrows the distal portal vein  No enlarged lymph nodes  MRI MRCP on 02/13/2022 at [facility], [place] showed heterogenous signal mass centered on the uncinate process measuring 4 x 3 2 cm with anterior displacement partial encasement of the superior mesenteric artery and vein         3 4 22 ERCP/EUS-s/p stent placement(metal) traversing the distal biliary stricture  Large pancreatic head/ uncinate mass with upstream bile duct dilation  Portal confluence was not seen  No liver lesions seen and no lymphadenopathy present  Biopsy done   Path noted below       4 11 22 Port Placement /RCW     4 13 22 Genetic test results-Call to pt/Ariana Moise     4 19 22 Initiation of Chemotherapy   (7/12/2022 - cycle 7 delayed due to diarrhea - bolus 5-FU further reduced by a total of 30% and infusional 5-FU dose reduced by 20%)     6 30 22 Palliative Care OV/Dianna Garcia PA-C  Pain significantly improved  No refill of Tramadol 50mg needed  Compazine per Med Onc orders  Discussed initiating SSRI for anxiety about health however the pt is not interested  No longer using Xanax  Refer to nutrition book  Living will to next appt    Follow appt in 8-10 weeks for symptom check up        7 6 22 CT CAP  IMPRESSION:  CHEST  1   Numerous bilateral pulmonary nodules as listed above, stable from March 2022   No new or enlarging pulmonary nodules      2   Left thyroid nodule measuring 4 6 cm   Further characterization with thyroid ultrasound is recommended      ABDOMEN/PELVIS  1   Redemonstration of hypoenhancing pancreatic mass centered within the uncinate process   The mass is slightly decreased in size as compared to prior CT of October 2022  Sherita Millsck is worsening upstream pancreatic ductal dilatation   The mass again encases   the superior mesenteric artery which is attenuated   The mass also encases the 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, similar to CT of December 2020  Lowella Boeck is a similar degree of common bile duct dilatation      2   The aforementioned pancreatic mass does abut the 3rd segment duodenum and there is mild prominence of the 2nd segment duodenum raising concern for a partial duodenal obstruction secondary to mass effect   Clinical correlation recommended      3   Arterially hyperenhancing subcapsular focus along the posterior aspect of the right hepatic lobe   A subcentimeter hyperenhancing lesion was described in a similar location on prior outside MRI of February 2022   The study is not available for direct comparison at the time this dictation  Eston Muff remains indeterminate and metastasis is occluded   Close attention on follow-up is recommended      4   Fluid density within the lumen of the colon as can be seen in the setting of diarrheal illness         7 18 22 Med Onc/Dr Nicole Sun  OV  Plan: Pt's diarrhea is better controlled with Lomotil  Cut back bolus 5FU by a total of 30% and the infusional 5FU by 20%  Discussed type of communication that would occur between the pt and myself concerning prognosis and treatment decisions  Present his case to the GI TB this week   Follow appt in two weeks/add CA 19-9 to labs prior to chemo       7/21/22 RECTAL/GI MULTIDISCIPLINARY CASE REVIEW  PHYSICIAN RECOMMENDED PLAN:   -Recommend concurrent chemotherapy and radiation therapy  -Refer patient to radiation oncology  Referral placed on 7/21/2022   -Switch systemic chemotherapy to Brown/Abraxane         7/21/22 Dr Simi Morse called pt with plan after Tumor board discussion:  Arlen Ramirez  Refer to Rad Onc for radiation to the bulk of his local disease as this is abutting the duodenum and could cause obstruction  Concurrent with Xeloda 1000 mg BID M-F            Appts    8 1 22 Med Onc/Dr Simi Morse  QU  9 6 22 Palliative Care QU    Today, the patient feels well overall  He has mild residual diarrhea and nausea  He has fatigue and mild COLE  He is significantly anxious  Oncology History   Pancreatic adenocarcinoma ([facility] [address] )   3/4/2022 Biopsy    A-B-C  Pancreas, pancreatic head mass   Malignant (PSC Category VI) -  Adenocarcinoma       3/4/2022 -  Cancer Staged    Staging form: Pancreas, AJCC 8th Edition  - Clinical stage from 3/4/2022: Stage IB (cT2, cN0, cM0) - Signed by Beth Lindsay MD on 7/28/2022  Stage prefix: Initial diagnosis  Total positive nodes: 0       3/14/2022 Initial Diagnosis    Pancreatic adenocarcinoma ([facility] [address] )     4/19/2022 - 7/20/2022 Chemotherapy    fluorouracil (ADRUCIL), 400 mg/m2 = 690 mg, Intravenous, Once, 7 of 8 cycles  Dose modification: 320 mg/m2 (original dose 400 mg/m2, Cycle 4), 280 mg/m2 (original dose 400 mg/m2, Cycle 7, Reason: Dose Not Tolerated, Comment: 30% dose reduction due to diarrhea)  Administration: 690 mg (4/19/2022), 690 mg (5/3/2022), 690 mg (5/17/2022), 555 mg (5/31/2022), 555 mg (6/14/2022), 555 mg (6/28/2022), 485 mg (7/20/2022)  pegfilgrastim (NEULASTA), 6 mg, Subcutaneous, Once, 5 of 6 cycles  Administration: 6 mg (6/2/2022), 6 mg (6/16/2022), 6 mg (6/30/2022), 6 mg (5/20/2022)  irinotecan (CAMPTOSAR) chemo infusion, 125 mg/m2 = 216 mg (69 4 % of original dose 180 mg/m2), Intravenous, Once, 7 of 8 cycles  Dose modification: 125 mg/m2 (original dose 180 mg/m2, Cycle 1, Reason: Anticipated Tolerance, Comment: Anticipated tolerance and Bilirubin being between 1-2), 90 mg/m2 (original dose 180 mg/m2, Cycle 4, Reason: Dose Not Tolerated, Comment: 50% dose reduction from original dose due to diarrhea)  Administration: 216 mg (4/19/2022), 216 mg (5/3/2022), 220 mg (5/17/2022), 156 mg (5/31/2022), 160 mg (6/14/2022), 160 mg (6/28/2022), 160 mg (7/20/2022)  leucovorin calcium IVPB, 692 mg, Intravenous, Once, 7 of 8 cycles  Administration: 700 mg (4/19/2022), 700 mg (5/3/2022), 700 mg (5/17/2022), 700 mg (5/31/2022), 700 mg (6/14/2022), 700 mg (6/28/2022), 700 mg (7/20/2022)  oxaliplatin (ELOXATIN) chemo infusion, 65 mg/m2 = 112 45 mg (76 5 % of original dose 85 mg/m2), Intravenous, Once, 7 of 8 cycles  Dose modification: 65 mg/m2 (original dose 85 mg/m2, Cycle 1, Reason: Anticipated Tolerance)  Administration: 112 45 mg (4/19/2022), 112 45 mg (5/3/2022), 112 45 mg (5/17/2022), 112 45 mg (5/31/2022), 112 45 mg (6/14/2022), 112 45 mg (6/28/2022), 112 45 mg (7/20/2022)  fluorouracil (ADRUCIL) ambulatory infusion Soln, 1,200 mg/m2/day = 4,150 mg, Intravenous, Over 46 hours, 7 of 8 cycles  Dose modification: 1,000 mg/m2/day (original dose 1,200 mg/m2/day, Cycle 8, Reason: Dose Not Tolerated, Comment: dose reduction due to diarrhea), 1,000 mg/m2/day (original dose 1,200 mg/m2/day, Cycle 7, Reason: Dose Not Tolerated, Comment: dose reduction due to diarrhea)     7/22/2022 -  Chemotherapy    pegfilgrastim (NEULASTA), 6 mg, Subcutaneous, Once, 0 of 1 cycle     7/27/2022 Adverse Reaction    3/2022 - locally advanced, unresectable adenocarcinoma of the pancreas     4/19/2022 - start FOLFIRINOX with 20% dose reduction in oxaliplatin and irinotecan due to age    5/2022 - 5FU dose reduced by 20% due to diarrhea    7/12/2022 - cycle 7 delayed due to diarrhea - bolus 5-FU further reduced by a total of 30% and infusional 5-FU dose reduced by 20%        Chemotherapy    Xeloda 1000mg BID M-F concurrent with RT       Historical Information   Past Medical History:   Diagnosis Date    Ambulates with cane     Anxiety     Depression     Diabetes mellitus (HCC)     GERD (gastroesophageal reflux disease)     History of pulmonary embolus (PE)     Hyperlipidemia     Multiple thyroid nodules     pt states about 40yrs ago    Pancreat[facility] cancer ([facility] [address] )     Pancreatic mass      Past Surgical History:   Procedure Laterality Date    APPENDECTOMY      CATARACT EXTRACTION Right     FL GUIDED CENTRAL VENOUS ACCESS DEVICE INSERTION  2022    TUNNELED VENOUS PORT PLACEMENT Right 2022    Procedure: INSERTION VENOUS PORT (PORT-A-CATH); Surgeon: Angelica Jose MD;  Location: [facility];  Service: Surgical Oncology     Family History   Problem Relation Age of Onset    Alcohol abuse Mother     Diabetes Father     Throat cancer Brother      Social History   Social History     Substance and Sexual Activity   Alcohol Use Not Currently    Comment: seldom; socially     Social History     Substance and Sexual Activity   Drug Use No     Social History     Tobacco Use   Smoking Status Former Smoker    Years:     Start date:     Quit date:     Years since quittin 6   Smokeless Tobacco Never Used   Tobacco Comment    quit in ; smoked cigars for several yrs     Meds/Allergies     Current Outpatient Medications:     Alcohol Swabs 70 % PADS, May substitute brand based on insurance coverage  Check glucose BID , Disp: 100 each, Rfl: 0    apixaban (ELIQUIS) 5 mg, Take 5 mg by mouth 2 (two) times a day, Disp: , Rfl:     Blood Glucose Monitoring Suppl (OneTouch Verio Reflect) w/Device KIT, May substitute brand based on insurance coverage  Check glucose BID , Disp: 1 kit, Rfl: 0    diphenoxylate-atropine (LOMOTIL) 2 5-0 025 mg per tablet, Take 1 tablet by mouth 4 (four) times a day as needed for diarrhea, Disp: 60 tablet, Rfl: 0    glucose blood (OneTouch Verio) test strip, May substitute brand based on insurance coverage  Check glucose BID , Disp: 100 each, Rfl: 0    Klor-Con M20 20 MEQ tablet, TAKE 2 TABLETS BY MOUTH DAILY, Disp: 180 tablet, Rfl: 1    metFORMIN (GLUCOPHAGE-XR) 500 mg 24 hr tablet, Take 500 mg by mouth 2 (two) times a day, Disp: , Rfl:     omeprazole (PriLOSEC) 20 mg delayed release capsule, , Disp: , Rfl:     OneTouch Delica Lancets 22F MISC, May substitute brand based on insurance coverage  Check glucose BID , Disp: 100 each, Rfl: 0    pravastatin (PRAVACHOL) 20 mg tablet, Take 20 mg by mouth daily, Disp: , Rfl:     prochlorperazine (COMPAZINE) 10 mg tablet, Take 1 tablet (10 mg total) by mouth every 6 (six) hours as needed for nausea or vomiting, Disp: 30 tablet, Rfl: 0    ALPRAZolam (XANAX) 0 5 mg tablet, Take 0 5 tablets (0 25 mg total) by mouth 3 (three) times a day as needed for anxiety (Patient not taking: No sig reported), Disp: 90 tablet, Rfl: 0    capecitabine (XELODA) 500 MG tablet, Take 2 pills twice a day Monday through Friday while on radiation  , Disp: 120 tablet, Rfl: 0    metFORMIN (GLUCOPHAGE) 1000 MG tablet, Take 1,000 mg by mouth 2 (two) times a day with meals   (Patient not taking: Reported on 7/28/2022), Disp: , Rfl:     Multiple Vitamin (MULTIVITAMIN ADULT PO), Take 1 tablet by mouth daily (Patient not taking: No sig reported), Disp: , Rfl:     omeprazole (PriLOSEC) 40 MG capsule, Take 40 mg by mouth every evening  , Disp: , Rfl:     ondansetron (Zofran ODT) 8 mg disintegrating tablet, Take 1 tablet (8 mg total) by mouth every 8 (eight) hours as needed for nausea or vomiting (Patient not taking: Reported on 7/28/2022), Disp: 60 tablet, Rfl: 0    polyethylene glycol (MIRALAX) 17 g packet, Take 17 g by mouth daily (Patient not taking: No sig reported), Disp: , Rfl:     traMADol (ULTRAM) 50 mg tablet, Take 1 tablet (50 mg total) by mouth every 6 (six) hours as needed for moderate pain (Patient not taking: No sig reported), Disp: 60 tablet, Rfl: 0  Allergies   Allergen Reactions    Aleve [Naproxen] Swelling       Lab Results/Imaging Studies       Chemistry        Component Value Date/Time    K 4 2 07/15/2022 0943     (H) 07/15/2022 0943    CO2 20 07/15/2022 0943    BUN 10 07/15/2022 0943    CREATININE 0 88 07/15/2022 0943    CREATININE 0 93 07/11/2022 1433        Component Value Date/Time    CALCIUM 8 4 (L) 07/15/2022 0943    ALKPHOS 187 (H) 07/15/2022 0943    AST 15 07/15/2022 0943    ALT 15 07/15/2022 0943          Lab Results   Component Value Date    WBC 5 6 07/15/2022    HGB 10 3 (L) 07/15/2022    HCT 31 2 (L) 07/15/2022    MCV 91 8 07/15/2022     07/15/2022     Imaging Studies  CT chest abdomen pelvis w contrast    Addendum Date: 7/12/2022 Addendum:   ADDENDUM: Upon review, there are two subcentimeter nodular densities within the right lower quadrant seen on series 4 images 79 and 81  The larger density measures 6 mm in maximum dimension  While these densities would not meet size criteria for enlarged abdominal lymph nodes, they are not definitively seen on prior exams and their anterior positioning within the abdomen does raise suspicion for early peritoneal metastasis  Close attention on short-term follow-up is recommended  I personally discussed this study with Andrzej Garcia on 7/12/2022 at 9:36 AM      Addendum Date: 7/11/2022 Addendum:   ADDENDUM: There is a voice recognition error within impression #3 under ABDOMEN/PELVIS  Sentence 4 should read as follows "Finding remains indeterminate and metastasis is not excluded"  I personally discussed this study and addendum with Andrzej Garcia on 7/11/2022 at 2:00 PM      Addendum Date: 7/11/2022 Addendum:   ADDENDUM: There is a voice recognition error regarding impression #3 under abdomen/pelvis  The month of prior comparison CT abdomen pelvis is incorrect in the original impression  The prior CT was obtained February 2022 as listed under prior comparisons      Result Date: 7/12/2022  Narrative: CT CHEST, ABDOMEN AND PELVIS WITH IV CONTRAST INDICATION:   C25 9: Malignant neoplasm of pancreas, unspecified  COMPARISON:  CT chest 3/17/2022  CT abdomen pelvis 2/26/2022  TECHNIQUE: CT examination of the chest, abdomen and pelvis was performed  Axial, sagittal, and coronal 2D reformatted images were created from the source data and submitted for interpretation  Radiation dose length product (DLP) for this visit:  1122 mGy-cm   This examination, like all CT scans performed in the [facility], was performed utilizing techniques to minimize radiation dose exposure, including the use of iterative reconstruction and automated exposure control  IV Contrast:  70 mL of iohexol (OMNIPAQUE) Enteric Contrast: Enteric contrast was administered  FINDINGS: CHEST LUNGS:  No focal consolidations  Multiple nodules are seen as listed below  -3 mm left lower lobe nodule (5:56), stable  -Left lower lobe nodule measuring 5 mm (5:74), stable when accounting for differences in measuring technique  -Left lower lobe nodule measuring 4 mm (5:85), stable  -3 mm left lower lobe nodule (5:97), stable  -2 mm left lower lobe nodule (5:105), stable  -Left lower lobe nodule measuring 1 mm (5:111), stable  -Multiple tiny right middle lobe nodules measuring 1 to 2 mm are stable  -Perifissural nodule within the right lower lobe measuring 3 mm (5:72), stable  -Right lower lobe nodule measures 5 mm (5:104), stable -Right lower lobe nodule measuring 3 mm (5:79), stable  -No new or enlarging pulmonary nodules  Small calcified granulomas are again noted within the right lower lobe and lingula No discrete tracheal or endobronchial lesions  PLEURA:  Unremarkable  HEART/GREAT VESSELS: Heart is normal in size  Coronary calcifications are noted  No thoracic aortic aneurysm  Right-sided [id number]-v-[id number] is seen with its terminus in the mid SVC  MEDIASTINUM AND HEMANTH:  Unremarkable   CHEST WALL AND LOWER NECK:  Incidental left thyroid nodule measuring 4 6 x 2 9 cm identified on this CT  According to guidelines published in the February 2015 white paper on incidental thyroid nodules in the Journal of the [employer] of Radiology Beto Sanches), Because the nodule(s) are greater than 1 5 cm in size, further characterization with thyroid ultrasound is recommended  There is mass effect on the trachea and esophagus secondary to the enlarged left thyroid lobe  ABDOMEN LIVER/BILIARY TREE:  Pneumobilia is noted within the left hepatic lobe  This is new from CT abdomen February 2022 and is likely from interval ERCP  It is similar in appearance to chest CT of March 2020  There is dilatation of the common bile duct which measures up to 1 2 cm in diameter with abrupt distal tapering (603:83)  This is similar in appearance to prior exam of December 2022 and is presumably from secondary to mass effect from pancreatic lesion described below  Within segment 7 there is a rounded subcapsular focus of arterial hyperenhancement measuring 0 6 x 0 6 cm (2:21)  No definitive correlate is seen on portal venous imaging  Per internal medicine note of 3/3/2022, a prior outside MRI of 2/13/2022 described a subcapsular lesion in the right lobe of the liver of similar size  No other discrete liver lesions are seen  GALLBLADDER:  Nondependent air is noted within the gallbladder lumen  No evidence of pericholecystic fluid, pericholecystic inflammation, or calcified gallstones  SPLEEN:  Unremarkable  PANCREAS:  Centered within the uncinate process there is a heterogeneously hypoenhancing mass which 5 9 x 4 5 x 3 9 cm (4:73, 603:76)  This mass appears mildly decreased in size from prior exam of February 2022 where it measured approximately 5 8 x 5 1 x 4 5 cm utilizing similar measuring technique  There is upstream pancreatic ductal dilatation measuring up to 7 mm (603:69) which is worsened from prior study    The mass encases the superior mesenteric artery as well as the upper portions of the superior mesenteric vein  The upper portion of the superior mesenteric vein is not well-visualized at this likely occluded or nearly completely occluded secondary to mass effect from the pancreatic lesion  A thin sliver of contrast is questionably seen  passing through the upper portion of the superior mesenteric vein (603:71)  The lower portions of the superior mesenteric vein as well as inferior mesenteric vein are grossly patent  There is attenuation of the superior mesenteric artery as it passes through the mass  This appears worsened from prior exam   The lesion again abuts the adjacent portions of the duodenum without a clear intervening fat plane (4:73- 75)  ADRENAL GLANDS:  Unremarkable  KIDNEYS/URETERS:  Unremarkable  No hydronephrosis  STOMACH AND BOWEL:  Aforementioned pancreatic mass is about the 3rd segment of the duodenum without a clear intervening fat plane as mentioned above  The proximal duodenum is slightly prominent raising the question of a partial duodenal obstruction secondary to mass effect from the pancreatic lesion  Otherwise, no evidence of small bowel dilatation  Scattered areas of fluid density are seen within the colon as can be seen in the setting of diarrheal illness  Scattered colonic diverticula are noted  APPENDIX:  No findings to suggest appendicitis  ABDOMINOPELVIC CAVITY: No pneumoperitoneum  No lymphadenopathy  Trace free fluid is noted within the lower abdomen  VESSELS:  Vascular interfaces with pancreatic mass as described above including occlusion/near complete occlusion of the superior mesenteric vein  Mild to moderate atherosclerotic calcifications are noted within the abdominal aorta and branching vessels  PELVIS REPRODUCTIVE ORGANS:  Prostate is enlarged measuring 5 2 cm in transverse dimension  URINARY BLADDER:  Incompletely distended, grossly unremarkable within these confines  ABDOMINAL WALL/INGUINAL REGIONS:  Unremarkable   OSSEOUS STRUCTURES:  Degenerative changes are seen throughout the spine  There is a grade 1 anterolisthesis of L5 on S1 with associated bilateral pars defects, stable from prior CT abdomen pelvis  No evidence of acute fracture or destructive osseous lesions  Impression: CHEST 1  Numerous bilateral pulmonary nodules as listed above, stable from March 2022  No new or enlarging pulmonary nodules  2   Left thyroid nodule measuring 4 6 cm  Further characterization with thyroid ultrasound is recommended  ABDOMEN/PELVIS 1  Redemonstration of hypoenhancing pancreatic mass centered within the uncinate process  The mass is slightly decreased in size as compared to prior CT of October 2022  There is worsening upstream pancreatic ductal dilatation  The mass again encases  the superior mesenteric artery which is attenuated  The mass also encases the 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, similar to CT of December 2020  There is a similar degree of common bile duct dilatation  2   The aforementioned pancreatic mass does abut the 3rd segment duodenum and there is mild prominence of the 2nd segment duodenum raising concern for a partial duodenal obstruction secondary to mass effect  Clinical correlation recommended  3   Arterially hyperenhancing subcapsular focus along the posterior aspect of the right hepatic lobe  A subcentimeter hyperenhancing lesion was described in a similar location on prior outside MRI of February 2022  The study is not available for direct  comparison at the time this dictation  Finding remains indeterminate and metastasis is occluded  Close attention on follow-up is recommended  4   Fluid density within the lumen of the colon as can be seen in the setting of diarrheal illness  The study was marked in EPIC for significant notification  Workstation performed: [id number]        Review of Systems  Constitutional: Positive for fatigue  HENT: Negative  Eyes: Negative      Respiratory: Positive for shortness of breath (at times with exertion)  Cardiovascular: Negative  Gastrointestinal: Positive for diarrhea (taking lomotil prn) and nausea (only after chemo, taking compazine (zofran not as effective))  Endocrine: Negative  Genitourinary: Negative  Musculoskeletal: Positive for gait problem (ambulates with cane)  Skin: Negative  Allergic/Immunologic: Negative  Neurological: Positive for weakness and light-headedness  Hematological: Bruises/bleeds easily  Psychiatric/Behavioral: The patient is nervous/anxious  OBJECTIVE:   /70 (BP Location: Right arm)   Pulse 90   Temp 98 3 °F (36 8 °C) (Temporal)   Resp 18   Wt 56 7 kg (125 lb)   SpO2 99%   BMI 19 58 kg/m²   Pain Assessment:  0  Performance Status: ECO - Symptomatic but completely ambulatory    Physical Exam  General Appearance:  Alert, cooperative, no distress, appears stated age  HEENT: normocephalic/atraumatic  Cardiovascular:  Extremities warm and well perfused, no lower extremity edema  Lungs: Respirations unlabored, no cyanosis, able to speak in complete sentences without dyspnea  Abdomen: Non-distended  Extremities: No cyanosis or edema  Skin: No rash or dermatitis  Neurologic: ANOx3    Pathology:  See above  Portions of the record may have been created with voice recognition software  Occasional wrong word or "sound a like" substitutions may have occurred due to the inherent limitations of voice recognition software  Read the chart carefully and recognize, using context, where substitutions have occurred

## 2022-07-29 ENCOUNTER — TELEPHONE (OUTPATIENT)
Dept: HEMATOLOGY ONCOLOGY | Facility: CLINIC | Age: 82
End: 2022-07-29

## 2022-07-29 ENCOUNTER — TELEPHONE (OUTPATIENT)
Dept: NUTRITION | Facility: CLINIC | Age: 82
End: 2022-07-29

## 2022-07-29 NOTE — TELEPHONE ENCOUNTER
Received call back from Boom Conklin  Introduced self and discussed oncology nutrition services available (options for in-person and phone consultation) and the benefits of meeting for a consultation  Initial RD phone consultation set up for 8/3/22 at 1 pm       Provided this RDs contact information asking that Boom Conklin reach out prn  All questions/concerns addressed at this time

## 2022-07-29 NOTE — TELEPHONE ENCOUNTER
Received notification by [facility] RN, Suzie Galindo , on 7/28/22 that pt has triggered for oncology nutrition care (reason for referral: Malnutrition Screening Tool (MST) Triggers: scored a 2 indicating 2-13# (0 9-6 kg) recent wt loss and is eating poorly due to a decreased appetite  (Date of MST: 7/28/22) and patient request)  Claudia Davis today to establish care  No answer  Left voice message with the reason for today's call and this RDs contact information asking that Fatoumata Hunt call back as able/desired

## 2022-07-29 NOTE — TELEPHONE ENCOUNTER
Left VM for patient to let him know that we r/s his appt with Dr Josie Rodríguez to be 8/10 at [place] [address] [facility], instead of 8/1  I left my direct number for him to callback

## 2022-07-29 NOTE — TELEPHONE ENCOUNTER
Spoke with patient to go over with him that Dr Gamaliel Pierre wants him to be seen by our office on 8/10 instead of 8/1  Patient verbalized that he will be at his appt on 8/10

## 2022-08-02 ENCOUNTER — PATIENT OUTREACH (OUTPATIENT)
Dept: HEMATOLOGY ONCOLOGY | Facility: CLINIC | Age: 82
End: 2022-08-02
Payer: COMMERCIAL

## 2022-08-02 NOTE — PROGRESS NOTES
MSW made outreach to pt to offer support and to assess his needs  Pt was open and receptive to the call  He spoke about his consul in radiation and states that he was "not at all happy with what he read in My Chart from the Doctor "  Pt was referring to his AVS, where the Doctor indicated that the radiation was not guaranteeing to "cure" his cancer  MSW explained to the pt that the Doctor has to be honest with his information  The pt states he finds it too upsetting to read and asked if he was "required" to read this  MSW explained he was not and if he found that it helped his anxiety to not read his medical information, then he should do what he felt best for himself  MSW did express concern with his inability to discuss his health and his prognosis and asked if he had been able to scheduled his appointment for counseling  The pt states that he has not and that he plans to do this  MSW encouraged him to try and get this scheduled, explaining that this will be a good start to help him with his anxiety  MSW reviewed the pt's appointments and the transportation  Pt verbalized understanding  Significant emotional support offered

## 2022-08-03 ENCOUNTER — TELEPHONE (OUTPATIENT)
Dept: NUTRITION | Facility: CLINIC | Age: 82
End: 2022-08-03

## 2022-08-03 NOTE — TELEPHONE ENCOUNTER
Nicolette Corbysuzy was scheduled for an RD appointment today (8/3/2022) and was unable to attend his appointment (no answer x 2 attemptd)  A call was placed and a voice message was left encouraging him to call back and reschedule his appointment at a more convenient time for him  RD contact information was provided

## 2022-08-03 NOTE — TELEPHONE ENCOUNTER
Received call back from [address] [place] [address] for missing appointment  He had an appt earlier that went longer than expected  He would like to reschedule RD appt   RD appt rescheduled for 8/5/22 at 9 am

## 2022-08-05 ENCOUNTER — NUTRITION (OUTPATIENT)
Dept: NUTRITION | Facility: HOSPITAL | Age: 82
End: 2022-08-05

## 2022-08-05 DIAGNOSIS — C25.9 PANCREATIC ADENOCARCINOMA (HCC): ICD-10-CM

## 2022-08-05 DIAGNOSIS — Z71.3 NUTRITIONAL COUNSELING: Primary | ICD-10-CM

## 2022-08-05 RX ORDER — CAPECITABINE 500 MG/1
TABLET, FILM COATED ORAL
Qty: 120 TABLET | Refills: 0 | Status: SHIPPED | OUTPATIENT
Start: 2022-08-05

## 2022-08-05 NOTE — PROGRESS NOTES
Outpatient Oncology Nutrition Consultation   Type of Consult: Follow Up  Care Location: Telephone Call    Reason for referral: Received notification by [facility] RN, Sydni Gallegos , on 7/28/22 that pt has triggered for oncology nutrition care (reason for referral: Malnutrition Screening Tool (MST) Triggers: scored a 2 indicating 2-13# (0 9-6 kg) recent wt loss and is eating poorly due to a decreased appetite  (Date of MST: 7/28/22) and patient request)  Blondell Finger had previously been seen by oncology CAR Montano on 3/23/22    Nutrition Assessment:   Oncology Diagnosis & Treatments: diagnosed with pancreatic cancer 3/4/22  -started on FOLFIRINOX 4/2022  Was reduced d/t diarrhea, and last cycle was cancelled  Finished 7/12/22  -plans to start concurrent chemo (oral chemo Xeloda)/RT  SIM scheduled for 8/11    Oncology History   Pancreatic adenocarcinoma ([address] )   3/4/2022 Biopsy    A-B-C  Pancreas, pancreatic head mass   Malignant (PSC Category VI) -  Adenocarcinoma       3/4/2022 -  Cancer Staged    Staging form: Pancreas, AJCC 8th Edition  - Clinical stage from 3/4/2022: Stage IB (cT2, cN0, cM0) - Signed by Rica Sun MD on 7/28/2022  Stage prefix: Initial diagnosis  Total positive nodes: 0       3/14/2022 Initial Diagnosis    Pancreatic adenocarcinoma (HCC)     4/19/2022 - 7/20/2022 Chemotherapy    fluorouracil (ADRUCIL), 400 mg/m2 = 690 mg, Intravenous, Once, 7 of 8 cycles  Dose modification: 320 mg/m2 (original dose 400 mg/m2, Cycle 4), 280 mg/m2 (original dose 400 mg/m2, Cycle 7, Reason: Dose Not Tolerated, Comment: 30% dose reduction due to diarrhea)  Administration: 690 mg (4/19/2022), 690 mg (5/3/2022), 690 mg (5/17/2022), 555 mg (5/31/2022), 555 mg (6/14/2022), 555 mg (6/28/2022), 485 mg (7/20/2022)  pegfilgrastim (NEULASTA), 6 mg, Subcutaneous, Once, 5 of 6 cycles  Administration: 6 mg (6/2/2022), 6 mg (6/16/2022), 6 mg (6/30/2022), 6 mg (5/20/2022)  irinotecan (CAMPTOSAR) chemo infusion, 125 mg/m2 = 216 mg (69 4 % of original dose 180 mg/m2), Intravenous, Once, 7 of 8 cycles  Dose modification: 125 mg/m2 (original dose 180 mg/m2, Cycle 1, Reason: Anticipated Tolerance, Comment: Anticipated tolerance and Bilirubin being between 1-2), 90 mg/m2 (original dose 180 mg/m2, Cycle 4, Reason: Dose Not Tolerated, Comment: 50% dose reduction from original dose due to diarrhea)  Administration: 216 mg (4/19/2022), 216 mg (5/3/2022), 220 mg (5/17/2022), 156 mg (5/31/2022), 160 mg (6/14/2022), 160 mg (6/28/2022), 160 mg (7/20/2022)  leucovorin calcium IVPB, 692 mg, Intravenous, Once, 7 of 8 cycles  Administration: 700 mg (4/19/2022), 700 mg (5/3/2022), 700 mg (5/17/2022), 700 mg (5/31/2022), 700 mg (6/14/2022), 700 mg (6/28/2022), 700 mg (7/20/2022)  oxaliplatin (ELOXATIN) chemo infusion, 65 mg/m2 = 112 45 mg (76 5 % of original dose 85 mg/m2), Intravenous, Once, 7 of 8 cycles  Dose modification: 65 mg/m2 (original dose 85 mg/m2, Cycle 1, Reason: Anticipated Tolerance)  Administration: 112 45 mg (4/19/2022), 112 45 mg (5/3/2022), 112 45 mg (5/17/2022), 112 45 mg (5/31/2022), 112 45 mg (6/14/2022), 112 45 mg (6/28/2022), 112 45 mg (7/20/2022)  fluorouracil (ADRUCIL) ambulatory infusion Soln, 1,200 mg/m2/day = 4,150 mg, Intravenous, Over 46 hours, 7 of 8 cycles  Dose modification: 1,000 mg/m2/day (original dose 1,200 mg/m2/day, Cycle 8, Reason: Dose Not Tolerated, Comment: dose reduction due to diarrhea), 1,000 mg/m2/day (original dose 1,200 mg/m2/day, Cycle 7, Reason: Dose Not Tolerated, Comment: dose reduction due to diarrhea)     7/22/2022 -  Chemotherapy    pegfilgrastim (NEULASTA), 6 mg, Subcutaneous, Once, 0 of 1 cycle     7/27/2022 Adverse Reaction    3/2022 - locally advanced, unresectable adenocarcinoma of the pancreas     4/19/2022 - start FOLFIRINOX with 20% dose reduction in oxaliplatin and irinotecan due to age    5/2022 - 5FU dose reduced by 20% due to diarrhea    7/12/2022 - cycle 7 delayed due to diarrhea - bolus 5-FU further reduced by a total of 30% and infusional 5-FU dose reduced by 20%        Chemotherapy    Xeloda 1000mg BID M-F concurrent with RT       Past Medical & Surgical Hx:   Patient Active Problem List   Diagnosis    Type 2 diabetes mellitus without complication, with long-term current use of insulin (HCC)    GERD (gastroesophageal reflux disease)    Pancreatic mass    History of pulmonary embolism    Hyperbilirubinemia    Anxiety about health    [facility]    Chemotherapy induced neutropenia (HCC)    Hypokalemia    Dehydration    Mild protein-calorie malnutrition (HCC)    Acute exacerbation of chronic obstructive pulmonary disease (HCC)     Past Medical History:   Diagnosis Date    Ambulates with cane     Anxiety     Depression     [facility]     GERD (gastroesophageal reflux disease)     History of pulmonary embolus (PE)     Hyperlipidemia     Multiple thyroid nodules     pt states about 40yrs ago    Pancreat[facility] cancer ([facility] )     Pancreatic mass      Past Surgical History:   Procedure Laterality Date    APPENDECTOMY      CATARACT EXTRACTION Right     FL GUIDED CENTRAL VENOUS ACCESS DEVICE INSERTION  4/11/2022    TUNNELED VENOUS PORT PLACEMENT Right 4/11/2022    Procedure: INSERTION VENOUS PORT (PORT-A-CATH); Surgeon: Carlos Villagran MD;  Location: [facility];  Service: Surgical Oncology       Review of Medications:   Vitamins, Supplements and Herbals: Med List Reviewed & pt is only taking: potassium    Current Outpatient Medications:     Alcohol Swabs 70 % PADS, May substitute brand based on insurance coverage   Check glucose BID , Disp: 100 each, Rfl: 0    ALPRAZolam (XANAX) 0 5 mg tablet, Take 0 5 tablets (0 25 mg total) by mouth 3 (three) times a day as needed for anxiety (Patient not taking: No sig reported), Disp: 90 tablet, Rfl: 0    apixaban (ELIQUIS) 5 mg, Take 5 mg by mouth 2 (two) times a day, Disp: , Rfl:     Blood Glucose Monitoring Suppl (OneTouch Verio Reflect) w/Device KIT, May substitute brand based on insurance coverage  Check glucose BID , Disp: 1 kit, Rfl: 0    capecitabine (XELODA) 500 MG tablet, Take 2 pills twice a day Monday through Friday while on radiation  , Disp: 120 tablet, Rfl: 0    diphenoxylate-atropine (LOMOTIL) 2 5-0 025 mg per tablet, Take 1 tablet by mouth 4 (four) times a day as needed for diarrhea, Disp: 60 tablet, Rfl: 0    glucose blood (OneTouch Verio) test strip, May substitute brand based on insurance coverage  Check glucose BID , Disp: 100 each, Rfl: 0    Klor-Con M20 20 MEQ tablet, TAKE 2 TABLETS BY MOUTH DAILY, Disp: 180 tablet, Rfl: 1    metFORMIN (GLUCOPHAGE) 1000 MG tablet, Take 1,000 mg by mouth 2 (two) times a day with meals   (Patient not taking: Reported on 7/28/2022), Disp: , Rfl:     metFORMIN (GLUCOPHAGE-XR) 500 mg 24 hr tablet, Take 500 mg by mouth 2 (two) times a day, Disp: , Rfl:     Multiple Vitamin (MULTIVITAMIN ADULT PO), Take 1 tablet by mouth daily (Patient not taking: No sig reported), Disp: , Rfl:     omeprazole (PriLOSEC) 20 mg delayed release capsule, , Disp: , Rfl:     omeprazole (PriLOSEC) 40 MG capsule, Take 40 mg by mouth every evening  , Disp: , Rfl:     ondansetron (Zofran ODT) 8 mg disintegrating tablet, Take 1 tablet (8 mg total) by mouth every 8 (eight) hours as needed for nausea or vomiting (Patient not taking: Reported on 7/28/2022), Disp: 60 tablet, Rfl: 0    OneTouch Delica Lancets 86J MISC, May substitute brand based on insurance coverage   Check glucose BID , Disp: 100 each, Rfl: 0    polyethylene glycol (MIRALAX) 17 g packet, Take 17 g by mouth daily (Patient not taking: No sig reported), Disp: , Rfl:     pravastatin (PRAVACHOL) 20 mg tablet, Take 20 mg by mouth daily, Disp: , Rfl:     prochlorperazine (COMPAZINE) 10 mg tablet, Take 1 tablet (10 mg total) by mouth every 6 (six) hours as needed for nausea or vomiting, Disp: 30 tablet, Rfl: 0    traMADol (ULTRAM) 50 mg tablet, Take 1 tablet (50 mg total) by mouth every 6 (six) hours as needed for moderate pain (Patient not taking: No sig reported), Disp: 60 tablet, Rfl: 0    Most Recent Lab Results: hx DM, BG range from 110-140   Doesn't always check blood sugars  Lab Results   Component Value Date    WBC 5 6 07/15/2022    NEUTROABS 3,696 07/15/2022    ALT 15 07/15/2022    AST 15 07/15/2022    ALB 3 6 07/15/2022    SODIUM 138 07/15/2022    SODIUM 139 07/11/2022    K 4 2 07/15/2022    K 3 8 07/11/2022     (H) 07/15/2022    BUN 10 07/15/2022    BUN 11 07/11/2022    CREATININE 0 88 07/15/2022    CREATININE 0 93 07/11/2022    EGFR 87 07/15/2022    PHOS 2 8 03/03/2022    POCGLU 135 04/11/2022    GLUC 152 (H) 07/15/2022    HGBA1C 6 1 08/28/2021    CALCIUM 8 4 (L) 07/15/2022    MG 2 0 03/03/2022       Anthropometric Measurements:   Height: 67"  Ht Readings from Last 1 Encounters:   07/20/22 5' 7" (1 702 m)     Wt Readings from Last 20 Encounters:   07/28/22 56 7 kg (125 lb)   07/20/22 57 3 kg (126 lb 5 2 oz)   07/18/22 57 6 kg (127 lb)   07/12/22 53 8 kg (118 lb 9 7 oz)   07/11/22 52 6 kg (116 lb)   06/30/22 55 3 kg (122 lb)   06/28/22 55 6 kg (122 lb 9 2 oz)   06/27/22 55 3 kg (122 lb)   06/14/22 57 8 kg (127 lb 6 8 oz)   06/13/22 57 2 kg (126 lb)   05/31/22 59 2 kg (130 lb 8 2 oz)   05/18/22 61 7 kg (136 lb)   05/17/22 61 3 kg (135 lb 2 3 oz)   05/03/22 58 4 kg (128 lb 12 8 oz)   04/21/22 62 4 kg (137 lb 9 6 oz)   04/19/22 60 5 kg (133 lb 6 1 oz)   04/12/22 60 8 kg (134 lb)   04/11/22 62 6 kg (138 lb)   03/30/22 62 6 kg (138 lb)   03/24/22 62 6 kg (138 lb)       Weight History:    Usual Weight: 160# beginning of the year   Varian: n/a   Home Scale: n/a    Oncology Nutrition-Anthropometrics    Flowsheet Row Nutrition from 8/5/2022 in [facility] [employer] [facility] Nutrition from 3/23/2022 [place] [address] [facility]   Patient age (years): 80 years 80 years   Patient (male) height (in): 67 in 67 in   Current weight (lbs): 125 lbs 136 lbs   Current weight to be used for anthropometric calculations (kg) 56 8 kg 61 8 kg   BMI: 19 6 21 3   IBW male 148 lb 148 lb   IBW (kg) male 67 3 kg 67 3 kg   IBW % (male) 84 5 % 91 9 %   Adjusted BW (male): 142 3 lbs 145 lbs   Adjusted BW in kg (male): 64 7 kg 65 9 kg   % weight change after 1 week: -- -2 9 %   Weight change after 1 week (lbs) -- -4 lbs   % weight change after 1 month: -1 6 % --   Weight change after 1 month (lbs) -2 lbs --   % weight change after 3 months: -9 2 % --   Weight change after 3 months (lbs) -12 6 lbs --          Nutrition-Focused Physical Findings: n/a due to telephone call    Food/Nutrition-Related History & Client/Social History:    Current Nutrition Impact Symptoms:  [] Nausea  [x] Reduced Appetite -initially when diagnosed and on chemo, appetite starting to improve now [] Acid Reflux    [] Vomiting  [x] Unintended Wt Loss  [] Malabsorption    [] Diarrhea  [] Unintended Wt Gain  [] Dumping Syndrome    [] Constipation  [] Thick Mucous/Secretions  [] Abdominal Pain    [x] Dysgeusia (Altered Taste)  [x] Xerostomia (Dry Mouth) -occasionally  [] Gas    [] Dysosmia (Altered Smell)  [] Thrush  [] Difficulty Chewing    [] Oral Mucositis (Sore Mouth)  [x] Fatigue  [] Hyperglycemia    [] Odynophagia  [] Esophagitis  [] Other:    [] Dysphagia  [] Early Satiety  [] No Problems Eating      Food Allergies & Intolerances: no    Current Diet: Regular Diet, No Restrictions  Current Nutrition Intake: Less than usual but starting to improve  Appetite: Good, improving now  Nutrition Route: PO  Oral Care: brushes mutiple times per day  Activity level: ADLs, uses a cane, increased fatigue at times  Tries to get out and do things    24 Hr Diet Recall:   Breakfast: CIB RTD usually   Today had waffles (had more time to prepare breakfast today)  Snack: hard candy  Lunch: sandwich (turkey and cheese on rye bread)  Snack: canned fruit  Dinner: frozen dinner (hungry man)  Snack: ice cream sandwich (klondike bar)    Beverages: flavored water (8oz x 6-8), sugar-free soda (8oz x2), CIB (8oz x1)  Supplements:    Baring Instant Breakfast (8oz 240kcal 10g protein)      Oncology Nutrition-Estimated Needs    Flowsheet Row Nutrition from 2022 in [facility]   Weight type used Actual weight Actual weight   Weight in kilograms (kg) used for estimated needs 56 8 kg 61 8 kg   Energy needs formula:  30-35 kcal/kg 30-35 kcal/kg   Energy needs based on 30 kcal/k kcal 1855 kcal   Energy needs based on 35 kcal/k kcal 2164 kcal   Protein needs formula: 1 2-1 5 g/kg 1 2-1 5 g/kg   Protein needs based on 1 2 g/k g 74 g   Protein needs based on 1 5 g/kg 85 g 93 g   Fluid needs formula: 30-35 mL/kg 30-35 mL/kg   Fluid needs based on 30 mL/kg 1704 mL 1854 mL   Fluid needs in ounces 58 oz 63 oz   Fluid needs based on 35 mL/kg 1988 mL 2163 mL   Fluid needs in ounces 67 oz 73 oz           Discussion & Intervention:   Kimberly Schultz was evaluated today for an RD follow up regarding pt request for weight loss and poor PO  Kimberly Schultz is planned to undergo tx for pancreatic cancer  He recently finished chemo last month, and plans to start RT and oral chemo soon  He states his appetite has been improving over the last couple weeks, and his weight is slowly trending back up  He had lost weight when diagnosed  He c/o dysgeusia at times with certain foods (toast), but not all food  He also reports xerostomia  He is eating 2-3 meals/day + occasional snacks  He drinks CIB shakes but usually drinks one for breakfast if he's going out  We discussed increasing oral nutrition supplements and trying others like Glucerna, Ensure, Boost  We also discussed small/frequent meals/snacks throughout the day, and calorie/protein dense foods to incorporate   He states he has been working on increasing his hydration and drinks a lot more water now  He does drink soda every day but usually dilutes this with water  Reviewed 24 hour recall, which revealed an inadequate po intake, and discussed ways to increase kcal, protein, and fluid intakes and optimize nutrient intake  Also reviewed the importance of wt management throughout the tx process and the role of a high kcal/ high protein diet in managing wt and overall health    Based on today's assessment, discussion included: MNT for: dysgeusia, wt loss, decreased appetite, xerostomia, how to modify foods for anticipated nutrition impact symptoms pt may experience during CA tx, practicing proper oral care, a high kcal/protein diet & food choices to include at all meals & snacks (Examples of high kcal foods: cheese, full-fat dairy products, nut butter, plant-based fats, coconut oil/milk, avocado, butter, cream soup, etc  Examples of high protein foods: eggs, chicken, fish, beans/legumes, nuts/nut butters, bone broth, etc ) , fortifying foods for added kcal and protein (examples include: adding cheese to foods such as eggs, mashed potatoes, casseroles, etc ; Making oatmeal with whole milk rather than water; Making fortified mashed potatoes with cream, butter, dry milk powder, plain [place] yogurt, and cheese ), adequate hydration & fluid choices, sipping on calorie containing beverages (examples include: adding whole milk or cream to coffee, oral nutrition supplements, juice, electrolyte replacement beverages, milk, etc ), eating smaller more frequent meals every 2-3 hours (5-6 small meals/day), having consistent and planned snacks between meals, increasing and trying other oral nutrition supplements, recipe suggestions/resources, trying new recipes, & cooking at home more often and adding extra fat to foods while cooking such as butter, plant-based oil, coconut oil/milk, avocado, nut butters, etc    Moving forward, Parviz Ricketts was encouraged to increase kcal, protein, and fluid intakes    Materials Provided: all mailed to pt: , Ensure Coupons, Boost Coupons and Glucerna Coupons  All questions and concerns addressed during todays visit  Lorrie Graham has RD contact information  Nutrition Diagnosis:    Inadequate Energy Intake related to physiological causes, disease state and treatment related issues as evidenced by food recall, wt loss and discussion with pt and/or family   Increased Nutrient Needs (kcal & pro) related to increased demand for nutrients and disease state as evidenced by cancer dx and pt undergoing tx for cancer  Monitoring & Evaluation:   Goals:  · weight maintenance/stabilization  · adequate nutrition impact symptom management  · pt to meet >/=75% estimated nutrition needs daily    · Progress Towards Goals: Progressing and New Goal(s) Added    Nutrition Rx & Recommendations:  · Diet: High Calorie, High Protein (for high calorie foods see pages 52-53, and for high protein foods see pages 49-51 in your Eating Hints book)  · Keep a daily food journal (pen/paper, patti such as Retroficiency)  · Nutrition Supplements: aim for 2 ONS per day  consume between meals  Can continue CIB, also try Glucerna shakes for lower CHO option, Boost or Ensure  May use homemade shakes/smoothies as desired  If using a pre-made shake/bar, choose ones with >300 calories and >10 grams protein (ex  Ensure Complete, Ensure Enlive, Ensure Plus, Boost Plus, Boost Very High Calorie, etc )  · Small, frequent meals/snacks may be easier to tolerate than 3 large daily meals  Aim for 5-6 small meals per day (every 2-3 hours)  · Include protein at all meals/snacks  · Include a variety of foods (as tolerated/allowed by diet)  · Incorporate physical activity as able/allowed  · Stay hydrated by sipping fluids of choice/tolerance throughout the day  · Liquid nutrition may be better tolerated than solids at times    · Alter food choices and eating patterns to accommodate changing needs   · Refer to Eating Hints book for other meal/snack ideas and symptom management  · Follow proper oral care; Try baking soda/salt water rinse recipe (mix 3/4 tsp salt + 1 tsp baking soda + 1 qt water; rinse with plain water after using) in Eating Hints book (pg 18)  Brush your teeth before/after meals & before bed  · For lack of taste: Practice good oral care  Blend fresh fruits into shakes, ice cream or yogurt  Eat frozen fruits: grapes, chopped cantaloupe, watermelon  Select fresh vegetables (may be more appealing than canned/frozen)  Add extra flavor to foods: experiment with spices, salt & sweeteners, extra oil/butter, acidic foods; marinate meats (see pages 29-30 in your Eating Hints book)  · For dry mouth: sip water throughout the day; try very sweet (or tart, as tolerated) drinks; chew gum or suck on hard candy, popsicles, & ice chips; eat easy to swallow foods (such as pureed cooked foods or soups); moisten food with sauce/gravy/salad dressing; do not drink beer, wine, or any type of alcohol; keep lips moist with lip balm; avoid tobacco products & second-hand smoke; try Biotene as needed (see pages 17-18 in your Eating Hints book)  Follow proper oral care; Try baking soda/salt water rinse recipe (mix 3/4 tsp salt + 1 tsp baking soda + 1 qt water; rinse with pain water after using) in Eating Hints book (pg 18)  Brush your teeth before/after meals & before bed  · Weigh yourself regularly  If you notice weight loss, make an effort to increase your daily food/calorie intake  If you continue to notice loss after these efforts, reach out to your dietitian to establish a plan to stabilize weight     · High calorie/high protein snacks between meals: cheese and crackers, oral nutrition supplements, PB & J on slice of bread, cottage cheese and fruit, milk,     Follow Up Plan: 8/17/22 phone follow up at 9 am  Recommend Referral to Other Providers: none at this time

## 2022-08-05 NOTE — PATIENT INSTRUCTIONS
Nutrition Rx & Recommendations:  Diet: High Calorie, High Protein (for high calorie foods see pages 52-53, and for high protein foods see pages 49-51 in your Eating Hints book)  Keep a daily food journal (pen/paper, patti such as [employer])  Nutrition Supplements: aim for 2 ONS per day  consume between meals  Can continue CIB, also try Glucerna shakes for lower CHO option, Boost or Ensure  May use homemade shakes/smoothies as desired  If using a pre-made shake/bar, choose ones with >300 calories and >10 grams protein (ex  Ensure Complete, Ensure Enlive, Ensure Plus, Boost Plus, Boost Very High Calorie, etc )  Small, frequent meals/snacks may be easier to tolerate than 3 large daily meals  Aim for 5-6 small meals per day (every 2-3 hours)  Include protein at all meals/snacks  Include a variety of foods (as tolerated/allowed by diet)  Incorporate physical activity as able/allowed  Stay hydrated by sipping fluids of choice/tolerance throughout the day  Liquid nutrition may be better tolerated than solids at times  Alter food choices and eating patterns to accommodate changing needs  Refer to Eating Hints book for other meal/snack ideas and symptom management  Follow proper oral care; Try baking soda/salt water rinse recipe (mix 3/4 tsp salt + 1 tsp baking soda + 1 qt water; rinse with plain water after using) in Eating Hints book (pg 18)  Brush your teeth before/after meals & before bed  For lack of taste: Practice good oral care  Blend fresh fruits into shakes, ice cream or yogurt  Eat frozen fruits: grapes, chopped cantaloupe, watermelon  Select fresh vegetables (may be more appealing than canned/frozen)  Add extra flavor to foods: experiment with spices, salt & sweeteners, extra oil/butter, acidic foods; marinate meats (see pages 29-30 in your Eating Hints book)    For dry mouth: sip water throughout the day; try very sweet (or tart, as tolerated) drinks; chew gum or suck on hard candy, popsicles, & ice chips; eat easy to swallow foods (such as pureed cooked foods or soups); moisten food with sauce/gravy/salad dressing; do not drink beer, wine, or any type of alcohol; keep lips moist with lip balm; avoid tobacco products & second-hand smoke; try Biotene as needed (see pages 17-18 in your Eating Hints book)  Follow proper oral care; Try baking soda/salt water rinse recipe (mix 3/4 tsp salt + 1 tsp baking soda + 1 qt water; rinse with pain water after using) in Eating Hints book (pg 18)  Brush your teeth before/after meals & before bed  Weigh yourself regularly  If you notice weight loss, make an effort to increase your daily food/calorie intake  If you continue to notice loss after these efforts, reach out to your dietitian to establish a plan to stabilize weight     High calorie/high protein snacks between meals: cheese and crackers, oral nutrition supplements, PB & J on slice of bread, cottage cheese and fruit, milk,     Follow Up Plan: 8/17/22 phone follow up at 9 am  Recommend Referral to Other Providers: none at this time

## 2022-08-10 ENCOUNTER — OFFICE VISIT (OUTPATIENT)
Dept: HEMATOLOGY ONCOLOGY | Facility: HOSPITAL | Age: 82
End: 2022-08-10
Payer: COMMERCIAL

## 2022-08-10 ENCOUNTER — TELEPHONE (OUTPATIENT)
Dept: HEMATOLOGY ONCOLOGY | Facility: HOSPITAL | Age: 82
End: 2022-08-10

## 2022-08-10 VITALS
HEIGHT: 67 IN | HEART RATE: 73 BPM | BODY MASS INDEX: 19.93 KG/M2 | SYSTOLIC BLOOD PRESSURE: 110 MMHG | WEIGHT: 127 LBS | DIASTOLIC BLOOD PRESSURE: 60 MMHG | TEMPERATURE: 97.5 F | OXYGEN SATURATION: 99 % | RESPIRATION RATE: 16 BRPM

## 2022-08-10 DIAGNOSIS — C25.9 PANCREATIC ADENOCARCINOMA (HCC): Primary | ICD-10-CM

## 2022-08-10 DIAGNOSIS — R19.7 DIARRHEA, UNSPECIFIED TYPE: ICD-10-CM

## 2022-08-10 PROCEDURE — 99214 OFFICE O/P EST MOD 30 MIN: CPT | Performed by: INTERNAL MEDICINE

## 2022-08-10 RX ORDER — DIPHENOXYLATE HYDROCHLORIDE AND ATROPINE SULFATE 2.5; .025 MG/1; MG/1
1 TABLET ORAL 4 TIMES DAILY PRN
Qty: 60 TABLET | Refills: 0 | Status: SHIPPED | OUTPATIENT
Start: 2022-08-10 | End: 2022-09-07 | Stop reason: SDUPTHER

## 2022-08-10 NOTE — PATIENT INSTRUCTIONS
Take 2 xeloda pills twice a day on Monday through Friday during radiation   Take 30 minutes after a meal

## 2022-08-10 NOTE — TELEPHONE ENCOUNTER
Left VM for the patient to let him know that he needs to call [facility] specialty pharmacy to set up shipment of his medication Xeloda  I left the [address] number for him to call as well as my number if he needs to call me back

## 2022-08-10 NOTE — TELEPHONE ENCOUNTER
Spoke with Italia Rodarte from [facility]  I asked Italia Rodarte what the status of the patient's Xeloda is and he stated that the patient needs to call them to set up shipment  I told Italia Rodarte that I will have the patient call

## 2022-08-11 ENCOUNTER — TELEPHONE (OUTPATIENT)
Dept: HEMATOLOGY ONCOLOGY | Facility: CLINIC | Age: 82
End: 2022-08-11

## 2022-08-11 ENCOUNTER — RADIATION THERAPY TREATMENT (OUTPATIENT)
Dept: RADIATION ONCOLOGY | Facility: HOSPITAL | Age: 82
End: 2022-08-11
Attending: INTERNAL MEDICINE
Payer: COMMERCIAL

## 2022-08-11 PROCEDURE — 77334 RADIATION TREATMENT AID(S): CPT | Performed by: STUDENT IN AN ORGANIZED HEALTH CARE EDUCATION/TRAINING PROGRAM

## 2022-08-11 PROCEDURE — 77470 SPECIAL RADIATION TREATMENT: CPT | Performed by: STUDENT IN AN ORGANIZED HEALTH CARE EDUCATION/TRAINING PROGRAM

## 2022-08-11 NOTE — TELEPHONE ENCOUNTER
Spoke with patient to let him know that since he is starting radiation on 8/22, he also should start his Xeloda on 8/22  We reviewed the instructions on how and when to take the medication  Patient verbalized understanding of this  The patient stated that he will be getting his radiation treatments in [place]  I told him that we can schedule his appt with Dr Shira Allen in [address] [place] [address] in the future if he would like

## 2022-08-11 NOTE — TELEPHONE ENCOUNTER
Left  for patient to call me back to review xeloda pills and if he rec'd them from the pharmacy  I left my direct teams number for him to callback

## 2022-08-12 PROCEDURE — 77399 UNLISTED PX MED RADJ PHYSICS: CPT | Performed by: INTERNAL MEDICINE

## 2022-08-13 NOTE — PROGRESS NOTES
HEMATOLOGY / [address] FOLLOW UP NOTE    Primary Care Provider: Alicia Barrera  Referring Provider:    MRN: [id number]  : 1940    Reason for Encounter: locally advanced pancreatic cancer       Oncology History Overview Note   3/2022 - locally advanced, unresectable adenocarcinoma of the pancreas     2022 - start FOLFIRINOX with 20% dose reduction in oxaliplatin and irinotecan due to age     2022 - 5FU dose reduced by 20% due to diarrhea     2022 - cycle 7 delayed due to diarrhea - bolus 5-FU further reduced by a total of 30% and infusional 5-FU dose reduced by 20%    2022 - stop FOLFIRINOX due to inadequate response, start xeloda 1000 mg BID M-F concurrently with RT to pancreas     Pancreatic adenocarcinoma ([facility] [address] )   3/4/2022 Biopsy    A-B-C  Pancreas, pancreatic head mass   Malignant (PSC Category VI) -  Adenocarcinoma       3/4/2022 -  Cancer Staged    Staging form: Pancreas, AJCC 8th Edition  - Clinical stage from 3/4/2022: Stage IB (cT2, cN0, cM0) - Signed by Jasvir Peña MD on 2022  Stage prefix: Initial diagnosis  Total positive nodes: 0       3/14/2022 Initial Diagnosis    Pancreatic adenocarcinoma ([facility] [address] )     2022 - 2022 Chemotherapy    fluorouracil (ADRUCIL), 400 mg/m2 = 690 mg, Intravenous, Once, 7 of 8 cycles  Dose modification: 320 mg/m2 (original dose 400 mg/m2, Cycle 4), 280 mg/m2 (original dose 400 mg/m2, Cycle 7, Reason: Dose Not Tolerated, Comment: 30% dose reduction due to diarrhea)  Administration: 690 mg (2022), 690 mg (5/3/2022), 690 mg (2022), 555 mg (2022), 555 mg (2022), 555 mg (2022), 485 mg (2022)  pegfilgrastim (NEULASTA), 6 mg, Subcutaneous, Once, 5 of 6 cycles  Administration: 6 mg (2022), 6 mg (2022), 6 mg (2022), 6 mg (2022)  irinotecan (CAMPTOSAR) chemo infusion, 125 mg/m2 = 216 mg (69 4 % of original dose 180 mg/m2), Intravenous, Once, 7 of 8 cycles  Dose modification: 125 mg/m2 (original dose 180 mg/m2, Cycle 1, Reason: Anticipated Tolerance, Comment: Anticipated tolerance and Bilirubin being between 1-2), 90 mg/m2 (original dose 180 mg/m2, Cycle 4, Reason: Dose Not Tolerated, Comment: 50% dose reduction from original dose due to diarrhea)  Administration: 216 mg (4/19/2022), 216 mg (5/3/2022), 220 mg (5/17/2022), 156 mg (5/31/2022), 160 mg (6/14/2022), 160 mg (6/28/2022), 160 mg (7/20/2022)  leucovorin calcium IVPB, 692 mg, Intravenous, Once, 7 of 8 cycles  Administration: 700 mg (4/19/2022), 700 mg (5/3/2022), 700 mg (5/17/2022), 700 mg (5/31/2022), 700 mg (6/14/2022), 700 mg (6/28/2022), 700 mg (7/20/2022)  oxaliplatin (ELOXATIN) chemo infusion, 65 mg/m2 = 112 45 mg (76 5 % of original dose 85 mg/m2), Intravenous, Once, 7 of 8 cycles  Dose modification: 65 mg/m2 (original dose 85 mg/m2, Cycle 1, Reason: Anticipated Tolerance)  Administration: 112 45 mg (4/19/2022), 112 45 mg (5/3/2022), 112 45 mg (5/17/2022), 112 45 mg (5/31/2022), 112 45 mg (6/14/2022), 112 45 mg (6/28/2022), 112 45 mg (7/20/2022)  fluorouracil (ADRUCIL) ambulatory infusion Soln, 1,200 mg/m2/day = 4,150 mg, Intravenous, Over 46 hours, 7 of 8 cycles  Dose modification: 1,000 mg/m2/day (original dose 1,200 mg/m2/day, Cycle 8, Reason: Dose Not Tolerated, Comment: dose reduction due to diarrhea), 1,000 mg/m2/day (original dose 1,200 mg/m2/day, Cycle 7, Reason: Dose Not Tolerated, Comment: dose reduction due to diarrhea)     7/22/2022 -  Chemotherapy    pegfilgrastim (NEULASTA), 6 mg, Subcutaneous, Once, 0 of 1 cycle     7/27/2022 Adverse Reaction    3/2022 - locally advanced, unresectable adenocarcinoma of the pancreas     4/19/2022 - start FOLFIRINOX with 20% dose reduction in oxaliplatin and irinotecan due to age    5/2022 - 5FU dose reduced by 20% due to diarrhea    7/12/2022 - cycle 7 delayed due to diarrhea - bolus 5-FU further reduced by a total of 30% and infusional 5-FU dose reduced by 20%        Chemotherapy    Xeloda 1000mg BID M-F concurrent with RT         Interval History: patient presents for follow up of his pancreatic cancer  He will be starting locoregional radiation this week for his pancreatic cancer  He has not received his xeloda yet  He is feeling better after getting a break from chemotherapy  He is drinking a lot of fluid and is eating a little better  His diarrhea is now only 3 times a day and he continues to use lomotil for it  He denies any abd pain of melena  REVIEW OF SYSTEMS:  Please note that a 14-point review of systems was performed to include Constitutional, HEENT, Respiratory, CVS, GI, , Musculoskeletal, Integumentary, Neurologic, Rheumatologic, Endocrinologic, Psychiatric, Lymphatic, and Hematologic/Oncologic systems were reviewed and are negative unless otherwise stated in HPI  Positive and negative findings pertinent to this evaluation are incorporated into the history of present illness  ECOG PS: 1    PROBLEM LIST:  Patient Active Problem List   Diagnosis    Type 2 diabetes mellitus without complication, with long-term current use of insulin (HCC)    GERD (gastroesophageal reflux disease)    Pancreatic mass    History of pulmonary embolism    Hyperbilirubinemia    Anxiety about health    Pancreatic adenocarcinoma ([facility] [address] )    Chemotherapy induced neutropenia (HCC)    Hypokalemia    Dehydration    Mild protein-calorie malnutrition (HCC)    Acute exacerbation of chronic obstructive pulmonary disease ([facility] [address] )       Assessment / Plan: he will start xeloda 1000 mg BID M-F concurrently with RT  We will look into getting it shipped directly to his house  We reviewed how to take the medication and what side effects to call us with  He can continue to use lomotil as needed for diarrhea  I will see him in 2 weeks with a cbc, cmp and ca 19-9   he knows to call in the interim with any questions or concerns      I spent 30 minutes on chart review, face to face counseling time, coordination of care and documentation  Past Medical History:   has a past medical history of Ambulates with cane, Anxiety, Depression, Diabetes mellitus ([facility] [address] ), GERD (gastroesophageal reflux disease), History of pulmonary embolus (PE), Hyperlipidemia, Multiple thyroid nodules, Pancreatic cancer ([facility] [address] ), and Pancreatic mass  PAST SURGICAL HISTORY:   has a past surgical history that includes Cataract extraction (Right); Appendectomy; Tunneled venous port placement (Right, 4/11/2022); and FL guided central venous access device insertion (4/11/2022)  CURRENT MEDICATIONS  Current Outpatient Medications   Medication Sig Dispense Refill    Alcohol Swabs 70 % PADS May substitute brand based on insurance coverage  Check glucose BID  100 each 0    apixaban (ELIQUIS) 5 mg Take 5 mg by mouth 2 (two) times a day      Blood Glucose Monitoring Suppl (OneTouch Verio Reflect) w/Device KIT May substitute brand based on insurance coverage  Check glucose BID  1 kit 0    capecitabine (XELODA) 500 MG tablet Take 2 pills twice a day Monday through Friday while on radiation  120 tablet 0    diphenoxylate-atropine (LOMOTIL) 2 5-0 025 mg per tablet Take 1 tablet by mouth 4 (four) times a day as needed for diarrhea 60 tablet 0    glucose blood (OneTouch Verio) test strip May substitute brand based on insurance coverage  Check glucose BID  100 each 0    Klor-Con M20 20 MEQ tablet TAKE 2 TABLETS BY MOUTH DAILY 180 tablet 1    metFORMIN (GLUCOPHAGE) 1000 MG tablet Take 1,000 mg by mouth 2 (two) times a day with meals      metFORMIN (GLUCOPHAGE-XR) 500 mg 24 hr tablet Take 500 mg by mouth 2 (two) times a day      Multiple Vitamin (MULTIVITAMIN ADULT PO) Take 1 tablet by mouth daily      omeprazole (PriLOSEC) 20 mg delayed release capsule       omeprazole (PriLOSEC) 40 MG capsule Take 40 mg by mouth every evening        OneTouch Delica Lancets 47W MISC May substitute brand based on insurance coverage   Check glucose BID  100 each 0    pravastatin (PRAVACHOL) 20 mg tablet Take 20 mg by mouth daily      prochlorperazine (COMPAZINE) 10 mg tablet Take 1 tablet (10 mg total) by mouth every 6 (six) hours as needed for nausea or vomiting 30 tablet 0    ALPRAZolam (XANAX) 0 5 mg tablet Take 0 5 tablets (0 25 mg total) by mouth 3 (three) times a day as needed for anxiety (Patient not taking: No sig reported) 90 tablet 0    ondansetron (Zofran ODT) 8 mg disintegrating tablet Take 1 tablet (8 mg total) by mouth every 8 (eight) hours as needed for nausea or vomiting (Patient not taking: No sig reported) 60 tablet 0    polyethylene glycol (MIRALAX) 17 g packet Take 17 g by mouth daily (Patient not taking: No sig reported)      traMADol (ULTRAM) 50 mg tablet Take 1 tablet (50 mg total) by mouth every 6 (six) hours as needed for moderate pain (Patient not taking: No sig reported) 60 tablet 0     No current facility-administered medications for this visit  [unfilled]    SOCIAL HISTORY:   reports that he quit smoking about 57 years ago  He started smoking about 62 years ago  He quit after 5 00 years of use  He has never used smokeless tobacco  He reports previous alcohol use  He reports that he does not use drugs  FAMILY HISTORY:  family history includes Alcohol abuse in his mother; Diabetes in his father; Throat cancer in his brother  ALLERGIES:  is allergic to aleve [naproxen]  Physical Exam:  Vital Signs:   Visit Vitals  /60 (BP Location: Left arm, Patient Position: Sitting, Cuff Size: Adult)   Pulse 73   Temp 97 5 °F (36 4 °C) (Tympanic)   Resp 16   Ht 5' 7" (1 702 m)   Wt 57 6 kg (127 lb)   SpO2 99%   BMI 19 89 kg/m²   Smoking Status Former Smoker   BSA 1 67 m²     Body mass index is 19 89 kg/m²  Body surface area is 1 67 meters squared      GEN: Alert, awake oriented x3, in no acute distress  HEENT- No pallor, icterus, cyanosis, no oral mucosal lesions,   LAD - no palpable cervical, clavicle, axillary, inguinal LAD  Heart- normal S1 S2, regular rate and rhythm, No murmur, rubs     Lungs- clear breathing sound bilateral    Abdomen- soft, Non tender, bowel sounds present  Extremities- No cyanosis, clubbing, edema  Neuro- No focal neurological deficit    Labs:  Lab Results   Component Value Date    WBC 5 6 07/15/2022    HGB 10 3 (L) 07/15/2022    HCT 31 2 (L) 07/15/2022    MCV 91 8 07/15/2022     07/15/2022     Lab Results   Component Value Date    SODIUM 138 07/15/2022    K 4 2 07/15/2022     (H) 07/15/2022    CO2 20 07/15/2022    AGAP 8 07/11/2022    BUN 10 07/15/2022    CREATININE 0 88 07/15/2022    GLUC 152 (H) 07/15/2022    CALCIUM 8 4 (L) 07/15/2022    AST 15 07/15/2022    ALT 15 07/15/2022    ALKPHOS 187 (H) 07/15/2022    TP 5 8 (L) 07/15/2022    TBILI 0 5 07/15/2022    EGFR 87 07/15/2022

## 2022-08-17 ENCOUNTER — NUTRITION (OUTPATIENT)
Dept: NUTRITION | Facility: HOSPITAL | Age: 82
End: 2022-08-17

## 2022-08-17 DIAGNOSIS — Z71.3 NUTRITIONAL COUNSELING: Primary | ICD-10-CM

## 2022-08-17 NOTE — PATIENT INSTRUCTIONS
Nutrition Rx & Recommendations:  Diet: High Calorie, High Protein (for high calorie foods see pages 52-53, and for high protein foods see pages 49-51 in your Eating Hints book)  Keep a daily food journal (pen/paper, patti such as [employer])  Nutrition Supplements: aim for 2 ONS per day  consume between meals  Can continue CIB, also try Glucerna shakes for lower CHO option, Boost or Ensure  May use homemade shakes/smoothies as desired  If using a pre-made shake/bar, choose ones with >300 calories and >10 grams protein (ex  Ensure Complete, Ensure Enlive, Ensure Plus, Boost Plus, Boost Very High Calorie, etc )  Small, frequent meals/snacks may be easier to tolerate than 3 large daily meals  Aim for 5-6 small meals per day (every 2-3 hours)  Include protein at all meals/snacks  Include a variety of foods (as tolerated/allowed by diet)  Incorporate physical activity as able/allowed  Stay hydrated by sipping fluids of choice/tolerance throughout the day  Liquid nutrition may be better tolerated than solids at times  Alter food choices and eating patterns to accommodate changing needs  Refer to Eating Hints book for other meal/snack ideas and symptom management  Follow proper oral care; Try baking soda/salt water rinse recipe (mix 3/4 tsp salt + 1 tsp baking soda + 1 qt water; rinse with plain water after using) in Eating Hints book (pg 18)  Brush your teeth before/after meals & before bed  For lack of taste: Practice good oral care  Blend fresh fruits into shakes, ice cream or yogurt  Eat frozen fruits: grapes, chopped cantaloupe, watermelon  Select fresh vegetables (may be more appealing than canned/frozen)  Add extra flavor to foods: experiment with spices, salt & sweeteners, extra oil/butter, acidic foods; marinate meats (see pages 29-30 in your Eating Hints book)    For diarrhea: drink plenty of fluids (>64 oz/day), avoid carbonation; eat 5-6 small meals/day; include high sodium & potassium foods/liquids (Sodium: soup/broth; Potassium: bananas, canned apricots, potatoes); eat low-fiber foods; choose foods/liquids that are room temperature; avoid foods/drinks that can make diarrhea worse (ex  high fiber, high sugar, hot/cold drinks, greasy/fatty foods, gas forming foods such as beans, raw produce, milk products, alcohol, spicy foods, caffeine, sugar alcohols (xylitol, sorbitol), and apple juice (high in sorbitol) (see pages 15-16 in your Eating Hints book)  If diarrhea is severe, consider adding Banatrol (banana flakes) 1-3 times per day mixed in applesauce (can be purchased online from [address] [place] [address])  For dry mouth: sip water throughout the day; try very sweet (or tart, as tolerated) drinks; chew gum or suck on hard candy, popsicles, & ice chips; eat easy to swallow foods (such as pureed cooked foods or soups); moisten food with sauce/gravy/salad dressing; do not drink beer, wine, or any type of alcohol; keep lips moist with lip balm; avoid tobacco products & second-hand smoke; try Biotene as needed (see pages 17-18 in your Eating Hints book)  Follow proper oral care; Try baking soda/salt water rinse recipe (mix 3/4 tsp salt + 1 tsp baking soda + 1 qt water; rinse with pain water after using) in Eating Hints book (pg 18)  Brush your teeth before/after meals & before bed  Weigh yourself regularly  If you notice weight loss, make an effort to increase your daily food/calorie intake  If you continue to notice loss after these efforts, reach out to your dietitian to establish a plan to stabilize weight     High calorie/high protein snacks between meals: cheese and crackers, oral nutrition supplements, PB on crackers or waffles, cottage cheese and fruit, milk, handful of nuts    Follow Up Plan: 8/31/22 phone follow up at 9 am  Recommend Referral to Other Providers: none at this time

## 2022-08-17 NOTE — PROGRESS NOTES
Outpatient Oncology Nutrition Consultation   Type of Consult: Follow Up  Care Location: Telephone Call    Reason for referral: Received notification by Luanonc RN, Melba Sicard , on 7/28/22 that pt has triggered for oncology nutrition care (reason for referral: Malnutrition Screening Tool (MST) Triggers: scored a 2 indicating 2-13# (0 9-6 kg) recent wt loss and is eating poorly due to a decreased appetite  (Date of MST: 7/28/22) and patient request)  Cinthya Zarate had previously been seen by oncology RD Thea Siemens on 3/23/22    Nutrition Assessment:   Oncology Diagnosis & Treatments: diagnosed with pancreatic cancer 3/4/22  -started on FOLFIRINOX 4/2022  Was reduced d/t diarrhea, and last cycle was cancelled  Finished 7/12/22  -plans to start concurrent chemo (oral chemo Xeloda)/RT  SIM  8/11, plan to start 8/22    Oncology History Overview Note   3/2022 - locally advanced, unresectable adenocarcinoma of the pancreas     4/19/2022 - start FOLFIRINOX with 20% dose reduction in oxaliplatin and irinotecan due to age     5/2022 - 5FU dose reduced by 20% due to diarrhea     7/12/2022 - cycle 7 delayed due to diarrhea - bolus 5-FU further reduced by a total of 30% and infusional 5-FU dose reduced by 20%    8/2022 - stop FOLFIRINOX due to inadequate response, start xeloda 1000 mg BID M-F concurrently with RT to pancreas     Pancreatic adenocarcinoma ([facility] [address] )   3/4/2022 Biopsy    A-B-C  Pancreas, pancreatic head mass   Malignant (PSC Category VI) -  Adenocarcinoma       3/4/2022 -  Cancer Staged    Staging form: Pancreas, AJCC 8th Edition  - Clinical stage from 3/4/2022: Stage IB (cT2, cN0, cM0) - Signed by Ely Lockett MD on 7/28/2022  Stage prefix: Initial diagnosis  Total positive nodes: 0       3/14/2022 Initial Diagnosis    Pancreatic adenocarcinoma ([facility] [address] )     4/19/2022 - 7/20/2022 Chemotherapy    fluorouracil (ADRUCIL), 400 mg/m2 = 690 mg, Intravenous, Once, 7 of 8 cycles  Dose modification: 320 mg/m2 (original dose 400 mg/m2, Cycle 4), 280 mg/m2 (original dose 400 mg/m2, Cycle 7, Reason: Dose Not Tolerated, Comment: 30% dose reduction due to diarrhea)  Administration: 690 mg (4/19/2022), 690 mg (5/3/2022), 690 mg (5/17/2022), 555 mg (5/31/2022), 555 mg (6/14/2022), 555 mg (6/28/2022), 485 mg (7/20/2022)  pegfilgrastim (NEULASTA), 6 mg, Subcutaneous, Once, 5 of 6 cycles  Administration: 6 mg (6/2/2022), 6 mg (6/16/2022), 6 mg (6/30/2022), 6 mg (5/20/2022)  irinotecan (CAMPTOSAR) chemo infusion, 125 mg/m2 = 216 mg (69 4 % of original dose 180 mg/m2), Intravenous, Once, 7 of 8 cycles  Dose modification: 125 mg/m2 (original dose 180 mg/m2, Cycle 1, Reason: Anticipated Tolerance, Comment: Anticipated tolerance and Bilirubin being between 1-2), 90 mg/m2 (original dose 180 mg/m2, Cycle 4, Reason: Dose Not Tolerated, Comment: 50% dose reduction from original dose due to diarrhea)  Administration: 216 mg (4/19/2022), 216 mg (5/3/2022), 220 mg (5/17/2022), 156 mg (5/31/2022), 160 mg (6/14/2022), 160 mg (6/28/2022), 160 mg (7/20/2022)  leucovorin calcium IVPB, 692 mg, Intravenous, Once, 7 of 8 cycles  Administration: 700 mg (4/19/2022), 700 mg (5/3/2022), 700 mg (5/17/2022), 700 mg (5/31/2022), 700 mg (6/14/2022), 700 mg (6/28/2022), 700 mg (7/20/2022)  oxaliplatin (ELOXATIN) chemo infusion, 65 mg/m2 = 112 45 mg (76 5 % of original dose 85 mg/m2), Intravenous, Once, 7 of 8 cycles  Dose modification: 65 mg/m2 (original dose 85 mg/m2, Cycle 1, Reason: Anticipated Tolerance)  Administration: 112 45 mg (4/19/2022), 112 45 mg (5/3/2022), 112 45 mg (5/17/2022), 112 45 mg (5/31/2022), 112 45 mg (6/14/2022), 112 45 mg (6/28/2022), 112 45 mg (7/20/2022)  fluorouracil (ADRUCIL) ambulatory infusion Soln, 1,200 mg/m2/day = 4,150 mg, Intravenous, Over 46 hours, 7 of 8 cycles  Dose modification: 1,000 mg/m2/day (original dose 1,200 mg/m2/day, Cycle 8, Reason: Dose Not Tolerated, Comment: dose reduction due to diarrhea), 1,000 mg/m2/day (original dose 1,200 mg/m2/day, Cycle 7, Reason: Dose Not Tolerated, Comment: dose reduction due to diarrhea)     7/22/2022 -  Chemotherapy    pegfilgrastim (NEULASTA), 6 mg, Subcutaneous, Once, 0 of 1 cycle     7/27/2022 Adverse Reaction    3/2022 - locally advanced, unresectable adenocarcinoma of the pancreas     4/19/2022 - start FOLFIRINOX with 20% dose reduction in oxaliplatin and irinotecan due to age    5/2022 - 5FU dose reduced by 20% due to diarrhea    7/12/2022 - cycle 7 delayed due to diarrhea - bolus 5-FU further reduced by a total of 30% and infusional 5-FU dose reduced by 20%        Chemotherapy    Xeloda 1000mg BID M-F concurrent with RT       Past Medical & Surgical Hx:   Patient Active Problem List   Diagnosis    Type 2 diabetes mellitus without complication, with long-term current use of insulin (HCC)    GERD (gastroesophageal reflux disease)    Pancreatic mass    History of pulmonary embolism    Hyperbilirubinemia    Anxiety about health    [facility]    Chemotherapy induced neutropenia (HCC)    Hypokalemia    Dehydration    Mild protein-calorie malnutrition (HCC)    Acute exacerbation of chronic obstructive pulmonary disease ([facility] [address] )     Past Medical History:   Diagnosis Date    Ambulates with cane     Anxiety     Depression     [facility]     GERD (gastroesophageal reflux disease)     History of pulmonary embolus (PE)     Hyperlipidemia     Multiple thyroid nodules     pt states about 40yrs ago    Pancreatic cancer ([facility] [address] )     Pancreatic mass      Past Surgical History:   Procedure Laterality Date    APPENDECTOMY      CATARACT EXTRACTION Right     FL GUIDED CENTRAL VENOUS ACCESS DEVICE INSERTION  4/11/2022    TUNNELED VENOUS PORT PLACEMENT Right 4/11/2022    Procedure: INSERTION VENOUS PORT (PORT-A-CATH);   Surgeon: Fiordaliza Neri MD;  Location: [facility];  Service: Surgical Oncology       Review of Medications:   Vitamins, Supplements and Herbals: Med List Reviewed & pt is only taking: potassium    Current Outpatient Medications:     Alcohol Swabs 70 % PADS, May substitute brand based on insurance coverage  Check glucose BID , Disp: 100 each, Rfl: 0    ALPRAZolam (XANAX) 0 5 mg tablet, Take 0 5 tablets (0 25 mg total) by mouth 3 (three) times a day as needed for anxiety (Patient not taking: No sig reported), Disp: 90 tablet, Rfl: 0    apixaban (ELIQUIS) 5 mg, Take 5 mg by mouth 2 (two) times a day, Disp: , Rfl:     Blood Glucose Monitoring Suppl (OneTouch Verio Reflect) w/Device KIT, May substitute brand based on insurance coverage  Check glucose BID , Disp: 1 kit, Rfl: 0    capecitabine (XELODA) 500 MG tablet, Take 2 pills twice a day Monday through Friday while on radiation  , Disp: 120 tablet, Rfl: 0    diphenoxylate-atropine (LOMOTIL) 2 5-0 025 mg per tablet, Take 1 tablet by mouth 4 (four) times a day as needed for diarrhea, Disp: 60 tablet, Rfl: 0    glucose blood (OneTouch Verio) test strip, May substitute brand based on insurance coverage  Check glucose BID , Disp: 100 each, Rfl: 0    Klor-Con M20 20 MEQ tablet, TAKE 2 TABLETS BY MOUTH DAILY, Disp: 180 tablet, Rfl: 1    metFORMIN (GLUCOPHAGE) 1000 MG tablet, Take 1,000 mg by mouth 2 (two) times a day with meals, Disp: , Rfl:     metFORMIN (GLUCOPHAGE-XR) 500 mg 24 hr tablet, Take 500 mg by mouth 2 (two) times a day, Disp: , Rfl:     Multiple Vitamin (MULTIVITAMIN ADULT PO), Take 1 tablet by mouth daily, Disp: , Rfl:     omeprazole (PriLOSEC) 20 mg delayed release capsule, , Disp: , Rfl:     omeprazole (PriLOSEC) 40 MG capsule, Take 40 mg by mouth every evening  , Disp: , Rfl:     ondansetron (Zofran ODT) 8 mg disintegrating tablet, Take 1 tablet (8 mg total) by mouth every 8 (eight) hours as needed for nausea or vomiting (Patient not taking: No sig reported), Disp: 60 tablet, Rfl: 0    OneTouch Delica Lancets 88Z MISC, May substitute brand based on insurance coverage   Check glucose BID , Disp: 100 each, Rfl: 0    polyethylene glycol (MIRALAX) 17 g packet, Take 17 g by mouth daily (Patient not taking: No sig reported), Disp: , Rfl:     pravastatin (PRAVACHOL) 20 mg tablet, Take 20 mg by mouth daily, Disp: , Rfl:     prochlorperazine (COMPAZINE) 10 mg tablet, Take 1 tablet (10 mg total) by mouth every 6 (six) hours as needed for nausea or vomiting, Disp: 30 tablet, Rfl: 0    traMADol (ULTRAM) 50 mg tablet, Take 1 tablet (50 mg total) by mouth every 6 (six) hours as needed for moderate pain (Patient not taking: No sig reported), Disp: 60 tablet, Rfl: 0    Most Recent Lab Results: hx DM, BG range from 110-140   Doesn't always check blood sugars  Lab Results   Component Value Date    WBC 5 6 07/15/2022    NEUTROABS 3,696 07/15/2022    ALT 15 07/15/2022    AST 15 07/15/2022    ALB 3 6 07/15/2022    SODIUM 138 07/15/2022    SODIUM 139 07/11/2022    K 4 2 07/15/2022    K 3 8 07/11/2022     (H) 07/15/2022    BUN 10 07/15/2022    BUN 11 07/11/2022    CREATININE 0 88 07/15/2022    CREATININE 0 93 07/11/2022    EGFR 87 07/15/2022    PHOS 2 8 03/03/2022    POCGLU 135 04/11/2022    GLUC 152 (H) 07/15/2022    HGBA1C 6 1 08/28/2021    CALCIUM 8 4 (L) 07/15/2022    MG 2 0 03/03/2022       Anthropometric Measurements:   Height: 67"  Ht Readings from Last 1 Encounters:   08/10/22 5' 7" (1 702 m)     Wt Readings from Last 20 Encounters:   08/10/22 57 6 kg (127 lb)   07/28/22 56 7 kg (125 lb)   07/20/22 57 3 kg (126 lb 5 2 oz)   07/18/22 57 6 kg (127 lb)   07/12/22 53 8 kg (118 lb 9 7 oz)   07/11/22 52 6 kg (116 lb)   06/30/22 55 3 kg (122 lb)   06/28/22 55 6 kg (122 lb 9 2 oz)   06/27/22 55 3 kg (122 lb)   06/14/22 57 8 kg (127 lb 6 8 oz)   06/13/22 57 2 kg (126 lb)   05/31/22 59 2 kg (130 lb 8 2 oz)   05/18/22 61 7 kg (136 lb)   05/17/22 61 3 kg (135 lb 2 3 oz)   05/03/22 58 4 kg (128 lb 12 8 oz)   04/21/22 62 4 kg (137 lb 9 6 oz)   04/19/22 60 5 kg (133 lb 6 1 oz)   04/12/22 60 8 kg (134 lb)   04/11/22 62 6 kg (138 lb)   03/30/22 62 6 kg (138 lb)       Weight History:    Usual Weight: 160# beginning of the year   Varian: n/a   Home Scale: n/a    Oncology Nutrition-Anthropometrics    Flowsheet Row Nutrition from 8/17/2022 in [facility] Oncology Dietitian Services Nutrition from 8/5/2022 in [facility] Oncology Dietitian Services   Patient age (years): 80 years 80 years   Patient (male) height (in): 79 in 79 in   Current weight (lbs): 127 lbs 125 lbs   Current weight to be used for anthropometric calculations (kg) 57 7 kg 56 8 kg   BMI: 19 9 19 6   IBW male 148 lb 148 lb   IBW (kg) male 67 3 kg 67 3 kg   IBW % (male) 85 8 % 84 5 %   Adjusted BW (male): 142 8 lbs 142 3 lbs   Adjusted BW in kg (male): 64 9 kg 64 7 kg   % weight change after 1 month: 9 5 % -1 6 %   Weight change after 1 month (lbs) 11 lbs -2 lbs   % weight change after 3 months: -6 % -9 2 %   Weight change after 3 months (lbs) -8 1 lbs -12 6 lbs          Nutrition-Focused Physical Findings: n/a due to telephone call    Food/Nutrition-Related History & Client/Social History:    Current Nutrition Impact Symptoms:  [] Nausea  [x] Reduced Appetite -initially when diagnosed and on chemo, appetite starting to improve now [] Acid Reflux    [] Vomiting  [x] Unintended Wt Loss -wt starting to trend up now [] Malabsorption    [x] Diarrhea -improving on lomotil [] Unintended Wt Gain  [] Dumping Syndrome    [] Constipation  [] Thick Mucous/Secretions  [] Abdominal Pain    [x] Dysgeusia (Altered Taste) -this is starting to improve [x] Xerostomia (Dry Mouth) -occasionally , but this is improving [] Gas    [] Dysosmia (Altered Smell)  [] Thrush  [] Difficulty Chewing    [] Oral Mucositis (Sore Mouth)  [x] Fatigue  [] Hyperglycemia    [] Odynophagia  [] Esophagitis  [] Other:    [] Dysphagia  [] Early Satiety  [] No Problems Eating      Food Allergies & Intolerances: no    Current Diet: Regular Diet, No Restrictions  Current Nutrition Intake: Increased since last visit  Appetite: Good, improving now  Nutrition Route: PO  Oral Care: brushes mutiple times per day  Activity level: ADLs, uses a cane, increased fatigue at times  Tries to get out and do things    24 Hr Diet Recall:   Breakfast: CIB RTD usually  Today had 2 Eggo waffles  Snack: hard candy  Lunch: sandwich (turkey and cheese on rye bread)  Snack: canned fruit  Dinner: frozen dinner (hungry man)  Snack: ice cream sandwich (klondike bar)    Beverages: flavored water (8oz x 6-8), sugar-free soda (8oz x2), CIB (8oz x1)  Supplements: usually once/day   Bonesteel Instant Breakfast (8oz 240kcal 10g protein)      Oncology Nutrition-Estimated Needs    Flowsheet Row Nutrition from 2022 in [facility] Oncology Dietitian Services Nutrition from 3/23/2022 in [facility] Oncology Dietitian [facility]   Weight type used Actual weight Actual weight   Weight in kilograms (kg) used for estimated needs 56 8 kg 61 8 kg   Energy needs formula:  30-35 kcal/kg 30-35 kcal/kg   Energy needs based on 30 kcal/k kcal 1855 kcal   Energy needs based on 35 kcal/k kcal 2164 kcal   Protein needs formula: 1 2-1 5 g/kg 1 2-1 5 g/kg   Protein needs based on 1 2 g/k g 74 g   Protein needs based on 1 5 g/kg 85 g 93 g   Fluid needs formula: 30-35 mL/kg 30-35 mL/kg   Fluid needs based on 30 mL/kg 1704 mL 1854 mL   Fluid needs in ounces 58 oz 63 oz   Fluid needs based on 35 mL/kg 1988 mL 2163 mL   Fluid needs in ounces 67 oz 73 oz           Discussion & Intervention:   Savannah Huddleston was evaluated today for an RD follow up regarding pt request for weight loss and poor PO  Savannah Huddleston is planned to undergo tx for pancreatic cancer  Today, Savannah Huddleston reports he's been doing better  He states his appetite continues to improve  Last week, his weight was up 2#  He feels like he continues to gain a little weight which he is pleased about  He is starting RT and oral chemo next week   He continues to eat 3 meals/day + some snacks  He tends to buy already prepared foods as he does not like to cook/prepare meals/wash dishes  We reviewed high calorie/high protein snack ideas to incorporate which he was appreciative of  He states he recently bought a jar of peanut butter and he wants to start eating peanut butter and crackers  He drinks CIB shakes occasionally, sometimes every day  We discussed increasing oral nutrition supplements to at least 2 per day, and reviewed other options for ONS like glucerna, ensure, and boost     Reviewed 24 hour recall, which revealed an inadequate po intake, and discussed ways to increase kcal, protein, and fluid intakes and optimize nutrient intake  Also reviewed the importance of wt management throughout the tx process and the role of a high kcal/ high protein diet in managing wt and overall health    Based on today's assessment, discussion included: MNT for: dysgeusia, wt loss, decreased appetite, xerostomia, diarrhea, how to modify foods for anticipated nutrition impact symptoms pt may experience during CA tx, practicing proper oral care, a high kcal/protein diet & food choices to include at all meals & snacks (Examples of high kcal foods: cheese, full-fat dairy products, nut butter, plant-based fats, coconut oil/milk, avocado, butter, cream soup, etc  Examples of high protein foods: eggs, chicken, fish, beans/legumes, nuts/nut butters, bone broth, etc ) , fortifying foods for added kcal and protein (examples include: adding cheese to foods such as eggs, mashed potatoes, casseroles, etc ; Making oatmeal with whole milk rather than water; Making fortified mashed potatoes with cream, butter, dry milk powder, plain [place] yogurt, and cheese ), adequate hydration & fluid choices, sipping on calorie containing beverages (examples include: adding whole milk or cream to coffee, oral nutrition supplements, juice, electrolyte replacement beverages, milk, etc ), eating smaller more frequent meals every 2-3 hours (5-6 small meals/day), having consistent and planned snacks between meals, increasing and trying other oral nutrition supplements, recipe suggestions/resources, trying new recipes, & cooking at home more often and adding extra fat to foods while cooking such as butter, plant-based oil, coconut oil/milk, avocado, nut butters, etc    Moving forward, Gerry Walters was encouraged to increase kcal, protein, and fluid intakes    Materials Provided: all mailed to pt: , Ensure Coupons, Boost Coupons, Glucerna Coupons and high calorie/high protein snack ideas  All questions and concerns addressed during todays visit  Gerry Walters has RD contact information  Nutrition Diagnosis:    Inadequate Energy Intake related to physiological causes, disease state and treatment related issues as evidenced by food recall, wt loss and discussion with pt and/or family   Increased Nutrient Needs (kcal & pro) related to increased demand for nutrients and disease state as evidenced by cancer dx and pt undergoing tx for cancer  Monitoring & Evaluation:   Goals:  · weight maintenance/stabilization  · adequate nutrition impact symptom management  · pt to meet >/=75% estimated nutrition needs daily    · Progress Towards Goals: Progressing    Nutrition Rx & Recommendations:  · Diet: High Calorie, High Protein (for high calorie foods see pages 52-53, and for high protein foods see pages 49-51 in your Eating Hints book)  · Keep a daily food journal (pen/paper, patti such as [employer])  · Nutrition Supplements: aim for 2 ONS per day  consume between meals  Can continue CIB, also try Glucerna shakes for lower CHO option, Boost or Ensure  May use homemade shakes/smoothies as desired  If using a pre-made shake/bar, choose ones with >300 calories and >10 grams protein (ex  Ensure Complete, Ensure Enlive, Ensure Plus, Boost Plus, Boost Very High Calorie, etc )  · Small, frequent meals/snacks may be easier to tolerate than 3 large daily meals    Aim for 5-6 small meals per day (every 2-3 hours)  · Include protein at all meals/snacks  · Include a variety of foods (as tolerated/allowed by diet)  · Incorporate physical activity as able/allowed  · Stay hydrated by sipping fluids of choice/tolerance throughout the day  · Liquid nutrition may be better tolerated than solids at times  · Alter food choices and eating patterns to accommodate changing needs  · Refer to Eating Hints book for other meal/snack ideas and symptom management  · Follow proper oral care; Try baking soda/salt water rinse recipe (mix 3/4 tsp salt + 1 tsp baking soda + 1 qt water; rinse with plain water after using) in Eating Hints book (pg 18)  Brush your teeth before/after meals & before bed  · For lack of taste: Practice good oral care  Blend fresh fruits into shakes, ice cream or yogurt  Eat frozen fruits: grapes, chopped cantaloupe, watermelon  Select fresh vegetables (may be more appealing than canned/frozen)  Add extra flavor to foods: experiment with spices, salt & sweeteners, extra oil/butter, acidic foods; marinate meats (see pages 29-30 in your Eating Hints book)  · For diarrhea: drink plenty of fluids (>64 oz/day), avoid carbonation; eat 5-6 small meals/day; include high sodium & potassium foods/liquids (Sodium: soup/broth; Potassium: bananas, canned apricots, potatoes); eat low-fiber foods; choose foods/liquids that are room temperature; avoid foods/drinks that can make diarrhea worse (ex  high fiber, high sugar, hot/cold drinks, greasy/fatty foods, gas forming foods such as beans, raw produce, milk products, alcohol, spicy foods, caffeine, sugar alcohols (xylitol, sorbitol), and apple juice (high in sorbitol) (see pages 15-16 in your Eating Hints book)  If diarrhea is severe, consider adding Banatrol (banana flakes) 1-3 times per day mixed in applesauce (can be purchased online from [address])    · For dry mouth: sip water throughout the day; try very sweet (or tart, as tolerated) drinks; chew gum or suck on hard candy, popsicles, & ice chips; eat easy to swallow foods (such as pureed cooked foods or soups); moisten food with sauce/gravy/salad dressing; do not drink beer, wine, or any type of alcohol; keep lips moist with lip balm; avoid tobacco products & second-hand smoke; try Biotene as needed (see pages 17-18 in your Eating Hints book)  Follow proper oral care; Try baking soda/salt water rinse recipe (mix 3/4 tsp salt + 1 tsp baking soda + 1 qt water; rinse with pain water after using) in Eating Hints book (pg 18)  Brush your teeth before/after meals & before bed  · Weigh yourself regularly  If you notice weight loss, make an effort to increase your daily food/calorie intake  If you continue to notice loss after these efforts, reach out to your dietitian to establish a plan to stabilize weight     · High calorie/high protein snacks between meals: cheese and crackers, oral nutrition supplements, PB on crackers or waffles, cottage cheese and fruit, milk, handful of nuts    Follow Up Plan: 8/31/22 phone follow up at 9 am  Recommend Referral to Other Providers: none at this time

## 2022-08-18 DIAGNOSIS — C25.9 PANCREATIC ADENOCARCINOMA (HCC): Primary | ICD-10-CM

## 2022-08-18 LAB
ALBUMIN SERPL-MCNC: 3.8 G/DL (ref 3.6–5.1)
ALBUMIN/GLOB SERPL: 1.5 (CALC) (ref 1–2.5)
ALP SERPL-CCNC: 153 U/L (ref 35–144)
ALT SERPL-CCNC: 16 U/L (ref 9–46)
AST SERPL-CCNC: 18 U/L (ref 10–35)
BASOPHILS # BLD AUTO: 112 CELLS/UL (ref 0–200)
BASOPHILS NFR BLD AUTO: 2.8 %
BILIRUB SERPL-MCNC: 0.4 MG/DL (ref 0.2–1.2)
BUN SERPL-MCNC: 9 MG/DL (ref 7–25)
BUN/CREAT SERPL: 15 (CALC) (ref 6–22)
CALCIUM SERPL-MCNC: 8.9 MG/DL (ref 8.6–10.3)
CHLORIDE SERPL-SCNC: 106 MMOL/L (ref 98–110)
CO2 SERPL-SCNC: 29 MMOL/L (ref 20–32)
CREAT SERPL-MCNC: 0.62 MG/DL (ref 0.7–1.28)
EOSINOPHIL # BLD AUTO: 112 CELLS/UL (ref 15–500)
EOSINOPHIL NFR BLD AUTO: 2.8 %
ERYTHROCYTE [DISTWIDTH] IN BLOOD BY AUTOMATED COUNT: 15 % (ref 11–15)
GFR/BSA.PRED SERPLBLD CYS-BASED-ARV: 97 ML/MIN/1.73M2
GLOBULIN SER CALC-MCNC: 2.5 G/DL (CALC) (ref 1.9–3.7)
GLUCOSE SERPL-MCNC: 104 MG/DL (ref 65–99)
HCT VFR BLD AUTO: 28.4 % (ref 38.5–50)
HGB BLD-MCNC: 9.4 G/DL (ref 13.2–17.1)
LYMPHOCYTES # BLD AUTO: 1172 CELLS/UL (ref 850–3900)
LYMPHOCYTES NFR BLD AUTO: 29.3 %
MCH RBC QN AUTO: 30.7 PG (ref 27–33)
MCHC RBC AUTO-ENTMCNC: 33.1 G/DL (ref 32–36)
MCV RBC AUTO: 92.8 FL (ref 80–100)
MONOCYTES # BLD AUTO: 756 CELLS/UL (ref 200–950)
MONOCYTES NFR BLD AUTO: 18.9 %
NEUTROPHILS # BLD AUTO: 1848 CELLS/UL (ref 1500–7800)
NEUTROPHILS NFR BLD AUTO: 46.2 %
PLATELET # BLD AUTO: 233 THOUSAND/UL (ref 140–400)
PMV BLD REES-ECKER: 10.7 FL (ref 7.5–12.5)
POTASSIUM SERPL-SCNC: 3.8 MMOL/L (ref 3.5–5.3)
PROT SERPL-MCNC: 6.3 G/DL (ref 6.1–8.1)
RBC # BLD AUTO: 3.06 MILLION/UL (ref 4.2–5.8)
SODIUM SERPL-SCNC: 140 MMOL/L (ref 135–146)
WBC # BLD AUTO: 4 THOUSAND/UL (ref 3.8–10.8)

## 2022-08-19 PROCEDURE — 77300 RADIATION THERAPY DOSE PLAN: CPT | Performed by: INTERNAL MEDICINE

## 2022-08-19 PROCEDURE — 77338 DESIGN MLC DEVICE FOR IMRT: CPT | Performed by: INTERNAL MEDICINE

## 2022-08-19 PROCEDURE — 77301 RADIOTHERAPY DOSE PLAN IMRT: CPT | Performed by: INTERNAL MEDICINE

## 2022-08-22 ENCOUNTER — TELEPHONE (OUTPATIENT)
Dept: HEMATOLOGY ONCOLOGY | Facility: CLINIC | Age: 82
End: 2022-08-22

## 2022-08-22 ENCOUNTER — PATIENT OUTREACH (OUTPATIENT)
Dept: HEMATOLOGY ONCOLOGY | Facility: CLINIC | Age: 82
End: 2022-08-22

## 2022-08-22 PROCEDURE — 77427 RADIATION TX MANAGEMENT X5: CPT | Performed by: INTERNAL MEDICINE

## 2022-08-22 PROCEDURE — 77014 CHG CT GUIDANCE PLACEMENT RAD THERAPY FIELDS: CPT | Performed by: INTERNAL MEDICINE

## 2022-08-22 PROCEDURE — 77386 HB NTSTY MODUL RAD TX DLVR CPLX: CPT | Performed by: INTERNAL MEDICINE

## 2022-08-22 NOTE — TELEPHONE ENCOUNTER
Appointment Cancellation Or Reschedule     Person calling in Patient    Provider Dr Min Sarabia Date and Time  8/24/22   Office Visit Location [facility]   Did patient want to reschedule their office appointment? If so, when was it scheduled to? Yes 8/29/22 3pm   Did you have STAR scheduled for this appointment? no   Do you need STAR set up for your new appointment? If yes, please send to "PATIENT RIDESHARE" pool for STAR rescheduling no   If you are cancelling appointment, can we notify STAR to cancel ride? If yes, please send to "PATIENT RIDESHARE" pool for STAR to cancel service no   Is this patient calling to reschedule an infusion appointment? no   When is their next infusion appointment? no   Is this patient a Chemo patient? no   Reason for Cancellation or Reschedule appt conflict     If the patient is a treatment patient, please route this to the office nurse  If the patient is not on treatment, please route to the office MA  If the patient is a surgical oncology patient, please route to surg/onc clinical pool

## 2022-08-22 NOTE — PROGRESS NOTES
MSW met with the pt in the [address] [place] [address] this day after his first treatment  The pt was presenting as anxious as he asked if the Doctor could only tell him good news when they meet each week  Pt proceeded to explain that "any bad news gets me too worked up and anxious and I just can't tegan it "  MSW spent time listening to the pt and explained that there could come a time when important information needs to be shared and it would be helpful for planning his care needs  The pt repeatedly stated that he "just can't handle bad news "  MSW asked the pt how he would feel if the Doctor shared information that was not favorable, to his daughter  The pt liked this idea and hoped this could be the plan moving forward  The nurse came in the room to assess the pt and MSW explained the situation with the pt and his anxiety  Kassidy Girard RN was going to speak with Dr Tiarra Reagan to express the pt's wishes  Pt states that he is eating better and believes that he has been gaining weight  He makes a conscious effort to drink more fluids and states that he has less pain in his stomach as a result  Emotional support was offered and MSW will continue to follow

## 2022-08-23 PROCEDURE — 77014 CHG CT GUIDANCE PLACEMENT RAD THERAPY FIELDS: CPT | Performed by: INTERNAL MEDICINE

## 2022-08-23 PROCEDURE — 77386 HB NTSTY MODUL RAD TX DLVR CPLX: CPT | Performed by: INTERNAL MEDICINE

## 2022-08-24 ENCOUNTER — TELEPHONE (OUTPATIENT)
Dept: HEMATOLOGY ONCOLOGY | Facility: CLINIC | Age: 82
End: 2022-08-24

## 2022-08-24 ENCOUNTER — TELEPHONE (OUTPATIENT)
Dept: HEMATOLOGY ONCOLOGY | Facility: MEDICAL CENTER | Age: 82
End: 2022-08-24

## 2022-08-24 DIAGNOSIS — F41.8 ANXIETY ABOUT HEALTH: ICD-10-CM

## 2022-08-24 PROCEDURE — 77386 HB NTSTY MODUL RAD TX DLVR CPLX: CPT | Performed by: RADIOLOGY

## 2022-08-24 PROCEDURE — 77014 CHG CT GUIDANCE PLACEMENT RAD THERAPY FIELDS: CPT | Performed by: RADIOLOGY

## 2022-08-24 RX ORDER — ALPRAZOLAM 0.5 MG/1
0.25 TABLET ORAL 3 TIMES DAILY PRN
Qty: 90 TABLET | Refills: 0 | Status: SHIPPED | OUTPATIENT
Start: 2022-08-24

## 2022-08-24 NOTE — TELEPHONE ENCOUNTER
Received TEAMs message from patient asking for a refill on tranquilizer  Do not see one listed on med list  Left message for patient to call my TEAMs number to discuss

## 2022-08-24 NOTE — TELEPHONE ENCOUNTER
CALL TRANSFER   Reason for patient call? Patient calling returning [facility] phone call regarding his medication  He states he called her direct line and it did not go through   Patient's primary physician? Dr Ayana Gómez   RN call was transferred to and time it was transferred? Lyssa Webb @11:18AM   Informed patient that the message will be forwarded to the team and someone will get back to them as soon as possible    Did you relay this information to the patient?  Yes

## 2022-08-24 NOTE — TELEPHONE ENCOUNTER
Patient would like refill of xanax  Will send to original prescriber  from  Palliative care  Patient aware of plan

## 2022-08-25 ENCOUNTER — TELEPHONE (OUTPATIENT)
Dept: HEMATOLOGY ONCOLOGY | Facility: CLINIC | Age: 82
End: 2022-08-25

## 2022-08-25 PROCEDURE — 77386 HB NTSTY MODUL RAD TX DLVR CPLX: CPT | Performed by: RADIOLOGY

## 2022-08-25 PROCEDURE — 77014 CHG CT GUIDANCE PLACEMENT RAD THERAPY FIELDS: CPT | Performed by: RADIOLOGY

## 2022-08-25 NOTE — TELEPHONE ENCOUNTER
Spoke with Blas Leon about the questions he had with his Xeloda pills  Patient asked if it was OK to continue taking his Eliquis with the Xeloda pills  I told the patient that Dr Tabitha Fischer is aware of the other pills he is taking and that he can take them both  Patient stated he read a side effect is increased bleeding  I told the patient to watch for any s/s of bleeding and to call me if he notices any  Patient verbalized understanding and wrote down my number

## 2022-08-25 NOTE — TELEPHONE ENCOUNTER
Attempted to call patient, per Marylee Redden, RN the patient has questions about his Xeloda  Patient's line was busy  Will call again later

## 2022-08-26 PROCEDURE — 77336 RADIATION PHYSICS CONSULT: CPT | Performed by: INTERNAL MEDICINE

## 2022-08-26 PROCEDURE — 77014 CHG CT GUIDANCE PLACEMENT RAD THERAPY FIELDS: CPT | Performed by: RADIOLOGY

## 2022-08-26 PROCEDURE — 77386 HB NTSTY MODUL RAD TX DLVR CPLX: CPT | Performed by: RADIOLOGY

## 2022-08-27 LAB
ALBUMIN SERPL-MCNC: 3.8 G/DL (ref 3.6–5.1)
ALBUMIN/GLOB SERPL: 1.7 (CALC) (ref 1–2.5)
ALP SERPL-CCNC: 138 U/L (ref 35–144)
ALT SERPL-CCNC: 14 U/L (ref 9–46)
AST SERPL-CCNC: 16 U/L (ref 10–35)
BASOPHILS # BLD AUTO: 40 CELLS/UL (ref 0–200)
BASOPHILS NFR BLD AUTO: 1.1 %
BILIRUB SERPL-MCNC: 0.4 MG/DL (ref 0.2–1.2)
BUN SERPL-MCNC: 17 MG/DL (ref 7–25)
BUN/CREAT SERPL: 25 (CALC) (ref 6–22)
CALCIUM SERPL-MCNC: 8.9 MG/DL (ref 8.6–10.3)
CHLORIDE SERPL-SCNC: 106 MMOL/L (ref 98–110)
CO2 SERPL-SCNC: 22 MMOL/L (ref 20–32)
CREAT SERPL-MCNC: 0.67 MG/DL (ref 0.7–1.22)
EOSINOPHIL # BLD AUTO: 151 CELLS/UL (ref 15–500)
EOSINOPHIL NFR BLD AUTO: 4.2 %
ERYTHROCYTE [DISTWIDTH] IN BLOOD BY AUTOMATED COUNT: 14.4 % (ref 11–15)
GFR/BSA.PRED SERPLBLD CYS-BASED-ARV: 94 ML/MIN/1.73M2
GLOBULIN SER CALC-MCNC: 2.3 G/DL (CALC) (ref 1.9–3.7)
GLUCOSE SERPL-MCNC: 195 MG/DL (ref 65–139)
HCT VFR BLD AUTO: 27.1 % (ref 38.5–50)
HGB BLD-MCNC: 9 G/DL (ref 13.2–17.1)
LYMPHOCYTES # BLD AUTO: 918 CELLS/UL (ref 850–3900)
LYMPHOCYTES NFR BLD AUTO: 25.5 %
MCH RBC QN AUTO: 30.9 PG (ref 27–33)
MCHC RBC AUTO-ENTMCNC: 33.2 G/DL (ref 32–36)
MCV RBC AUTO: 93.1 FL (ref 80–100)
MONOCYTES # BLD AUTO: 518 CELLS/UL (ref 200–950)
MONOCYTES NFR BLD AUTO: 14.4 %
NEUTROPHILS # BLD AUTO: 1973 CELLS/UL (ref 1500–7800)
NEUTROPHILS NFR BLD AUTO: 54.8 %
PLATELET # BLD AUTO: 202 THOUSAND/UL (ref 140–400)
PMV BLD REES-ECKER: 10.8 FL (ref 7.5–12.5)
POTASSIUM SERPL-SCNC: 3.8 MMOL/L (ref 3.5–5.3)
PROT SERPL-MCNC: 6.1 G/DL (ref 6.1–8.1)
RBC # BLD AUTO: 2.91 MILLION/UL (ref 4.2–5.8)
SODIUM SERPL-SCNC: 138 MMOL/L (ref 135–146)
WBC # BLD AUTO: 3.6 THOUSAND/UL (ref 3.8–10.8)

## 2022-08-29 ENCOUNTER — OFFICE VISIT (OUTPATIENT)
Dept: HEMATOLOGY ONCOLOGY | Facility: HOSPITAL | Age: 82
End: 2022-08-29
Payer: COMMERCIAL

## 2022-08-29 VITALS
TEMPERATURE: 98.6 F | DIASTOLIC BLOOD PRESSURE: 68 MMHG | WEIGHT: 128 LBS | BODY MASS INDEX: 20.09 KG/M2 | OXYGEN SATURATION: 97 % | RESPIRATION RATE: 16 BRPM | HEIGHT: 67 IN | HEART RATE: 81 BPM | SYSTOLIC BLOOD PRESSURE: 120 MMHG

## 2022-08-29 DIAGNOSIS — C25.9 PANCREATIC ADENOCARCINOMA (HCC): Primary | ICD-10-CM

## 2022-08-29 PROCEDURE — 77427 RADIATION TX MANAGEMENT X5: CPT | Performed by: INTERNAL MEDICINE

## 2022-08-29 PROCEDURE — 77386 HB NTSTY MODUL RAD TX DLVR CPLX: CPT | Performed by: INTERNAL MEDICINE

## 2022-08-29 PROCEDURE — 77014 CHG CT GUIDANCE PLACEMENT RAD THERAPY FIELDS: CPT | Performed by: INTERNAL MEDICINE

## 2022-08-29 PROCEDURE — 99213 OFFICE O/P EST LOW 20 MIN: CPT | Performed by: INTERNAL MEDICINE

## 2022-08-30 PROCEDURE — 77014 CHG CT GUIDANCE PLACEMENT RAD THERAPY FIELDS: CPT | Performed by: RADIOLOGY

## 2022-08-30 PROCEDURE — 77386 HB NTSTY MODUL RAD TX DLVR CPLX: CPT | Performed by: RADIOLOGY

## 2022-08-31 ENCOUNTER — TELEPHONE (OUTPATIENT)
Dept: NUTRITION | Facility: CLINIC | Age: 82
End: 2022-08-31

## 2022-08-31 ENCOUNTER — NUTRITION (OUTPATIENT)
Dept: NUTRITION | Facility: HOSPITAL | Age: 82
End: 2022-08-31

## 2022-08-31 PROCEDURE — 77014 CHG CT GUIDANCE PLACEMENT RAD THERAPY FIELDS: CPT | Performed by: RADIOLOGY

## 2022-08-31 PROCEDURE — 77386 HB NTSTY MODUL RAD TX DLVR CPLX: CPT | Performed by: RADIOLOGY

## 2022-08-31 NOTE — PROGRESS NOTES
Outpatient Oncology Nutrition Consultation   Type of Consult: Follow Up  Care Location: Telephone Call    Reason for referral: Received notification by [facility] RN, Nayla Bhagat , on 7/28/22 that pt has triggered for oncology nutrition care (reason for referral: Malnutrition Screening Tool (MST) Triggers: scored a 2 indicating 2-13# (0 9-6 kg) recent wt loss and is eating poorly due to a decreased appetite  (Date of MST: 7/28/22) and patient request)  Colletta Morgans had previously been seen by oncology RD Vianey David on 3/23/22    Nutrition Assessment:   Oncology Diagnosis & Treatments: diagnosed with pancreatic cancer 3/4/22  -started on FOLFIRINOX 4/2022  Was reduced d/t diarrhea, and last cycle was cancelled  Finished 7/12/22  -concurrent chemo (oral chemo Xeloda)/RT  Started 8/22    Oncology History Overview Note   3/2022 - locally advanced, unresectable adenocarcinoma of the pancreas     4/19/2022 - start FOLFIRINOX with 20% dose reduction in oxaliplatin and irinotecan due to age     5/2022 - 5FU dose reduced by 20% due to diarrhea     7/12/2022 - cycle 7 delayed due to diarrhea - bolus 5-FU further reduced by a total of 30% and infusional 5-FU dose reduced by 20%    8/2022 - stop FOLFIRINOX due to inadequate response, start xeloda 1000 mg BID M-F concurrently with RT to pancreas     Pancreatic adenocarcinoma ([facility] [address] )   3/4/2022 Biopsy    A-B-C  Pancreas, pancreatic head mass   Malignant (PSC Category VI) -  Adenocarcinoma       3/4/2022 -  Cancer Staged    Staging form: Pancreas, AJCC 8th Edition  - Clinical stage from 3/4/2022: Stage IB (cT2, cN0, cM0) - Signed by Sharron Zheng MD on 7/28/2022  Stage prefix: Initial diagnosis  Total positive nodes: 0       3/14/2022 Initial Diagnosis    Pancreatic adenocarcinoma ([facility] [address] )     4/19/2022 - 7/20/2022 Chemotherapy    fluorouracil (ADRUCIL), 400 mg/m2 = 690 mg, Intravenous, Once, 7 of 8 cycles  Dose modification: 320 mg/m2 (original dose 400 mg/m2, Cycle 4), 280 mg/m2 (original dose 400 mg/m2, Cycle 7, Reason: Dose Not Tolerated, Comment: 30% dose reduction due to diarrhea)  Administration: 690 mg (4/19/2022), 690 mg (5/3/2022), 690 mg (5/17/2022), 555 mg (5/31/2022), 555 mg (6/14/2022), 555 mg (6/28/2022), 485 mg (7/20/2022)  pegfilgrastim (NEULASTA), 6 mg, Subcutaneous, Once, 5 of 6 cycles  Administration: 6 mg (6/2/2022), 6 mg (6/16/2022), 6 mg (6/30/2022), 6 mg (5/20/2022)  irinotecan (CAMPTOSAR) chemo infusion, 125 mg/m2 = 216 mg (69 4 % of original dose 180 mg/m2), Intravenous, Once, 7 of 8 cycles  Dose modification: 125 mg/m2 (original dose 180 mg/m2, Cycle 1, Reason: Anticipated Tolerance, Comment: Anticipated tolerance and Bilirubin being between 1-2), 90 mg/m2 (original dose 180 mg/m2, Cycle 4, Reason: Dose Not Tolerated, Comment: 50% dose reduction from original dose due to diarrhea)  Administration: 216 mg (4/19/2022), 216 mg (5/3/2022), 220 mg (5/17/2022), 156 mg (5/31/2022), 160 mg (6/14/2022), 160 mg (6/28/2022), 160 mg (7/20/2022)  leucovorin calcium IVPB, 692 mg, Intravenous, Once, 7 of 8 cycles  Administration: 700 mg (4/19/2022), 700 mg (5/3/2022), 700 mg (5/17/2022), 700 mg (5/31/2022), 700 mg (6/14/2022), 700 mg (6/28/2022), 700 mg (7/20/2022)  oxaliplatin (ELOXATIN) chemo infusion, 65 mg/m2 = 112 45 mg (76 5 % of original dose 85 mg/m2), Intravenous, Once, 7 of 8 cycles  Dose modification: 65 mg/m2 (original dose 85 mg/m2, Cycle 1, Reason: Anticipated Tolerance)  Administration: 112 45 mg (4/19/2022), 112 45 mg (5/3/2022), 112 45 mg (5/17/2022), 112 45 mg (5/31/2022), 112 45 mg (6/14/2022), 112 45 mg (6/28/2022), 112 45 mg (7/20/2022)  fluorouracil (ADRUCIL) ambulatory infusion Soln, 1,200 mg/m2/day = 4,150 mg, Intravenous, Over 46 hours, 7 of 8 cycles  Dose modification: 1,000 mg/m2/day (original dose 1,200 mg/m2/day, Cycle 8, Reason: Dose Not Tolerated, Comment: dose reduction due to diarrhea), 1,000 mg/m2/day (original dose 1,200 mg/m2/day, Cycle 7, Reason: Dose Not Tolerated, Comment: dose reduction due to diarrhea)     7/22/2022 -  Chemotherapy    pegfilgrastim (NEULASTA), 6 mg, Subcutaneous, Once, 0 of 1 cycle     7/27/2022 Adverse Reaction    3/2022 - locally advanced, unresectable adenocarcinoma of the pancreas     4/19/2022 - start FOLFIRINOX with 20% dose reduction in oxaliplatin and irinotecan due to age    5/2022 - 5FU dose reduced by 20% due to diarrhea    7/12/2022 - cycle 7 delayed due to diarrhea - bolus 5-FU further reduced by a total of 30% and infusional 5-FU dose reduced by 20%        Chemotherapy    Xeloda 1000mg BID M-F concurrent with RT       Past Medical & Surgical Hx:   Patient Active Problem List   Diagnosis    Type 2 diabetes mellitus without complication, with long-term current use of insulin (HCC)    GERD (gastroesophageal reflux disease)    Pancreatic mass    History of pulmonary embolism    Hyperbilirubinemia    Anxiety about health    Pancreatic adenocarcinoma ([facility] [address]    Chemotherapy induced neutropenia (HCC)    Hypokalemia    Dehydration    Mild protein-calorie malnutrition (HCC)    Acute exacerbation of chronic obstructive pulmonary disease ([facility] [address] )     Past Medical History:   Diagnosis Date    Ambulates with cane     Anxiety     Depression     Diabetes mellitus ([facility] [address] )     GERD (gastroesophageal reflux disease)     History of pulmonary embolus (PE)     Hyperlipidemia     Multiple thyroid nodules     pt states about 40yrs ago    Pancreatic cancer ([facility] [address] )     Pancreatic mass      Past Surgical History:   Procedure Laterality Date    APPENDECTOMY      CATARACT EXTRACTION Right     FL GUIDED CENTRAL VENOUS ACCESS DEVICE INSERTION  4/11/2022    TUNNELED VENOUS PORT PLACEMENT Right 4/11/2022    Procedure: INSERTION VENOUS PORT (PORT-A-CATH);   Surgeon: Thai Evans MD;  Location: [facility];  Service: Surgical Oncology       Review of Medications:   Vitamins, Supplements and Herbals: Med List Reviewed & pt is only taking: potassium    Current Outpatient Medications:     Alcohol Swabs 70 % PADS, May substitute brand based on insurance coverage  Check glucose BID , Disp: 100 each, Rfl: 0    ALPRAZolam (XANAX) 0 5 mg tablet, Take 0 5 tablets (0 25 mg total) by mouth 3 (three) times a day as needed for anxiety, Disp: 90 tablet, Rfl: 0    apixaban (ELIQUIS) 5 mg, Take 5 mg by mouth 2 (two) times a day, Disp: , Rfl:     Blood Glucose Monitoring Suppl (OneTouch Verio Reflect) w/Device KIT, May substitute brand based on insurance coverage  Check glucose BID , Disp: 1 kit, Rfl: 0    capecitabine (XELODA) 500 MG tablet, Take 2 pills twice a day Monday through Friday while on radiation  , Disp: 120 tablet, Rfl: 0    diphenoxylate-atropine (LOMOTIL) 2 5-0 025 mg per tablet, Take 1 tablet by mouth 4 (four) times a day as needed for diarrhea, Disp: 60 tablet, Rfl: 0    glucose blood (OneTouch Verio) test strip, May substitute brand based on insurance coverage  Check glucose BID , Disp: 100 each, Rfl: 0    Klor-Con M20 20 MEQ tablet, TAKE 2 TABLETS BY MOUTH DAILY, Disp: 180 tablet, Rfl: 1    metFORMIN (GLUCOPHAGE) 1000 MG tablet, Take 1,000 mg by mouth 2 (two) times a day with meals, Disp: , Rfl:     metFORMIN (GLUCOPHAGE-XR) 500 mg 24 hr tablet, Take 500 mg by mouth 2 (two) times a day, Disp: , Rfl:     Multiple Vitamin (MULTIVITAMIN ADULT PO), Take 1 tablet by mouth daily, Disp: , Rfl:     omeprazole (PriLOSEC) 20 mg delayed release capsule, , Disp: , Rfl:     omeprazole (PriLOSEC) 40 MG capsule, Take 40 mg by mouth every evening  , Disp: , Rfl:     ondansetron (Zofran ODT) 8 mg disintegrating tablet, Take 1 tablet (8 mg total) by mouth every 8 (eight) hours as needed for nausea or vomiting, Disp: 60 tablet, Rfl: 0    OneTouch Delica Lancets 79B MISC, May substitute brand based on insurance coverage   Check glucose BID , Disp: 100 each, Rfl: 0    polyethylene glycol (MIRALAX) 17 g packet, Take 17 g by mouth daily (Patient not taking: No sig reported), Disp: , Rfl:     pravastatin (PRAVACHOL) 20 mg tablet, Take 20 mg by mouth daily, Disp: , Rfl:     prochlorperazine (COMPAZINE) 10 mg tablet, Take 1 tablet (10 mg total) by mouth every 6 (six) hours as needed for nausea or vomiting, Disp: 30 tablet, Rfl: 0    traMADol (ULTRAM) 50 mg tablet, Take 1 tablet (50 mg total) by mouth every 6 (six) hours as needed for moderate pain (Patient not taking: No sig reported), Disp: 60 tablet, Rfl: 0    Most Recent Lab Results: hx DM, BG range from 110-140   Doesn't always check blood sugars  Lab Results   Component Value Date    WBC 3 6 (L) 08/26/2022    NEUTROABS 1,973 08/26/2022    ALT 14 08/26/2022    AST 16 08/26/2022    ALB 3 8 08/26/2022    SODIUM 138 08/26/2022    SODIUM 140 08/18/2022    K 3 8 08/26/2022    K 3 8 08/18/2022     08/26/2022    BUN 17 08/26/2022    BUN 9 08/18/2022    CREATININE 0 67 (L) 08/26/2022    CREATININE 0 62 (L) 08/18/2022    EGFR 94 08/26/2022    PHOS 2 8 03/03/2022    POCGLU 135 04/11/2022    GLUC 195 (H) 08/26/2022    HGBA1C 6 1 08/28/2021    CALCIUM 8 9 08/26/2022    MG 2 0 03/03/2022       Anthropometric Measurements:   Height: 67"  Ht Readings from Last 1 Encounters:   08/29/22 5' 7" (1 702 m)     Wt Readings from Last 20 Encounters:   08/29/22 58 1 kg (128 lb)   08/10/22 57 6 kg (127 lb)   07/28/22 56 7 kg (125 lb)   07/20/22 57 3 kg (126 lb 5 2 oz)   07/18/22 57 6 kg (127 lb)   07/12/22 53 8 kg (118 lb 9 7 oz)   07/11/22 52 6 kg (116 lb)   06/30/22 55 3 kg (122 lb)   06/28/22 55 6 kg (122 lb 9 2 oz)   06/27/22 55 3 kg (122 lb)   06/14/22 57 8 kg (127 lb 6 8 oz)   06/13/22 57 2 kg (126 lb)   05/31/22 59 2 kg (130 lb 8 2 oz)   05/18/22 61 7 kg (136 lb)   05/17/22 61 3 kg (135 lb 2 3 oz)   05/03/22 58 4 kg (128 lb 12 8 oz)   04/21/22 62 4 kg (137 lb 9 6 oz)   04/19/22 60 5 kg (133 lb 6 1 oz)   04/12/22 60 8 kg (134 lb)   04/11/22 62 6 kg (138 lb)       Weight History:    Usual Weight: 160# beginning of the year 2022  [address]: (8/29/22) 127#   Home Scale: n/a    Oncology Nutrition-Anthropometrics    Flowsheet Row Nutrition from 8/31/2022 in [facility] Nutrition from 8/17/2022 in [facility]   Patient age (years): [de-identified] years 80 years   Patient (male) height (in): 79 in 79 in   Current weight (lbs): 128 lbs 127 lbs   Current weight to be used for anthropometric calculations (kg) 58 2 kg 57 7 kg   BMI: 20 19 9   IBW male 148 lb 148 lb   IBW (kg) male 67 3 kg 67 3 kg   IBW % (male) 86 5 % 85 8 %   Adjusted BW (male): 143 lbs 142 8 lbs   Adjusted BW in kg (male): 65 kg 64 9 kg   % weight change after 1 month: 2 4 % 9 5 %   Weight change after 1 month (lbs) 3 lbs 11 lbs   % weight change after 3 months: -1 9 % -6 %   Weight change after 3 months (lbs) -2 5 lbs -8 1 lbs          Nutrition-Focused Physical Findings: n/a due to telephone call    Food/Nutrition-Related History & Client/Social History:    Current Nutrition Impact Symptoms:  [x] Nausea -improving, still gets nauseous occasionally  [] Reduced Appetite -improving [] Acid Reflux    [] Vomiting  [x] Unintended Wt Loss -wt starting to trend up now [] Malabsorption    [x] Diarrhea - has  Lomotil  -not as bad as it used to be per patient [] Unintended Wt Gain  [] Dumping Syndrome    [] Constipation  [] Thick Mucous/Secretions  [] Abdominal Pain    [x] Dysgeusia (Altered Taste) -this is starting to improve, more normal now [] Xerostomia (Dry Mouth)  [] Gas    [] Dysosmia (Altered Smell)  [] Thrush  [] Difficulty Chewing    [] Oral Mucositis (Sore Mouth)  [x] Fatigue  [] Hyperglycemia    [] Odynophagia  [] Esophagitis  [] Other:    [] Dysphagia  [] Early Satiety  [] No Problems Eating      Food Allergies & Intolerances: no    Current Diet: Regular Diet, No Restrictions  Current Nutrition Intake:  Increased since last visit  Appetite: Good, improving now  Nutrition Route: PO  Oral Care: brushes mutiple times per day  Activity level: ADLs, uses a cane, increased fatigue at times  Tries to get out and do things    24 Hr Diet Recall:   Breakfast: 2 eggo waffles with sugar free syrup, glass of chocolate skim milk  Snack: pretzels dipped with mustard  Lunch: a few slices of cheese, chocolate milk  Snack: canned fruit, popcorn  Dinner: frozen dinner (hungry man)  Snack: ice cream sandwich (klondike bar)    Beverages: flavored water (8oz x 6-8), sugar-free soda (8oz x2), CIB (8oz x1), skim milk (8oz x2)  Supplements: usually once/day    [place] Instant Breakfast (8oz 240kcal 10g protein)      Oncology Nutrition-Estimated Needs    Flowsheet Row Nutrition from 2022 in [facility] Nutrition from 3/23/2022 in [place] [id number] Oncology Dietitian [facility]   Weight type used Actual weight Actual weight   Weight in kilograms (kg) used for estimated needs 56 8 kg 61 8 kg   Energy needs formula:  30-35 kcal/kg 30-35 kcal/kg   Energy needs based on 30 kcal/k kcal 1855 kcal   Energy needs based on 35 kcal/k kcal 2164 kcal   Protein needs formula: 1 2-1 5 g/kg 1 2-1 5 g/kg   Protein needs based on 1 2 g/k g 74 g   Protein needs based on 1 5 g/kg 85 g 93 g   Fluid needs formula: 30-35 mL/kg 30-35 mL/kg   Fluid needs based on 30 mL/kg 1704 mL 1854 mL   Fluid needs in ounces 58 oz 63 oz   Fluid needs based on 35 mL/kg 1988 mL 2163 mL   Fluid needs in ounces 67 oz 73 oz           Discussion & Intervention:   Mariana Obrien was evaluated today for an RD follow up regarding pt request for weight loss and poor PO  Mariana Obrien is currently undergoing tx for pancreatic cancer  Today, Mariana Obrien reports he's been doing better  He states his appetite continues to improve and he feels like he's eating more  He started RT and oral chemo  His nausea has improved, as well as his diarrhea, although he still takes lomotil as needed  His taste has come back   He is eating 3 meals/day + snacks between meals  Reviewed high calorie/high protein foods to consume  He is drinking CIB shakes, usually 1/day  Encouraged him to increase to 2 per day for more calories/protein  He is also drinking skim milk so we talked about switching to whole milk or trying Fairlife milk  His weight is up 3# over the last month  Reviewed 24 hour recall, which revealed an inadequate po intake, and discussed ways to increase kcal, protein, and fluid intakes and optimize nutrient intake  Also reviewed the importance of wt management throughout the tx process and the role of a high kcal/ high protein diet in managing wt and overall health    Based on today's assessment, discussion included: MNT for: wt loss, decreased appetite, xerostomia, diarrhea, nausea, how to modify foods for anticipated nutrition impact symptoms pt may experience during CA tx, practicing proper oral care, a high kcal/protein diet & food choices to include at all meals & snacks (Examples of high kcal foods: cheese, full-fat dairy products, nut butter, plant-based fats, coconut oil/milk, avocado, butter, cream soup, etc  Examples of high protein foods: eggs, chicken, fish, beans/legumes, nuts/nut butters, bone broth, etc ) , fortifying foods for added kcal and protein (examples include: adding cheese to foods such as eggs, mashed potatoes, casseroles, etc ; Making oatmeal with whole milk rather than water; Making fortified mashed potatoes with cream, butter, dry milk powder, plain [place] yogurt, and cheese ), adequate hydration & fluid choices, sipping on calorie containing beverages (examples include: adding whole milk or cream to coffee, oral nutrition supplements, juice, electrolyte replacement beverages, milk, etc ), eating smaller more frequent meals every 2-3 hours (5-6 small meals/day), having consistent and planned snacks between meals, increasing oral nutrition supplements, recipe suggestions/resources, trying new recipes, & cooking at home more often and adding extra fat to foods while cooking such as butter, plant-based oil, coconut oil/milk, avocado, nut butters, etc    Moving forward, James Tarcy was encouraged to increase kcal, protein, and fluid intakes    Materials Provided: not applicable  All questions and concerns addressed during todays visit  James Tracy has RD contact information  Nutrition Diagnosis:    Inadequate Energy Intake related to physiological causes, disease state and treatment related issues as evidenced by food recall, wt loss and discussion with pt and/or family   Increased Nutrient Needs (kcal & pro) related to increased demand for nutrients and disease state as evidenced by cancer dx and pt undergoing tx for cancer  Monitoring & Evaluation:   Goals:  · weight maintenance/stabilization  · adequate nutrition impact symptom management  · pt to meet >/=75% estimated nutrition needs daily    · Progress Towards Goals: Progressing    Nutrition Rx & Recommendations:  · Diet: High Calorie, High Protein (for high calorie foods see pages 52-53, and for high protein foods see pages 49-51 in your Eating Hints book)  · Keep a daily food journal (pen/paper, patti such as Stereotaxis)  · Nutrition Supplements: increase to 2 CIB per day  May use homemade shakes/smoothies as desired  If using a pre-made shake/bar, choose ones with >300 calories and >10 grams protein (ex  Ensure Complete, Ensure Enlive, Ensure Plus, Boost Plus, Boost Very High Calorie, etc )  · Small, frequent meals/snacks may be easier to tolerate than 3 large daily meals  Aim for 5-6 small meals per day (every 2-3 hours)  · Include protein at all meals/snacks  · Include a variety of foods (as tolerated/allowed by diet)  · Incorporate physical activity as able/allowed  · Stay hydrated by sipping fluids of choice/tolerance throughout the day  · Liquid nutrition may be better tolerated than solids at times    · Alter food choices and eating patterns to accommodate changing needs   · Refer to Eating Hints book for other meal/snack ideas and symptom management  · Follow proper oral care; Try baking soda/salt water rinse recipe (mix 3/4 tsp salt + 1 tsp baking soda + 1 qt water; rinse with plain water after using) in Eating Hints book (pg 18)  Brush your teeth before/after meals & before bed  · For diarrhea: drink plenty of fluids (>64 oz/day), avoid carbonation; eat 5-6 small meals/day; include high sodium & potassium foods/liquids (Sodium: soup/broth; Potassium: bananas, canned apricots, potatoes); eat low-fiber foods; choose foods/liquids that are room temperature; avoid foods/drinks that can make diarrhea worse (ex  high fiber, high sugar, hot/cold drinks, greasy/fatty foods, gas forming foods such as beans, raw produce, milk products, alcohol, spicy foods, caffeine, sugar alcohols (xylitol, sorbitol), and apple juice (high in sorbitol) (see pages 15-16 in your Eating Hints book)  If diarrhea is severe, consider adding Banatrol (banana flakes) 1-3 times per day mixed in applesauce (can be purchased online from [address] [place] [address])  · For nausea/vomiting: try plain/bland foods; try lemon/lime flavors; eat 5-6 small meals/day (except when vomiting); do not skip meals/snacks; choose appealing foods; sip only small amounts of liquids during meals; stay hydrated in between meals; eat foods/drinks that are room temperature; eat dry toast/crackers (see pages  21-22 and 31-32 in your Eating Hints book)  · Weigh yourself regularly  If you notice weight loss, make an effort to increase your daily food/calorie intake  If you continue to notice loss after these efforts, reach out to your dietitian to establish a plan to stabilize weight      · High calorie/high protein snacks between meals: cheese and crackers, oral nutrition supplements, PB on crackers or waffles, cottage cheese and fruit, milk, handful of nuts  · Switch to whole milk (for more calories) or try fairlife milk (for more calories and protein)    Follow Up Plan: 9/21/22 phone follow up at 1 pm  Recommend Referral to Other Providers: none at this time

## 2022-08-31 NOTE — PATIENT INSTRUCTIONS
Nutrition Rx & Recommendations:  Diet: High Calorie, High Protein (for high calorie foods see pages 52-53, and for high protein foods see pages 49-51 in your Eating Hints book)  Keep a daily food journal (pen/paper, patti such as Enliken)  Nutrition Supplements: increase to 2 CIB per day  May use homemade shakes/smoothies as desired  If using a pre-made shake/bar, choose ones with >300 calories and >10 grams protein (ex  Ensure Complete, Ensure Enlive, Ensure Plus, Boost Plus, Boost Very High Calorie, etc )  Small, frequent meals/snacks may be easier to tolerate than 3 large daily meals  Aim for 5-6 small meals per day (every 2-3 hours)  Include protein at all meals/snacks  Include a variety of foods (as tolerated/allowed by diet)  Incorporate physical activity as able/allowed  Stay hydrated by sipping fluids of choice/tolerance throughout the day  Liquid nutrition may be better tolerated than solids at times  Alter food choices and eating patterns to accommodate changing needs  Refer to Eating Hints book for other meal/snack ideas and symptom management  Follow proper oral care; Try baking soda/salt water rinse recipe (mix 3/4 tsp salt + 1 tsp baking soda + 1 qt water; rinse with plain water after using) in Eating Hints book (pg 18)  Brush your teeth before/after meals & before bed  For diarrhea: drink plenty of fluids (>64 oz/day), avoid carbonation; eat 5-6 small meals/day; include high sodium & potassium foods/liquids (Sodium: soup/broth; Potassium: bananas, canned apricots, potatoes); eat low-fiber foods; choose foods/liquids that are room temperature; avoid foods/drinks that can make diarrhea worse (ex  high fiber, high sugar, hot/cold drinks, greasy/fatty foods, gas forming foods such as beans, raw produce, milk products, alcohol, spicy foods, caffeine, sugar alcohols (xylitol, sorbitol), and apple juice (high in sorbitol) (see pages 15-16 in your Eating Hints book)    If diarrhea is severe, consider adding Banatrol (banana flakes) 1-3 times per day mixed in applesauce (can be purchased online from [address])  For nausea/vomiting: try plain/bland foods; try lemon/lime flavors; eat 5-6 small meals/day (except when vomiting); do not skip meals/snacks; choose appealing foods; sip only small amounts of liquids during meals; stay hydrated in between meals; eat foods/drinks that are room temperature; eat dry toast/crackers (see pages  21-22 and 31-32 in your Eating Hints book)  Weigh yourself regularly  If you notice weight loss, make an effort to increase your daily food/calorie intake  If you continue to notice loss after these efforts, reach out to your dietitian to establish a plan to stabilize weight      High calorie/high protein snacks between meals: cheese and crackers, oral nutrition supplements, PB on crackers or waffles, cottage cheese and fruit, milk, handful of nuts  Switch to whole milk (for more calories) or try fairlife milk (for more calories and protein)    Follow Up Plan: 9/21/22 phone follow up at 1 pm  Recommend Referral to Other Providers: none at this time

## 2022-08-31 NOTE — TELEPHONE ENCOUNTER
Received VM from Nicolette Chávez stating that he needs to reschedule today's RD appt for later in the afternoon as he leaves for his RT around 9 am  Dinorah Thomas back  Received voicemail  Left message encouraging Nicolette Chávez to call back  Will reach out later this morning to try and reschedule appt for this afternoon

## 2022-08-31 NOTE — TELEPHONE ENCOUNTER
Received call back from Arias Borges stating that he is home now  Offered to reschedule RD appt for later today  Arias Borges agreed  Appointment rescheduled for today 8/31/22 at 1:30 pm via phone

## 2022-09-01 ENCOUNTER — APPOINTMENT (OUTPATIENT)
Dept: RADIATION ONCOLOGY | Facility: HOSPITAL | Age: 82
End: 2022-09-01
Payer: COMMERCIAL

## 2022-09-01 PROCEDURE — 77386 HB NTSTY MODUL RAD TX DLVR CPLX: CPT | Performed by: RADIOLOGY

## 2022-09-01 PROCEDURE — 77387 GUIDANCE FOR RADJ TX DLVR: CPT | Performed by: RADIOLOGY

## 2022-09-02 ENCOUNTER — APPOINTMENT (OUTPATIENT)
Dept: RADIATION ONCOLOGY | Facility: HOSPITAL | Age: 82
End: 2022-09-02
Payer: COMMERCIAL

## 2022-09-02 PROCEDURE — 77386 HB NTSTY MODUL RAD TX DLVR CPLX: CPT | Performed by: STUDENT IN AN ORGANIZED HEALTH CARE EDUCATION/TRAINING PROGRAM

## 2022-09-02 PROCEDURE — 77387 GUIDANCE FOR RADJ TX DLVR: CPT | Performed by: STUDENT IN AN ORGANIZED HEALTH CARE EDUCATION/TRAINING PROGRAM

## 2022-09-02 PROCEDURE — 77336 RADIATION PHYSICS CONSULT: CPT | Performed by: INTERNAL MEDICINE

## 2022-09-02 NOTE — PROGRESS NOTES
HEMATOLOGY / [address] FOLLOW UP NOTE    Primary Care Provider: Ki Mo  Referring Provider:    MRN: [id number]  : 1940    Reason for Encounter: follow up pancreatic cancer       Oncology History Overview Note   3/2022 - locally advanced, unresectable adenocarcinoma of the pancreas     2022 - start FOLFIRINOX with 20% dose reduction in oxaliplatin and irinotecan due to age     2022 - 5FU dose reduced by 20% due to diarrhea     2022 - cycle 7 delayed due to diarrhea - bolus 5-FU further reduced by a total of 30% and infusional 5-FU dose reduced by 20%    2022 - stop FOLFIRINOX due to inadequate response, start xeloda 1000 mg BID M-F concurrently with RT to pancreas    2022 - start xeloda 1000 mg BID M-F with RT to pancreas     Pancreatic adenocarcinoma ([facility] [address] )   3/4/2022 Biopsy    A-B-C  Pancreas, pancreatic head mass   Malignant (PSC Category VI) -  Adenocarcinoma       3/4/2022 -  Cancer Staged    Staging form: Pancreas, AJCC 8th Edition  - Clinical stage from 3/4/2022: Stage IB (cT2, cN0, cM0) - Signed by Leticia Jimenez MD on 2022  Stage prefix: Initial diagnosis  Total positive nodes: 0       3/14/2022 Initial Diagnosis    Pancreatic adenocarcinoma ([facility] [address] )     2022 - 2022 Chemotherapy    fluorouracil (ADRUCIL), 400 mg/m2 = 690 mg, Intravenous, Once, 7 of 8 cycles  Dose modification: 320 mg/m2 (original dose 400 mg/m2, Cycle 4), 280 mg/m2 (original dose 400 mg/m2, Cycle 7, Reason: Dose Not Tolerated, Comment: 30% dose reduction due to diarrhea)  Administration: 690 mg (2022), 690 mg (5/3/2022), 690 mg (2022), 555 mg (2022), 555 mg (2022), 555 mg (2022), 485 mg (2022)  pegfilgrastim (NEULASTA), 6 mg, Subcutaneous, Once, 5 of 6 cycles  Administration: 6 mg (2022), 6 mg (2022), 6 mg (2022), 6 mg (2022)  irinotecan (CAMPTOSAR) chemo infusion, 125 mg/m2 = 216 mg (69 4 % of original dose 180 mg/m2), Intravenous, Once, 7 of 8 cycles  Dose modification: 125 mg/m2 (original dose 180 mg/m2, Cycle 1, Reason: Anticipated Tolerance, Comment: Anticipated tolerance and Bilirubin being between 1-2), 90 mg/m2 (original dose 180 mg/m2, Cycle 4, Reason: Dose Not Tolerated, Comment: 50% dose reduction from original dose due to diarrhea)  Administration: 216 mg (4/19/2022), 216 mg (5/3/2022), 220 mg (5/17/2022), 156 mg (5/31/2022), 160 mg (6/14/2022), 160 mg (6/28/2022), 160 mg (7/20/2022)  leucovorin calcium IVPB, 692 mg, Intravenous, Once, 7 of 8 cycles  Administration: 700 mg (4/19/2022), 700 mg (5/3/2022), 700 mg (5/17/2022), 700 mg (5/31/2022), 700 mg (6/14/2022), 700 mg (6/28/2022), 700 mg (7/20/2022)  oxaliplatin (ELOXATIN) chemo infusion, 65 mg/m2 = 112 45 mg (76 5 % of original dose 85 mg/m2), Intravenous, Once, 7 of 8 cycles  Dose modification: 65 mg/m2 (original dose 85 mg/m2, Cycle 1, Reason: Anticipated Tolerance)  Administration: 112 45 mg (4/19/2022), 112 45 mg (5/3/2022), 112 45 mg (5/17/2022), 112 45 mg (5/31/2022), 112 45 mg (6/14/2022), 112 45 mg (6/28/2022), 112 45 mg (7/20/2022)  fluorouracil (ADRUCIL) ambulatory infusion Soln, 1,200 mg/m2/day = 4,150 mg, Intravenous, Over 46 hours, 7 of 8 cycles  Dose modification: 1,000 mg/m2/day (original dose 1,200 mg/m2/day, Cycle 8, Reason: Dose Not Tolerated, Comment: dose reduction due to diarrhea), 1,000 mg/m2/day (original dose 1,200 mg/m2/day, Cycle 7, Reason: Dose Not Tolerated, Comment: dose reduction due to diarrhea)     7/22/2022 -  Chemotherapy    pegfilgrastim (NEULASTA), 6 mg, Subcutaneous, Once, 0 of 1 cycle     7/27/2022 Adverse Reaction    3/2022 - locally advanced, unresectable adenocarcinoma of the pancreas     4/19/2022 - start FOLFIRINOX with 20% dose reduction in oxaliplatin and irinotecan due to age    5/2022 - 5FU dose reduced by 20% due to diarrhea    7/12/2022 - cycle 7 delayed due to diarrhea - bolus 5-FU further reduced by a total of 30% and infusional 5-FU dose reduced by 20%        Chemotherapy    Xeloda 1000mg BID M-F concurrent with RT         Interval History: patient presents for follow up of his locally advanced pancreatic cancer  He started his xeloda with RT on 8/22/22  Labs prior to this visit show only anemia with hgb = 9 0  He has diarrhea 1-2 times a day which is better  He still takes lomotil  He denies any bleeding  No abd pain  No n/v  He is eating better and has gained a few lbs  REVIEW OF SYSTEMS:  Please note that a 14-point review of systems was performed to include Constitutional, HEENT, Respiratory, CVS, GI, , Musculoskeletal, Integumentary, Neurologic, Rheumatologic, Endocrinologic, Psychiatric, Lymphatic, and Hematologic/Oncologic systems were reviewed and are negative unless otherwise stated in HPI  Positive and negative findings pertinent to this evaluation are incorporated into the history of present illness  ECOG PS: 1    PROBLEM LIST:  Patient Active Problem List   Diagnosis    Type 2 diabetes mellitus without complication, with long-term current use of insulin (HCC)    GERD (gastroesophageal reflux disease)    Pancreatic mass    History of pulmonary embolism    Hyperbilirubinemia    Anxiety about health    Pancreatic adenocarcinoma ([facility] [address] [facility]    Chemotherapy induced neutropenia (HCC)    Hypokalemia    Dehydration    Mild protein-calorie malnutrition (HCC)    Acute exacerbation of chronic obstructive pulmonary disease ([facility] [address] )       Assessment / Plan: patient is tolerating concurrent xeloda with RT well  We will continue to monitor his cbc and cmp weekly  I will see him in follow up the week of 9/19/22  We reviewed how he should be taking his xeloda and he is taking it correctly  he knows to call in the interim with any questions or concerns  I spent 20 minutes on chart review, face to face counseling time, coordination of care and documentation      Past Medical History:   has a past medical history of Ambulates with cane, Anxiety, Depression, Diabetes mellitus ([facility] [address] ), GERD (gastroesophageal reflux disease), History of pulmonary embolus (PE), Hyperlipidemia, Multiple thyroid nodules, Pancreatic cancer ([facility] [address] ), and Pancreatic mass  PAST SURGICAL HISTORY:   has a past surgical history that includes Cataract extraction (Right); Appendectomy; Tunneled venous port placement (Right, 4/11/2022); and FL guided central venous access device insertion (4/11/2022)  CURRENT MEDICATIONS  Current Outpatient Medications   Medication Sig Dispense Refill    Alcohol Swabs 70 % PADS May substitute brand based on insurance coverage  Check glucose BID  100 each 0    ALPRAZolam (XANAX) 0 5 mg tablet Take 0 5 tablets (0 25 mg total) by mouth 3 (three) times a day as needed for anxiety 90 tablet 0    apixaban (ELIQUIS) 5 mg Take 5 mg by mouth 2 (two) times a day      Blood Glucose Monitoring Suppl (OneTouch Verio Reflect) w/Device KIT May substitute brand based on insurance coverage  Check glucose BID  1 kit 0    capecitabine (XELODA) 500 MG tablet Take 2 pills twice a day Monday through Friday while on radiation  120 tablet 0    diphenoxylate-atropine (LOMOTIL) 2 5-0 025 mg per tablet Take 1 tablet by mouth 4 (four) times a day as needed for diarrhea 60 tablet 0    glucose blood (OneTouch Verio) test strip May substitute brand based on insurance coverage   Check glucose BID  100 each 0    Klor-Con M20 20 MEQ tablet TAKE 2 TABLETS BY MOUTH DAILY 180 tablet 1    metFORMIN (GLUCOPHAGE) 1000 MG tablet Take 1,000 mg by mouth 2 (two) times a day with meals      metFORMIN (GLUCOPHAGE-XR) 500 mg 24 hr tablet Take 500 mg by mouth 2 (two) times a day      Multiple Vitamin (MULTIVITAMIN ADULT PO) Take 1 tablet by mouth daily      omeprazole (PriLOSEC) 20 mg delayed release capsule       omeprazole (PriLOSEC) 40 MG capsule Take 40 mg by mouth every evening        ondansetron (Zofran ODT) 8 mg disintegrating tablet Take 1 tablet (8 mg total) by mouth every 8 (eight) hours as needed for nausea or vomiting 60 tablet 0    OneTouch Delica Lancets 90K MISC May substitute brand based on insurance coverage  Check glucose BID  100 each 0    pravastatin (PRAVACHOL) 20 mg tablet Take 20 mg by mouth daily      prochlorperazine (COMPAZINE) 10 mg tablet Take 1 tablet (10 mg total) by mouth every 6 (six) hours as needed for nausea or vomiting 30 tablet 0    polyethylene glycol (MIRALAX) 17 g packet Take 17 g by mouth daily (Patient not taking: No sig reported)      traMADol (ULTRAM) 50 mg tablet Take 1 tablet (50 mg total) by mouth every 6 (six) hours as needed for moderate pain (Patient not taking: No sig reported) 60 tablet 0     No current facility-administered medications for this visit  [unfilled]    SOCIAL HISTORY:   reports that he quit smoking about 57 years ago  He started smoking about 62 years ago  He quit after 5 00 years of use  He has never used smokeless tobacco  He reports previous alcohol use  He reports that he does not use drugs  FAMILY HISTORY:  family history includes Alcohol abuse in his mother; Diabetes in his father; Throat cancer in his brother  ALLERGIES:  is allergic to aleve [naproxen]  Physical Exam:  Vital Signs:   Visit Vitals  /68 (BP Location: Left arm, Patient Position: Sitting, Cuff Size: Adult)   Pulse 81   Temp 98 6 °F (37 °C) (Tympanic)   Resp 16   Ht 5' 7" (1 702 m)   Wt 58 1 kg (128 lb)   SpO2 97%   BMI 20 05 kg/m²   Smoking Status Former Smoker   BSA 1 67 m²     Body mass index is 20 05 kg/m²  Body surface area is 1 67 meters squared  GEN: Alert, awake oriented x3, in no acute distress  HEENT- No pallor, icterus, cyanosis, no oral mucosal lesions,   LAD - no palpable cervical, clavicle, axillary, inguinal LAD  Heart- normal S1 S2, regular rate and rhythm, No murmur, rubs     Lungs- clear breathing sound bilateral    Abdomen- soft, Non tender, bowel sounds present  Extremities- No cyanosis, clubbing, edema  Neuro- No focal neurological deficit    Labs:  Lab Results   Component Value Date    WBC 3 6 (L) 08/26/2022    HGB 9 0 (L) 08/26/2022    HCT 27 1 (L) 08/26/2022    MCV 93 1 08/26/2022     08/26/2022     Lab Results   Component Value Date    SODIUM 138 08/26/2022    K 3 8 08/26/2022     08/26/2022    CO2 22 08/26/2022    AGAP 8 07/11/2022    BUN 17 08/26/2022    CREATININE 0 67 (L) 08/26/2022    GLUC 195 (H) 08/26/2022    CALCIUM 8 9 08/26/2022    AST 16 08/26/2022    ALT 14 08/26/2022    ALKPHOS 138 08/26/2022    TP 6 1 08/26/2022    TBILI 0 4 08/26/2022    EGFR 94 08/26/2022

## 2022-09-06 ENCOUNTER — APPOINTMENT (OUTPATIENT)
Dept: RADIATION ONCOLOGY | Facility: HOSPITAL | Age: 82
End: 2022-09-06
Payer: COMMERCIAL

## 2022-09-06 PROCEDURE — 77386 HB NTSTY MODUL RAD TX DLVR CPLX: CPT | Performed by: RADIOLOGY

## 2022-09-06 PROCEDURE — 77427 RADIATION TX MANAGEMENT X5: CPT | Performed by: STUDENT IN AN ORGANIZED HEALTH CARE EDUCATION/TRAINING PROGRAM

## 2022-09-06 PROCEDURE — 77387 GUIDANCE FOR RADJ TX DLVR: CPT | Performed by: RADIOLOGY

## 2022-09-07 ENCOUNTER — DOCUMENTATION (OUTPATIENT)
Dept: RADIATION ONCOLOGY | Facility: HOSPITAL | Age: 82
End: 2022-09-07

## 2022-09-07 ENCOUNTER — APPOINTMENT (OUTPATIENT)
Dept: RADIATION ONCOLOGY | Facility: HOSPITAL | Age: 82
End: 2022-09-07
Payer: COMMERCIAL

## 2022-09-07 DIAGNOSIS — R19.7 DIARRHEA, UNSPECIFIED TYPE: ICD-10-CM

## 2022-09-07 PROCEDURE — 77387 GUIDANCE FOR RADJ TX DLVR: CPT | Performed by: RADIOLOGY

## 2022-09-07 PROCEDURE — 77386 HB NTSTY MODUL RAD TX DLVR CPLX: CPT | Performed by: RADIOLOGY

## 2022-09-07 RX ORDER — DIPHENOXYLATE HYDROCHLORIDE AND ATROPINE SULFATE 2.5; .025 MG/1; MG/1
1 TABLET ORAL 4 TIMES DAILY PRN
Qty: 60 TABLET | Refills: 0 | Status: SHIPPED | OUTPATIENT
Start: 2022-09-07

## 2022-09-07 NOTE — PROGRESS NOTES
RN NOTE-pt requesting refill for "diarrhea pills" per physician at time of OTV today  TT/Team message to Deepthi August/Dr Bravo-Med Onc  Aware of pt request/Rx-Lomotil in the past    Palliative care appt noted in [place], 9 8 22-pt reminded of appt

## 2022-09-08 ENCOUNTER — TELEPHONE (OUTPATIENT)
Dept: RADIATION ONCOLOGY | Facility: CLINIC | Age: 82
End: 2022-09-08

## 2022-09-08 ENCOUNTER — APPOINTMENT (OUTPATIENT)
Dept: RADIATION ONCOLOGY | Facility: HOSPITAL | Age: 82
End: 2022-09-08
Payer: COMMERCIAL

## 2022-09-08 ENCOUNTER — OFFICE VISIT (OUTPATIENT)
Dept: PALLIATIVE MEDICINE | Age: 82
End: 2022-09-08
Payer: COMMERCIAL

## 2022-09-08 VITALS
OXYGEN SATURATION: 97 % | BODY MASS INDEX: 20.52 KG/M2 | DIASTOLIC BLOOD PRESSURE: 60 MMHG | WEIGHT: 131 LBS | TEMPERATURE: 98 F | HEART RATE: 77 BPM | SYSTOLIC BLOOD PRESSURE: 100 MMHG | RESPIRATION RATE: 16 BRPM

## 2022-09-08 DIAGNOSIS — D70.1 CHEMOTHERAPY INDUCED NEUTROPENIA (HCC): ICD-10-CM

## 2022-09-08 DIAGNOSIS — E11.9 TYPE 2 DIABETES MELLITUS WITHOUT COMPLICATION, WITH LONG-TERM CURRENT USE OF INSULIN (HCC): ICD-10-CM

## 2022-09-08 DIAGNOSIS — C25.9 PANCREATIC ADENOCARCINOMA (HCC): ICD-10-CM

## 2022-09-08 DIAGNOSIS — K86.89 PANCREATIC MASS: Primary | ICD-10-CM

## 2022-09-08 DIAGNOSIS — T45.1X5A CHEMOTHERAPY INDUCED NEUTROPENIA (HCC): ICD-10-CM

## 2022-09-08 DIAGNOSIS — R11.2 CHEMOTHERAPY INDUCED NAUSEA AND VOMITING: ICD-10-CM

## 2022-09-08 DIAGNOSIS — F41.8 ANXIETY ABOUT HEALTH: ICD-10-CM

## 2022-09-08 DIAGNOSIS — T45.1X5A CHEMOTHERAPY INDUCED NAUSEA AND VOMITING: ICD-10-CM

## 2022-09-08 DIAGNOSIS — Z79.4 TYPE 2 DIABETES MELLITUS WITHOUT COMPLICATION, WITH LONG-TERM CURRENT USE OF INSULIN (HCC): ICD-10-CM

## 2022-09-08 DIAGNOSIS — E44.1 MILD PROTEIN-CALORIE MALNUTRITION (HCC): ICD-10-CM

## 2022-09-08 PROCEDURE — 77386 HB NTSTY MODUL RAD TX DLVR CPLX: CPT | Performed by: RADIOLOGY

## 2022-09-08 PROCEDURE — 99213 OFFICE O/P EST LOW 20 MIN: CPT | Performed by: PHYSICIAN ASSISTANT

## 2022-09-08 PROCEDURE — 77387 GUIDANCE FOR RADJ TX DLVR: CPT | Performed by: RADIOLOGY

## 2022-09-08 RX ORDER — PROCHLORPERAZINE MALEATE 10 MG
10 TABLET ORAL EVERY 6 HOURS PRN
Qty: 30 TABLET | Refills: 0 | Status: SHIPPED | OUTPATIENT
Start: 2022-09-08 | End: 2022-09-26 | Stop reason: SDUPTHER

## 2022-09-08 NOTE — TELEPHONE ENCOUNTER
5954-Message sent to nurse-pt requesting "nausea med"  Zofran Rx per Med Onc  Pt has an appt today w/Pall Care/QU  Pt needs to follow up w/Med Onc and /or Pall  For refill  Call to pt's phone number x 2--Busy  Attempt to reach pt at [facility] tx-pt had left the RT area/tx completed  LM on VM-Pall care to call RT office in [address]

## 2022-09-08 NOTE — PROGRESS NOTES
Outpatient Follow-Up - Palliative and Supportive Care   Moses Fajardo [de-identified] y o  male [id number]    Assessment & Plan  1  Pancreatic mass    2  Chemotherapy induced nausea and vomiting    3  Anxiety about health    4  Chemotherapy induced neutropenia (HCC)    5  Mild protein-calorie malnutrition ([address] )    6  Type 2 diabetes mellitus without complication, with long-term current use of insulin (HCC)    7  Pancreatic adenocarcinoma (HCC)       PLAN:  - refill Compazine; Zofran ineffective  - no refill of tramadol needed  Pain improved  - diarrhea improving but persists  Vee Cheatham has Lomotil   - mood stable  At every visit, we have discussed initiating SSRI for anxiety about health but Vee Cheatham has not been interested  - not currently requiring PRN Xanax  - continue consulting nutrition booklet and nutrition follow up   - ACP - has Living Will, scanned to chart  Sebastien's sister is medical POA  - RTC - 3 months - call if needed    Medications adjusted this encounter:  Requested Prescriptions     Signed Prescriptions Disp Refills    prochlorperazine (COMPAZINE) 10 mg tablet 30 tablet 0     Sig: Take 1 tablet (10 mg total) by mouth every 6 (six) hours as needed for nausea or vomiting     No orders of the defined types were placed in this encounter  Medications Discontinued During This Encounter   Medication Reason    ondansetron (Zofran ODT) 8 mg disintegrating tablet Alternate therapy    prochlorperazine (COMPAZINE) 10 mg tablet Reorder         Moses Fajardo was seen today for symptoms and planning cares related to above illnesses  I have reviewed the patient's controlled substance dispensing history in the Prescription Drug Monitoring Program in compliance with the [facility] regulations before prescribing any controlled substances    Fill Date ID   Written Drug Qty Days Prescriber Rx # Pharmacy Refill   Daily Dose* Pymt Type      08/24/2022  1   08/24/2022  Alprazolam 0 5 MG Tablet    90 00  [facility]   [id number]   Pen (8306)     [place]   08/10/2022  1   08/10/2022  Diphenoxylate-Atrop 2 5-0 025    60 00  15 [place] [address]   [id number]   Pen (8306)    0 00 MME  [place]are   [place]   07/12/2022  1   07/12/2022  Diphenoxylate-Atrop 2 5-0 025    60 00  15 [place] [address]   [id number]   Pen (8306)    0 00 MME  [place]   06/21/2022  1   04/15/2022  Tramadol Hcl 50 MG Tablet    120 00  [address]   [id number]   Pen (8306)    20 00 MME  [place]are   [place]       They are invited to continue to follow with us  If there are questions or concerns, please contact us through our clinic/answering service 24 hours a day, seven days a week  [address] Pritchett PA-C  [facility] Palliative and Supportive Care      Visit Information    Accompanied By: no one    Source of History: Self    History Limitations: None     History of Present Illness      Darryn Mahajan is a [de-identified] y o  male who presents in follow up of symptoms related to pancreatic cancer  Pertinent issues include: symptom management, pain, neoplasm related, depression or anxiety, fatigue, assessment of goals of care, disease process education and discussion of prognosis, advance care planning  Nazanin Smith is under the care of Dr Stu Starkey for pancreatic cancer  According to surgical oncology, Nazanin Smith is a poor surgical candidate as the tumor encases the SMA  He was started on FOLFIRINOX in oxaliplatin + irinotecan  FOLFIRINOX was stopped in August 2022 due to inadequate response and he was started on Xeloda with radiation therapy  Nazanin Smith presented for his initial palliative care consultation on 03/24/2022  At that time, he complained of extreme anxiety  He was not willing to take medication for this  He was started on tramadol 50MG BID which gave him adequate relief of pain  Today, Nazanin Smith presents unaccompanied for follow-up  He states his nausea is currently stable however he did have an episode of vomiting with his last chemotherapy cycle and is requesting a refill of his antiemetic  He is not sure if this is Zofran or Compazine  Sebastien's appetite is improved  He is continuing to follow-up with nutrition  He is no longer requiring tramadol for pain  He does endorse occasional abdominal pain but he reports it is mild  He is hoping to increase his fluid intake  At this time, Maryanne Sullivan has no further complaints or questions  Will plan to see him in 2-3 months or sooner if needed  I requested he call the office with any questions or concerns  Social: Maryanne Sullivan lives alone  He states he lives in apartment in [facility] and has social interactions in the community room  He states his wife  7 years ago  While he is on good terms with his family, they live in [facility] and in [place] and he does not see them very often  Past medical, surgical, social, and family histories are reviewed and pertinent updates are made  Review of Systems   Constitutional: Positive for decreased appetite and weight gain  Negative for fever, malaise/fatigue and weight loss  HENT: Negative for congestion and ear pain  Eyes: Negative for blurred vision and photophobia  Cardiovascular: Negative for chest pain and claudication  Respiratory: Negative for shortness of breath and sleep disturbances due to breathing  Endocrine: Negative for heat intolerance  Skin: Negative for dry skin and flushing  Musculoskeletal: Negative for arthritis, back pain and falls  Gastrointestinal: Positive for bloating, diarrhea, nausea and vomiting  Negative for abdominal pain, anorexia, dysphagia and excessive appetite  Genitourinary: Negative for bladder incontinence and flank pain  Neurological: Negative for aphonia, difficulty with concentration, disturbances in coordination and excessive daytime sleepiness  Psychiatric/Behavioral: Negative for altered mental status, depression and hypervigilance  The patient is nervous/anxious  The patient does not have insomnia        Vital Signs    /60 (BP Location: Right arm, Patient Position: Sitting, Cuff Size: Standard)   Pulse 77   Temp 98 °F (36 7 °C) (Temporal)   Resp 16   Wt 59 4 kg (131 lb)   SpO2 97%   BMI 20 52 kg/m²      Physical Exam and Objective Data  Physical Exam  Vitals and nursing note reviewed  Exam conducted with a chaperone present  Constitutional:       General: He is not in acute distress  Appearance: Normal appearance  He is underweight  He is ill-appearing (chronically)  HENT:      Head: Normocephalic and atraumatic  Eyes:      Conjunctiva/sclera: Conjunctivae normal    Cardiovascular:      Rate and Rhythm: Normal rate  Pulmonary:      Effort: Pulmonary effort is normal  No respiratory distress  Abdominal:      Palpations: Abdomen is soft  Tenderness: There is no abdominal tenderness  Musculoskeletal:         General: No swelling or tenderness  Cervical back: Neck supple  Skin:     General: Skin is warm and dry  Neurological:      Mental Status: He is alert and oriented to person, place, and time  Mental status is at baseline  Psychiatric:         Mood and Affect: Mood is anxious  Behavior: Behavior normal        Radiology and Laboratory:  I personally reviewed and interpreted the following results:  CBC, CMP  45 minutes was spent face to face with Anabel Gonsales with greater than 50% of the time spent in counseling or coordination of care including discussions of etiology of diagnosis, treatment instructions, follow up requirements, risk factors and risk reduction of disease, patient and family counseling/involvement in care and compliance with treatment regimen  All of the patient's or agent's questions were answered during this discussion

## 2022-09-09 ENCOUNTER — APPOINTMENT (OUTPATIENT)
Dept: RADIATION ONCOLOGY | Facility: HOSPITAL | Age: 82
End: 2022-09-09
Payer: COMMERCIAL

## 2022-09-09 PROCEDURE — 77386 HB NTSTY MODUL RAD TX DLVR CPLX: CPT | Performed by: RADIOLOGY

## 2022-09-09 PROCEDURE — 77387 GUIDANCE FOR RADJ TX DLVR: CPT | Performed by: RADIOLOGY

## 2022-09-12 ENCOUNTER — APPOINTMENT (OUTPATIENT)
Dept: RADIATION ONCOLOGY | Facility: HOSPITAL | Age: 82
End: 2022-09-12
Payer: COMMERCIAL

## 2022-09-12 ENCOUNTER — APPOINTMENT (OUTPATIENT)
Dept: RADIATION ONCOLOGY | Facility: HOSPITAL | Age: 82
End: 2022-09-12
Attending: INTERNAL MEDICINE
Payer: COMMERCIAL

## 2022-09-12 PROCEDURE — 77386 HB NTSTY MODUL RAD TX DLVR CPLX: CPT | Performed by: STUDENT IN AN ORGANIZED HEALTH CARE EDUCATION/TRAINING PROGRAM

## 2022-09-12 PROCEDURE — 77387 GUIDANCE FOR RADJ TX DLVR: CPT | Performed by: STUDENT IN AN ORGANIZED HEALTH CARE EDUCATION/TRAINING PROGRAM

## 2022-09-12 PROCEDURE — 77336 RADIATION PHYSICS CONSULT: CPT | Performed by: STUDENT IN AN ORGANIZED HEALTH CARE EDUCATION/TRAINING PROGRAM

## 2022-09-13 ENCOUNTER — APPOINTMENT (OUTPATIENT)
Dept: RADIATION ONCOLOGY | Facility: HOSPITAL | Age: 82
End: 2022-09-13
Payer: COMMERCIAL

## 2022-09-13 PROCEDURE — 77427 RADIATION TX MANAGEMENT X5: CPT | Performed by: INTERNAL MEDICINE

## 2022-09-13 PROCEDURE — 77386 HB NTSTY MODUL RAD TX DLVR CPLX: CPT | Performed by: INTERNAL MEDICINE

## 2022-09-13 PROCEDURE — 77387 GUIDANCE FOR RADJ TX DLVR: CPT | Performed by: INTERNAL MEDICINE

## 2022-09-14 ENCOUNTER — APPOINTMENT (OUTPATIENT)
Dept: RADIATION ONCOLOGY | Facility: HOSPITAL | Age: 82
End: 2022-09-14
Attending: INTERNAL MEDICINE
Payer: COMMERCIAL

## 2022-09-14 PROCEDURE — 77387 GUIDANCE FOR RADJ TX DLVR: CPT | Performed by: RADIOLOGY

## 2022-09-14 PROCEDURE — 77386 HB NTSTY MODUL RAD TX DLVR CPLX: CPT | Performed by: RADIOLOGY

## 2022-09-15 ENCOUNTER — APPOINTMENT (OUTPATIENT)
Dept: RADIATION ONCOLOGY | Facility: HOSPITAL | Age: 82
End: 2022-09-15
Payer: COMMERCIAL

## 2022-09-15 PROCEDURE — 77386 HB NTSTY MODUL RAD TX DLVR CPLX: CPT | Performed by: INTERNAL MEDICINE

## 2022-09-15 PROCEDURE — 77387 GUIDANCE FOR RADJ TX DLVR: CPT | Performed by: INTERNAL MEDICINE

## 2022-09-16 ENCOUNTER — APPOINTMENT (OUTPATIENT)
Dept: RADIATION ONCOLOGY | Facility: HOSPITAL | Age: 82
End: 2022-09-16
Payer: COMMERCIAL

## 2022-09-16 PROCEDURE — 77387 GUIDANCE FOR RADJ TX DLVR: CPT | Performed by: RADIOLOGY

## 2022-09-16 PROCEDURE — 77386 HB NTSTY MODUL RAD TX DLVR CPLX: CPT | Performed by: RADIOLOGY

## 2022-09-19 ENCOUNTER — APPOINTMENT (OUTPATIENT)
Dept: RADIATION ONCOLOGY | Facility: HOSPITAL | Age: 82
End: 2022-09-19
Payer: COMMERCIAL

## 2022-09-19 ENCOUNTER — TELEPHONE (OUTPATIENT)
Dept: HEMATOLOGY ONCOLOGY | Facility: HOSPITAL | Age: 82
End: 2022-09-19

## 2022-09-19 ENCOUNTER — TELEPHONE (OUTPATIENT)
Dept: HEMATOLOGY ONCOLOGY | Facility: CLINIC | Age: 82
End: 2022-09-19

## 2022-09-19 PROCEDURE — 77336 RADIATION PHYSICS CONSULT: CPT | Performed by: INTERNAL MEDICINE

## 2022-09-19 PROCEDURE — 77386 HB NTSTY MODUL RAD TX DLVR CPLX: CPT | Performed by: INTERNAL MEDICINE

## 2022-09-19 PROCEDURE — 77387 GUIDANCE FOR RADJ TX DLVR: CPT | Performed by: INTERNAL MEDICINE

## 2022-09-19 NOTE — TELEPHONE ENCOUNTER
Appointment Cancellation Or Reschedule     Person calling in Patient    Provider Dr Zoe Lee   Office Visit Date and Time 9/21/22 @ 2:20pm   Office Visit Location [facility]   Did patient want to reschedule their office appointment? If so, when was it scheduled to? Yes 9/28/22 @ 2:40pm   Did you have STAR scheduled for this appointment? No    Do you need STAR set up for your new appointment? If yes, please send to "PATIENT RIDESHARE" pool for STAR rescheduling No   If you are cancelling appointment, can we notify STAR to cancel ride? If yes, please send to "PATIENT RIDESHARE" pool for STAR to cancel service Na   Is this patient calling to reschedule an infusion appointment? No   When is their next infusion appointment? Na   Is this patient a Chemo patient? Yes   Reason for Cancellation or Reschedule Patient unable to keep appt     If the patient is a treatment patient, please route this to the office nurse  If the patient is not on treatment, please route to the office MA  If the patient is a surgical oncology patient, please route to surg/onc clinical pool

## 2022-09-19 NOTE — TELEPHONE ENCOUNTER
Spoke with patient regarding his rescheduled appt for 9/28  Patient states that he had to r/s because someone is coming to inspect his apartment  Patient also wanted to speak with me about how he is feeling  Patient states he vomited about 3 times yesterday and feels nauseous  I asked him if he took his compazine and he said he has been taking it every 6 hours and it is not helping  I told the patient that I will speak with Dr Nicole Sun and see if we can prescribe him any more medications for nausea  Patient also stated that he is staying hydrated and is eating well and does not feel dehydrated  I told the patient to call me back tomorrow to let me know how he is feeling  Patient verbalized understanding

## 2022-09-19 NOTE — TELEPHONE ENCOUNTER
Left  for patient to call me back and let me know how he is doing because he canceled his appt this week and r/s to next week  I left my direct teams number for him to callback

## 2022-09-20 ENCOUNTER — TELEPHONE (OUTPATIENT)
Dept: HEMATOLOGY ONCOLOGY | Facility: CLINIC | Age: 82
End: 2022-09-20

## 2022-09-20 ENCOUNTER — APPOINTMENT (OUTPATIENT)
Dept: RADIATION ONCOLOGY | Facility: HOSPITAL | Age: 82
End: 2022-09-20
Payer: COMMERCIAL

## 2022-09-20 LAB
ALBUMIN SERPL-MCNC: 3.7 G/DL (ref 3.6–5.1)
ALBUMIN/GLOB SERPL: 1.9 (CALC) (ref 1–2.5)
ALP SERPL-CCNC: 110 U/L (ref 35–144)
ALT SERPL-CCNC: 11 U/L (ref 9–46)
AST SERPL-CCNC: 17 U/L (ref 10–35)
BASOPHILS # BLD AUTO: 30 CELLS/UL (ref 0–200)
BASOPHILS NFR BLD AUTO: 1.1 %
BILIRUB SERPL-MCNC: 0.4 MG/DL (ref 0.2–1.2)
BUN SERPL-MCNC: 16 MG/DL (ref 7–25)
BUN/CREAT SERPL: ABNORMAL (CALC) (ref 6–22)
CALCIUM SERPL-MCNC: 9 MG/DL (ref 8.6–10.3)
CHLORIDE SERPL-SCNC: 104 MMOL/L (ref 98–110)
CO2 SERPL-SCNC: 27 MMOL/L (ref 20–32)
CREAT SERPL-MCNC: 0.7 MG/DL (ref 0.7–1.22)
EOSINOPHIL # BLD AUTO: 184 CELLS/UL (ref 15–500)
EOSINOPHIL NFR BLD AUTO: 6.8 %
ERYTHROCYTE [DISTWIDTH] IN BLOOD BY AUTOMATED COUNT: 13.8 % (ref 11–15)
GFR/BSA.PRED SERPLBLD CYS-BASED-ARV: 93 ML/MIN/1.73M2
GLOBULIN SER CALC-MCNC: 2 G/DL (CALC) (ref 1.9–3.7)
GLUCOSE SERPL-MCNC: 160 MG/DL (ref 65–139)
HCT VFR BLD AUTO: 27.8 % (ref 38.5–50)
HGB BLD-MCNC: 9.2 G/DL (ref 13.2–17.1)
LYMPHOCYTES # BLD AUTO: 356 CELLS/UL (ref 850–3900)
LYMPHOCYTES NFR BLD AUTO: 13.2 %
MCH RBC QN AUTO: 31.1 PG (ref 27–33)
MCHC RBC AUTO-ENTMCNC: 33.1 G/DL (ref 32–36)
MCV RBC AUTO: 93.9 FL (ref 80–100)
MONOCYTES # BLD AUTO: 367 CELLS/UL (ref 200–950)
MONOCYTES NFR BLD AUTO: 13.6 %
NEUTROPHILS # BLD AUTO: 1763 CELLS/UL (ref 1500–7800)
NEUTROPHILS NFR BLD AUTO: 65.3 %
PLATELET # BLD AUTO: 157 THOUSAND/UL (ref 140–400)
PMV BLD REES-ECKER: 11.4 FL (ref 7.5–12.5)
POTASSIUM SERPL-SCNC: 3.3 MMOL/L (ref 3.5–5.3)
PROT SERPL-MCNC: 5.7 G/DL (ref 6.1–8.1)
RBC # BLD AUTO: 2.96 MILLION/UL (ref 4.2–5.8)
SODIUM SERPL-SCNC: 139 MMOL/L (ref 135–146)
WBC # BLD AUTO: 2.7 THOUSAND/UL (ref 3.8–10.8)

## 2022-09-20 PROCEDURE — 77386 HB NTSTY MODUL RAD TX DLVR CPLX: CPT | Performed by: INTERNAL MEDICINE

## 2022-09-20 PROCEDURE — 77427 RADIATION TX MANAGEMENT X5: CPT | Performed by: INTERNAL MEDICINE

## 2022-09-20 PROCEDURE — 77387 GUIDANCE FOR RADJ TX DLVR: CPT | Performed by: INTERNAL MEDICINE

## 2022-09-20 NOTE — TELEPHONE ENCOUNTER
Left VM for patient to see how he is feeling today  I left my direct teams number for him to callback

## 2022-09-20 NOTE — TELEPHONE ENCOUNTER
Spoke with patient to see how he is feeling today  Patient believes he ate something spoiled over the weekend and that is why he was very nauseous and puked a few times on Sunday  Patient states he is feeling much better and the compazine is working, so he does not want to try another medication  Patient also asked if there is any way, as the patient, he could just stop radiation treatments  I told him that is something he would have to discuss with Dr Barbra Patel, because I do not work in radiation or with Dr Barbra Patel  I did tell the patient that if he does want to stop going to radiation, he has to speak with them  Patient then stated that he will keep going, he was just curious  I told him to call me if he ever needs anything  Patient verbalized understanding

## 2022-09-21 ENCOUNTER — APPOINTMENT (OUTPATIENT)
Dept: RADIATION ONCOLOGY | Facility: HOSPITAL | Age: 82
End: 2022-09-21
Payer: COMMERCIAL

## 2022-09-21 ENCOUNTER — NUTRITION (OUTPATIENT)
Dept: NUTRITION | Facility: HOSPITAL | Age: 82
End: 2022-09-21

## 2022-09-21 DIAGNOSIS — Z71.3 NUTRITIONAL COUNSELING: Primary | ICD-10-CM

## 2022-09-21 PROCEDURE — 77386 HB NTSTY MODUL RAD TX DLVR CPLX: CPT | Performed by: INTERNAL MEDICINE

## 2022-09-21 PROCEDURE — 77387 GUIDANCE FOR RADJ TX DLVR: CPT | Performed by: INTERNAL MEDICINE

## 2022-09-21 NOTE — PROGRESS NOTES
Outpatient Oncology Nutrition Consultation   Type of Consult: Follow Up  Care Location: Telephone Call    Reason for referral: Received notification by Amalia RN, Gerson Vasques , on 7/28/22 that pt has triggered for oncology nutrition care (reason for referral: Malnutrition Screening Tool (MST) Triggers: scored a 2 indicating 2-13# (0 9-6 kg) recent wt loss and is eating poorly due to a decreased appetite  (Date of MST: 7/28/22) and patient request)  Orlando Farrell had previously been seen by oncology RD Ashlee Mandujano on 3/23/22    Nutrition Assessment:   Oncology Diagnosis & Treatments: diagnosed with pancreatic cancer 3/4/22  -started on FOLFIRINOX 4/2022  Was reduced d/t diarrhea, and last cycle was cancelled  Finished 7/12/22  -concurrent chemo (oral chemo Xeloda)/RT  Started 8/22  RT EOT 9/29    Oncology History Overview Note   3/2022 - locally advanced, unresectable adenocarcinoma of the pancreas     4/19/2022 - start FOLFIRINOX with 20% dose reduction in oxaliplatin and irinotecan due to age     5/2022 - 5FU dose reduced by 20% due to diarrhea     7/12/2022 - cycle 7 delayed due to diarrhea - bolus 5-FU further reduced by a total of 30% and infusional 5-FU dose reduced by 20%    8/2022 - stop FOLFIRINOX due to inadequate response, start xeloda 1000 mg BID M-F concurrently with RT to pancreas    8/22/2022 - start xeloda 1000 mg BID M-F with RT to pancreas     Pancreatic adenocarcinoma ([facility])   3/4/2022 Biopsy    A-B-C  Pancreas, pancreatic head mass   Malignant (PSC Category VI) -  Adenocarcinoma       3/4/2022 -  Cancer Staged    Staging form: Pancreas, AJCC 8th Edition  - Clinical stage from 3/4/2022: Stage IB (cT2, cN0, cM0) - Signed by Shellie Jack MD on 7/28/2022  Stage prefix: Initial diagnosis  Total positive nodes: 0       3/14/2022 Initial Diagnosis    Pancreatic adenocarcinoma ([facility])     4/19/2022 - 7/20/2022 Chemotherapy    fluorouracil (ADRUCIL), 400 mg/m2 = 690 mg, Intravenous, Once, 7 of 8 cycles  Dose modification: 320 mg/m2 (original dose 400 mg/m2, Cycle 4), 280 mg/m2 (original dose 400 mg/m2, Cycle 7, Reason: Dose Not Tolerated, Comment: 30% dose reduction due to diarrhea)  Administration: 690 mg (4/19/2022), 690 mg (5/3/2022), 690 mg (5/17/2022), 555 mg (5/31/2022), 555 mg (6/14/2022), 555 mg (6/28/2022), 485 mg (7/20/2022)  pegfilgrastim (NEULASTA), 6 mg, Subcutaneous, Once, 5 of 6 cycles  Administration: 6 mg (6/2/2022), 6 mg (6/16/2022), 6 mg (6/30/2022), 6 mg (5/20/2022)  irinotecan (CAMPTOSAR) chemo infusion, 125 mg/m2 = 216 mg (69 4 % of original dose 180 mg/m2), Intravenous, Once, 7 of 8 cycles  Dose modification: 125 mg/m2 (original dose 180 mg/m2, Cycle 1, Reason: Anticipated Tolerance, Comment: Anticipated tolerance and Bilirubin being between 1-2), 90 mg/m2 (original dose 180 mg/m2, Cycle 4, Reason: Dose Not Tolerated, Comment: 50% dose reduction from original dose due to diarrhea)  Administration: 216 mg (4/19/2022), 216 mg (5/3/2022), 220 mg (5/17/2022), 156 mg (5/31/2022), 160 mg (6/14/2022), 160 mg (6/28/2022), 160 mg (7/20/2022)  leucovorin calcium IVPB, 692 mg, Intravenous, Once, 7 of 8 cycles  Administration: 700 mg (4/19/2022), 700 mg (5/3/2022), 700 mg (5/17/2022), 700 mg (5/31/2022), 700 mg (6/14/2022), 700 mg (6/28/2022), 700 mg (7/20/2022)  oxaliplatin (ELOXATIN) chemo infusion, 65 mg/m2 = 112 45 mg (76 5 % of original dose 85 mg/m2), Intravenous, Once, 7 of 8 cycles  Dose modification: 65 mg/m2 (original dose 85 mg/m2, Cycle 1, Reason: Anticipated Tolerance)  Administration: 112 45 mg (4/19/2022), 112 45 mg (5/3/2022), 112 45 mg (5/17/2022), 112 45 mg (5/31/2022), 112 45 mg (6/14/2022), 112 45 mg (6/28/2022), 112 45 mg (7/20/2022)  fluorouracil (ADRUCIL) ambulatory infusion Soln, 1,200 mg/m2/day = 4,150 mg, Intravenous, Over 46 hours, 7 of 8 cycles  Dose modification: 1,000 mg/m2/day (original dose 1,200 mg/m2/day, Cycle 8, Reason: Dose Not Tolerated, Comment: dose reduction due to diarrhea), 1,000 mg/m2/day (original dose 1,200 mg/m2/day, Cycle 7, Reason: Dose Not Tolerated, Comment: dose reduction due to diarrhea)     7/22/2022 -  Chemotherapy    pegfilgrastim (NEULASTA), 6 mg, Subcutaneous, Once, 0 of 1 cycle     7/27/2022 Adverse Reaction    3/2022 - locally advanced, unresectable adenocarcinoma of the pancreas     4/19/2022 - start FOLFIRINOX with 20% dose reduction in oxaliplatin and irinotecan due to age    5/2022 - 5FU dose reduced by 20% due to diarrhea    7/12/2022 - cycle 7 delayed due to diarrhea - bolus 5-FU further reduced by a total of 30% and infusional 5-FU dose reduced by 20%        Chemotherapy    Xeloda 1000mg BID M-F concurrent with RT       Past Medical & Surgical Hx:   Patient Active Problem List   Diagnosis    Type 2 diabetes mellitus without complication, with long-term current use of insulin (HCC)    GERD (gastroesophageal reflux disease)    Pancreatic mass    History of pulmonary embolism    Hyperbilirubinemia    Anxiety about health    [facility]    Chemotherapy induced neutropenia (HCC)    Hypokalemia    Dehydration    Mild protein-calorie malnutrition (HCC)    Acute exacerbation of chronic obstructive pulmonary disease ([facility] )     Past Medical History:   Diagnosis Date    Ambulates with cane     Anxiety     Depression     Diabetes mellitus ([facility] )     GERD (gastroesophageal reflux disease)     History of pulmonary embolus (PE)     Hyperlipidemia     Multiple thyroid nodules     pt states about 40yrs ago    Pancreatic cancer ([facility] )     Pancreatic mass      Past Surgical History:   Procedure Laterality Date    APPENDECTOMY      CATARACT EXTRACTION Right     FL GUIDED CENTRAL VENOUS ACCESS DEVICE INSERTION  4/11/2022    TUNNELED VENOUS PORT PLACEMENT Right 4/11/2022    Procedure: INSERTION VENOUS PORT (PORT-A-CATH);   Surgeon: Stefany Tirado MD;  Location: [facility];  Service: Surgical Oncology       Review of Medications: Vitamins, Supplements and Herbals: Med List Reviewed & pt is only taking: potassium    Current Outpatient Medications:     Alcohol Swabs 70 % PADS, May substitute brand based on insurance coverage  Check glucose BID , Disp: 100 each, Rfl: 0    ALPRAZolam (XANAX) 0 5 mg tablet, Take 0 5 tablets (0 25 mg total) by mouth 3 (three) times a day as needed for anxiety, Disp: 90 tablet, Rfl: 0    apixaban (ELIQUIS) 5 mg, Take 5 mg by mouth 2 (two) times a day, Disp: , Rfl:     Blood Glucose Monitoring Suppl (OneTouch Verio Reflect) w/Device KIT, May substitute brand based on insurance coverage  Check glucose BID , Disp: 1 kit, Rfl: 0    capecitabine (XELODA) 500 MG tablet, Take 2 pills twice a day Monday through Friday while on radiation  , Disp: 120 tablet, Rfl: 0    diphenoxylate-atropine (LOMOTIL) 2 5-0 025 mg per tablet, Take 1 tablet by mouth 4 (four) times a day as needed for diarrhea, Disp: 60 tablet, Rfl: 0    glucose blood (OneTouch Verio) test strip, May substitute brand based on insurance coverage  Check glucose BID , Disp: 100 each, Rfl: 0    Klor-Con M20 20 MEQ tablet, TAKE 2 TABLETS BY MOUTH DAILY, Disp: 180 tablet, Rfl: 1    metFORMIN (GLUCOPHAGE) 1000 MG tablet, Take 1,000 mg by mouth 2 (two) times a day with meals (Patient not taking: Reported on 9/8/2022), Disp: , Rfl:     metFORMIN (GLUCOPHAGE-XR) 500 mg 24 hr tablet, Take 500 mg by mouth 2 (two) times a day, Disp: , Rfl:     Multiple Vitamin (MULTIVITAMIN ADULT PO), Take 1 tablet by mouth daily, Disp: , Rfl:     omeprazole (PriLOSEC) 20 mg delayed release capsule, , Disp: , Rfl:     omeprazole (PriLOSEC) 40 MG capsule, Take 40 mg by mouth every evening   (Patient not taking: Reported on 9/8/2022), Disp: , Rfl:     OneTouch Delica Lancets 29R MISC, May substitute brand based on insurance coverage   Check glucose BID , Disp: 100 each, Rfl: 0    polyethylene glycol (MIRALAX) 17 g packet, Take 17 g by mouth daily (Patient not taking: No sig reported), Disp: , Rfl:     pravastatin (PRAVACHOL) 20 mg tablet, Take 20 mg by mouth daily, Disp: , Rfl:     prochlorperazine (COMPAZINE) 10 mg tablet, Take 1 tablet (10 mg total) by mouth every 6 (six) hours as needed for nausea or vomiting, Disp: 30 tablet, Rfl: 0    traMADol (ULTRAM) 50 mg tablet, Take 1 tablet (50 mg total) by mouth every 6 (six) hours as needed for moderate pain, Disp: 60 tablet, Rfl: 0    Most Recent Lab Results: hx DM, BG range from 110-140   Doesn't always check blood sugars  Lab Results   Component Value Date    WBC 2 7 (L) 09/19/2022    NEUTROABS 1,763 09/19/2022    ALT 11 09/19/2022    AST 17 09/19/2022    ALB 3 7 09/19/2022    SODIUM 139 09/19/2022    SODIUM 138 09/09/2022    K 3 3 (L) 09/19/2022    K 3 5 09/09/2022     09/19/2022    BUN 16 09/19/2022    BUN 19 09/09/2022    CREATININE 0 70 09/19/2022    CREATININE 0 82 09/09/2022    EGFR 93 09/19/2022    PHOS 2 8 03/03/2022    POCGLU 135 04/11/2022    GLUC 160 (H) 09/19/2022    HGBA1C 6 1 08/28/2021    CALCIUM 9 0 09/19/2022    MG 2 0 03/03/2022       Anthropometric Measurements:   Height: 67"  Ht Readings from Last 1 Encounters:   08/29/22 5' 7" (1 702 m)     Wt Readings from Last 20 Encounters:   09/08/22 59 4 kg (131 lb)   08/29/22 58 1 kg (128 lb)   08/10/22 57 6 kg (127 lb)   07/28/22 56 7 kg (125 lb)   07/20/22 57 3 kg (126 lb 5 2 oz)   07/18/22 57 6 kg (127 lb)   07/12/22 53 8 kg (118 lb 9 7 oz)   07/11/22 52 6 kg (116 lb)   06/30/22 55 3 kg (122 lb)   06/28/22 55 6 kg (122 lb 9 2 oz)   06/27/22 55 3 kg (122 lb)   06/14/22 57 8 kg (127 lb 6 8 oz)   06/13/22 57 2 kg (126 lb)   05/31/22 59 2 kg (130 lb 8 2 oz)   05/18/22 61 7 kg (136 lb)   05/17/22 61 3 kg (135 lb 2 3 oz)   05/03/22 58 4 kg (128 lb 12 8 oz)   04/21/22 62 4 kg (137 lb 9 6 oz)   04/19/22 60 5 kg (133 lb 6 1 oz)   04/12/22 60 8 kg (134 lb)       Weight History:    Usual Weight: 160# beginning of the year [address]: (8/29/22) 127#, (9/7/22) 129 2#, (9/19/22) 130 2#   Home Scale: n/a    Oncology Nutrition-Anthropometrics    Flowsheet Row Nutrition from 9/21/2022 in [facility] Nutrition from 8/31/2022 in [facility]   Patient age (years): [de-identified] years [de-identified] years   Patient (male) height (in): 79 in 79 in   Current weight (lbs): 131 lbs 128 lbs   Current weight to be used for anthropometric calculations (kg) 59 5 kg 58 2 kg   BMI: 20 5 20   IBW male 148 lb 148 lb   IBW (kg) male 67 3 kg 67 3 kg   IBW % (male) 88 5 % 86 5 %   Adjusted BW (male): 143 8 lbs 143 lbs   Adjusted BW in kg (male): 65 4 kg 65 kg   % weight change after 1 month: 3 1 % 2 4 %   Weight change after 1 month (lbs) 4 lbs 3 lbs   % weight change after 3 months: 4 % -1 9 %   Weight change after 3 months (lbs) 5 lbs -2 5 lbs          Nutrition-Focused Physical Findings: n/a due to telephone call    Food/Nutrition-Related History & Client/Social History:    Current Nutrition Impact Symptoms:  [x] Nausea -improving, still gets nauseous occasionally  [] Reduced Appetite -improving [] Acid Reflux    [x] Vomiting -the other day, relates this to eating something bad [x] Unintended Wt Loss -wt starting to trend up now [] Malabsorption    [x] Diarrhea - has  Lomotil  -not as bad as it used to be per patient [] Unintended Wt Gain  [] Dumping Syndrome    [] Constipation  [] Thick Mucous/Secretions  [] Abdominal Pain    [x] Dysgeusia (Altered Taste) -this is starting to improve, more normal now [] Xerostomia (Dry Mouth)  [] Gas    [] Dysosmia (Altered Smell)  [] Thrush  [] Difficulty Chewing    [] Oral Mucositis (Sore Mouth)  [x] Fatigue  [] Hyperglycemia    [] Odynophagia  [] Esophagitis  [] Other:    [] Dysphagia  [] Early Satiety  [] No Problems Eating      Food Allergies & Intolerances: no    Current Diet: Regular Diet, No Restrictions  Current Nutrition Intake:  Increased since last visit  Appetite: Good, improving now  Nutrition Route: PO  Oral Care: brushes mutiple times per day  Activity level: ADLs, uses a cane, increased fatigue at times  Tries to get out and do things    24 Hr Diet Recall:   Breakfast: CIB with skim milk  Snack: pretzels dipped with mustard  Lunch: fish filet sandwich with french fries from [employer], 1/2 of chocolate shake  Snack: canned fruit, popcorn  Dinner: frozen dinner (hungry man)  Snack: ice cream sandwich (klondike bar)    Beverages: flavored water (8oz x 6-8), sugar-free soda (8oz x2), CIB (8oz x1), chocolate skim milk (8oz x1)  Supplements: usually once/day    [employer] (2ipe=398 kcal, 5 g pro) mixed with skim milk      Oncology Nutrition-Estimated Needs    Flowsheet Row Nutrition from 2022 in [facility] [employer] [facility] Nutrition from 3/23/2022 in [place] [address] [facility]   Weight type used Actual weight Actual weight   Weight in kilograms (kg) used for estimated needs 56 8 kg 61 8 kg   Energy needs formula:  30-35 kcal/kg 30-35 kcal/kg   Energy needs based on 30 kcal/k kcal 1855 kcal   Energy needs based on 35 kcal/k kcal 2164 kcal   Protein needs formula: 1 2-1 5 g/kg 1 2-1 5 g/kg   Protein needs based on 1 2 g/k g 74 g   Protein needs based on 1 5 g/kg 85 g 93 g   Fluid needs formula: 30-35 mL/kg 30-35 mL/kg   Fluid needs based on 30 mL/kg 1704 mL 1854 mL   Fluid needs in ounces 58 oz 63 oz   Fluid needs based on 35 mL/kg 1988 mL 2163 mL   Fluid needs in ounces 67 oz 73 oz           Discussion & Intervention:   Fatoumata Hunt was evaluated today for an RD follow up regarding pt request for weight loss and poor PO  Fatoumata Hunt is currently undergoing tx for pancreatic cancer  Today, Fatoumata Hunt reports he's been doing better  He states his appetite continues to improve and he feels like he's eating more  His RT EOT is scheduled for next week   He did experience some vomiting over the weekend, but he thinks this is d/t eating bad lunch meat that may have been   He still has diarrhea at times, but lomotil helps  He is eating about 3 meals/day+ snacks, and drinking CIB shakes daily which he drinks for breakfast some days  He still uses skim milk, so we discussed trying whole milk or fairlife milk for more calories/protein  He plans to purchase this next time he goes to the store  He also states he hasn't been eating a lot of meat lately d/t not having a taste for it anymore  We discussed other sources of protein to incorporate  Reviewed 24 hour recall, which revealed an inadequate po intake, and discussed ways to increase kcal, protein, and fluid intakes and optimize nutrient intake  Also reviewed the importance of wt management throughout the tx process and the role of a high kcal/ high protein diet in managing wt and overall health    Based on today's assessment, discussion included: MNT for: wt loss, decreased appetite, xerostomia, diarrhea, nausea, increased protein, how to modify foods for anticipated nutrition impact symptoms pt may experience during CA tx, practicing proper oral care, a high kcal/protein diet & food choices to include at all meals & snacks (Examples of high kcal foods: cheese, full-fat dairy products, nut butter, plant-based fats, coconut oil/milk, avocado, butter, cream soup, etc  Examples of high protein foods: eggs, chicken, fish, beans/legumes, nuts/nut butters, bone broth, etc ) , fortifying foods for added kcal and protein (examples include: adding cheese to foods such as eggs, mashed potatoes, casseroles, etc ; Making oatmeal with whole milk rather than water; Making fortified mashed potatoes with cream, butter, dry milk powder, plain [place] yogurt, and cheese ), adequate hydration & fluid choices, sipping on calorie containing beverages (examples include: adding whole milk or cream to coffee, oral nutrition supplements, juice, electrolyte replacement beverages, milk, etc ), eating smaller more frequent meals every 2-3 hours (5-6 small meals/day), having consistent and planned snacks between meals, increasing oral nutrition supplements, recipe suggestions/resources, trying new recipes, & cooking at home more often and adding extra fat to foods while cooking such as butter, plant-based oil, coconut oil/milk, avocado, nut butters, etc    Moving forward, Carlos arboleda was encouraged to increase kcal, protein, and fluid intakes    Materials Provided: not applicable  All questions and concerns addressed during todays visit  Carlos arboleda has RD contact information  Nutrition Diagnosis:    Inadequate Energy Intake related to physiological causes, disease state and treatment related issues as evidenced by food recall, wt loss and discussion with pt and/or family   Increased Nutrient Needs (kcal & pro) related to increased demand for nutrients and disease state as evidenced by cancer dx and pt undergoing tx for cancer  Monitoring & Evaluation:   Goals:  · weight maintenance/stabilization  · adequate nutrition impact symptom management  · pt to meet >/=75% estimated nutrition needs daily    · Progress Towards Goals: Progressing    Nutrition Rx & Recommendations:  · Diet: High Calorie, High Protein (for high calorie foods see pages 52-53, and for high protein foods see pages 49-51 in your Eating Hints book)  · Keep a daily food journal (pen/paper, patti such as [employer])  · Nutrition Supplements: increase to 2 CIB per day  May use homemade shakes/smoothies as desired  If using a pre-made shake/bar, choose ones with >300 calories and >10 grams protein (ex  Ensure Complete, Ensure Enlive, Ensure Plus, Boost Plus, Boost Very High Calorie, etc )  · Small, frequent meals/snacks may be easier to tolerate than 3 large daily meals  Aim for 5-6 small meals per day (every 2-3 hours)  · Include protein at all meals/snacks  · Include a variety of foods (as tolerated/allowed by diet)    · Incorporate physical activity as able/allowed  · Stay hydrated by sipping fluids of choice/tolerance throughout the day  · Liquid nutrition may be better tolerated than solids at times  · Alter food choices and eating patterns to accommodate changing needs  · Refer to Eating Hints book for other meal/snack ideas and symptom management  · Follow proper oral care; Try baking soda/salt water rinse recipe (mix 3/4 tsp salt + 1 tsp baking soda + 1 qt water; rinse with plain water after using) in Eating Hints book (pg 18)  Brush your teeth before/after meals & before bed  · For diarrhea: drink plenty of fluids (>64 oz/day), avoid carbonation; eat 5-6 small meals/day; include high sodium & potassium foods/liquids (Sodium: soup/broth; Potassium: bananas, canned apricots, potatoes); eat low-fiber foods; choose foods/liquids that are room temperature; avoid foods/drinks that can make diarrhea worse (ex  high fiber, high sugar, hot/cold drinks, greasy/fatty foods, gas forming foods such as beans, raw produce, milk products, alcohol, spicy foods, caffeine, sugar alcohols (xylitol, sorbitol), and apple juice (high in sorbitol) (see pages 15-16 in your Eating Hints book)  If diarrhea is severe, consider adding Banatrol (banana flakes) 1-3 times per day mixed in applesauce (can be purchased online from [address] [place] [address])  · For nausea/vomiting: try plain/bland foods; try lemon/lime flavors; eat 5-6 small meals/day (except when vomiting); do not skip meals/snacks; choose appealing foods; sip only small amounts of liquids during meals; stay hydrated in between meals; eat foods/drinks that are room temperature; eat dry toast/crackers (see pages  21-22 and 31-32 in your Eating Hints book)  · Weigh yourself regularly  If you notice weight loss, make an effort to increase your daily food/calorie intake  If you continue to notice loss after these efforts, reach out to your dietitian to establish a plan to stabilize weight      · High calorie/high protein snacks between meals: cheese and crackers, oral nutrition supplements, PB on crackers or waffles, cottage cheese and fruit, milk, handful of nuts  · Switch to whole milk (for more calories) or try whole fairlife milk (for more calories and protein)  · Other sources of protein besides meat: milk, oral nutrition supplements, fish, tuna salad, yogurt, cheese, nuts/nut butter, eggs, beans/legumes     Follow Up Plan: 10/5/22 phone follow up at 1 pm  Recommend Referral to Other Providers: none at this time

## 2022-09-21 NOTE — PATIENT INSTRUCTIONS
Nutrition Rx & Recommendations:  Diet: High Calorie, High Protein (for high calorie foods see pages 52-53, and for high protein foods see pages 49-51 in your Eating Hints book)  Keep a daily food journal (pen/paper, patti such as [employer])  Nutrition Supplements: increase to 2 CIB per day  May use homemade shakes/smoothies as desired  If using a pre-made shake/bar, choose ones with >300 calories and >10 grams protein (ex  Ensure Complete, Ensure Enlive, Ensure Plus, Boost Plus, Boost Very High Calorie, etc )  Small, frequent meals/snacks may be easier to tolerate than 3 large daily meals  Aim for 5-6 small meals per day (every 2-3 hours)  Include protein at all meals/snacks  Include a variety of foods (as tolerated/allowed by diet)  Incorporate physical activity as able/allowed  Stay hydrated by sipping fluids of choice/tolerance throughout the day  Liquid nutrition may be better tolerated than solids at times  Alter food choices and eating patterns to accommodate changing needs  Refer to Eating Hints book for other meal/snack ideas and symptom management  Follow proper oral care; Try baking soda/salt water rinse recipe (mix 3/4 tsp salt + 1 tsp baking soda + 1 qt water; rinse with plain water after using) in Eating Hints book (pg 18)  Brush your teeth before/after meals & before bed  For diarrhea: drink plenty of fluids (>64 oz/day), avoid carbonation; eat 5-6 small meals/day; include high sodium & potassium foods/liquids (Sodium: soup/broth; Potassium: bananas, canned apricots, potatoes); eat low-fiber foods; choose foods/liquids that are room temperature; avoid foods/drinks that can make diarrhea worse (ex  high fiber, high sugar, hot/cold drinks, greasy/fatty foods, gas forming foods such as beans, raw produce, milk products, alcohol, spicy foods, caffeine, sugar alcohols (xylitol, sorbitol), and apple juice (high in sorbitol) (see pages 15-16 in your Eating Hints book)    If diarrhea is severe, consider adding Banatrol (banana flakes) 1-3 times per day mixed in applesauce (can be purchased online from [address])  For nausea/vomiting: try plain/bland foods; try lemon/lime flavors; eat 5-6 small meals/day (except when vomiting); do not skip meals/snacks; choose appealing foods; sip only small amounts of liquids during meals; stay hydrated in between meals; eat foods/drinks that are room temperature; eat dry toast/crackers (see pages  21-22 and 31-32 in your Eating Hints book)  Weigh yourself regularly  If you notice weight loss, make an effort to increase your daily food/calorie intake  If you continue to notice loss after these efforts, reach out to your dietitian to establish a plan to stabilize weight      High calorie/high protein snacks between meals: cheese and crackers, oral nutrition supplements, PB on crackers or waffles, cottage cheese and fruit, milk, handful of nuts  Switch to whole milk (for more calories) or try whole fairlife milk (for more calories and protein)  Other sources of protein besides meat: milk, oral nutrition supplements, fish, tuna salad, yogurt, cheese, nuts/nut butter, eggs, beans/legumes     Follow Up Plan: 10/5/22 phone follow up at 1 pm  Recommend Referral to Other Providers: none at this time

## 2022-09-22 ENCOUNTER — APPOINTMENT (OUTPATIENT)
Dept: RADIATION ONCOLOGY | Facility: HOSPITAL | Age: 82
End: 2022-09-22
Payer: COMMERCIAL

## 2022-09-22 PROCEDURE — 77386 HB NTSTY MODUL RAD TX DLVR CPLX: CPT | Performed by: INTERNAL MEDICINE

## 2022-09-22 PROCEDURE — 77387 GUIDANCE FOR RADJ TX DLVR: CPT | Performed by: INTERNAL MEDICINE

## 2022-09-23 ENCOUNTER — APPOINTMENT (OUTPATIENT)
Dept: RADIATION ONCOLOGY | Facility: HOSPITAL | Age: 82
End: 2022-09-23
Payer: COMMERCIAL

## 2022-09-23 PROCEDURE — 77386 HB NTSTY MODUL RAD TX DLVR CPLX: CPT | Performed by: RADIOLOGY

## 2022-09-23 PROCEDURE — 77387 GUIDANCE FOR RADJ TX DLVR: CPT | Performed by: RADIOLOGY

## 2022-09-26 ENCOUNTER — APPOINTMENT (OUTPATIENT)
Dept: RADIATION ONCOLOGY | Facility: HOSPITAL | Age: 82
End: 2022-09-26
Attending: INTERNAL MEDICINE
Payer: COMMERCIAL

## 2022-09-26 ENCOUNTER — APPOINTMENT (OUTPATIENT)
Dept: RADIATION ONCOLOGY | Facility: HOSPITAL | Age: 82
End: 2022-09-26
Payer: COMMERCIAL

## 2022-09-26 DIAGNOSIS — T45.1X5A CHEMOTHERAPY INDUCED NAUSEA AND VOMITING: ICD-10-CM

## 2022-09-26 DIAGNOSIS — R11.2 CHEMOTHERAPY INDUCED NAUSEA AND VOMITING: ICD-10-CM

## 2022-09-26 PROCEDURE — 77387 GUIDANCE FOR RADJ TX DLVR: CPT | Performed by: STUDENT IN AN ORGANIZED HEALTH CARE EDUCATION/TRAINING PROGRAM

## 2022-09-26 PROCEDURE — 77336 RADIATION PHYSICS CONSULT: CPT | Performed by: INTERNAL MEDICINE

## 2022-09-26 PROCEDURE — 77386 HB NTSTY MODUL RAD TX DLVR CPLX: CPT | Performed by: STUDENT IN AN ORGANIZED HEALTH CARE EDUCATION/TRAINING PROGRAM

## 2022-09-26 RX ORDER — PROCHLORPERAZINE MALEATE 10 MG
10 TABLET ORAL EVERY 6 HOURS PRN
Qty: 30 TABLET | Refills: 0 | Status: SHIPPED | OUTPATIENT
Start: 2022-09-26

## 2022-09-26 NOTE — TELEPHONE ENCOUNTER
Patient is at his rad onc apt vomiting in their office  Patient is out of his Compazine and asking for a refill  He stated to nurse Tashia Bolaños that it had worked in the past  Can you please send this to his pharmacy  I advised Tashia Bolaños to have Nicolette Chávez call us if he cannot manage the vomitting   with this med to call our office to let us know

## 2022-09-27 ENCOUNTER — APPOINTMENT (OUTPATIENT)
Dept: RADIATION ONCOLOGY | Facility: HOSPITAL | Age: 82
End: 2022-09-27
Payer: COMMERCIAL

## 2022-09-27 ENCOUNTER — APPOINTMENT (OUTPATIENT)
Dept: RADIATION ONCOLOGY | Facility: HOSPITAL | Age: 82
End: 2022-09-27
Attending: INTERNAL MEDICINE
Payer: COMMERCIAL

## 2022-09-27 PROCEDURE — 77387 GUIDANCE FOR RADJ TX DLVR: CPT | Performed by: INTERNAL MEDICINE

## 2022-09-27 PROCEDURE — 77386 HB NTSTY MODUL RAD TX DLVR CPLX: CPT | Performed by: INTERNAL MEDICINE

## 2022-09-28 ENCOUNTER — OFFICE VISIT (OUTPATIENT)
Dept: HEMATOLOGY ONCOLOGY | Facility: HOSPITAL | Age: 82
End: 2022-09-28
Payer: COMMERCIAL

## 2022-09-28 ENCOUNTER — APPOINTMENT (OUTPATIENT)
Dept: RADIATION ONCOLOGY | Facility: HOSPITAL | Age: 82
End: 2022-09-28
Payer: COMMERCIAL

## 2022-09-28 VITALS
SYSTOLIC BLOOD PRESSURE: 140 MMHG | WEIGHT: 124 LBS | OXYGEN SATURATION: 98 % | HEIGHT: 67 IN | RESPIRATION RATE: 16 BRPM | BODY MASS INDEX: 19.46 KG/M2 | HEART RATE: 92 BPM | TEMPERATURE: 97.4 F | DIASTOLIC BLOOD PRESSURE: 70 MMHG

## 2022-09-28 DIAGNOSIS — C25.9 PANCREATIC ADENOCARCINOMA (HCC): Primary | ICD-10-CM

## 2022-09-28 PROCEDURE — 99214 OFFICE O/P EST MOD 30 MIN: CPT | Performed by: INTERNAL MEDICINE

## 2022-09-28 PROCEDURE — 77386 HB NTSTY MODUL RAD TX DLVR CPLX: CPT | Performed by: RADIOLOGY

## 2022-09-28 PROCEDURE — 77387 GUIDANCE FOR RADJ TX DLVR: CPT | Performed by: RADIOLOGY

## 2022-09-29 ENCOUNTER — APPOINTMENT (OUTPATIENT)
Dept: RADIATION ONCOLOGY | Facility: HOSPITAL | Age: 82
End: 2022-09-29
Payer: COMMERCIAL

## 2022-09-29 PROCEDURE — 77387 GUIDANCE FOR RADJ TX DLVR: CPT | Performed by: INTERNAL MEDICINE

## 2022-09-29 PROCEDURE — 77336 RADIATION PHYSICS CONSULT: CPT | Performed by: INTERNAL MEDICINE

## 2022-09-29 PROCEDURE — 77386 HB NTSTY MODUL RAD TX DLVR CPLX: CPT | Performed by: INTERNAL MEDICINE

## 2022-10-04 ENCOUNTER — TELEPHONE (OUTPATIENT)
Dept: RADIATION ONCOLOGY | Facility: HOSPITAL | Age: 82
End: 2022-10-04

## 2022-10-04 ENCOUNTER — TELEPHONE (OUTPATIENT)
Dept: HEMATOLOGY ONCOLOGY | Facility: CLINIC | Age: 82
End: 2022-10-04

## 2022-10-04 LAB
ALBUMIN SERPL-MCNC: 4 G/DL (ref 3.6–5.1)
ALBUMIN/GLOB SERPL: 1.6 (CALC) (ref 1–2.5)
ALP SERPL-CCNC: 126 U/L (ref 35–144)
ALT SERPL-CCNC: 12 U/L (ref 9–46)
AST SERPL-CCNC: 14 U/L (ref 10–35)
BASOPHILS # BLD AUTO: 10 CELLS/UL (ref 0–200)
BASOPHILS NFR BLD AUTO: 0.5 %
BILIRUB SERPL-MCNC: 0.5 MG/DL (ref 0.2–1.2)
BUN SERPL-MCNC: 13 MG/DL (ref 7–25)
BUN/CREAT SERPL: ABNORMAL (CALC) (ref 6–22)
CALCIUM SERPL-MCNC: 8.9 MG/DL (ref 8.6–10.3)
CHLORIDE SERPL-SCNC: 101 MMOL/L (ref 98–110)
CO2 SERPL-SCNC: 27 MMOL/L (ref 20–32)
CREAT SERPL-MCNC: 0.75 MG/DL (ref 0.7–1.22)
EOSINOPHIL # BLD AUTO: 91 CELLS/UL (ref 15–500)
EOSINOPHIL NFR BLD AUTO: 4.8 %
ERYTHROCYTE [DISTWIDTH] IN BLOOD BY AUTOMATED COUNT: 14 % (ref 11–15)
GFR/BSA.PRED SERPLBLD CYS-BASED-ARV: 91 ML/MIN/1.73M2
GLOBULIN SER CALC-MCNC: 2.5 G/DL (CALC) (ref 1.9–3.7)
GLUCOSE SERPL-MCNC: 206 MG/DL (ref 65–139)
HCT VFR BLD AUTO: 32.4 % (ref 38.5–50)
HGB BLD-MCNC: 11 G/DL (ref 13.2–17.1)
LYMPHOCYTES # BLD AUTO: 251 CELLS/UL (ref 850–3900)
LYMPHOCYTES NFR BLD AUTO: 13.2 %
MAGNESIUM SERPL-MCNC: 1.8 MG/DL (ref 1.5–2.5)
MCH RBC QN AUTO: 30.9 PG (ref 27–33)
MCHC RBC AUTO-ENTMCNC: 34 G/DL (ref 32–36)
MCV RBC AUTO: 91 FL (ref 80–100)
MONOCYTES # BLD AUTO: 412 CELLS/UL (ref 200–950)
MONOCYTES NFR BLD AUTO: 21.7 %
NEUTROPHILS # BLD AUTO: 1136 CELLS/UL (ref 1500–7800)
NEUTROPHILS NFR BLD AUTO: 59.8 %
PLATELET # BLD AUTO: 160 THOUSAND/UL (ref 140–400)
PMV BLD REES-ECKER: 11.1 FL (ref 7.5–12.5)
POTASSIUM SERPL-SCNC: 3.1 MMOL/L (ref 3.5–5.3)
PROT SERPL-MCNC: 6.5 G/DL (ref 6.1–8.1)
RBC # BLD AUTO: 3.56 MILLION/UL (ref 4.2–5.8)
SODIUM SERPL-SCNC: 137 MMOL/L (ref 135–146)
WBC # BLD AUTO: 1.9 THOUSAND/UL (ref 3.8–10.8)

## 2022-10-04 NOTE — PROGRESS NOTES
HEMATOLOGY / [address] FOLLOW UP NOTE    Primary Care Provider: Nina De La Cruz  Referring Provider:    MRN: [id number]  : 1940    Reason for Encounter: follow up locally advance pancreatic cancer       Oncology History Overview Note   3/2022 - locally advanced, unresectable adenocarcinoma of the pancreas     2022 - start FOLFIRINOX with 20% dose reduction in oxaliplatin and irinotecan due to age     2022 - 5FU dose reduced by 20% due to diarrhea     2022 - cycle 7 delayed due to diarrhea - bolus 5-FU further reduced by a total of 30% and infusional 5-FU dose reduced by 20%    2022 - stop FOLFIRINOX due to inadequate response, start xeloda 1000 mg BID M-F concurrently with RT to pancreas    2022 - start xeloda 1000 mg BID M-F with RT to pancreas     Pancreatic adenocarcinoma ([facility] [address] )   3/4/2022 Biopsy    A-B-C  Pancreas, pancreatic head mass   Malignant (PSC Category VI) -  Adenocarcinoma       3/4/2022 -  Cancer Staged    Staging form: Pancreas, AJCC 8th Edition  - Clinical stage from 3/4/2022: Stage IB (cT2, cN0, cM0) - Signed by Lucy Ordaz MD on 2022  Stage prefix: Initial diagnosis  Total positive nodes: 0       3/14/2022 Initial Diagnosis    Pancreatic adenocarcinoma ([facility] [address] )     2022 - 2022 Chemotherapy    fluorouracil (ADRUCIL), 400 mg/m2 = 690 mg, Intravenous, Once, 7 of 8 cycles  Dose modification: 320 mg/m2 (original dose 400 mg/m2, Cycle 4), 280 mg/m2 (original dose 400 mg/m2, Cycle 7, Reason: Dose Not Tolerated, Comment: 30% dose reduction due to diarrhea)  Administration: 690 mg (2022), 690 mg (5/3/2022), 690 mg (2022), 555 mg (2022), 555 mg (2022), 555 mg (2022), 485 mg (2022)  pegfilgrastim (NEULASTA), 6 mg, Subcutaneous, Once, 5 of 6 cycles  Administration: 6 mg (2022), 6 mg (2022), 6 mg (2022), 6 mg (2022)  irinotecan (CAMPTOSAR) chemo infusion, 125 mg/m2 = 216 mg (69 4 % of original dose 180 mg/m2), Intravenous, Once, 7 of 8 cycles  Dose modification: 125 mg/m2 (original dose 180 mg/m2, Cycle 1, Reason: Anticipated Tolerance, Comment: Anticipated tolerance and Bilirubin being between 1-2), 90 mg/m2 (original dose 180 mg/m2, Cycle 4, Reason: Dose Not Tolerated, Comment: 50% dose reduction from original dose due to diarrhea)  Administration: 216 mg (4/19/2022), 216 mg (5/3/2022), 220 mg (5/17/2022), 156 mg (5/31/2022), 160 mg (6/14/2022), 160 mg (6/28/2022), 160 mg (7/20/2022)  leucovorin calcium IVPB, 692 mg, Intravenous, Once, 7 of 8 cycles  Administration: 700 mg (4/19/2022), 700 mg (5/3/2022), 700 mg (5/17/2022), 700 mg (5/31/2022), 700 mg (6/14/2022), 700 mg (6/28/2022), 700 mg (7/20/2022)  oxaliplatin (ELOXATIN) chemo infusion, 65 mg/m2 = 112 45 mg (76 5 % of original dose 85 mg/m2), Intravenous, Once, 7 of 8 cycles  Dose modification: 65 mg/m2 (original dose 85 mg/m2, Cycle 1, Reason: Anticipated Tolerance)  Administration: 112 45 mg (4/19/2022), 112 45 mg (5/3/2022), 112 45 mg (5/17/2022), 112 45 mg (5/31/2022), 112 45 mg (6/14/2022), 112 45 mg (6/28/2022), 112 45 mg (7/20/2022)  fluorouracil (ADRUCIL) ambulatory infusion Soln, 1,200 mg/m2/day = 4,150 mg, Intravenous, Over 46 hours, 7 of 8 cycles  Dose modification: 1,000 mg/m2/day (original dose 1,200 mg/m2/day, Cycle 8, Reason: Dose Not Tolerated, Comment: dose reduction due to diarrhea), 1,000 mg/m2/day (original dose 1,200 mg/m2/day, Cycle 7, Reason: Dose Not Tolerated, Comment: dose reduction due to diarrhea)     7/22/2022 -  Chemotherapy    pegfilgrastim (NEULASTA), 6 mg, Subcutaneous, Once, 0 of 1 cycle     7/27/2022 Adverse Reaction    3/2022 - locally advanced, unresectable adenocarcinoma of the pancreas     4/19/2022 - start FOLFIRINOX with 20% dose reduction in oxaliplatin and irinotecan due to age    5/2022 - 5FU dose reduced by 20% due to diarrhea    7/12/2022 - cycle 7 delayed due to diarrhea - bolus 5-FU further reduced by a total of 30% and infusional 5-FU dose reduced by 20%        Chemotherapy    Xeloda 1000mg BID M-F concurrent with RT         Interval History: patient presents for follow up of his pancreatic cancer  Is last radiation to the pancreas is tomorrow  He is not feeling well today  He is very cold and has a runny nose  He has been having N/V/D for the past few days  The lomotil does slow down the diarrhea and he takes it every 6 hours  He denies any BRBPR or melena  He has lost weight  He is feeling weak  No falls  REVIEW OF SYSTEMS:  Please note that a 14-point review of systems was performed to include Constitutional, HEENT, Respiratory, CVS, GI, , Musculoskeletal, Integumentary, Neurologic, Rheumatologic, Endocrinologic, Psychiatric, Lymphatic, and Hematologic/Oncologic systems were reviewed and are negative unless otherwise stated in HPI  Positive and negative findings pertinent to this evaluation are incorporated into the history of present illness  ECOG PS: 2    PROBLEM LIST:  Patient Active Problem List   Diagnosis    Type 2 diabetes mellitus without complication, with long-term current use of insulin (HCC)    GERD (gastroesophageal reflux disease)    Pancreatic mass    History of pulmonary embolism    Hyperbilirubinemia    Anxiety about health    Pancreatic adenocarcinoma ([facility] [address] )    Chemotherapy induced neutropenia (HCC)    Hypokalemia    Dehydration    Mild protein-calorie malnutrition (HCC)    Acute exacerbation of chronic obstructive pulmonary disease ([facility] [address] )       Assessment / Plan: I told alicja to stop his xeloda now  He only has 3 doses left and this treatment has been hard on him  I am going to to give him a break to recover and will see him back in 1 month with CT, CBC, CMP and CA 19-9 prior  I encouraged him to supplement his nutrition with ensure or boost as much as possible and to remain well hydrated  He knows to call in the interim with any questions or concerns      I spent 30 minutes on chart review, face to face counseling time, coordination of care and documentation  Past Medical History:   has a past medical history of Ambulates with cane, Anxiety, Depression, Diabetes mellitus ([facility] [address] ), GERD (gastroesophageal reflux disease), History of pulmonary embolus (PE), Hyperlipidemia, Multiple thyroid nodules, Pancreatic cancer ([facility] [address] ), and Pancreatic mass  PAST SURGICAL HISTORY:   has a past surgical history that includes Cataract extraction (Right); Appendectomy; Tunneled venous port placement (Right, 4/11/2022); and FL guided central venous access device insertion (4/11/2022)  CURRENT MEDICATIONS  Current Outpatient Medications   Medication Sig Dispense Refill    Alcohol Swabs 70 % PADS May substitute brand based on insurance coverage  Check glucose BID  100 each 0    ALPRAZolam (XANAX) 0 5 mg tablet Take 0 5 tablets (0 25 mg total) by mouth 3 (three) times a day as needed for anxiety 90 tablet 0    apixaban (ELIQUIS) 5 mg Take 5 mg by mouth 2 (two) times a day      Blood Glucose Monitoring Suppl (OneTouch Verio Reflect) w/Device KIT May substitute brand based on insurance coverage  Check glucose BID  1 kit 0    capecitabine (XELODA) 500 MG tablet Take 2 pills twice a day Monday through Friday while on radiation  120 tablet 0    diphenoxylate-atropine (LOMOTIL) 2 5-0 025 mg per tablet Take 1 tablet by mouth 4 (four) times a day as needed for diarrhea 60 tablet 0    glucose blood (OneTouch Verio) test strip May substitute brand based on insurance coverage   Check glucose BID  100 each 0    Klor-Con M20 20 MEQ tablet TAKE 2 TABLETS BY MOUTH DAILY 180 tablet 1    metFORMIN (GLUCOPHAGE) 1000 MG tablet Take 1,000 mg by mouth 2 (two) times a day with meals      metFORMIN (GLUCOPHAGE-XR) 500 mg 24 hr tablet Take 500 mg by mouth 2 (two) times a day      Multiple Vitamin (MULTIVITAMIN ADULT PO) Take 1 tablet by mouth daily      omeprazole (PriLOSEC) 20 mg delayed release capsule  omeprazole (PriLOSEC) 40 MG capsule Take 40 mg by mouth every evening      OneTouch Delica Lancets 68O MISC May substitute brand based on insurance coverage  Check glucose BID  100 each 0    polyethylene glycol (MIRALAX) 17 g packet Take 17 g by mouth daily      pravastatin (PRAVACHOL) 20 mg tablet Take 20 mg by mouth daily      prochlorperazine (COMPAZINE) 10 mg tablet Take 1 tablet (10 mg total) by mouth every 6 (six) hours as needed for nausea or vomiting 30 tablet 0    traMADol (ULTRAM) 50 mg tablet Take 1 tablet (50 mg total) by mouth every 6 (six) hours as needed for moderate pain 60 tablet 0     No current facility-administered medications for this visit  [unfilled]    SOCIAL HISTORY:   reports that he quit smoking about 57 years ago  He started smoking about 62 years ago  He quit after 5 00 years of use  He has never used smokeless tobacco  He reports previous alcohol use  He reports that he does not use drugs  FAMILY HISTORY:  family history includes Alcohol abuse in his mother; Diabetes in his father; Throat cancer in his brother  ALLERGIES:  is allergic to aleve [naproxen]  Physical Exam:  Vital Signs:   Visit Vitals  /70 (BP Location: Left arm, Patient Position: Sitting, Cuff Size: Adult)   Pulse 92   Temp (!) 97 4 °F (36 3 °C) (Tympanic)   Resp 16   Ht 5' 7" (1 702 m)   Wt 56 2 kg (124 lb)   SpO2 98%   BMI 19 42 kg/m²   Smoking Status Former Smoker   BSA 1 65 m²     Body mass index is 19 42 kg/m²  Body surface area is 1 65 meters squared  GEN: Alert, awake oriented x3, in no acute distress  HEENT- No pallor, icterus, cyanosis, no oral mucosal lesions,   LAD - no palpable cervical, clavicle, axillary, inguinal LAD  Heart- normal S1 S2, regular rate and rhythm, No murmur, rubs     Lungs- clear breathing sound bilateral    Abdomen- soft, Non tender, bowel sounds present  Extremities- No cyanosis, clubbing, edema  Neuro- No focal neurological deficit    Labs:  Lab Results Component Value Date    WBC 1 9 (L) 10/03/2022    HGB 11 0 (L) 10/03/2022    HCT 32 4 (L) 10/03/2022    MCV 91 0 10/03/2022     10/03/2022     Lab Results   Component Value Date    SODIUM 137 10/03/2022    K 3 1 (L) 10/03/2022     10/03/2022    CO2 27 10/03/2022    AGAP 8 07/11/2022    BUN 13 10/03/2022    CREATININE 0 75 10/03/2022    GLUC 206 (H) 10/03/2022    CALCIUM 8 9 10/03/2022    AST 14 10/03/2022    ALT 12 10/03/2022    ALKPHOS 126 10/03/2022    TP 6 5 10/03/2022    TBILI 0 5 10/03/2022    EGFR 91 10/03/2022

## 2022-10-04 NOTE — TELEPHONE ENCOUNTER
Left  for patient to give me a call back at my direct teams number to go over instructions for potassium  Patient needs to take another 20 mEq at night, added onto the 40 mEq he is already taking daily   I

## 2022-10-04 NOTE — TELEPHONE ENCOUNTER
[id number] on pt's VM to call the [facility]  Office/left phone number to return call  Pt calling earlier-question about need for lab work for Dr Luca Morrison   RT EOT [phone]  Phone follow up appt-[phone]  Pt does not need any further lab work from [facility]

## 2022-10-04 NOTE — TELEPHONE ENCOUNTER
Rec'd call from patient asking if Dr Shar Lopez wanted him to get labs completed weekly  I told him he does not need to get labs completed weekly  Patient verbalized understanding

## 2022-10-04 NOTE — TELEPHONE ENCOUNTER
Rec'd call from patient to go over his potassium  I informed him that his potassium was low on his recent blood work  Patient then admitted that he hasn't taken his potassium supplements for about a week now because he feels nauseous  I told him that he needs to restart it today, and he can take a compazine pill about 30 minutes before taking the potassium pill to prevent any nausea  Patient verbalized understanding of this  Patient also asked if he needs to get blood work weekly for Dr Karyn Cha  I told him that he needs to call their office and I gave him their phone number to call and confirm  Patient wrote down their number and will call them now

## 2022-10-04 NOTE — TELEPHONE ENCOUNTER
PT is confused about his labs  He wants to know if Dr Mendes wants him to get blood work 1 a week  Please call pt to explain

## 2022-10-05 ENCOUNTER — TELEPHONE (OUTPATIENT)
Dept: NUTRITION | Facility: CLINIC | Age: 82
End: 2022-10-05

## 2022-10-05 NOTE — TELEPHONE ENCOUNTER
Sue Bermudez for our scheduled RD follow up appointment today 10/5/22 at 1 pm  Naty [place] states he's very tired at the moment, and asked if we can reschedule when he's feeling better  Offered to reschedule appt for tomorrow at 2:30 pm via phone, and he was agreeable to this  Appointment rescheduled  Naty [place] has RD contact information if needed

## 2022-10-06 ENCOUNTER — TELEPHONE (OUTPATIENT)
Dept: NUTRITION | Facility: CLINIC | Age: 82
End: 2022-10-06

## 2022-10-06 NOTE — TELEPHONE ENCOUNTER
Dashawn Alonzo was scheduled for an RD phone follow up appointment today (10/6/2022) and was unable to attend his appointment (no answer)  A call was placed and a voice message was left encouraging him to call back and reschedule his appointment at a more convenient time for him  RD contact information was provided

## 2022-10-07 NOTE — TELEPHONE ENCOUNTER
Received VM from [address] [place] [address] yesterday stating he was unable to talk for our appt d/t having abdominal pain and nausea  Called back today to offer to reschedule appt  Jaxon Vu would like to reschedule for next week   Appt rescheduled for 10/12/22 at 3 pm via phone

## 2022-10-11 PROBLEM — E86.0 DEHYDRATION: Status: RESOLVED | Noted: 2022-06-02 | Resolved: 2022-10-11

## 2022-10-12 ENCOUNTER — NUTRITION (OUTPATIENT)
Dept: NUTRITION | Facility: HOSPITAL | Age: 82
End: 2022-10-12

## 2022-10-12 DIAGNOSIS — Z71.3 NUTRITIONAL COUNSELING: Primary | ICD-10-CM

## 2022-10-12 NOTE — PATIENT INSTRUCTIONS
Nutrition Rx & Recommendations:  Diet: High Calorie, High Protein (for high calorie foods see pages 52-53, and for high protein foods see pages 49-51 in your Eating Hints book)  Keep a daily food journal (pen/paper, patti such as [employer])  Nutrition Supplements: increase to 2 CIB per day  May use homemade shakes/smoothies as desired  If using a pre-made shake/bar, choose ones with >300 calories and >10 grams protein (ex  Ensure Complete, Ensure Enlive, Ensure Plus, Boost Plus, Boost Very High Calorie, etc )  Small, frequent meals/snacks may be easier to tolerate than 3 large daily meals  Aim for 5-6 small meals per day (every 2-3 hours)  Include protein at all meals/snacks  Include a variety of foods (as tolerated/allowed by diet)  Incorporate physical activity as able/allowed  Stay hydrated by sipping fluids of choice/tolerance throughout the day  Liquid nutrition may be better tolerated than solids at times  Alter food choices and eating patterns to accommodate changing needs  Refer to Eating Hints book for other meal/snack ideas and symptom management  Follow proper oral care; Try baking soda/salt water rinse recipe (mix 3/4 tsp salt + 1 tsp baking soda + 1 qt water; rinse with plain water after using) in Eating Hints book (pg 18)  Brush your teeth before/after meals & before bed  For diarrhea: drink plenty of fluids (>64 oz/day), avoid carbonation; eat 5-6 small meals/day; include high sodium & potassium foods/liquids (Sodium: soup/broth; Potassium: bananas, canned apricots, potatoes); eat low-fiber foods; choose foods/liquids that are room temperature; avoid foods/drinks that can make diarrhea worse (ex  high fiber, high sugar, hot/cold drinks, greasy/fatty foods, gas forming foods such as beans, raw produce, milk products, alcohol, spicy foods, caffeine, sugar alcohols (xylitol, sorbitol), and apple juice (high in sorbitol) (see pages 15-16 in your Eating Hints book)    If diarrhea is severe, consider adding Banatrol (banana flakes) 1-3 times per day mixed in applesauce (can be purchased online from [id number] [facility] [address])  For nausea/vomiting: try plain/bland foods; try lemon/lime flavors; eat 5-6 small meals/day (except when vomiting); do not skip meals/snacks; choose appealing foods; sip only small amounts of liquids during meals; stay hydrated in between meals; eat foods/drinks that are room temperature; eat dry toast/crackers (see pages  21-22 and 31-32 in your Eating Hints book)  Weigh yourself regularly  If you notice weight loss, make an effort to increase your daily food/calorie intake  If you continue to notice loss after these efforts, reach out to your dietitian to establish a plan to stabilize weight      High calorie/high protein snacks between meals: cheese and crackers, oral nutrition supplements, PB on crackers or waffles, cottage cheese and fruit, milk, handful of nuts  Continue to use whole milk (for more calories) or try whole fairlife milk (for more calories and protein)  Other sources of protein besides meat: milk, oral nutrition supplements, fish, tuna salad, yogurt, cheese, nuts/nut butter, eggs, beans/legumes     Follow Up Plan: 11/9/22 phone follow up at 3 pm  Recommend Referral to Other Providers: none at this time

## 2022-10-17 ENCOUNTER — TELEPHONE (OUTPATIENT)
Dept: HEMATOLOGY ONCOLOGY | Facility: HOSPITAL | Age: 82
End: 2022-10-17

## 2022-10-17 NOTE — TELEPHONE ENCOUNTER
Rec'd a call from the patient stating that he rec'd a call from someone named Francis Flores with an authorization code for his CT scan  Patient states that the code is [id number]  I told the patient that I will let the financial team know and he doesn't have to do anything with this authorization  Patient verbalized understanding

## 2022-10-21 ENCOUNTER — TELEPHONE (OUTPATIENT)
Dept: OTHER | Facility: OTHER | Age: 82
End: 2022-10-21

## 2022-10-21 DIAGNOSIS — E86.0 DEHYDRATION: Primary | ICD-10-CM

## 2022-10-21 NOTE — TELEPHONE ENCOUNTER
Patient of Dr Adriana Sage calling stating he would like to leave a message for her  He is wondering if he can get back on IV treatment therapy to build up his liquids, had been offered it previously but declined in hopes to get some more free time from hospital visits but feels as if he can get back on treatment, feels as if he is not eating enough  Please advise   [phone]

## 2022-10-22 PROBLEM — E86.0 DEHYDRATION: Status: ACTIVE | Noted: 2022-10-22

## 2022-10-24 ENCOUNTER — TELEPHONE (OUTPATIENT)
Dept: HEMATOLOGY ONCOLOGY | Facility: HOSPITAL | Age: 82
End: 2022-10-24

## 2022-10-24 DIAGNOSIS — E86.0 DEHYDRATION: Primary | ICD-10-CM

## 2022-10-24 NOTE — TELEPHONE ENCOUNTER
Spoke with patient this morning regarding his IVF weekly plan  I told him that Dr Robson Lyles would like him to get weekly hydration to help him feel better  Patient believes that this will help him as well  I told the patient that I will call him back shortly with appt dates and times  Patient verbalized understanding

## 2022-10-24 NOTE — TELEPHONE ENCOUNTER
Spoke with patient to let him know that his infusion appts are scheduled, starting tomorrow at 1230 at [address]  Patient wrote down this date and time and verbalized understanding

## 2022-10-24 NOTE — TELEPHONE ENCOUNTER
Scheduled  Please place chemotherapy treatment plan on HOLD to not cause confusion if the patient is not getting treatment

## 2022-10-25 ENCOUNTER — HOSPITAL ENCOUNTER (OUTPATIENT)
Dept: INFUSION CENTER | Facility: HOSPITAL | Age: 82
Discharge: HOME | End: 2022-10-25
Payer: COMMERCIAL

## 2022-10-25 VITALS
TEMPERATURE: 98 F | SYSTOLIC BLOOD PRESSURE: 100 MMHG | OXYGEN SATURATION: 100 % | HEART RATE: 83 BPM | DIASTOLIC BLOOD PRESSURE: 59 MMHG | RESPIRATION RATE: 16 BRPM

## 2022-10-25 DIAGNOSIS — E86.0 DEHYDRATION: Primary | ICD-10-CM

## 2022-10-25 LAB
ALBUMIN SERPL BCP-MCNC: 3.4 G/DL (ref 3.5–5)
ALP SERPL-CCNC: 194 U/L (ref 46–116)
ALT SERPL W P-5'-P-CCNC: 29 U/L (ref 12–78)
ANION GAP SERPL CALCULATED.3IONS-SCNC: 4 MMOL/L (ref 4–13)
AST SERPL W P-5'-P-CCNC: 22 U/L (ref 5–45)
BASOPHILS # BLD AUTO: 0.04 THOUSANDS/ÂΜL (ref 0–0.1)
BASOPHILS NFR BLD AUTO: 2 % (ref 0–1)
BILIRUB SERPL-MCNC: 0.5 MG/DL (ref 0.2–1)
BUN SERPL-MCNC: 23 MG/DL (ref 5–25)
CALCIUM ALBUM COR SERPL-MCNC: 9.3 MG/DL (ref 8.3–10.1)
CALCIUM SERPL-MCNC: 8.8 MG/DL (ref 8.3–10.1)
CHLORIDE SERPL-SCNC: 100 MMOL/L (ref 96–108)
CO2 SERPL-SCNC: 28 MMOL/L (ref 21–32)
CREAT SERPL-MCNC: 1.01 MG/DL (ref 0.6–1.3)
EOSINOPHIL # BLD AUTO: 0.13 THOUSAND/ÂΜL (ref 0–0.61)
EOSINOPHIL NFR BLD AUTO: 5 % (ref 0–6)
ERYTHROCYTE [DISTWIDTH] IN BLOOD BY AUTOMATED COUNT: 14.3 % (ref 11.6–15.1)
GFR SERPL CREATININE-BSD FRML MDRD: 69 ML/MIN/1.73SQ M
GLUCOSE SERPL-MCNC: 164 MG/DL (ref 65–140)
HCT VFR BLD AUTO: 28.6 % (ref 36.5–49.3)
HGB BLD-MCNC: 9.2 G/DL (ref 12–17)
IMM GRANULOCYTES # BLD AUTO: 0.01 THOUSAND/UL (ref 0–0.2)
IMM GRANULOCYTES NFR BLD AUTO: 0 % (ref 0–2)
LYMPHOCYTES # BLD AUTO: 0.25 THOUSANDS/ÂΜL (ref 0.6–4.47)
LYMPHOCYTES NFR BLD AUTO: 9 % (ref 14–44)
MAGNESIUM SERPL-MCNC: 2 MG/DL (ref 1.6–2.6)
MCH RBC QN AUTO: 29.9 PG (ref 26.8–34.3)
MCHC RBC AUTO-ENTMCNC: 32.2 G/DL (ref 31.4–37.4)
MCV RBC AUTO: 93 FL (ref 82–98)
MONOCYTES # BLD AUTO: 0.42 THOUSAND/ÂΜL (ref 0.17–1.22)
MONOCYTES NFR BLD AUTO: 15 % (ref 4–12)
NEUTROPHILS # BLD AUTO: 1.89 THOUSANDS/ÂΜL (ref 1.85–7.62)
NEUTS SEG NFR BLD AUTO: 69 % (ref 43–75)
NRBC BLD AUTO-RTO: 0 /100 WBCS
PLATELET # BLD AUTO: 188 THOUSANDS/UL (ref 149–390)
PMV BLD AUTO: 10.3 FL (ref 8.9–12.7)
POTASSIUM SERPL-SCNC: 4.3 MMOL/L (ref 3.5–5.3)
PROT SERPL-MCNC: 7.3 G/DL (ref 6.4–8.4)
RBC # BLD AUTO: 3.08 MILLION/UL (ref 3.88–5.62)
SODIUM SERPL-SCNC: 132 MMOL/L (ref 135–147)
WBC # BLD AUTO: 2.74 THOUSAND/UL (ref 4.31–10.16)

## 2022-10-25 PROCEDURE — 83735 ASSAY OF MAGNESIUM: CPT | Performed by: INTERNAL MEDICINE

## 2022-10-25 PROCEDURE — 85025 COMPLETE CBC W/AUTO DIFF WBC: CPT | Performed by: INTERNAL MEDICINE

## 2022-10-25 PROCEDURE — 80053 COMPREHEN METABOLIC PANEL: CPT | Performed by: INTERNAL MEDICINE

## 2022-10-25 RX ADMIN — SODIUM CHLORIDE 1000 ML: 0.9 INJECTION, SOLUTION INTRAVENOUS at 12:48

## 2022-10-25 NOTE — PROGRESS NOTES
Rec'd pt in [facility], pt c/o poor appetite  Glucerna samples provided and were well rec'd  Pt states he will give them a try  Port easily accessed for port labs and Hydration fluids  Infusion tolerated well  Pt napped throughout  Port then dc'd and pt discharged to home with steady gait  AVS in hand  Next appt times reviewed with pt

## 2022-10-28 ENCOUNTER — HOSPITAL ENCOUNTER (OUTPATIENT)
Dept: CT IMAGING | Facility: HOSPITAL | Age: 82
Discharge: HOME | End: 2022-10-28
Attending: INTERNAL MEDICINE
Payer: COMMERCIAL

## 2022-10-28 DIAGNOSIS — C25.9 PANCREATIC ADENOCARCINOMA (HCC): ICD-10-CM

## 2022-10-28 PROCEDURE — G1004 CDSM NDSC: HCPCS

## 2022-10-28 PROCEDURE — 74177 CT ABD & PELVIS W/CONTRAST: CPT

## 2022-10-28 PROCEDURE — 71260 CT THORAX DX C+: CPT

## 2022-10-28 RX ADMIN — IOHEXOL 100 ML: 350 INJECTION, SOLUTION INTRAVENOUS at 11:16

## 2022-10-31 ENCOUNTER — OFFICE VISIT (OUTPATIENT)
Dept: HEMATOLOGY ONCOLOGY | Facility: HOSPITAL | Age: 82
End: 2022-10-31

## 2022-10-31 ENCOUNTER — TELEPHONE (OUTPATIENT)
Dept: HEMATOLOGY ONCOLOGY | Facility: HOSPITAL | Age: 82
End: 2022-10-31

## 2022-10-31 VITALS
DIASTOLIC BLOOD PRESSURE: 50 MMHG | BODY MASS INDEX: 18.68 KG/M2 | RESPIRATION RATE: 16 BRPM | TEMPERATURE: 98.2 F | HEART RATE: 86 BPM | WEIGHT: 119 LBS | OXYGEN SATURATION: 93 % | SYSTOLIC BLOOD PRESSURE: 80 MMHG | HEIGHT: 67 IN

## 2022-10-31 DIAGNOSIS — E87.6 HYPOKALEMIA: Primary | ICD-10-CM

## 2022-10-31 DIAGNOSIS — D70.1 CHEMOTHERAPY INDUCED NEUTROPENIA (HCC): ICD-10-CM

## 2022-10-31 DIAGNOSIS — C25.9 PANCREATIC ADENOCARCINOMA (HCC): ICD-10-CM

## 2022-10-31 DIAGNOSIS — R19.7 DIARRHEA, UNSPECIFIED TYPE: ICD-10-CM

## 2022-10-31 DIAGNOSIS — R11.2 CHEMOTHERAPY INDUCED NAUSEA AND VOMITING: ICD-10-CM

## 2022-10-31 DIAGNOSIS — T45.1X5A CHEMOTHERAPY INDUCED NEUTROPENIA (HCC): ICD-10-CM

## 2022-10-31 DIAGNOSIS — T45.1X5A CHEMOTHERAPY INDUCED NAUSEA AND VOMITING: ICD-10-CM

## 2022-10-31 RX ORDER — DIPHENOXYLATE HYDROCHLORIDE AND ATROPINE SULFATE 2.5; .025 MG/1; MG/1
1 TABLET ORAL 4 TIMES DAILY PRN
Qty: 60 TABLET | Refills: 0 | Status: SHIPPED | OUTPATIENT
Start: 2022-10-31

## 2022-10-31 RX ORDER — PROCHLORPERAZINE MALEATE 10 MG
10 TABLET ORAL EVERY 6 HOURS PRN
Qty: 30 TABLET | Refills: 3 | Status: SHIPPED | OUTPATIENT
Start: 2022-10-31

## 2022-10-31 RX ORDER — SODIUM CHLORIDE 9 MG/ML
20 INJECTION, SOLUTION INTRAVENOUS ONCE
Status: CANCELLED | OUTPATIENT
Start: 2022-11-08

## 2022-10-31 RX ORDER — SODIUM CHLORIDE 9 MG/ML
20 INJECTION, SOLUTION INTRAVENOUS ONCE
OUTPATIENT
Start: 2022-11-22

## 2022-10-31 RX ORDER — SODIUM CHLORIDE 9 MG/ML
20 INJECTION, SOLUTION INTRAVENOUS ONCE
OUTPATIENT
Start: 2022-11-15

## 2022-10-31 RX ORDER — ONDANSETRON HYDROCHLORIDE 8 MG/1
8 TABLET, FILM COATED ORAL EVERY 8 HOURS PRN
Qty: 30 TABLET | Refills: 3 | Status: SHIPPED | OUTPATIENT
Start: 2022-10-31

## 2022-10-31 NOTE — TELEPHONE ENCOUNTER
Please schedule new treatment plan on Tuesdays, Fluid plan added, patient can do fluid same day as treatment plan, schedule f/u in [facility] with Dr Marlene Marsh prior to cycle 1

## 2022-10-31 NOTE — TELEPHONE ENCOUNTER
I linked the hydration and chemo together on Tuesdays could you have them change the date for D1/C1 Gemzar/Abraxxane to 11/1?  Thank you

## 2022-11-01 ENCOUNTER — TELEPHONE (OUTPATIENT)
Dept: HEMATOLOGY ONCOLOGY | Facility: CLINIC | Age: 82
End: 2022-11-01

## 2022-11-01 ENCOUNTER — HOSPITAL ENCOUNTER (OUTPATIENT)
Dept: INFUSION CENTER | Facility: HOSPITAL | Age: 82
Discharge: HOME | End: 2022-11-01

## 2022-11-01 VITALS
OXYGEN SATURATION: 100 % | SYSTOLIC BLOOD PRESSURE: 104 MMHG | DIASTOLIC BLOOD PRESSURE: 63 MMHG | TEMPERATURE: 97.8 F | RESPIRATION RATE: 18 BRPM | HEART RATE: 75 BPM

## 2022-11-01 DIAGNOSIS — E86.0 DEHYDRATION: Primary | ICD-10-CM

## 2022-11-01 LAB
ALBUMIN SERPL BCP-MCNC: 3.5 G/DL (ref 3.5–5)
ALP SERPL-CCNC: 190 U/L (ref 46–116)
ALT SERPL W P-5'-P-CCNC: 25 U/L (ref 12–78)
ANION GAP SERPL CALCULATED.3IONS-SCNC: 9 MMOL/L (ref 4–13)
AST SERPL W P-5'-P-CCNC: 19 U/L (ref 5–45)
BASOPHILS # BLD AUTO: 0.04 THOUSANDS/ÂΜL (ref 0–0.1)
BASOPHILS NFR BLD AUTO: 1 % (ref 0–1)
BILIRUB SERPL-MCNC: 0.4 MG/DL (ref 0.2–1)
BUN SERPL-MCNC: 23 MG/DL (ref 5–25)
CALCIUM SERPL-MCNC: 8.7 MG/DL (ref 8.3–10.1)
CHLORIDE SERPL-SCNC: 103 MMOL/L (ref 96–108)
CO2 SERPL-SCNC: 23 MMOL/L (ref 21–32)
CREAT SERPL-MCNC: 0.93 MG/DL (ref 0.6–1.3)
EOSINOPHIL # BLD AUTO: 0.15 THOUSAND/ÂΜL (ref 0–0.61)
EOSINOPHIL NFR BLD AUTO: 4 % (ref 0–6)
ERYTHROCYTE [DISTWIDTH] IN BLOOD BY AUTOMATED COUNT: 14.3 % (ref 11.6–15.1)
GFR SERPL CREATININE-BSD FRML MDRD: 77 ML/MIN/1.73SQ M
GLUCOSE SERPL-MCNC: 118 MG/DL (ref 65–140)
HCT VFR BLD AUTO: 29.9 % (ref 36.5–49.3)
HGB BLD-MCNC: 9.8 G/DL (ref 12–17)
IMM GRANULOCYTES # BLD AUTO: 0.01 THOUSAND/UL (ref 0–0.2)
IMM GRANULOCYTES NFR BLD AUTO: 0 % (ref 0–2)
LYMPHOCYTES # BLD AUTO: 0.55 THOUSANDS/ÂΜL (ref 0.6–4.47)
LYMPHOCYTES NFR BLD AUTO: 15 % (ref 14–44)
MAGNESIUM SERPL-MCNC: 2 MG/DL (ref 1.6–2.6)
MCH RBC QN AUTO: 30 PG (ref 26.8–34.3)
MCHC RBC AUTO-ENTMCNC: 32.8 G/DL (ref 31.4–37.4)
MCV RBC AUTO: 91 FL (ref 82–98)
MONOCYTES # BLD AUTO: 0.55 THOUSAND/ÂΜL (ref 0.17–1.22)
MONOCYTES NFR BLD AUTO: 15 % (ref 4–12)
NEUTROPHILS # BLD AUTO: 2.38 THOUSANDS/ÂΜL (ref 1.85–7.62)
NEUTS SEG NFR BLD AUTO: 65 % (ref 43–75)
NRBC BLD AUTO-RTO: 0 /100 WBCS
PLATELET # BLD AUTO: 219 THOUSANDS/UL (ref 149–390)
PMV BLD AUTO: 10 FL (ref 8.9–12.7)
POTASSIUM SERPL-SCNC: 4.9 MMOL/L (ref 3.5–5.3)
PROT SERPL-MCNC: 7.6 G/DL (ref 6.4–8.4)
RBC # BLD AUTO: 3.27 MILLION/UL (ref 3.88–5.62)
SODIUM SERPL-SCNC: 135 MMOL/L (ref 135–147)
WBC # BLD AUTO: 3.68 THOUSAND/UL (ref 4.31–10.16)

## 2022-11-01 RX ADMIN — SODIUM CHLORIDE 1000 ML: 0.9 INJECTION, SOLUTION INTRAVENOUS at 12:50

## 2022-11-01 NOTE — TELEPHONE ENCOUNTER
Patient scheduled through cycle 3  Are we ok to run the hydration orders concurrently with premeds and chemo?   Or should I extend the appointment to accommodate the hydration orders

## 2022-11-01 NOTE — TELEPHONE ENCOUNTER
Spoke with Ruma Mcmanus RN from [address]  I explained that the patient should only be getting his hydration plan today, not his chemotherapy plan  We want his chemotherapy to start next week  I will send out a message to the infusion schedulers as well

## 2022-11-03 ENCOUNTER — OFFICE VISIT (OUTPATIENT)
Dept: PALLIATIVE MEDICINE | Age: 82
End: 2022-11-03

## 2022-11-03 VITALS
RESPIRATION RATE: 16 BRPM | SYSTOLIC BLOOD PRESSURE: 98 MMHG | HEART RATE: 87 BPM | DIASTOLIC BLOOD PRESSURE: 70 MMHG | BODY MASS INDEX: 18.89 KG/M2 | WEIGHT: 120.6 LBS | OXYGEN SATURATION: 96 % | TEMPERATURE: 98.5 F

## 2022-11-03 DIAGNOSIS — F41.8 ANXIETY ABOUT HEALTH: ICD-10-CM

## 2022-11-03 DIAGNOSIS — E44.1 MILD PROTEIN-CALORIE MALNUTRITION (HCC): ICD-10-CM

## 2022-11-03 DIAGNOSIS — E86.0 DEHYDRATION: ICD-10-CM

## 2022-11-03 DIAGNOSIS — T45.1X5A CHEMOTHERAPY INDUCED NEUTROPENIA (HCC): ICD-10-CM

## 2022-11-03 DIAGNOSIS — D70.1 CHEMOTHERAPY INDUCED NEUTROPENIA (HCC): ICD-10-CM

## 2022-11-03 DIAGNOSIS — C25.9 PANCREATIC ADENOCARCINOMA (HCC): Primary | ICD-10-CM

## 2022-11-03 NOTE — PROGRESS NOTES
HEMATOLOGY / [address] FOLLOW UP NOTE    Primary Care Provider: Slime Galvan  Referring Provider:    MRN: [id number]  : 1940    Reason for Encounter: follow up pancreatic cancer, now metastatic       Oncology History Overview Note   3/2022 - locally advanced, unresectable adenocarcinoma of the pancreas     2022 - start FOLFIRINOX with 20% dose reduction in oxaliplatin and irinotecan due to age     2022 - 5FU dose reduced by 20% due to diarrhea     2022 - cycle 7 delayed due to diarrhea - bolus 5-FU further reduced by a total of 30% and infusional 5-FU dose reduced by 20%    2022 - xeloda/RT for progression in the pancreas    10/2022 - progression of disease with peritoneal carcinomatosis     Pancreatic adenocarcinoma ([facility] )   3/4/2022 Biopsy    A-B-C  Pancreas, pancreatic head mass   Malignant (PSC Category VI) -  Adenocarcinoma       3/4/2022 -  Cancer Staged    Staging form: Pancreas, AJCC 8th Edition  - Clinical stage from 3/4/2022: Stage IB (cT2, cN0, cM0) - Signed by Colin Munguia MD on 2022  Stage prefix: Initial diagnosis  Total positive nodes: 0       3/14/2022 Initial Diagnosis    Pancreatic adenocarcinoma ([facility] [address] )     2022 - 2022 Chemotherapy    fluorouracil (ADRUCIL), 400 mg/m2 = 690 mg, Intravenous, Once, 7 of 8 cycles  Dose modification: 320 mg/m2 (original dose 400 mg/m2, Cycle 4), 280 mg/m2 (original dose 400 mg/m2, Cycle 7, Reason: Dose Not Tolerated, Comment: 30% dose reduction due to diarrhea)  Administration: 690 mg (2022), 690 mg (5/3/2022), 690 mg (2022), 555 mg (2022), 555 mg (2022), 555 mg (2022), 485 mg (2022)  pegfilgrastim (NEULASTA), 6 mg, Subcutaneous, Once, 5 of 6 cycles  Administration: 6 mg (2022), 6 mg (2022), 6 mg (2022), 6 mg (2022)  irinotecan (CAMPTOSAR) chemo infusion, 125 mg/m2 = 216 mg (69 4 % of original dose 180 mg/m2), Intravenous, Once, 7 of 8 cycles  Dose modification: 125 mg/m2 (original dose 180 mg/m2, Cycle 1, Reason: Anticipated Tolerance, Comment: Anticipated tolerance and Bilirubin being between 1-2), 90 mg/m2 (original dose 180 mg/m2, Cycle 4, Reason: Dose Not Tolerated, Comment: 50% dose reduction from original dose due to diarrhea)  Administration: 216 mg (4/19/2022), 216 mg (5/3/2022), 220 mg (5/17/2022), 156 mg (5/31/2022), 160 mg (6/14/2022), 160 mg (6/28/2022), 160 mg (7/20/2022)  leucovorin calcium IVPB, 692 mg, Intravenous, Once, 7 of 8 cycles  Administration: 700 mg (4/19/2022), 700 mg (5/3/2022), 700 mg (5/17/2022), 700 mg (5/31/2022), 700 mg (6/14/2022), 700 mg (6/28/2022), 700 mg (7/20/2022)  oxaliplatin (ELOXATIN) chemo infusion, 65 mg/m2 = 112 45 mg (76 5 % of original dose 85 mg/m2), Intravenous, Once, 7 of 8 cycles  Dose modification: 65 mg/m2 (original dose 85 mg/m2, Cycle 1, Reason: Anticipated Tolerance)  Administration: 112 45 mg (4/19/2022), 112 45 mg (5/3/2022), 112 45 mg (5/17/2022), 112 45 mg (5/31/2022), 112 45 mg (6/14/2022), 112 45 mg (6/28/2022), 112 45 mg (7/20/2022)  fluorouracil (ADRUCIL) ambulatory infusion Soln, 1,200 mg/m2/day = 4,150 mg, Intravenous, Over 46 hours, 7 of 8 cycles  Dose modification: 1,000 mg/m2/day (original dose 1,200 mg/m2/day, Cycle 8, Reason: Dose Not Tolerated, Comment: dose reduction due to diarrhea), 1,000 mg/m2/day (original dose 1,200 mg/m2/day, Cycle 7, Reason: Dose Not Tolerated, Comment: dose reduction due to diarrhea)     7/22/2022 - 7/22/2022 Chemotherapy    pegfilgrastim (NEULASTA), 6 mg, Subcutaneous, Once, 0 of 1 cycle  Administration: 6 mg (7/22/2022)     7/27/2022 Adverse Reaction    3/2022 - locally advanced, unresectable adenocarcinoma of the pancreas     4/19/2022 - start FOLFIRINOX with 20% dose reduction in oxaliplatin and irinotecan due to age    5/2022 - 5FU dose reduced by 20% due to diarrhea    7/12/2022 - cycle 7 delayed due to diarrhea - bolus 5-FU further reduced by a total of 30% and infusional 5-FU dose reduced by 20%        Chemotherapy    Xeloda 1000mg BID M-F concurrent with RT     8/22/2022 - 9/29/2022 Radiation    Treatments:  Course: C1    Plan ID Energy Fractions Dose per Fraction (cGy) Dose Correction (cGy) Total Dose Delivered (cGy) Elapsed Days   Abdomen 6X 25 / 25 180 0 4,500 35   CD Abdomen 6X 3 / 3 180 0 540 2      Treatment dates:  8/22/2022 - 9/29/2022 11/8/2022 -  Chemotherapy    paclitaxel protein-bound (ABRAXANE) IVPB, 125 mg/m2 = 203 mg, Intravenous, Once, 0 of 6 cycles  gemcitabine (GEMZAR) infusion, 1,000 mg/m2 = 1,620 2 mg, Intravenous, Once, 0 of 6 cycles         Interval History: patient presents for follow up of his pancreatic cancer  He had a CT scan prior to this visit that unfortunately shows new evidence of peritoneal carcinomatosis  He also called me last week and asked to come in for IVF weekly because he is having trouble drinking enough fluids  He is able to each 3 meals a day but has still lost 8 lb since July  He is fatigued  He has symptoms of GERD  He has diarrhea about 2 times a day and this has improved  He denies any falls  He is able to do all of his ADLs independently and [facility]  Labs show an [address] = 1 89, NA = 132, hgb = 9 2  REVIEW OF SYSTEMS:  Please note that a 14-point review of systems was performed to include Constitutional, HEENT, Respiratory, CVS, GI, , Musculoskeletal, Integumentary, Neurologic, Rheumatologic, Endocrinologic, Psychiatric, Lymphatic, and Hematologic/Oncologic systems were reviewed and are negative unless otherwise stated in HPI  Positive and negative findings pertinent to this evaluation are incorporated into the history of present illness        ECOG PS: 1    PROBLEM LIST:  Patient Active Problem List   Diagnosis   • Type 2 diabetes mellitus without complication, with long-term current use of insulin (HCC)   • GERD (gastroesophageal reflux disease)   • Pancreatic mass   • History of pulmonary embolism   • Hyperbilirubinemia   • Anxiety about health   • Panc[facility] )   • Chemotherapy induced neutropenia (HCC)   • Hypokalemia   • Mild protein-calorie malnutrition (HCC)   • Acute exacerbation of chronic obstructive pulmonary disease (HCC)   • Dehydration       Assessment / Plan: I explained to the patient that unfortunately his disease is worsening  I will need to start him on systemic chemo with gem/abraxane  Risks and benefits of gem/abraxane were reviewed and consent was signed  I am going to dose reduce both drugs by 20% due to his age, PS and low WBC  We may need to take out one of the weekly doses for tolerance  He knows he will need labs prior to each dose of chemo to watch his cbc closely  I am going to see him prior to cycle 1 day 15 of treatment and we will continue with weekly IVF/  he knows to call in the interim with any questions or concerns  I spent 40 minutes on chart review, face to face counseling time, coordination of care and documentation  Past Medical History:   has a past medical history of Ambulates with cane, Anxiety, Depression, Diabetes mellitus ([facility] ), GERD (gastroesophageal reflux disease), History of pulmonary embolus (PE), Hyperlipidemia, Multiple thyroid nodules, Pancreatic cancer ([facility] ), and Pancreatic mass  PAST SURGICAL HISTORY:   has a past surgical history that includes Cataract extraction (Right); Appendectomy; Tunneled venous port placement (Right, 4/11/2022); and FL guided central venous access device insertion (4/11/2022)  CURRENT MEDICATIONS  Current Outpatient Medications   Medication Sig Dispense Refill   • Alcohol Swabs 70 % PADS May substitute brand based on insurance coverage   Check glucose BID  100 each 0   • ALPRAZolam (XANAX) 0 5 mg tablet Take 0 5 tablets (0 25 mg total) by mouth 3 (three) times a day as needed for anxiety 90 tablet 0   • apixaban (ELIQUIS) 5 mg Take 5 mg by mouth 2 (two) times a day     • Blood Glucose Monitoring Suppl (OneTouch Verio Reflect) w/Device KIT May substitute brand based on insurance coverage  Check glucose BID  1 kit 0   • diphenoxylate-atropine (LOMOTIL) 2 5-0 025 mg per tablet Take 1 tablet by mouth 4 (four) times a day as needed for diarrhea 60 tablet 0   • glucose blood (OneTouch Verio) test strip May substitute brand based on insurance coverage  Check glucose BID  100 each 0   • Klor-Con M20 20 MEQ tablet TAKE 2 TABLETS BY MOUTH DAILY 180 tablet 1   • metFORMIN (GLUCOPHAGE) 1000 MG tablet Take 1,000 mg by mouth 2 (two) times a day with meals     • metFORMIN (GLUCOPHAGE-XR) 500 mg 24 hr tablet Take 500 mg by mouth 2 (two) times a day     • Multiple Vitamin (MULTIVITAMIN ADULT PO) Take 1 tablet by mouth daily     • omeprazole (PriLOSEC) 20 mg delayed release capsule      • omeprazole (PriLOSEC) 40 MG capsule Take 40 mg by mouth every evening     • ondansetron (ZOFRAN) 8 mg tablet Take 1 tablet (8 mg total) by mouth every 8 (eight) hours as needed for nausea or vomiting 30 tablet 3   • OneTouch Delica Lancets 77X MISC May substitute brand based on insurance coverage  Check glucose BID  100 each 0   • polyethylene glycol (MIRALAX) 17 g packet Take 17 g by mouth daily     • pravastatin (PRAVACHOL) 20 mg tablet Take 20 mg by mouth daily     • prochlorperazine (COMPAZINE) 10 mg tablet Take 1 tablet (10 mg total) by mouth every 6 (six) hours as needed for nausea or vomiting 30 tablet 3   • traMADol (ULTRAM) 50 mg tablet Take 1 tablet (50 mg total) by mouth every 6 (six) hours as needed for moderate pain 60 tablet 0     No current facility-administered medications for this visit  [unfilled]    SOCIAL HISTORY:   reports that he quit smoking about 57 years ago  He started smoking about 62 years ago  He quit after 5 00 years of use  He has never used smokeless tobacco  He reports previous alcohol use  He reports that he does not use drugs       FAMILY HISTORY:  family history includes Alcohol abuse in his mother; Diabetes in his father; Throat cancer in his brother  ALLERGIES:  is allergic to aleve [naproxen]  Physical Exam:  Vital Signs:   Visit Vitals  BP (!) 80/50 (BP Location: Left arm, Patient Position: Sitting, Cuff Size: Adult)   Pulse 86   Temp 98 2 °F (36 8 °C) (Temporal)   Resp 16   Ht 5' 7" (1 702 m)   Wt 54 kg (119 lb)   SpO2 93%   BMI 18 64 kg/m²   Smoking Status Former Smoker   BSA 1 62 m²     Body mass index is 18 64 kg/m²  Body surface area is 1 62 meters squared  GEN: Alert, awake oriented x3, in no acute distress  HEENT- No pallor, icterus, cyanosis, no oral mucosal lesions,   LAD - no palpable cervical, clavicle, axillary, inguinal LAD  Heart- normal S1 S2, regular rate and rhythm, No murmur, rubs     Lungs- clear breathing sound bilateral    Abdomen- soft, Non tender, bowel sounds present  Extremities- No cyanosis, clubbing, edema  Neuro- No focal neurological deficit    Labs:  Lab Results   Component Value Date    WBC 3 68 (L) 11/01/2022    HGB 9 8 (L) 11/01/2022    HCT 29 9 (L) 11/01/2022    MCV 91 11/01/2022     11/01/2022     Lab Results   Component Value Date    SODIUM 135 11/01/2022    K 4 9 11/01/2022     11/01/2022    CO2 23 11/01/2022    AGAP 9 11/01/2022    BUN 23 11/01/2022    CREATININE 0 93 11/01/2022    GLUC 118 11/01/2022    CALCIUM 8 7 11/01/2022    AST 19 11/01/2022    ALT 25 11/01/2022    ALKPHOS 190 (H) 11/01/2022    TP 7 6 11/01/2022    TBILI 0 40 11/01/2022    EGFR 77 11/01/2022

## 2022-11-03 NOTE — PROGRESS NOTES
Outpatient Follow-Up - Palliative and Supportive Care   Kortney Amaro [de-identified] y o  male [id number]    Assessment & Plan  1  Pancreatic adenocarcinoma ([facility] )    2  Mild protein-calorie malnutrition ([facility] )    3  Chemotherapy induced neutropenia (HCC)    4  Anxiety about health    5  Dehydration       PLAN:  · Carole Rahman states he enjoys his life, does not want to think about recent news from oncology  · Continue appointments with nutrition  We discussed strategies to increase calories  Carole Rahman interested in increasing PO intake  · He is being arranged for IV fluid infusions  · Pain overall controlled with tramadol - he requires 1 tab every 4 -5 days  · At every visit, I offer to start Carole Rahman on anxiolytic such as SSRI; once again declines  · He may be a good candidate for MMJ but will first try to increase his assistance to help with understanding, compliance, and follow through  · Rosa Maria Kay with [facility], is working on home health aides for Carole Rahman  · I offered to conference a family member/friend into this visit; he is open to it however deferred for next appointment  · Carole Rahman is encouraged that his sister and daughter are coming to visit him next week  · ACP - Sebastien's Living Will is on file  His sister is medical POA  · RTC - 2 months         Medications adjusted this encounter:  Requested Prescriptions      No prescriptions requested or ordered in this encounter     No orders of the defined types were placed in this encounter  There are no discontinued medications  Kortney Amaro was seen today for symptoms and planning cares related to above illnesses  I have reviewed the patient's controlled substance dispensing history in the Prescription Drug Monitoring Program in compliance with the [facility] regulations before prescribing any controlled substances     10/31/2022  10/31/2022   1  Diphenoxylate-Atrop 2 5-0 025   60 00  15  Ni Ago  [id number]   Pen (8306)   0 00 [place]     10/26/2022 10/26/2022   1  Tramadol Hcl 50 Mg Tablet   30 00  15  De Hipolito  [id number]   Pen (8306)   10 00 [place]     09/07/2022 09/07/2022   1  Diphenoxylate-Atrop 2 5-0 025   60 00  15  Ni Ago  [id number]   Pen (8306)   0 00 [place]ME  [place]     08/24/2022 08/24/2022   1  Alprazolam 0 5 Mg Tablet   90 00  60  Ch Fis              They are invited to continue to follow with us  If there are questions or concerns, please contact us through our clinic/answering service 24 hours a day, seven days a week  [address] [place] [address]  [phone]    Visit Information    Accompanied By: no one    Source of History: Self    History Limitations: None     History of Present Illness      Nima oWoten is a [de-identified] y o  male who presents in follow up of symptoms related to pancreatic cancer  Pertinent issues include: symptom management, pain, neoplasm related, depression or anxiety, fatigue, assessment of goals of care, disease process education and discussion of prognosis, advance care planning  Kathi Camacho is under the care of Dr Jaqui Durand for pancreatic cancer  According to Surgical Oncology, Kathi Camacho is a poor surgical candidate as the tumor encases the SMA  He was started FOLFIRINOX plus oxaliplatin plus irinotecan  FOLFIRINOX was stopped August 2022 due to inadequate response and he was started on Xeloda with radiation therapy  Kathi Camacho initially established care with palliative in March 2022  At that time, he complained of extreme anxiety but was not willing to start medication for this  He was started on tramadol 50mg BID which gave him adequate pain relief and he subsequently reported weaning from this  His symptoms were overall stable except he developed nausea and vomiting after a recent treatment  On 10/31/2022, Kathi Camacho had follow-up with Dr Jaqui Durand  They reviewed his interval imaging which showed worsening of disease including peritoneal carcinomatosis    Treatment changed to systemic chemo with gemcitabine + Abraxane  He is also being established with weekly IVF  Justo Cesar presents unaccompanied today for his follow up visit  I offered to conference family members in  He is not opposed to the idea but does not choose to do this at this time  He states he has nausea related to his treatments  He does have an antiemetic and states this is effective  He states his appetite is limited but he forces himself to eat food  He continues to meet with nutritionist   His cancer-related pain is mild and he does not require tramadol on a daily basis  He has multiple questions and concerns but his upcoming appointments and we spent large amount of our visit time focusing on this  I reassured him I would reach out to other providers  At the conclusion of our visit, he has no other complaints or questions for me  He does not need refills of any medications and does not have any other symptoms he would like to address  Social: Justo Cesar lives alone  He states he lives in apartment in [facility] and has social interactions in the community room  He states his wife  7 years ago  While he is on good terms with his family, they live in [facility] and in [place] and he does not see them very often  He anticipates a visit from his sister and daughter in mid November  Idalia Ackerman, the  from the [place] [address], is helping him find increased help in the home  Past medical, surgical, social, and family histories are reviewed and pertinent updates are made  Review of Systems   Constitutional: Positive for decreased appetite and weight loss  Negative for malaise/fatigue  HENT: Negative for congestion, hearing loss and hoarse voice  Eyes: Negative for blurred vision and discharge  Cardiovascular: Negative for chest pain, claudication, dyspnea on exertion and irregular heartbeat     Respiratory: Negative for hemoptysis, shortness of breath and sleep disturbances due to breathing  Skin: Negative for color change, dry skin, itching, nail changes and poor wound healing  Musculoskeletal: Negative for arthritis and back pain  Gastrointestinal: Positive for abdominal pain (occasional ) and nausea (mild)  Negative for bloating, change in bowel habit and vomiting  Genitourinary: Negative for bladder incontinence, decreased libido and flank pain  Neurological: Negative for difficulty with concentration, focal weakness and weakness  Psychiatric/Behavioral: Negative for altered mental status, depression, hallucinations and hypervigilance  Vital Signs    BP 98/70 (BP Location: Left arm, Patient Position: Sitting, Cuff Size: Standard)   Pulse 87   Temp 98 5 °F (36 9 °C) (Temporal)   Resp 16   Wt 54 7 kg (120 lb 9 6 oz)   SpO2 96%   BMI 18 89 kg/m²      Physical Exam and Objective Data  Physical Exam  Vitals and nursing note reviewed  Constitutional:       Appearance: He is well-developed  He is cachectic  He is ill-appearing (chronically ill appearing)  HENT:      Head: Normocephalic and atraumatic  Eyes:      Conjunctiva/sclera: Conjunctivae normal    Cardiovascular:      Rate and Rhythm: Normal rate  Pulmonary:      Effort: Pulmonary effort is normal  No respiratory distress  Abdominal:      Tenderness: There is no abdominal tenderness  Musculoskeletal:      Cervical back: Neck supple  Right lower leg: No edema  Left lower leg: No edema  Skin:     General: Skin is warm and dry  Neurological:      Mental Status: He is alert and oriented to person, place, and time  Mental status is at baseline     Psychiatric:         Mood and Affect: Mood normal          Behavior: Behavior normal      Radiology and Laboratory:  I personally reviewed and interpreted the following results: CT , CBC, CMP  40 minutes was spent face to face with Clarisse Christine with greater than 50% of the time spent in counseling or coordination of care including discussions of etiology of diagnosis, treatment instructions, follow up requirements, risk factors and risk reduction of disease, patient and family counseling/involvement in care and compliance with treatment regimen  All of the patient's or agent's questions were answered during this discussion

## 2022-11-04 ENCOUNTER — TELEPHONE (OUTPATIENT)
Dept: HEMATOLOGY ONCOLOGY | Facility: CLINIC | Age: 82
End: 2022-11-04

## 2022-11-04 NOTE — TELEPHONE ENCOUNTER
Rec'd call from patient stating that he can do his virtual visit with Dr Airam Rivera as long as it is a phone call  I told him I will reach out to his office and make sure that it is a phone call  Patient verbalized understanding

## 2022-11-07 ENCOUNTER — TELEPHONE (OUTPATIENT)
Dept: HEMATOLOGY ONCOLOGY | Facility: HOSPITAL | Age: 82
End: 2022-11-07

## 2022-11-07 ENCOUNTER — TELEMEDICINE (OUTPATIENT)
Dept: RADIATION ONCOLOGY | Facility: HOSPITAL | Age: 82
End: 2022-11-07

## 2022-11-07 DIAGNOSIS — C25.9 PANCREATIC ADENOCARCINOMA (HCC): Primary | ICD-10-CM

## 2022-11-07 NOTE — PROGRESS NOTES
Virtual Brief Visit - Radiation Oncology   Lolita Suazo 1940 [de-identified] y o  male [id number]    Verification of patient location: Patient is located in the following state in which I hold an active license [place]  Encounter Provider: Claudene Junes  Provider Located at   [address] [place] [address]    After connecting through Telephone, the patient was identified by name and date of birth  Patient was informed that this is a telemedicine visit and that the visit is being conducted through Telephone  My office door was closed  No one else was in the room  He acknowledged consent and understanding of privacy and security of the platform  The patient has agreed to participate and understands he can discontinue the visit at any time  Patient is aware this is a billable service  Cancer Staging  Pancreatic adenocarcinoma [facility])  Staging form: Pancreas, AJCC 8th Edition  - Clinical stage from 3/4/2022: Stage IB (cT2, cN0, cM0) - Signed by Claudene Junes, MD on 7/28/2022  Stage prefix: Initial diagnosis  Total positive nodes: 0    Assessment/Plan:  Lolita Suazo is a 80 y o  male with unresectable cT2N0 pancreatic cancer due to vessel encasement who was initially diagnosed on pancreatic biopsy on 3/4/22  He had imaging, surgical and medical oncology evaluation, and tumor board discussion (report not available) in March 2022 and was deemed likely unresectable and was started on FOLFIRINOX  Post-chemo CT chest abdomen pelvis on 07/06/22 demonstrated the pancreas mass measuring 5 9 cm although appearing mildly decreased in size from the exam of February 2022  Hosea Peterson There is an addendum regarding two subcentimeter nodular densities within the RLQ that raise suspicion for early peritoneal metastases  His last cycle of FOLFOX was held due to significant GI toxicity  He was reviewed at tumor board and consensus was for chemoRT for local control and then subsequent [place]/Abraxane  He completed chemoRT on 9/29/22  He presents for 1 month EOT  He has recovered from usual acute toxicity of RT, with mild residual nausea and diarrhea  His CT scan unfortunately showed peritoneal carcinomatosis  He is planned for additional systemic therapy  He adamantly requests to be not told any bad news  I reviewed with him that I have no new news to share beyond what he has already discussed with Dr Payal Tran  He understands  He knows he's scheduled for infusion starting this week  -Continue systemic therapy  RTC in 6 months  No orders of the defined types were placed in this encounter  History of Present Illness   Interval History:Sebastien Knox presents for first phone follow up visit  Rad [id number] 22   Dx: Pancreatic Ca     10 12 22 Nutritional Phone Follow up visit/Angelica Lovell     10 28 22 CT CAP   IMPRESSION:  Unchanged pulmonary nodules  No new disease in the chest      No significant change in known pancreatic neoplasm, however there are findings of worsening peritoneal carcinomatosis, including new small ascites and multiple new peritoneal nodules in the right lower quadrant and pelvis  10 31 22 Med Onc OV--Dr Payal Tran  Plan: Explained to pt, unfortunately his disease is worsening  Will need to start on systemic chemo w/[place]/Abraxane  (Dose reduced)  May need to adjust for tolerance  Labs prior to txmt  Follow up visit/continue weekly IVF      11 3 22  Palliative Care-follow up visit  Plan follow up visit in 2 months  He notes mild nausea, occasional abdominal pain, and some diarrhea  He adamantly requests to be not told any bad news  I reviewed with him that I have no new news to share beyond what he has already discussed with Dr Payal Tran  He understands  He knows he's scheduled for infusion starting this week  Historical Information   Oncology History   Pancreatic adenocarcinoma ([facility] [address] )   3/4/2022 Biopsy    A-B-C   Pancreas, pancreatic head mass   Malignant (PSC Category VI) -  Adenocarcinoma       3/4/2022 -  Cancer Staged    Staging form: Pancreas, AJCC 8th Edition  - Clinical stage from 3/4/2022: Stage IB (cT2, cN0, cM0) - Signed by Jacob Maza MD on 7/28/2022  Stage prefix: Initial diagnosis  Total positive nodes: 0       3/14/2022 Initial Diagnosis    Pancreatic adenocarcinoma ([facility] [address] )     4/19/2022 - 7/20/2022 Chemotherapy    fluorouracil (ADRUCIL), 400 mg/m2 = 690 mg, Intravenous, Once, 7 of 8 cycles  Dose modification: 320 mg/m2 (original dose 400 mg/m2, Cycle 4), 280 mg/m2 (original dose 400 mg/m2, Cycle 7, Reason: Dose Not Tolerated, Comment: 30% dose reduction due to diarrhea)  Administration: 690 mg (4/19/2022), 690 mg (5/3/2022), 690 mg (5/17/2022), 555 mg (5/31/2022), 555 mg (6/14/2022), 555 mg (6/28/2022), 485 mg (7/20/2022)  pegfilgrastim (NEULASTA), 6 mg, Subcutaneous, Once, 5 of 6 cycles  Administration: 6 mg (6/2/2022), 6 mg (6/16/2022), 6 mg (6/30/2022), 6 mg (5/20/2022)  irinotecan (CAMPTOSAR) chemo infusion, 125 mg/m2 = 216 mg (69 4 % of original dose 180 mg/m2), Intravenous, Once, 7 of 8 cycles  Dose modification: 125 mg/m2 (original dose 180 mg/m2, Cycle 1, Reason: Anticipated Tolerance, Comment: Anticipated tolerance and Bilirubin being between 1-2), 90 mg/m2 (original dose 180 mg/m2, Cycle 4, Reason: Dose Not Tolerated, Comment: 50% dose reduction from original dose due to diarrhea)  Administration: 216 mg (4/19/2022), 216 mg (5/3/2022), 220 mg (5/17/2022), 156 mg (5/31/2022), 160 mg (6/14/2022), 160 mg (6/28/2022), 160 mg (7/20/2022)  leucovorin calcium IVPB, 692 mg, Intravenous, Once, 7 of 8 cycles  Administration: 700 mg (4/19/2022), 700 mg (5/3/2022), 700 mg (5/17/2022), 700 mg (5/31/2022), 700 mg (6/14/2022), 700 mg (6/28/2022), 700 mg (7/20/2022)  oxaliplatin (ELOXATIN) chemo infusion, 65 mg/m2 = 112 45 mg (76 5 % of original dose 85 mg/m2), Intravenous, Once, 7 of 8 cycles  Dose modification: 65 mg/m2 (original dose 85 mg/m2, Cycle 1, Reason: Anticipated Tolerance)  Administration: 112 45 mg (4/19/2022), 112 45 mg (5/3/2022), 112 45 mg (5/17/2022), 112 45 mg (5/31/2022), 112 45 mg (6/14/2022), 112 45 mg (6/28/2022), 112 45 mg (7/20/2022)  fluorouracil (ADRUCIL) ambulatory infusion Soln, 1,200 mg/m2/day = 4,150 mg, Intravenous, Over 46 hours, 7 of 8 cycles  Dose modification: 1,000 mg/m2/day (original dose 1,200 mg/m2/day, Cycle 8, Reason: Dose Not Tolerated, Comment: dose reduction due to diarrhea), 1,000 mg/m2/day (original dose 1,200 mg/m2/day, Cycle 7, Reason: Dose Not Tolerated, Comment: dose reduction due to diarrhea)     7/22/2022 - 7/22/2022 Chemotherapy    pegfilgrastim (NEULASTA), 6 mg, Subcutaneous, Once, 0 of 1 cycle  Administration: 6 mg (7/22/2022)     7/27/2022 Adverse Reaction    3/2022 - locally advanced, unresectable adenocarcinoma of the pancreas     4/19/2022 - start FOLFIRINOX with 20% dose reduction in oxaliplatin and irinotecan due to age    5/2022 - 5FU dose reduced by 20% due to diarrhea    7/12/2022 - cycle 7 delayed due to diarrhea - bolus 5-FU further reduced by a total of 30% and infusional 5-FU dose reduced by 20%        Chemotherapy    Xeloda 1000mg BID M-F concurrent with RT     8/22/2022 - 9/29/2022 Radiation    The patient saw @[facility]@ for radiation treatment   This is the current list of radiation treatment:  Plan ID Energy Fractions Dose per Fraction (cGy) Dose Correction (cGy) Total Dose Delivered (cGy) Elapsed Days   Abdomen 6X 25 / 25 180 0 4,500 35   CD Abdomen 6X 3 / 3 180 0 540 2      Treatment dates:  8/22/2022 - 9/29/2022 11/8/2022 -  Chemotherapy    paclitaxel protein-bound (ABRAXANE) IVPB, 100 mg/m2 = 162 mg (80 % of original dose 125 mg/m2), Intravenous, Once, 0 of 6 cycles  Dose modification: 100 mg/m2 (original dose 125 mg/m2, Cycle 1, Reason: Anticipated Tolerance, Comment: dose reduction due to age and PS), 100 mg/m2 (original dose 125 mg/m2, Cycle 1, Reason: Anticipated Tolerance, Comment: 20% dose reduction due to age and PS)  gemcitabine (GEMZAR) infusion, 800 mg/m2 = 1,295 8 mg (80 % of original dose 1,000 mg/m2), Intravenous, Once, 0 of 6 cycles  Dose modification: 800 mg/m2 (original dose 1,000 mg/m2, Cycle 1, Reason: Anticipated Tolerance, Comment: 20% dose reduction due to age and PS)      Chemotherapy    fluorouracil (ADRUCIL), 400 mg/m2 = 690 mg, Intravenous, Once, 7 of 8 cycles    Dose modification: 320 mg/m2 (original dose 400 mg/m2, Cycle 4), 280 mg/m2 (original dose 400 mg/m2, Cycle 7, Reason: Dose Not Tolerated, Comment: 30% dose reduction due to diarrhea)    Administration: 690 mg (4/19/2022), 690 mg (5/3/2022), 690 mg (5/17/2022), 555 mg (5/31/2022), 555 mg (6/14/2022), 555 mg (6/28/2022), 485 mg (7/20/2022)        pegfilgrastim (NEULASTA), 6 mg, Subcutaneous, Once, 5 of 6 cycles    Administration: 6 mg (6/2/2022), 6 mg (6/16/2022), 6 mg (6/30/2022), 6 mg (5/20/2022)        irinotecan (CAMPTOSAR) chemo infusion, 125 mg/m2 = 216 mg (69 4 % of original dose 180 mg/m2), Intravenous, Once, 7 of 8 cycles    Dose modification: 125 mg/m2 (original dose 180 mg/m2, Cycle 1, Reason: Anticipated Tolerance, Comment: Anticipated tolerance and Bilirubin being between 1-2), 90 mg/m2 (original dose 180 mg/m2, Cycle 4, Reason: Dose Not Tolerated, Comment: 50% dose reduction from original dose due to diarrhea)    Administration: 216 mg (4/19/2022), 216 mg (5/3/2022), 220 mg (5/17/2022), 156 mg (5/31/2022), 160 mg (6/14/2022), 160 mg (6/28/2022), 160 mg (7/20/2022)        leucovorin calcium IVPB, 692 mg, Intravenous, Once, 7 of 8 cycles    Administration: 700 mg (4/19/2022), 700 mg (5/3/2022), 700 mg (5/17/2022), 700 mg (5/31/2022), 700 mg (6/14/2022), 700 mg (6/28/2022), 700 mg (7/20/2022)        oxaliplatin (ELOXATIN) chemo infusion, 65 mg/m2 = 112 45 mg (76 5 % of original dose 85 mg/m2), Intravenous, Once, 7 of 8 cycles    Dose modification: 65 mg/m2 (original dose 85 mg/m2, Cycle 1, Reason: Anticipated Tolerance)    Administration: 112 45 mg (4/19/2022), 112 45 mg (5/3/2022), 112 45 mg (5/17/2022), 112 45 mg (5/31/2022), 112 45 mg (6/14/2022), 112 45 mg (6/28/2022), 112 45 mg (7/20/2022)        fluorouracil (ADRUCIL) ambulatory infusion Soln, 1,200 mg/m2/day = 4,150 mg, Intravenous, Over 46 hours, 7 of 8 cycles    Dose modification: 1,000 mg/m2/day (original dose 1,200 mg/m2/day, Cycle 8, Reason: Dose Not Tolerated, Comment: dose reduction due to diarrhea), 1,000 mg/m2/day (original dose 1,200 mg/m2/day, Cycle 7, Reason: Dose Not Tolerated, Comment: dose reduction due to diarrhea)    pegfilgrastim (NEULASTA), 6 mg, Subcutaneous, Once, 0 of 1 cycle    Administration: 6 mg (7/22/2022)    paclitaxel protein-bound (ABRAXANE) IVPB, 100 mg/m2 = 162 mg (80 % of original dose 125 mg/m2), Intravenous, Once, 0 of 6 cycles    Dose modification: 100 mg/m2 (original dose 125 mg/m2, Cycle 1, Reason: Anticipated Tolerance, Comment: dose reduction due to age and PS), 100 mg/m2 (original dose 125 mg/m2, Cycle 1, Reason: Anticipated Tolerance, Comment: 20% dose reduction due to age and PS)        gemcitabine (GEMZAR) infusion, 800 mg/m2 = 1,295 8 mg (80 % of original dose 1,000 mg/m2), Intravenous, Once, 0 of 6 cycles    Dose modification: 800 mg/m2 (original dose 1,000 mg/m2, Cycle 1, Reason: Anticipated Tolerance, Comment: 20% dose reduction due to age and PS)    3/2022 - locally advanced, unresectable adenocarcinoma of the pancreas     4/19/2022 - start FOLFIRINOX with 20% dose reduction in oxaliplatin and irinotecan due to age     5/2022 - 5FU dose reduced by 20% due to diarrhea     7/12/2022 - cycle 7 delayed due to diarrhea - bolus 5-FU further reduced by a total of 30% and infusional 5-FU dose reduced by 20%    9/2022 - xeloda/RT for progression in the pancreas    10/2022 - progression of disease with peritoneal carcinomatosis         Past Medical History:   Diagnosis Date   • Ambulates with cane    • Anxiety    • Depression • Diabetes mellitus ([facility] [address] )    • GERD (gastroesophageal reflux disease)    • History of pulmonary embolus (PE)    • Hyperlipidemia    • Multiple thyroid nodules     pt states about 40yrs ago   • Panc[facility] ([facility] )    • Pancreatic mass      Past Surgical History:   Procedure Laterality Date   • APPENDECTOMY     • CATARACT EXTRACTION Right    • FL GUIDED CENTRAL VENOUS ACCESS DEVICE INSERTION  2022   • TUNNELED VENOUS PORT PLACEMENT Right 2022    Procedure: INSERTION VENOUS PORT (PORT-A-CATH); Surgeon: Timoteo Watson MD;  Location: [facility];  Service: Surgical Oncology     Social History   Social History     Substance and Sexual Activity   Alcohol Use Not Currently    Comment: seldom; socially     Social History     Substance and Sexual Activity   Drug Use No     Social History     Tobacco Use   Smoking Status Former Smoker   • Years: 5 00   • Start date: 58   • Quit date:    • Years since quittin 8   Smokeless Tobacco Never Used   Tobacco Comment    quit in 1960s; smoked cigars for several yrs     Meds/Allergies     Current Outpatient Medications:   •  Alcohol Swabs 70 % PADS, May substitute brand based on insurance coverage  Check glucose BID , Disp: 100 each, Rfl: 0  •  ALPRAZolam (XANAX) 0 5 mg tablet, Take 0 5 tablets (0 25 mg total) by mouth 3 (three) times a day as needed for anxiety, Disp: 90 tablet, Rfl: 0  •  apixaban (ELIQUIS) 5 mg, Take 5 mg by mouth 2 (two) times a day, Disp: , Rfl:   •  Blood Glucose Monitoring Suppl (OneTouch Verio Reflect) w/Device KIT, May substitute brand based on insurance coverage  Check glucose BID , Disp: 1 kit, Rfl: 0  •  diphenoxylate-atropine (LOMOTIL) 2 5-0 025 mg per tablet, Take 1 tablet by mouth 4 (four) times a day as needed for diarrhea, Disp: 60 tablet, Rfl: 0  •  glucose blood (OneTouch Verio) test strip, May substitute brand based on insurance coverage   Check glucose BID , Disp: 100 each, Rfl: 0  •  Klor-Con M20 20 MEQ tablet, TAKE 2 TABLETS BY MOUTH DAILY, Disp: 180 tablet, Rfl: 1  •  metFORMIN (GLUCOPHAGE) 1000 MG tablet, Take 1,000 mg by mouth 2 (two) times a day with meals, Disp: , Rfl:   •  metFORMIN (GLUCOPHAGE-XR) 500 mg 24 hr tablet, Take 500 mg by mouth 2 (two) times a day, Disp: , Rfl:   •  Multiple Vitamin (MULTIVITAMIN ADULT PO), Take 1 tablet by mouth daily, Disp: , Rfl:   •  omeprazole (PriLOSEC) 20 mg delayed release capsule, , Disp: , Rfl:   •  omeprazole (PriLOSEC) 40 MG capsule, Take 40 mg by mouth every evening, Disp: , Rfl:   •  ondansetron (ZOFRAN) 8 mg tablet, Take 1 tablet (8 mg total) by mouth every 8 (eight) hours as needed for nausea or vomiting, Disp: 30 tablet, Rfl: 3  •  OneTouch Delica Lancets 70X MISC, May substitute brand based on insurance coverage   Check glucose BID , Disp: 100 each, Rfl: 0  •  polyethylene glycol (MIRALAX) 17 g packet, Take 17 g by mouth daily (Patient not taking: Reported on 11/3/2022), Disp: , Rfl:   •  pravastatin (PRAVACHOL) 20 mg tablet, Take 20 mg by mouth daily, Disp: , Rfl:   •  prochlorperazine (COMPAZINE) 10 mg tablet, Take 1 tablet (10 mg total) by mouth every 6 (six) hours as needed for nausea or vomiting, Disp: 30 tablet, Rfl: 3  •  traMADol (ULTRAM) 50 mg tablet, Take 1 tablet (50 mg total) by mouth every 6 (six) hours as needed for moderate pain, Disp: 60 tablet, Rfl: 0  Allergies   Allergen Reactions   • Aleve [Naproxen] Swelling         OBJECTIVE:   RESULTS  Lab Results:   Recent Results (from the past 672 hour(s))   CBC and differential    Collection Time: 10/25/22 12:43 PM   Result Value Ref Range    WBC 2 74 (L) 4 31 - 10 16 Thousand/uL    RBC 3 08 (L) 3 88 - 5 62 Million/uL    Hemoglobin 9 2 (L) 12 0 - 17 0 g/dL    Hematocrit 28 6 (L) 36 5 - 49 3 %    MCV 93 82 - 98 fL    MCH 29 9 26 8 - 34 3 pg    MCHC 32 2 31 4 - 37 4 g/dL    RDW 14 3 11 6 - 15 1 %    MPV 10 3 8 9 - 12 7 fL    Platelets 485 091 - 326 Thousands/uL    nRBC 0 /100 WBCs    Neutrophils Relative 69 43 - 75 % Immat GRANS % 0 0 - 2 %    Lymphocytes Relative 9 (L) 14 - 44 %    Monocytes Relative 15 (H) 4 - 12 %    Eosinophils Relative 5 0 - 6 %    Basophils Relative 2 (H) 0 - 1 %    Neutrophils Absolute 1 89 1 85 - 7 62 Thousands/µL    Immature Grans Absolute 0 01 0 00 - 0 20 Thousand/uL    Lymphocytes Absolute 0 25 (L) 0 60 - 4 47 Thousands/µL    Monocytes Absolute 0 42 0 17 - 1 22 Thousand/µL    Eosinophils Absolute 0 13 0 00 - 0 61 Thousand/µL    Basophils Absolute 0 04 0 00 - 0 10 Thousands/µL   Comprehensive metabolic panel    Collection Time: 10/25/22 12:43 PM   Result Value Ref Range    Sodium 132 (L) 135 - 147 mmol/L    Potassium 4 3 3 5 - 5 3 mmol/L    Chloride 100 96 - 108 mmol/L    CO2 28 21 - 32 mmol/L    ANION GAP 4 4 - 13 mmol/L    BUN 23 5 - 25 mg/dL    Creatinine 1 01 0 60 - 1 30 mg/dL    Glucose 164 (H) 65 - 140 mg/dL    Calcium 8 8 8 3 - 10 1 mg/dL    Corrected Calcium 9 3 8 3 - 10 1 mg/dL    AST 22 5 - 45 U/L    ALT 29 12 - 78 U/L    Alkaline Phosphatase 194 (H) 46 - 116 U/L    Total Protein 7 3 6 4 - 8 4 g/dL    Albumin 3 4 (L) 3 5 - 5 0 g/dL    Total Bilirubin 0 50 0 20 - 1 00 mg/dL    eGFR 69 ml/min/1 73sq m   Magnesium    Collection Time: 10/25/22 12:43 PM   Result Value Ref Range    Magnesium 2 0 1 6 - 2 6 mg/dL   CBC and differential    Collection Time: 11/01/22 12:50 PM   Result Value Ref Range    WBC 3 68 (L) 4 31 - 10 16 Thousand/uL    RBC 3 27 (L) 3 88 - 5 62 Million/uL    Hemoglobin 9 8 (L) 12 0 - 17 0 g/dL    Hematocrit 29 9 (L) 36 5 - 49 3 %    MCV 91 82 - 98 fL    MCH 30 0 26 8 - 34 3 pg    MCHC 32 8 31 4 - 37 4 g/dL    RDW 14 3 11 6 - 15 1 %    MPV 10 0 8 9 - 12 7 fL    Platelets 508 484 - 206 Thousands/uL    nRBC 0 /100 WBCs    Neutrophils Relative 65 43 - 75 %    Immat GRANS % 0 0 - 2 %    Lymphocytes Relative 15 14 - 44 %    Monocytes Relative 15 (H) 4 - 12 %    Eosinophils Relative 4 0 - 6 %    Basophils Relative 1 0 - 1 %    Neutrophils Absolute 2 38 1 85 - 7 62 Thousands/µL Immature Grans Absolute 0 01 0 00 - 0 20 Thousand/uL    Lymphocytes Absolute 0 55 (L) 0 60 - 4 47 Thousands/µL    Monocytes Absolute 0 55 0 17 - 1 22 Thousand/µL    Eosinophils Absolute 0 15 0 00 - 0 61 Thousand/µL    Basophils Absolute 0 04 0 00 - 0 10 Thousands/µL   Comprehensive metabolic panel    Collection Time: 11/01/22 12:50 PM   Result Value Ref Range    Sodium 135 135 - 147 mmol/L    Potassium 4 9 3 5 - 5 3 mmol/L    Chloride 103 96 - 108 mmol/L    CO2 23 21 - 32 mmol/L    ANION GAP 9 4 - 13 mmol/L    BUN 23 5 - 25 mg/dL    Creatinine 0 93 0 60 - 1 30 mg/dL    Glucose 118 65 - 140 mg/dL    Calcium 8 7 8 3 - 10 1 mg/dL    AST 19 5 - 45 U/L    ALT 25 12 - 78 U/L    Alkaline Phosphatase 190 (H) 46 - 116 U/L    Total Protein 7 6 6 4 - 8 4 g/dL    Albumin 3 5 3 5 - 5 0 g/dL    Total Bilirubin 0 40 0 20 - 1 00 mg/dL    eGFR 77 ml/min/1 73sq m   Magnesium    Collection Time: 11/01/22 12:50 PM   Result Value Ref Range    Magnesium 2 0 1 6 - 2 6 mg/dL     Imaging Studies:CT chest abdomen pelvis w contrast    Result Date: 10/31/2022  Narrative: CT CHEST, ABDOMEN AND PELVIS WITH IV CONTRAST INDICATION:   C25 9: Malignant neoplasm of pancreas, unspecified  COMPARISON:  Multiple prior CT scans, most recent dated 7/6/2022  TECHNIQUE: CT examination of the chest, abdomen and pelvis was performed  Axial, sagittal, and coronal 2D reformatted images were created from the source data and submitted for interpretation  Radiation dose length product (DLP) for this visit:  924 69 mGy-cm   This examination, like all CT scans performed in the [facility], was performed utilizing techniques to minimize radiation dose exposure, including the use of iterative  reconstruction and automated exposure control  IV Contrast:  100 mL of iohexol (OMNIPAQUE) Enteric Contrast: Enteric contrast was administered  FINDINGS: CHEST LUNGS:  No change in multiple nodules    Index nodules: 3 mm right upper lobe nodule laterally, image 3/28  4 mm right lower lobe nodule medially, image 3/79  3 mm anterior left lower lobe nodule, image 3/56   4 mm left lower lobe nodule laterally, image 3/83  5 mm posterior left lower lobe nodule, image 3/73  No new nodules appreciated  There is no tracheal or endobronchial lesion  PLEURA:  Unremarkable  HEART/GREAT VESSELS: Normal heart size  Coronary artery calcifications  Normal caliber thoracic aorta with mild atherosclerotic calcifications  Right chest port terminates in the superior vena cava  MEDIASTINUM AND HEMANTH:  Unchanged enlarged thyroid goiter with mass effect on the trachea  CHEST WALL AND LOWER NECK:  Unremarkable  ABDOMEN LIVER/BILIARY TREE:  No focal hepatic lesions  Unchanged enlargement of the common bile duct  No intrahepatic biliary dilatation  Unchanged pneumobilia  GALLBLADDER:  No calcified gallstones  No pericholecystic inflammatory change  SPLEEN:  Unremarkable  PANCREAS:  Allowing for differences in measurement technique, no significant change in the irregular mass in the uncinate process measuring approximately 5 3 x 4 4 cm on image 3 image 6/48  Again seen is encasement of the celiac axis and superior mesenteric artery by the mass  Diffuse atrophy and pancreatic ductal dilatation is again seen  ADRENAL GLANDS:  Unchanged left adrenal fullness  KIDNEYS/URETERS:  Unremarkable  No hydronephrosis  STOMACH AND BOWEL:  No obstruction or inflammatory changes  APPENDIX:  No findings to suggest appendicitis  ABDOMINOPELVIC CAVITY: New mesenteric nodules in the right lower quadrant/pelvis, measuring 1 8 x 1 6 cm on image 2/98, 3 1 x 2 2 cm on image 2/101 and 1 0 cm on image 2/105  New 2 7 x 1 8 cm nodule along the serosal surface of the sigmoid colon on image 2/100  New 1 0 cm lymph node at the bifurcation of the right iliac artery, image 2/91    Minimally larger 8 mm omental nodule in the right anterior abdomen on image 2/80 (previously 6 mm, with increased mild omental fat stranding noted   New small ascites also noted  VESSELS:  Unremarkable for patient's age  PELVIS REPRODUCTIVE ORGANS:  The prostate is enlarged  URINARY BLADDER:  Unremarkable  ABDOMINAL WALL/INGUINAL REGIONS:  Unchanged small right inguinal hernia containing fat and fluid  OSSEOUS STRUCTURES:  No acute fracture or destructive osseous lesion  Chronic bilateral pars defects at L5 with unchanged grade 1 anterolisthesis at L5-S1  Impression: Unchanged pulmonary nodules  No new disease in the chest  No significant change in known pancreatic neoplasm, however there are findings of worsening peritoneal carcinomatosis, including new small ascites and multiple new peritoneal nodules in the right lower quadrant and pelvis  The study was marked in [facility] for immediate notification  Workstation performed: [id number]       Varun Number  11/7/2022,3:01 PM    VIRTUAL VISIT DISCLAIMER  Patient verbally agrees to participate in [facility]  Pt is aware that [facility] could be limited without vital signs or the ability to perform a full hands-on physical exam  Patient understands he or the provider may request at any time to terminate the video visit and request the patient to seek care or treatment in person  Portions of the record may have been created with voice recognition software  Occasional wrong word or "sound a like" substitutions may have occurred due to the inherent limitations of voice recognition software  Read the chart carefully and recognize, using context, where substitutions have occurred

## 2022-11-07 NOTE — TELEPHONE ENCOUNTER
Spoke to 163Ragini Morgan from [facility]  She will fax over patient's results from [facility] to our [facility] when completed

## 2022-11-08 ENCOUNTER — HOSPITAL ENCOUNTER (OUTPATIENT)
Dept: INFUSION CENTER | Facility: HOSPITAL | Age: 82
Discharge: HOME | End: 2022-11-08

## 2022-11-08 VITALS
BODY MASS INDEX: 17.3 KG/M2 | SYSTOLIC BLOOD PRESSURE: 110 MMHG | RESPIRATION RATE: 16 BRPM | DIASTOLIC BLOOD PRESSURE: 70 MMHG | OXYGEN SATURATION: 100 % | TEMPERATURE: 97.8 F | HEIGHT: 67 IN | WEIGHT: 110.2 LBS | HEART RATE: 82 BPM

## 2022-11-08 DIAGNOSIS — D70.1 CHEMOTHERAPY INDUCED NEUTROPENIA (HCC): ICD-10-CM

## 2022-11-08 DIAGNOSIS — C25.9 PANCREATIC ADENOCARCINOMA (HCC): ICD-10-CM

## 2022-11-08 DIAGNOSIS — T45.1X5A CHEMOTHERAPY INDUCED NEUTROPENIA (HCC): ICD-10-CM

## 2022-11-08 DIAGNOSIS — E86.0 DEHYDRATION: ICD-10-CM

## 2022-11-08 DIAGNOSIS — E87.6 HYPOKALEMIA: Primary | ICD-10-CM

## 2022-11-08 LAB
ALBUMIN SERPL-MCNC: 4 G/DL (ref 3.6–5.1)
ALBUMIN/GLOB SERPL: 1.3 (CALC) (ref 1–2.5)
ALP SERPL-CCNC: 183 U/L (ref 35–144)
ALT SERPL-CCNC: 14 U/L (ref 9–46)
AST SERPL-CCNC: 16 U/L (ref 10–35)
BASOPHILS # BLD AUTO: 50 CELLS/UL (ref 0–200)
BASOPHILS NFR BLD AUTO: 1.1 %
BILIRUB SERPL-MCNC: 0.7 MG/DL (ref 0.2–1.2)
BUN SERPL-MCNC: 24 MG/DL (ref 7–25)
BUN/CREAT SERPL: ABNORMAL (CALC) (ref 6–22)
CALCIUM SERPL-MCNC: 9.8 MG/DL (ref 8.6–10.3)
CHLORIDE SERPL-SCNC: 97 MMOL/L (ref 98–110)
CO2 SERPL-SCNC: 28 MMOL/L (ref 20–32)
CREAT SERPL-MCNC: 0.91 MG/DL (ref 0.7–1.22)
EOSINOPHIL # BLD AUTO: 117 CELLS/UL (ref 15–500)
EOSINOPHIL NFR BLD AUTO: 2.6 %
ERYTHROCYTE [DISTWIDTH] IN BLOOD BY AUTOMATED COUNT: 14.1 % (ref 11–15)
GFR/BSA.PRED SERPLBLD CYS-BASED-ARV: 85 ML/MIN/1.73M2
GLOBULIN SER CALC-MCNC: 3.1 G/DL (CALC) (ref 1.9–3.7)
GLUCOSE SERPL-MCNC: 206 MG/DL (ref 65–139)
HCT VFR BLD AUTO: 33 % (ref 38.5–50)
HGB BLD-MCNC: 10.9 G/DL (ref 13.2–17.1)
LYMPHOCYTES # BLD AUTO: 576 CELLS/UL (ref 850–3900)
LYMPHOCYTES NFR BLD AUTO: 12.8 %
MCH RBC QN AUTO: 30.9 PG (ref 27–33)
MCHC RBC AUTO-ENTMCNC: 33 G/DL (ref 32–36)
MCV RBC AUTO: 93.5 FL (ref 80–100)
MONOCYTES # BLD AUTO: 594 CELLS/UL (ref 200–950)
MONOCYTES NFR BLD AUTO: 13.2 %
NEUTROPHILS # BLD AUTO: 3164 CELLS/UL (ref 1500–7800)
NEUTROPHILS NFR BLD AUTO: 70.3 %
PLATELET # BLD AUTO: 218 THOUSAND/UL (ref 140–400)
PMV BLD REES-ECKER: 10.4 FL (ref 7.5–12.5)
POTASSIUM SERPL-SCNC: 5.5 MMOL/L (ref 3.5–5.3)
PROT SERPL-MCNC: 7.1 G/DL (ref 6.1–8.1)
RBC # BLD AUTO: 3.53 MILLION/UL (ref 4.2–5.8)
SODIUM SERPL-SCNC: 132 MMOL/L (ref 135–146)
WBC # BLD AUTO: 4.5 THOUSAND/UL (ref 3.8–10.8)

## 2022-11-08 RX ORDER — SODIUM CHLORIDE 9 MG/ML
20 INJECTION, SOLUTION INTRAVENOUS ONCE
Status: COMPLETED | OUTPATIENT
Start: 2022-11-08 | End: 2022-11-08

## 2022-11-08 RX ADMIN — SODIUM CHLORIDE 20 ML/HR: 9 INJECTION, SOLUTION INTRAVENOUS at 13:28

## 2022-11-08 RX ADMIN — SODIUM CHLORIDE 1000 ML: 0.9 INJECTION, SOLUTION INTRAVENOUS at 12:37

## 2022-11-08 RX ADMIN — GEMCITABINE 1295.8 MG: 38 INJECTION, SOLUTION INTRAVENOUS at 15:29

## 2022-11-08 RX ADMIN — ONDANSETRON 8 MG: 2 INJECTION INTRAMUSCULAR; INTRAVENOUS at 13:29

## 2022-11-08 RX ADMIN — Medication 162 MG: at 14:14

## 2022-11-08 RX ADMIN — DEXAMETHASONE SODIUM PHOSPHATE 10 MG: 10 INJECTION, SOLUTION INTRAMUSCULAR; INTRAVENOUS at 13:45

## 2022-11-08 NOTE — PROGRESS NOTES
Pt here today for chemo tolerated well no adverse reactions AVS provided and pt left ambulatory with [facility] transport

## 2022-11-09 ENCOUNTER — TELEPHONE (OUTPATIENT)
Dept: NUTRITION | Facility: CLINIC | Age: 82
End: 2022-11-09

## 2022-11-09 NOTE — TELEPHONE ENCOUNTER
Christiano Gaitan was scheduled for an RD phone f/u appt today (11/9/2022) and was unable to attend his appointment (no answer x 2 attempts)  A call was placed and a voice message was left encouraging him to call back and reschedule his appointment at a more convenient time for him  RD contact information was provided

## 2022-11-15 ENCOUNTER — HOSPITAL ENCOUNTER (OUTPATIENT)
Dept: INFUSION CENTER | Facility: HOSPITAL | Age: 82
End: 2022-11-15

## 2022-11-16 ENCOUNTER — TELEPHONE (OUTPATIENT)
Dept: HEMATOLOGY ONCOLOGY | Facility: HOSPITAL | Age: 82
End: 2022-11-16

## 2022-11-16 NOTE — TELEPHONE ENCOUNTER
Rec'd message from [address] stating that the patient canceled yesterday's tx due to being ill  I called the patient and left a VM for him to call me back at my direct number to see how he is feeling

## 2022-11-16 NOTE — TELEPHONE ENCOUNTER
Spoke with patient to go over how he is feeling in regards to missing his INF appt yesterday  Patient stated that he was "too tired" to get out of bed and couldn't make it to the lab or the infusion room  I educated the patient to call our office if he is ever feeling that way again  Patient states that he is feeling better and back to normal today  I informed the patient of his appt he has on Monday with Dr Venkat Mccoy and patient states he "isn't sure if he can see her anymore"  I asked the patient if there is a certain reason why and patient states that she has no hope for him and he is "on the other side of the fence"  I explained to the patient that Dr Venkat Mccoy would not give him treatment if she didn't have hope that it would help and he has to understand that there are going to be times that she has to tell him information about his cancer that he considers "bad" and "hopeless"  Patient verbalized understanding and told me that he has no emotional support and he has to believe he has hope so that he can continue moving forward  I told the patient I will be there during his office visit and let Dr Venkat Mccoy know how he is feeling as well  Patient verbalized understanding and will be at the appt

## 2022-11-18 ENCOUNTER — TELEPHONE (OUTPATIENT)
Dept: HEMATOLOGY ONCOLOGY | Facility: CLINIC | Age: 82
End: 2022-11-18

## 2022-11-18 NOTE — TELEPHONE ENCOUNTER
CALL RETURN FORM   Reason for patient call? Patient calling to speak with Deepthi He has an appt 11/21/22@ 1:20pm  in the afternoon he would like an am appointment due to parking issues at his residence    Patient's primary oncologist? Dr Mil Akins   Name of person the patient was calling for? Deepthi  Any additional information to add, if applicable? [phone]   Informed patient that the message will be forwarded to the team and someone will get back to them as soon as possible    Did you relay this information to the patient?  yes

## 2022-11-21 ENCOUNTER — PATIENT OUTREACH (OUTPATIENT)
Dept: HEMATOLOGY ONCOLOGY | Facility: CLINIC | Age: 82
End: 2022-11-21

## 2022-11-21 ENCOUNTER — TELEPHONE (OUTPATIENT)
Dept: HEMATOLOGY ONCOLOGY | Facility: HOSPITAL | Age: 82
End: 2022-11-21

## 2022-11-21 ENCOUNTER — OFFICE VISIT (OUTPATIENT)
Dept: HEMATOLOGY ONCOLOGY | Facility: HOSPITAL | Age: 82
End: 2022-11-21

## 2022-11-21 VITALS
TEMPERATURE: 98.3 F | RESPIRATION RATE: 16 BRPM | HEART RATE: 88 BPM | BODY MASS INDEX: 18.05 KG/M2 | WEIGHT: 115 LBS | SYSTOLIC BLOOD PRESSURE: 102 MMHG | DIASTOLIC BLOOD PRESSURE: 74 MMHG | OXYGEN SATURATION: 99 % | HEIGHT: 67 IN

## 2022-11-21 DIAGNOSIS — D70.1 CHEMOTHERAPY INDUCED NEUTROPENIA (HCC): ICD-10-CM

## 2022-11-21 DIAGNOSIS — T45.1X5A CHEMOTHERAPY INDUCED NEUTROPENIA (HCC): ICD-10-CM

## 2022-11-21 DIAGNOSIS — E87.6 HYPOKALEMIA: Primary | ICD-10-CM

## 2022-11-21 DIAGNOSIS — E86.0 DEHYDRATION: Primary | ICD-10-CM

## 2022-11-21 DIAGNOSIS — C25.9 PANCREATIC ADENOCARCINOMA (HCC): ICD-10-CM

## 2022-11-21 DIAGNOSIS — C25.9 PANCREATIC ADENOCARCINOMA (HCC): Primary | ICD-10-CM

## 2022-11-21 NOTE — PROGRESS NOTES
MSW met with the pt in the Oncology office this day after he met with the Doctor and nurse  Dr Maurilio Mcdaniels reports that pt was falling asleep throughout the pt's appointment, presented with confusion, told her he drove to the appointment and admittedly is eating very little  Concerns were expressed from a medical standpoint and Dr Maurilio Mcdaniels requesting a referral to [facility]  MSW met with the pt and found the similar behaviors  The pt was unable to maintain eye contact, was dozing off, repeating himself throughout the conversation and was not able to recall if her drove or if he rode [facility]  MSW asked the pt how he was maintaining his apartment and he states that his "Mother" pays for a cleaning lady  MSW asked the pt if it was his sister or his daughter that was paying and he repeatedly stated it was his mother  After several times of asking, MSW explained to Claude Garrett that his mother was not living, to which hew then stated, " I didn't say my mother, I said my sister "  Pt states he spends most of the day in bed and that he is not eating due to lack of appetite  MSW asked if he wanted MOW's, which he declined numerous times before and he agreed this day  He did not wish for this MSW to place a referral for a caregiver as he states the " at the apartment is doing that "  MSW gently asked the pt if he had given any thought to what he might do if he was not longer able to care for himself  Pt states that he "didn't want to think about that "  MSW explained that no one likes to but it is practical to have plans in place or at least start to have conversation  The pt then asked where he would go and MSW spoke about a nursing home, to which he was more in agreement than anticipated    The pt responded, "I know I need to go there but can we try a caregiver in the house first?"  MSW assured the pt that the waiver program was designed to help people remain at home and asked if he would agree to have this MSW place the referral   Pt agreed  Referral was placed this day  MSW then called [facility] as he was unsure how he arrived  Carlin @ [facility] explained it would be a LYFT   MSW assisted the pt to the lobby and waited for the LYFT to arrive and helped him the car       MSW placed a call to [facility] to refer pt for services and assessment @ [phone] Neurology Note 5/5/2019 - 85yo RH  man with a PMHx of DM, HTN, HLD, CAD s/p CABGx3 presents with his wife with complaints of L. weakness and slurred speech. Patient is hard of hearing, A&Ox3. Fully independent LKN 9pm 5/4. Eating dinner when wife noticed slurred speech and facial asymmetry. Patient reports inability to move left arm or leg. Improved slightly as the night progressed. Patient fell asleep. Patient woke in the morning with persistent symptoms. EMS activated. Patient has no other complaints. Denies fevers, chills, ns, cp, sob, abd pain, n/v/d/c, or changes to weight or senses.     Patient seen in the EDU/ESSU#3. Patient is seen at bedside, HOB 90 degrees in stretcher. Nursing reports patient tolerated dysphagia diet without overt signs of impaired airway protection.  Patient is alert and oriented. Patient's wife is also present. Patient is able to follow commands and express needs.

## 2022-11-21 NOTE — TELEPHONE ENCOUNTER
Title: Medication time out/change to orders    Date patient scheduled: 11/22/22    Patient only getting hydration plan, no chemo  Jerry Clara, RN from [address] made aware  Is the patient scheduled within 24 hours? ? If yes, follow up with verbal telephone call  Office RN to route to [facility]  Infusion  pool routes to [facility]  Infusion tech to receive message, confirm scheduled treatment duration matches ordered treatment duration or adjust accordingly, and re-link appointment request orders  Infusion tech to notify pharmacy and finance

## 2022-11-22 ENCOUNTER — TELEPHONE (OUTPATIENT)
Dept: HEMATOLOGY ONCOLOGY | Facility: CLINIC | Age: 82
End: 2022-11-22

## 2022-11-22 ENCOUNTER — HOSPITAL ENCOUNTER (OUTPATIENT)
Dept: INFUSION CENTER | Facility: HOSPITAL | Age: 82
Discharge: HOME | End: 2022-11-22

## 2022-11-22 VITALS
RESPIRATION RATE: 16 BRPM | TEMPERATURE: 98.1 F | HEART RATE: 89 BPM | SYSTOLIC BLOOD PRESSURE: 100 MMHG | OXYGEN SATURATION: 100 % | DIASTOLIC BLOOD PRESSURE: 62 MMHG

## 2022-11-22 DIAGNOSIS — E86.0 DEHYDRATION: Primary | ICD-10-CM

## 2022-11-22 LAB
ALBUMIN SERPL BCP-MCNC: 3.1 G/DL (ref 3.5–5)
ALP SERPL-CCNC: 201 U/L (ref 46–116)
ALT SERPL W P-5'-P-CCNC: 31 U/L (ref 12–78)
ANION GAP SERPL CALCULATED.3IONS-SCNC: 8 MMOL/L (ref 4–13)
AST SERPL W P-5'-P-CCNC: 25 U/L (ref 5–45)
BASOPHILS # BLD AUTO: 0.03 THOUSANDS/ÂΜL (ref 0–0.1)
BASOPHILS NFR BLD AUTO: 1 % (ref 0–1)
BILIRUB SERPL-MCNC: 0.4 MG/DL (ref 0.2–1)
BUN SERPL-MCNC: 28 MG/DL (ref 5–25)
CALCIUM ALBUM COR SERPL-MCNC: 9.3 MG/DL (ref 8.3–10.1)
CALCIUM SERPL-MCNC: 8.6 MG/DL (ref 8.3–10.1)
CHLORIDE SERPL-SCNC: 102 MMOL/L (ref 96–108)
CO2 SERPL-SCNC: 26 MMOL/L (ref 21–32)
CREAT SERPL-MCNC: 0.94 MG/DL (ref 0.6–1.3)
EOSINOPHIL # BLD AUTO: 0.08 THOUSAND/ÂΜL (ref 0–0.61)
EOSINOPHIL NFR BLD AUTO: 3 % (ref 0–6)
ERYTHROCYTE [DISTWIDTH] IN BLOOD BY AUTOMATED COUNT: 14.8 % (ref 11.6–15.1)
GFR SERPL CREATININE-BSD FRML MDRD: 76 ML/MIN/1.73SQ M
GLUCOSE SERPL-MCNC: 215 MG/DL (ref 65–140)
HCT VFR BLD AUTO: 31.5 % (ref 36.5–49.3)
HGB BLD-MCNC: 10.1 G/DL (ref 12–17)
IMM GRANULOCYTES # BLD AUTO: 0.02 THOUSAND/UL (ref 0–0.2)
IMM GRANULOCYTES NFR BLD AUTO: 1 % (ref 0–2)
LYMPHOCYTES # BLD AUTO: 0.36 THOUSANDS/ÂΜL (ref 0.6–4.47)
LYMPHOCYTES NFR BLD AUTO: 14 % (ref 14–44)
MAGNESIUM SERPL-MCNC: 2 MG/DL (ref 1.6–2.6)
MCH RBC QN AUTO: 29.4 PG (ref 26.8–34.3)
MCHC RBC AUTO-ENTMCNC: 32.1 G/DL (ref 31.4–37.4)
MCV RBC AUTO: 92 FL (ref 82–98)
MONOCYTES # BLD AUTO: 0.4 THOUSAND/ÂΜL (ref 0.17–1.22)
MONOCYTES NFR BLD AUTO: 16 % (ref 4–12)
NEUTROPHILS # BLD AUTO: 1.68 THOUSANDS/ÂΜL (ref 1.85–7.62)
NEUTS SEG NFR BLD AUTO: 65 % (ref 43–75)
NRBC BLD AUTO-RTO: 0 /100 WBCS
PLATELET # BLD AUTO: 220 THOUSANDS/UL (ref 149–390)
PMV BLD AUTO: 10.4 FL (ref 8.9–12.7)
POTASSIUM SERPL-SCNC: 4 MMOL/L (ref 3.5–5.3)
PROT SERPL-MCNC: 7.3 G/DL (ref 6.4–8.4)
RBC # BLD AUTO: 3.43 MILLION/UL (ref 3.88–5.62)
SODIUM SERPL-SCNC: 136 MMOL/L (ref 135–147)
WBC # BLD AUTO: 2.57 THOUSAND/UL (ref 4.31–10.16)

## 2022-11-22 RX ADMIN — SODIUM CHLORIDE 1000 ML: 0.9 INJECTION, SOLUTION INTRAVENOUS at 12:49

## 2022-11-22 NOTE — TELEPHONE ENCOUNTER
Spoke with patient to go over his infusion schedule  I told the patient that he is only getting fluids at the infusion room and not chemo, like we reviewed in office yesterday and that he is going to get labs at the infusion center when he is getting fluids  Patient verbalized understanding of this  I also made sure that [facility] is set up for him to take him to appointments

## 2022-11-22 NOTE — PROGRESS NOTES
Pt tolerated IV Hydration with no complications  Pt left unit ambulatory with steady gait  Escorted home by [employer]   Refused AVS

## 2022-11-22 NOTE — TELEPHONE ENCOUNTER
CALL RETURN FORM   Reason for patient call? Patient calling about lab work and infusions scheduled    Patient's primary oncologist? Melita Shane    Name of person the patient was calling for? Nicole   Any additional information to add, if applicable? [phone]   Informed patient that the message will be forwarded to the team and someone will get back to them as soon as possible    Did you relay this information to the patient?   yes

## 2022-11-22 NOTE — PROGRESS NOTES
Pt arrived amb for hydration and labs via port  Accessed, labs obtained, Hydration infusing  Assisted pt to bathroom    WCTM

## 2022-11-22 NOTE — TELEPHONE ENCOUNTER
Left  for patient to go over his infusion schedule and labs  I left my direct teams number for him to callback

## 2022-11-29 ENCOUNTER — HOSPITAL ENCOUNTER (OUTPATIENT)
Dept: INFUSION CENTER | Facility: HOSPITAL | Age: 82
Discharge: HOME | End: 2022-11-29

## 2022-11-29 VITALS
HEART RATE: 80 BPM | OXYGEN SATURATION: 100 % | TEMPERATURE: 97.3 F | RESPIRATION RATE: 14 BRPM | DIASTOLIC BLOOD PRESSURE: 63 MMHG | SYSTOLIC BLOOD PRESSURE: 93 MMHG

## 2022-11-29 DIAGNOSIS — E86.0 DEHYDRATION: Primary | ICD-10-CM

## 2022-11-29 LAB
ALBUMIN SERPL BCP-MCNC: 3 G/DL (ref 3.5–5)
ALP SERPL-CCNC: 223 U/L (ref 46–116)
ALT SERPL W P-5'-P-CCNC: 24 U/L (ref 12–78)
ANION GAP SERPL CALCULATED.3IONS-SCNC: 7 MMOL/L (ref 4–13)
AST SERPL W P-5'-P-CCNC: 22 U/L (ref 5–45)
BASOPHILS # BLD AUTO: 0.02 THOUSANDS/ÂΜL (ref 0–0.1)
BASOPHILS NFR BLD AUTO: 1 % (ref 0–1)
BILIRUB SERPL-MCNC: 0.46 MG/DL (ref 0.2–1)
BUN SERPL-MCNC: 35 MG/DL (ref 5–25)
CALCIUM ALBUM COR SERPL-MCNC: 10.3 MG/DL (ref 8.3–10.1)
CALCIUM SERPL-MCNC: 9.5 MG/DL (ref 8.3–10.1)
CHLORIDE SERPL-SCNC: 100 MMOL/L (ref 96–108)
CO2 SERPL-SCNC: 25 MMOL/L (ref 21–32)
CREAT SERPL-MCNC: 1.08 MG/DL (ref 0.6–1.3)
EOSINOPHIL # BLD AUTO: 0.05 THOUSAND/ÂΜL (ref 0–0.61)
EOSINOPHIL NFR BLD AUTO: 2 % (ref 0–6)
ERYTHROCYTE [DISTWIDTH] IN BLOOD BY AUTOMATED COUNT: 14.9 % (ref 11.6–15.1)
GFR SERPL CREATININE-BSD FRML MDRD: 64 ML/MIN/1.73SQ M
GLUCOSE SERPL-MCNC: 139 MG/DL (ref 65–140)
HCT VFR BLD AUTO: 31 % (ref 36.5–49.3)
HGB BLD-MCNC: 10 G/DL (ref 12–17)
IMM GRANULOCYTES # BLD AUTO: 0.02 THOUSAND/UL (ref 0–0.2)
IMM GRANULOCYTES NFR BLD AUTO: 1 % (ref 0–2)
LYMPHOCYTES # BLD AUTO: 0.33 THOUSANDS/ÂΜL (ref 0.6–4.47)
LYMPHOCYTES NFR BLD AUTO: 12 % (ref 14–44)
MAGNESIUM SERPL-MCNC: 2.4 MG/DL (ref 1.6–2.6)
MCH RBC QN AUTO: 29.4 PG (ref 26.8–34.3)
MCHC RBC AUTO-ENTMCNC: 32.3 G/DL (ref 31.4–37.4)
MCV RBC AUTO: 91 FL (ref 82–98)
MONOCYTES # BLD AUTO: 0.62 THOUSAND/ÂΜL (ref 0.17–1.22)
MONOCYTES NFR BLD AUTO: 22 % (ref 4–12)
NEUTROPHILS # BLD AUTO: 1.8 THOUSANDS/ÂΜL (ref 1.85–7.62)
NEUTS SEG NFR BLD AUTO: 62 % (ref 43–75)
NRBC BLD AUTO-RTO: 0 /100 WBCS
PLATELET # BLD AUTO: 321 THOUSANDS/UL (ref 149–390)
PMV BLD AUTO: 9.9 FL (ref 8.9–12.7)
POTASSIUM SERPL-SCNC: 4.6 MMOL/L (ref 3.5–5.3)
PROT SERPL-MCNC: 6.8 G/DL (ref 6.4–8.4)
RBC # BLD AUTO: 3.4 MILLION/UL (ref 3.88–5.62)
SODIUM SERPL-SCNC: 132 MMOL/L (ref 135–147)
WBC # BLD AUTO: 2.84 THOUSAND/UL (ref 4.31–10.16)

## 2022-11-29 RX ADMIN — SODIUM CHLORIDE 1000 ML: 0.9 INJECTION, SOLUTION INTRAVENOUS at 14:25

## 2022-11-29 NOTE — PROGRESS NOTES
Pt here today for hydration and port labs tolerated well no adverse reactions AVS provided and pt left ambulatory utilizing a cane and steady gait

## 2022-11-29 NOTE — PLAN OF CARE
Problem: Potential for Falls  Goal: Patient will remain free of falls  Description: INTERVENTIONS:  - Educate patient/family on patient safety including physical limitations  - Instruct patient to call for assistance with activity   - Keep Call bell within reach  - Keep bed low and locked with side rails adjusted as appropriate  - Keep care items and personal belongings within reach  - Initiate and maintain comfort rounds    - Consider moving patient to room near nurses station  Outcome: Progressing     Problem: Knowledge Deficit  Goal: Patient/family/caregiver demonstrates understanding of disease process, treatment plan, medications, and discharge instructions  Description: Complete learning assessment and assess knowledge base    Interventions:  - Provide teaching at level of understanding  - Provide teaching via preferred learning methods  Outcome: Progressing

## 2022-12-02 ENCOUNTER — PATIENT OUTREACH (OUTPATIENT)
Dept: HEMATOLOGY ONCOLOGY | Facility: CLINIC | Age: 82
End: 2022-12-02

## 2022-12-02 NOTE — PROGRESS NOTES
MSW placed call to [facility] @ [phone] to follow up on the status of the pt's referral   VM left  MSW called pt to assess needs and to offer support and pt's vm was received  A detailed message was left, along with a contact number and the pt was encouraged to return the call  MSW placed referral with Itzel Ang for waiver services as no return call was received from the [facility]  Urgent referral placed  Call was placed to pt's sister to discuss his situation and to inquire as to her level or interest in involvement with future planning  Sister's vm was received  A detailed message was left briefly explaining the situation, contact number provided and she was encouraged to return the call  Pt returned marisel this day and states that he will be getting a caregiver through the [facility] but he will need to pay $400  Pt was repetitive in the amount he was paying and that he was unsure how he would pay this amount  He states he was told this would be for a short term only  Pt then asked why this MSW called his sister as he prefers only his daughter to be called  It was explained to the pt that his sister is his primary contact  Pt requesting a call to his daughter be made  MSW called and spoke with pt's daughter who states that the pt has a , Yulissa Ruvalcaba, through his apartment  Her number ws provided @ [phone]  Pt's daughter shared that the pt did not raise her and she never lived with her parents due to their mental illness  She states she is willing to help as able but she is also supporting herself and living in [place] where the cost of living is quite high  MSW called and spoke with Yulissa Ruvalcaba who states the pt was assessed and approved for in home care    He is over income for the waiver program but qualifies for assistance through the [employer], however there is a waiting list for this program   Pt has agreed to pay out of pocket until the [facility] is able to cameron Garcia was unsure what that amount would be and explained they will be meeting on Tuesday  She asked if she could conference call during the meeting and MSW provided contact number

## 2022-12-05 DIAGNOSIS — E86.0 DEHYDRATION: Primary | ICD-10-CM

## 2022-12-05 PROBLEM — N17.9 AKI (ACUTE KIDNEY INJURY) (HCC): Status: ACTIVE | Noted: 2022-01-01

## 2022-12-05 PROBLEM — R53.83 LETHARGY: Status: ACTIVE | Noted: 2022-01-01

## 2022-12-05 NOTE — ED PROVIDER NOTES
History  Chief Complaint   Patient presents with   • Shortness of Breath     Patient presents to ED sent by oncologist for evaluation of "lethargy" which patient reports has been from not sleeping along with shortness of breath worsened by movement  Patient had documented decreased oxygen saturation at office, but patient has poor perfusion  82-year-old male presents for evaluation of lethargy and weakness from the oncologist   The patient has had increased fatigue with sleeping as well as increasing shortness of breath and inability to complete his ADLs  The patient was reportedly hypoxic and hypotensive in the office  Patient states that his fatigue and shortness of breath or worse with exertion  Nothing has made it better  The patient states that he has had an increasingly poor appetite  Prior to Admission Medications   Prescriptions Last Dose Informant Patient Reported? Taking? ALPRAZolam (XANAX) 0 5 mg tablet   No No   Sig: Take 0 5 tablets (0 25 mg total) by mouth 3 (three) times a day as needed for anxiety   Alcohol Swabs 70 % PADS   No No   Sig: May substitute brand based on insurance coverage  Check glucose BID  Blood Glucose Monitoring Suppl (OneTouch Verio Reflect) w/Device KIT   No No   Sig: May substitute brand based on insurance coverage  Check glucose BID  Klor-Con M20 20 MEQ tablet   No No   Sig: TAKE 2 TABLETS BY MOUTH DAILY   Multiple Vitamin (MULTIVITAMIN ADULT PO)   Yes No   Sig: Take 1 tablet by mouth daily   OneTouch Delica Lancets 80X MISC   No No   Sig: May substitute brand based on insurance coverage  Check glucose BID  apixaban (ELIQUIS) 5 mg   Yes No   Sig: Take 5 mg by mouth 2 (two) times a day   diphenoxylate-atropine (LOMOTIL) 2 5-0 025 mg per tablet   No No   Sig: Take 1 tablet by mouth 4 (four) times a day as needed for diarrhea   glucose blood (OneTouch Verio) test strip   No No   Sig: May substitute brand based on insurance coverage  Check glucose BID  metFORMIN (GLUCOPHAGE) 1000 MG tablet   Yes No   Sig: Take 1,000 mg by mouth 2 (two) times a day with meals   metFORMIN (GLUCOPHAGE-XR) 500 mg 24 hr tablet   Yes No   Sig: Take 500 mg by mouth 2 (two) times a day   omeprazole (PriLOSEC) 20 mg delayed release capsule   Yes No   omeprazole (PriLOSEC) 40 MG capsule   Yes No   Sig: Take 40 mg by mouth every evening   ondansetron (ZOFRAN) 8 mg tablet   No No   Sig: Take 1 tablet (8 mg total) by mouth every 8 (eight) hours as needed for nausea or vomiting   polyethylene glycol (MIRALAX) 17 g packet   Yes No   Sig: Take 17 g by mouth daily   Patient not taking: Reported on 11/3/2022   pravastatin (PRAVACHOL) 20 mg tablet   Yes No   Sig: Take 20 mg by mouth daily   prochlorperazine (COMPAZINE) 10 mg tablet   No No   Sig: Take 1 tablet (10 mg total) by mouth every 6 (six) hours as needed for nausea or vomiting   traMADol (ULTRAM) 50 mg tablet   No No   Sig: Take 1 tablet (50 mg total) by mouth every 6 (six) hours as needed for moderate pain      Facility-Administered Medications: None       Past Medical History:   Diagnosis Date   • Ambulates with cane    • Anxiety    • Depression    • Diabetes mellitus (HCC)    • GERD (gastroesophageal reflux disease)    • History of pulmonary embolus (PE)    • Hyperlipidemia    • Multiple thyroid nodules     pt states about 40yrs ago   • Pancreatic cancer ([facility] [address] )    • Pancreatic mass        Past Surgical History:   Procedure Laterality Date   • APPENDECTOMY     • CATARACT EXTRACTION Right    • FL GUIDED CENTRAL VENOUS ACCESS DEVICE INSERTION  4/11/2022   • TUNNELED VENOUS PORT PLACEMENT Right 4/11/2022    Procedure: INSERTION VENOUS PORT (PORT-A-CATH);   Surgeon: Christopher Wick MD;  Location: [facility];  Service: Surgical Oncology       Family History   Problem Relation Age of Onset   • Alcohol abuse Mother    • Diabetes Father    • Throat cancer Brother      I have reviewed and agree with the history as documented  E-Cigarette/Vaping   • E-Cigarette Use Never User      E-Cigarette/Vaping Substances   • Nicotine No    • Flavoring No      Social History     Tobacco Use   • Smoking status: Former     Years: 5 00     Types: Cigarettes     Start date: 58     Quit date:      Years since quittin 9   • Smokeless tobacco: Never   • Tobacco comments:     quit in 1960s; smoked cigars for several yrs   Vaping Use   • Vaping Use: Never used   Substance Use Topics   • Alcohol use: Not Currently     Comment: seldom; socially   • Drug use: No       Review of Systems   Constitutional: Positive for fatigue  Negative for chills and fever  HENT: Negative for sore throat  Respiratory: Positive for shortness of breath  Negative for cough and chest tightness  Cardiovascular: Negative for chest pain and palpitations  Gastrointestinal: Negative for abdominal pain, constipation, diarrhea, nausea and vomiting  Genitourinary: Negative for difficulty urinating, dysuria, frequency and urgency  Musculoskeletal: Negative for back pain  Skin: Negative for rash  Neurological: Negative for dizziness, seizures, syncope, weakness and headaches  All other systems reviewed and are negative  Physical Exam  Physical Exam  Vitals and nursing note reviewed  Constitutional:       General: He is not in acute distress  Appearance: He is well-developed and well-nourished  He is cachectic  HENT:      Head: Normocephalic and atraumatic  Right Ear: External ear normal       Left Ear: External ear normal       Mouth/Throat:      Pharynx: No oropharyngeal exudate  Eyes:      General: No scleral icterus  Extraocular Movements: EOM normal       Pupils: Pupils are equal, round, and reactive to light  Cardiovascular:      Rate and Rhythm: Normal rate and regular rhythm  Heart sounds: Normal heart sounds  Pulmonary:      Effort: Pulmonary effort is normal  No respiratory distress        Breath sounds: Normal breath sounds  Abdominal:      General: Bowel sounds are normal       Palpations: Abdomen is soft  Tenderness: There is no abdominal tenderness  There is no guarding or rebound  Musculoskeletal:         General: Normal range of motion  Cervical back: Normal range of motion and neck supple  Skin:     General: Skin is warm and dry  Findings: No rash  Neurological:      Mental Status: He is alert and oriented to person, place, and time     Psychiatric:         Mood and Affect: Mood and affect normal          Vital Signs  ED Triage Vitals   Temperature Pulse Respirations Blood Pressure SpO2   12/05/22 1406 12/05/22 1406 12/05/22 1406 12/05/22 1406 12/05/22 1406   (!) 97 3 °F (36 3 °C) 58 18 93/64 91 %      Temp src Heart Rate Source Patient Position - Orthostatic VS BP Location FiO2 (%)   -- 12/05/22 1406 12/05/22 1406 12/05/22 1406 --    Monitor Sitting Left arm       Pain Score       12/05/22 1714       4           Vitals:    12/05/22 1406 12/05/22 1500 12/05/22 1715 12/05/22 1800   BP: 93/64 100/70 114/73 104/70   Pulse: 58 86 83 83   Patient Position - Orthostatic VS: Sitting            Visual Acuity      ED Medications  Medications   ALPRAZolam (XANAX) tablet 0 25 mg (has no administration in time range)   apixaban (ELIQUIS) tablet 5 mg (5 mg Oral Given 12/5/22 1732)   pantoprazole (PROTONIX) EC tablet 40 mg (has no administration in time range)   pravastatin (PRAVACHOL) tablet 20 mg (has no administration in time range)   potassium chloride (K-DUR,KLOR-CON) CR tablet 40 mEq (has no administration in time range)   insulin lispro (HumaLOG) 100 units/mL subcutaneous injection 1-5 Units (1 Units Subcutaneous Not Given 12/5/22 1734)   insulin lispro (HumaLOG) 100 units/mL subcutaneous injection 1-5 Units (has no administration in time range)   ondansetron (ZOFRAN) injection 4 mg (4 mg Intravenous Given 12/5/22 1945)   sodium chloride 0 9 % infusion (125 mL/hr Intravenous Rate/Dose Change 12/5/22 1809)   acetaminophen (TYLENOL) tablet 650 mg (has no administration in time range)   HYDROmorphone (DILAUDID) injection 0 5 mg (has no administration in time range)   ondansetron (ZOFRAN) injection 4 mg (4 mg Intravenous Given 12/5/22 1434)   sodium chloride 0 9 % bolus 1,000 mL (0 mL Intravenous Stopped 12/5/22 1715)   ondansetron (ZOFRAN) injection 4 mg (4 mg Intravenous Given 12/5/22 1715)   HYDROmorphone (DILAUDID) injection 0 5 mg (0 5 mg Intravenous Given 12/5/22 1714)       Diagnostic Studies  Results Reviewed     Procedure Component Value Units Date/Time    UA w Reflex to Microscopic w Reflex to Culture [[id number]]  (Abnormal) Collected: 12/05/22 2117    Lab Status: Final result Specimen: Urine, Clean Catch Updated: 12/05/22 2124     Color, UA Yellow     Clarity, UA Clear     Specific Gravity, UA 1 025     pH, UA 5 0     Leukocytes, UA Small     Nitrite, UA Negative     Protein, UA Trace mg/dl      Glucose, UA Negative mg/dl      Ketones, UA Trace mg/dl      Urobilinogen, UA <2 0 mg/dl      Bilirubin, UA Negative     Occult Blood, UA Negative    Urine Microscopic [[id number]] Collected: 12/05/22 2117    Lab Status:  In process Specimen: Urine, Clean Catch Updated: 12/05/22 2123    Fingerstick Glucose (POCT) [[id number]]  (Normal) Collected: 12/05/22 2055    Lab Status: Final result Updated: 12/05/22 2101     POC Glucose 140 mg/dl     HS Troponin I 4hr [[id number]]  (Normal) Collected: 12/05/22 2005    Lab Status: Final result Specimen: Blood from Arm, Right Updated: 12/05/22 2032     hs TnI 4hr 9 ng/L      Delta 4hr hsTnI -1 ng/L     HS Troponin I 2hr [[id number]]  (Normal) Collected: 12/05/22 1714    Lab Status: Final result Specimen: Blood from Arm, Right Updated: 12/05/22 1750     hs TnI 2hr 10 ng/L      Delta 2hr hsTnI 0 ng/L     Fingerstick Glucose (POCT) [[id number]]  (Normal) Collected: 12/05/22 1733    Lab Status: Final result Updated: 12/05/22 1736     POC Glucose 137 mg/dl     FLU/RSV/COVID - if FLU/RSV clinically relevant [[id number]]  (Normal) Collected: 12/05/22 1456    Lab Status: Final result Specimen: Nares from Nose Updated: 12/05/22 1547     SARS-CoV-2 Negative     INFLUENZA A PCR Negative     INFLUENZA B PCR Negative     RSV PCR Negative    Narrative:      FOR PEDIATRIC PATIENTS - copy/paste COVID Guidelines URL to browser: [email or web address]    SARS-CoV-2 assay is a Nucleic Acid Amplification assay intended for the  qualitative detection of nucleic acid from SARS-CoV-2 in nasopharyngeal  swabs  Results are for the presumptive identification of SARS-CoV-2 RNA  Positive results are indicative of infection with SARS-CoV-2, the virus  causing COVID-19, but do not rule out bacterial infection or co-infection  with other viruses  Laboratories within the [place] and its  territories are required to report all positive results to the appropriate  public health authorities  Negative results do not preclude SARS-CoV-2  infection and should not be used as the sole basis for treatment or other  patient management decisions  Negative results must be combined with  clinical observations, patient history, and epidemiological information  This test has not been FDA cleared or approved  This test has been authorized by FDA under an Emergency Use Authorization  (EUA)  This test is only authorized for the duration of time the  declaration that circumstances exist justifying the authorization of the  emergency use of an in vitro diagnostic tests for detection of SARS-CoV-2  virus and/or diagnosis of COVID-19 infection under section 564(b)(1) of  the Act, 21 U  S C  111CJX-7(N)(0), unless the authorization is terminated  or revoked sooner  The test has been validated but independent review by FDA  and CLIA is pending  Test performed using [employer]: This RT-PCR assay targets N2,  a region unique to SARS-CoV-2   A conserved region in the E-gene was chosen  for pan-Sarbecovirus detection which includes SARS-CoV-2  According to CMS-2020-01-R, this platform meets the definition of high-throughput technology      Procalcitonin [[id number]]  (Abnormal) Collected: 12/05/22 1437    Lab Status: Final result Specimen: Blood from Arm, Right Updated: 12/05/22 1530     Procalcitonin 0 43 ng/ml     HS Troponin 0hr (reflex protocol) [[id number]]  (Normal) Collected: 12/05/22 1437    Lab Status: Final result Specimen: Blood from Arm, Right Updated: 12/05/22 1530     hs TnI 0hr 10 ng/L     Comprehensive metabolic panel [[id number]]  (Abnormal) Collected: 12/05/22 1437    Lab Status: Final result Specimen: Blood from Arm, Right Updated: 12/05/22 1526     Sodium 133 mmol/L      Potassium 4 8 mmol/L      Chloride 99 mmol/L      CO2 23 mmol/L      ANION GAP 11 mmol/L      BUN 67 mg/dL      Creatinine 1 64 mg/dL      Glucose 146 mg/dL      Calcium 9 5 mg/dL      Corrected Calcium 10 3 mg/dL      AST 22 U/L      ALT 23 U/L      Alkaline Phosphatase 206 U/L      Total Protein 7 4 g/dL      Albumin 3 0 g/dL      Total Bilirubin 0 60 mg/dL      eGFR 38 ml/min/1 73sq m     Narrative:      [place] guidelines for Chronic Kidney Disease (CKD):   •  Stage 1 with normal or high GFR (GFR > 90 mL/min/1 73 square meters)  •  Stage 2 Mild CKD (GFR = 60-89 mL/min/1 73 square meters)  •  Stage 3A Moderate CKD (GFR = 45-59 mL/min/1 73 square meters)  •  Stage 3B Moderate CKD (GFR = 30-44 mL/min/1 73 square meters)  •  Stage 4 Severe CKD (GFR = 15-29 mL/min/1 73 square meters)  •  Stage 5 End Stage CKD (GFR <15 mL/min/1 73 square meters)  Note: GFR calculation is accurate only with a steady state creatinine    NT-BNP PRO [[id number]]  (Abnormal) Collected: 12/05/22 1437    Lab Status: Final result Specimen: Blood from Arm, Right Updated: 12/05/22 1526     NT-proBNP 492 pg/mL     Lactic acid [[id number]]  (Normal) Collected: 12/05/22 1437    Lab Status: Final result Specimen: Blood from Arm, Right Updated: 12/05/22 1522     LACTIC ACID 1 8 mmol/L     Narrative:      Result may be elevated if tourniquet was used during collection  Protime-INR [[id number]]  (Normal) Collected: 12/05/22 1437    Lab Status: Final result Specimen: Blood from Arm, Right Updated: 12/05/22 1522     Protime 14 0 seconds      INR 1 01    APTT [[id number]]  (Normal) Collected: 12/05/22 1437    Lab Status: Final result Specimen: Blood from Arm, Right Updated: 12/05/22 1522     PTT 29 seconds     CBC and differential [[id number]]  (Abnormal) Collected: 12/05/22 1437    Lab Status: Final result Specimen: Blood from Arm, Right Updated: 12/05/22 1505     WBC 4 44 Thousand/uL      RBC 3 82 Million/uL      Hemoglobin 11 1 g/dL      Hematocrit 35 0 %      MCV 92 fL      MCH 29 1 pg      MCHC 31 7 g/dL      RDW 15 1 %      MPV 9 7 fL      Platelets 399 Thousands/uL      nRBC 0 /100 WBCs      Neutrophils Relative 84 %      Immat GRANS % 1 %      Lymphocytes Relative 4 %      Monocytes Relative 11 %      Eosinophils Relative 0 %      Basophils Relative 0 %      Neutrophils Absolute 3 73 Thousands/µL      Immature Grans Absolute 0 04 Thousand/uL      Lymphocytes Absolute 0 19 Thousands/µL      Monocytes Absolute 0 47 Thousand/µL      Eosinophils Absolute 0 00 Thousand/µL      Basophils Absolute 0 01 Thousands/µL     Blood culture #1 [[id number]] Collected: 12/05/22 1456    Lab Status:  In process Specimen: Blood from Arm, Right Updated: 12/05/22 1500    POCT Blood Gas (CG8+) [[id number]]  (Abnormal) Collected: 12/05/22 1443    Lab Status: Final result Specimen: Venous Updated: 12/05/22 1500     ph, Thomas ISTAT 7 399     pCO2, Thomas i-STAT 34 4 mm HG      pO2, Thomas i-STAT 27 0 mm HG      BE, i-STAT -3 mmol/L      HCO3, Thomas i-STAT 21 2 mmol/L      CO2, i-STAT 22 mmol/L      O2 Sat, i-STAT 51 %      SODIUM, I-STAT 132 mmol/l      Potassium, i-STAT 4 7 mmol/L      Calcium, Ionized i-STAT 1 20 mmol/L      Hct, i-STAT 34 % Hgb, i-STAT 11 6 g/dl      Glucose, i-STAT 144 mg/dl      Specimen Type VENOUS    Blood culture #2 [[id number]] Collected: 12/05/22 1437    Lab Status: In process Specimen: Blood from Arm, Right Updated: 12/05/22 1458                 XR chest portable   ED Interpretation by Carlos Goodson DO (12/05 1518)   No acute cardiopulmonary process  Unchanged when compared to previous      Final Result by Maribell Torrez MD (12/05 2050)      No acute cardiopulmonary disease  Workstation performed: [id number]         CT chest abdomen pelvis wo contrast    (Results Pending)              Procedures  ECG 12 Lead Documentation Only    Date/Time: 12/5/2022 2:32 PM  Performed by: Carlos Goodson DO  Authorized by: Carlos Goodson DO     Indications / Diagnosis:  Weakness  ECG reviewed by me, the ED Provider: yes    Patient location:  ED  Previous ECG:     Previous ECG:  Compared to current    Comparison ECG info:  3/2/2022    Similarity:  No change  Interpretation:     Interpretation: normal    Rate:     ECG rate:  86    ECG rate assessment: normal    Rhythm:     Rhythm: sinus rhythm    Ectopy:     Ectopy: none    QRS:     QRS axis:  Normal    QRS intervals:  Normal  Conduction:     Conduction: abnormal      Abnormal conduction: 1st degree and bifascicular block    ST segments:     ST segments:  Normal  T waves:     T waves: normal               ED Course  ED Course as of 12/05/22 2128   Mon Dec 05, 2022   1551 SLIM paged for admit of acute dehydration as likely etiology of weakness and lethargy, no signs of sepsis, infection or significant anemia     1608 4:08 PM  I discussed the case with the hospitalist  We reviewed the HPI, pertinent PMH, ED course and workup   Hospitalist agreed with plan and will admit the patient to the hospital                                  SBIRT 22yo+    Flowsheet Row Most Recent Value   SBIRT (25 yo +)    In order to provide better care to our patients, we are screening all of our patients for alcohol and drug use  Would it be okay to ask you these screening questions? Yes Filed at: 12/05/2022 1413   Initial Alcohol Screen: US AUDIT-C     1  How often do you have a drink containing alcohol? 0 Filed at: 12/05/2022 1413   2  How many drinks containing alcohol do you have on a typical day you are drinking? 0 Filed at: 12/05/2022 1413   3a  Male UNDER 65: How often do you have five or more drinks on one occasion? 0 Filed at: 12/05/2022 1413   3b  FEMALE Any Age, or MALE 65+: How often do you have 4 or more drinks on one occassion? 0 Filed at: 12/05/2022 1413   Audit-C Score 0 Filed at: 12/05/2022 1413   DELIA: How many times in the past year have you    Used an illegal drug or used a prescription medication for non-medical reasons? Never Filed at: 12/05/2022 1413                    MDM  Number of Diagnoses or Management Options  Acute renal insufficiency  Dehydration  Pancreatic cancer [facility])  Diagnosis management comments: Differential diagnosis:  Dehydration, renal failure, anemia, electrolyte disturbance, myocardial infarction, anemia, doubt sepsis    Plan EKG, laboratories, urinalysis  Will begin rehydration as patient clinically appears dehydrated         Amount and/or Complexity of Data Reviewed  Clinical lab tests: reviewed  Tests in the radiology section of CPT®: reviewed  Tests in the medicine section of CPT®: reviewed  Decide to obtain previous medical records or to obtain history from someone other than the patient: yes  Review and summarize past medical records: yes  Discuss the patient with other providers: yes (Dr Alvin Tilley)  Independent visualization of images, tracings, or specimens: yes        Disposition  Final diagnoses:   Dehydration   Acute renal insufficiency   Pancreatic cancer [facility])     Time reflects when diagnosis was documented in both MDM as applicable and the Disposition within this note     Time User Action Codes Description Comment    12/5/2022 3:29 PM Milton Darter Add [E86 0] Dehydration     12/5/2022  3:29 PM Milton Darter Add [N28 9] Acute renal insufficiency     12/5/2022  3:29 PM Milton Darter Add [C25 9] Pancreatic cancer ([facility] )     12/5/2022  4:38 PM Rosilyn Ferries Add [C25 9] Pancreatic adenocarcinoma ([facility] )     12/5/2022  5:40 PM Rosilyn Ferries Add [E44 1] Mild protein-calorie malnutrition [facility])       ED Disposition     ED Disposition   Admit    Condition   Stable    Date/Time   Mon Dec 5, 2022  4:06 PM    Comment   Case was discussed with Jez Arteaga and the patient's admission status was agreed to be Admission Status: inpatient status to the service of Dr Jez Arteaga   Follow-up Information    None         Patient's Medications   Discharge Prescriptions    No medications on file       No discharge procedures on file      PDMP Review       Value Time User    PDMP Reviewed  Yes 11/3/2022  1:07 PM Jesse Yee PA-C          ED Provider  Electronically Signed by           Sissy Chin DO  12/05/22 212

## 2022-12-05 NOTE — ASSESSMENT & PLAN NOTE
Lab Results   Component Value Date    HGBA1C 6 1 08/28/2021     · Hold home metformin  · ISS and accucheks

## 2022-12-05 NOTE — ASSESSMENT & PLAN NOTE
· Baseline Cr 0 8-1  · Cr on admission 1 64, suspect prerenal dehydration, patient is with poor PO intake   · Gentle IVF  · UA is pending   · CT A/P pending   · Retention protocol   · Avoid hypotension and nephrotoxins

## 2022-12-05 NOTE — ASSESSMENT & PLAN NOTE
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  · initiated on chemotherapy April 2022, most recently on Abraxane and Gemzar, patient states he is not currently receiving chemotherapy, thinks his last infusion was within the past several months  He does receive hydration infusion every Tuesday  · S/P radiation in august  · Seen in office 11/20 01/2022 with Dr Moo Crowe --> at that time was documented with progressively reduced appetite, weakness, poor family support, notably was falling asleep during office visit  Reportedly had canceled his chemo cycle 8 due to weakness     · Patient was seen again in office today 12/5, referred to ED by Dr Moo Crowe   · Palliative care consult

## 2022-12-05 NOTE — H&P
[place]  H&P- Souleymane Tadeo 1940, [de-identified] y o  male MRN: [id number]  Unit/Bed#: ED 02 Encounter: [id number]  Primary Care Provider: Lori Benavidez   Date and time admitted to hospital: 12/5/2022  2:00 PM    * Lethargy  Assessment & Plan  · Referred to ED by OP Oncology due to report of lethargy/weakness after begin seen in office 12/5  Appears to be ongoing since 11/21 where lethargy/progressive decline in funcitonal status is noted in Onc note  Endorses abdominal pain, nausea/vomiting, diarrhea  · SIRS:  No leukocytosis, no fever, no tachycardia, no tachypnea  · Lactic is normal  · Procalcitonin slightly elevated, repeat morning  · No evidence/source of infection at this time  · UA pending  · CXR pending final read, no evidence of PNA on wet read   · CT CAP ordered   · BC x 2 pending  · PT/OT eval   · Palliative care consult due to progressive decline in setting of metastatic cancer   · Suspect likely due to progressive metastatic cancer    SHRAVAN (acute kidney injury) ([facility] [address] )  Assessment & Plan  · Baseline Cr 0 8-1  · Cr on admission 1 64, suspect prerenal dehydration, patient is with poor PO intake   · Gentle IVF  · UA is pending   · CT A/P pending   · Retention protocol   · Avoid hypotension and nephrotoxins    Pancreatic adenocarcinoma ([facility] [address] )  Assessment & Plan  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  · initiated on chemotherapy April 2022, most recently on Abraxane and Gemzar, patient states he is not currently receiving chemotherapy, thinks his last infusion was within the past several months    He does receive hydration infusion every Tuesday  · S/P radiation in august  · Seen in office 11/20 01/2022 with Dr Kimberly Benitez --> at that time was documented with progressively reduced appetite, weakness, poor family support, notably was falling asleep during office visit  Reportedly had canceled his chemo cycle 8 due to weakness  · Patient was seen again in office today 12/5, referred to ED by Dr Suzette Graff   · Palliative care consult     Mild protein-calorie malnutrition [facility])  Assessment & Plan  Malnutrition Findings:                                 BMI Findings: Body mass index is 18 01 kg/m²  History of pulmonary embolism  Assessment & Plan  · Continue Eliquis 5 mg b i d  Type 2 diabetes mellitus without complication, with long-term current use of insulin [facility])  Assessment & Plan  Lab Results   Component Value Date    HGBA1C 6 1 08/28/2021     · Hold home metformin  · ISS and accucheks     VTE Pharmacologic Prophylaxis: VTE Score: 7 High Risk (Score >/= 5) - Pharmacological DVT Prophylaxis Ordered: apixaban (Eliquis)  Sequential Compression Devices Ordered  Code Status: Level 1 - Full Code per patient, discussed thoroughly  Discussion with family: Attempted to update  (daughter) via phone  Left voicemail  Anticipated Length of Stay: Patient will be admitted on an inpatient basis with an anticipated length of stay of greater than 2 midnights secondary to IVF  Total Time for Visit, including Counseling / Coordination of Care: 60 minutes Greater than 50% of this total time spent on direct patient counseling and coordination of care  Chief Complaint:  Generalized weakness, lethargy, abdominal pain    History of Present Illness:  Veronica Major is a [de-identified] y o  male with a PMH of metastatic stage I B non-resectable pancreatic adenocarcinoma, DM2, history of PE on Eliquis, severe protein calorie malnutrition, HLD who presents from outpatient Oncology office per oncologist due to weakness, lethargy, abdominal pain/nausea and general malaise  Patient reports his primary complaints at this time include epigastric abdominal pain and nausea/vomiting  He reports he has had abdominal pain and nausea/vomiting on and off for “weeks”   He endorses chronic diarrhea, last episode 3-4 hours prior to admission, he denies seeing any blood in the stool  He reports he is always cold, denies fevers at home, dizziness, chest pain, shortness a breath, cough/wheeze, congestion, dysuria, urinary frequency, inability to urinate, rashes/wounds or additional complaints  Patient reports his lethargy has been ongoing for weeks, he is unable to cook for himself and has struggled with other ADLs at home where he lives alone  He is unsure of the last time that he 8, denies drinking water recently  Uses a cane to ambulate  He has been working with  to obtain home care  Notably his last outpatient Oncology visit on 11/21 is also with documentation of patient's lethargy, fatigue suggesting gradual decline over the past few months  Patient states he is no longer undergoing chemotherapy  Review of Systems:  Review of Systems   Constitutional: Positive for activity change, appetite change, chills and fatigue  Negative for fever  HENT: Negative for congestion, rhinorrhea and sore throat  Eyes: Negative for visual disturbance  Respiratory: Negative for cough, chest tightness, shortness of breath and wheezing  Cardiovascular: Negative for chest pain, palpitations and leg swelling  Gastrointestinal: Positive for abdominal pain, diarrhea, nausea and vomiting  Negative for constipation  Genitourinary: Negative for difficulty urinating, dysuria and frequency  Musculoskeletal: Negative for arthralgias and myalgias  Skin: Negative for rash and wound  Neurological: Negative for dizziness, light-headedness and headaches  All other systems reviewed and are negative        Past Medical and Surgical History:   Past Medical History:   Diagnosis Date   • Ambulates with cane    • Anxiety    • Depression    • Diabetes mellitus ([facility] [address] )    • GERD (gastroesophageal reflux disease)    • History of pulmonary embolus (PE)    • Hyperlipidemia    • Multiple thyroid nodules     pt states about 40yrs ago   • Pancreatic cancer [facility])    • Pancreatic mass        Past Surgical History:   Procedure Laterality Date   • APPENDECTOMY     • CATARACT EXTRACTION Right    • FL GUIDED CENTRAL VENOUS ACCESS DEVICE INSERTION  4/11/2022   • TUNNELED VENOUS PORT PLACEMENT Right 4/11/2022    Procedure: INSERTION VENOUS PORT (PORT-A-CATH); Surgeon: Meagan Costello MD;  Location: [facility];  Service: Surgical Oncology       Meds/Allergies:  Prior to Admission medications    Medication Sig Start Date End Date Taking? Authorizing Provider   Alcohol Swabs 70 % PADS May substitute brand based on insurance coverage  Check glucose BID  3/5/22   Clement Kocher, CRNP   ALPRAZolam Jereld Back) 0 5 mg tablet Take 0 5 tablets (0 25 mg total) by mouth 3 (three) times a day as needed for anxiety 8/24/22   Yovany Stevenson PA-C   apixaban (ELIQUIS) 5 mg Take 5 mg by mouth 2 (two) times a day    Historical Provider, MD   Blood Glucose Monitoring Suppl (OneTouch Verio Reflect) w/Device KIT May substitute brand based on insurance coverage  Check glucose BID  3/5/22   Clement Kocher, CRNP   diphenoxylate-atropine (LOMOTIL) 2 5-0 025 mg per tablet Take 1 tablet by mouth 4 (four) times a day as needed for diarrhea 10/31/22   Khadar Chester DO   glucose blood (OneTouch Verio) test strip May substitute brand based on insurance coverage   Check glucose BID  3/5/22   Clement Kocher, CRNP   Klor-Con M20 20 MEQ tablet TAKE 2 TABLETS BY MOUTH DAILY 7/5/22   Khadar Chester DO   metFORMIN (GLUCOPHAGE) 1000 MG tablet Take 1,000 mg by mouth 2 (two) times a day with meals    Historical Provider, MD   metFORMIN (GLUCOPHAGE-XR) 500 mg 24 hr tablet Take 500 mg by mouth 2 (two) times a day 7/14/22   Historical Provider, MD   Multiple Vitamin (MULTIVITAMIN ADULT PO) Take 1 tablet by mouth daily    Historical Provider, MD   omeprazole (PriLOSEC) 20 mg delayed release capsule  7/15/22   Historical Provider, MD omeprazole (PriLOSEC) 40 MG capsule Take 40 mg by mouth every evening    Historical Provider, MD   ondansetron (ZOFRAN) 8 mg tablet Take 1 tablet (8 mg total) by mouth every 8 (eight) hours as needed for nausea or vomiting 10/31/22   Fredi Medicine, DO   OneTouch Delica Lancets 57J MISC May substitute brand based on insurance coverage  Check glucose BID  3/5/22   TAMIKA Caldwell   polyethylene glycol (MIRALAX) 17 g packet Take 17 g by mouth daily  Patient not taking: Reported on 11/3/2022    Historical Provider, MD   pravastatin (PRAVACHOL) 20 mg tablet Take 20 mg by mouth daily    Historical Provider, MD   prochlorperazine (COMPAZINE) 10 mg tablet Take 1 tablet (10 mg total) by mouth every 6 (six) hours as needed for nausea or vomiting 10/31/22   Fredi Medicine, DO   traMADol (ULTRAM) 50 mg tablet Take 1 tablet (50 mg total) by mouth every 6 (six) hours as needed for moderate pain 22   Luz Garcia PA-C     I have reviewed home medications with patient personally  Allergies: Allergies   Allergen Reactions   • Aleve [Naproxen] Swelling       Social History:  Marital Status:     Occupation: none   Patient Pre-hospital Living Situation: Home, Alone  Patient Pre-hospital Level of Mobility: walks with cane  Patient Pre-hospital Diet Restrictions: none   Substance Use History:   Social History     Substance and Sexual Activity   Alcohol Use Not Currently    Comment: seldom; socially     Social History     Tobacco Use   Smoking Status Former   • Years: 5 00   • Types: Cigarettes   • Start date:    • Quit date:    • Years since quittin 9   Smokeless Tobacco Never   Tobacco Comments    quit in 1960s; smoked cigars for several yrs     Social History     Substance and Sexual Activity   Drug Use No       Family History:  Family History   Problem Relation Age of Onset   • Alcohol abuse Mother    • Diabetes Father    • Throat cancer Brother        Physical Exam:     Vitals: Blood Pressure: 114/73 (12/05/22 1715)  Pulse: 83 (12/05/22 1715)  Temperature: (!) 97 3 °F (36 3 °C) (12/05/22 1406)  Respirations: 16 (12/05/22 1715)  Height: 5' 7" (170 2 cm) (12/05/22 1406)  Weight - Scale: 52 2 kg (115 lb) (12/05/22 1406)  SpO2: 98 % (12/05/22 1715)    Physical Exam  Vitals and nursing note reviewed  Constitutional:       General: He is not in acute distress  Appearance: He is well-developed  He is ill-appearing  Comments: severely cachectic    HENT:      Head: Normocephalic and atraumatic  Eyes:      General:         Right eye: No discharge  Left eye: No discharge  Extraocular Movements: Extraocular movements intact  Conjunctiva/sclera: Conjunctivae normal    Cardiovascular:      Rate and Rhythm: Normal rate and regular rhythm  Heart sounds: No murmur heard  Pulmonary:      Effort: Pulmonary effort is normal  No respiratory distress  Breath sounds: Normal breath sounds  No wheezing, rhonchi or rales  Abdominal:      General: Bowel sounds are normal  There is distension  Palpations: Abdomen is soft  Tenderness: There is abdominal tenderness  Comments: Epigastric tenderness    Musculoskeletal:      Cervical back: Neck supple  Right lower leg: No edema  Left lower leg: No edema  Skin:     General: Skin is warm and dry  Capillary Refill: Capillary refill takes less than 2 seconds  Neurological:      Mental Status: He is alert and oriented to person, place, and time     Psychiatric:         Mood and Affect: Mood normal          Behavior: Behavior normal           Additional Data:     Lab Results:  Results from last 7 days   Lab Units 12/05/22  1443 12/05/22  1437   WBC Thousand/uL  --  4 44   HEMOGLOBIN g/dL  --  11 1*   I STAT HEMOGLOBIN g/dl 11 6*  --    HEMATOCRIT %  --  35 0*   HEMATOCRIT, ISTAT % 34*  --    PLATELETS Thousands/uL  --  273   NEUTROS PCT %  --  84*   LYMPHS PCT %  --  4*   MONOS PCT %  --  11   EOS PCT %  --  0     Results from last 7 days   Lab Units 12/05/22  1443 12/05/22  1437   SODIUM mmol/L  --  133*   POTASSIUM mmol/L  --  4 8   CHLORIDE mmol/L  --  99   CO2 mmol/L  --  23   CO2, I-STAT mmol/L 22  --    BUN mg/dL  --  67*   CREATININE mg/dL  --  1 64*   ANION GAP mmol/L  --  11   CALCIUM mg/dL  --  9 5   ALBUMIN g/dL  --  3 0*   TOTAL BILIRUBIN mg/dL  --  0 60   ALK PHOS U/L  --  206*   ALT U/L  --  23   AST U/L  --  22   GLUCOSE RANDOM mg/dL  --  146*     Results from last 7 days   Lab Units 12/05/22  1437   INR  1 01     Results from last 7 days   Lab Units 12/05/22  1733   POC GLUCOSE mg/dl 137         Results from last 7 days   Lab Units 12/05/22  1437   LACTIC ACID mmol/L 1 8   PROCALCITONIN ng/ml 0 43*       Lines/Drains:  Invasive Devices     Central Venous Catheter Line  Duration           Port A Cath 04/11/22 Right Chest 238 days          Peripheral Intravenous Line  Duration           Peripheral IV 12/05/22 Right;Ventral (anterior) Forearm <1 day                Central Line:  Goal for removal: N/A - Chronic PICC           Imaging: Reviewed radiology reports from this admission including: chest xray  XR chest portable   ED Interpretation by Hunter Rob DO (12/05 1518)   No acute cardiopulmonary process  Unchanged when compared to previous      CT chest abdomen pelvis wo contrast    (Results Pending)       EKG and Other Studies Reviewed on Admission:   · EKG: NSR, RBBB, L anterior fascicular blcok   ** Please Note: This note has been constructed using a voice recognition system   **

## 2022-12-05 NOTE — ASSESSMENT & PLAN NOTE
· Referred to ED by OP Oncology due to report of lethargy/weakness after begin seen in office 12/5  Appears to be ongoing since 11/21 where lethargy/progressive decline in funcitonal status is noted in Onc note    Endorses abdominal pain, nausea/vomiting, diarrhea  · SIRS:  No leukocytosis, no fever, no tachycardia, no tachypnea  · Lactic is normal  · Procalcitonin slightly elevated, repeat morning  · No evidence/source of infection at this time  · UA pending  · CXR pending final read, no evidence of PNA on wet read   · CT CAP ordered   · BC x 2 pending  · PT/OT eval   · Palliative care consult due to progressive decline in setting of metastatic cancer   · Suspect likely due to progressive metastatic cancer

## 2022-12-05 NOTE — ASSESSMENT & PLAN NOTE
Malnutrition Findings:                                 BMI Findings: Body mass index is 18 01 kg/m²

## 2022-12-06 ENCOUNTER — HOSPITAL ENCOUNTER (OUTPATIENT)
Dept: INFUSION CENTER | Facility: HOSPITAL | Age: 82
Discharge: HOME | End: 2022-12-06

## 2022-12-06 PROBLEM — R18.8 ASCITES: Status: ACTIVE | Noted: 2022-12-06

## 2022-12-06 NOTE — DISCHARGE INSTRUCTIONS
Abdominal Paracentesis     WHAT YOU NEED TO KNOW:   Abdominal paracentesis is a procedure to remove abnormal fluid buildup in your abdomen  Fluid builds up because of liver problems, such as swelling and scarring  Heart failure, kidney disease, a mass, or problems with your pancreas may also cause fluid buildup  DISCHARGE INSTRUCTIONS:     Follow up with your healthcare provider as directed: Write down your questions so you remember to ask them during your visits  Wound care: Remove dressing after 24 hours  Leave glue in place  Return to your normal activities    Contact Interventional Radiology at [phone] Juanjose PATIENTS: Contact Interventional Radiology at [phone]) Kristine Jones PATIENTS: Contact Interventional Radiology at [phone]) if:  You have a fever and your wound is red and swollen  You have yellow, green, or bad-smelling discharge coming from your wound  You have pain or swelling in your abdomen  You have an upset stomach or you vomit  You have sudden, sharp pain in your abdomen  You urinate very little or not at all  You feel confused and more tired than usual    Your arm or leg feels warm, tender, and painful  It may look swollen and red  You suddenly feel lightheaded and have trouble breathing

## 2022-12-06 NOTE — SEDATION DOCUMENTATION
5500cc's of clear yellow fluid drained from Rt side of abdomen  Pt tolerated procedure well  No complaints

## 2022-12-06 NOTE — ASSESSMENT & PLAN NOTE
CT chest abdomen pelvis showed-" Massive ascites, developing since a CT from 10/28/2022  No new pancreatic head tumor poorly evaluated in the absence of IV and enteric contrast   Grossly unchanged in size  Previously demonstrated mesenteric metastases not visualized with certainty on this examination, most likely due to combination of ascites, mesenteric edema and absence of IV contrast   No evidence of new metastases in the chest, abdomen or pelvis  Stable small pulmonary nodules    Generalized dilatation of fluid-filled esophagus, consistent with esophageal dysmotility, reflux or, less likely, distal esophageal obstruction "    IR consult for paracentesis  GI consult regarding esophageal dysmotility

## 2022-12-06 NOTE — ASSESSMENT & PLAN NOTE
· Referred to ED by OP Oncology due to report of lethargy/weakness after begin seen in office 12/5  Appears to be ongoing since 11/21 where lethargy/progressive decline in funcitonal status is noted in Onc note    Endorses abdominal pain, nausea/vomiting, diarrhea  · SIRS:  No leukocytosis, no fever, no tachycardia, no tachypnea  · Lactic is normal  · Procalcitonin slightly elevated, repeat morning  · No evidence/source of infection at this time  · UA is unremarkable  · CXR is unremarkable  · Follow-up culture results  · PT/OT eval   · Palliative care consult due to progressive decline in setting of metastatic cancer   · Suspect likely due to progressive metastatic cancer

## 2022-12-06 NOTE — ASSESSMENT & PLAN NOTE
Malnutrition Findings:     BMI Findings: Body mass index is 18 01 kg/m²     Nutrition consult and recommendation

## 2022-12-06 NOTE — CONSULTS
Consultation - GI   Kat Dailey [de-identified] y o  male MRN: [id number]  Unit/Bed#: ED 02 Encounter: [id number]      Assessment/Plan   1  Advanced unresectable pancreatic CA  2  Esophageal abnormality on CT scan  3  GERD  43-year-old male diagnosed with advanced unresectable pancreatic cancer in March 2022  Received chemotherapy April through July 2022  Also underwent radiation treatment August through September 29, 2022  Surveillance CT scan 10/28/2022 showed progression of disease  Received additional chemotherapy 11/8/2022 however has developed severe weakness, lethargy, abdominal pain, nausea and anorexia  Required outpatient IV fluids for dehydration  Presented to ED after evaluated by oncologist  CT scan of the abdomen shows massive ascites  Also noted to have dilated fluid-filled esophagus  Patient denies recent dysphagia but has had minimal p o  intake for at least 5 days  Reports frequent nausea contributing to anorexia  -Plan for IR paracentesis given new ascites  -Check barium swallow/esophagram to evaluate for stenosis or obstruction  -Clear liquids for now until esophagus further evaluated  - will add Zofran 3 times daily scheduled dosing for nausea  -Continue PPI twice daily  -Agree with palliative care, need to address goals of care         Inpatient consult to gastroenterology  Consult performed by: TAMIKA Mustafa  Consult ordered by: Garry Powell MD          Physician Requesting Consult: Garry Powell MD  Reason for Consult / Principal Problem: Advanced Pancreatic cancer    HPI: Kat Dailey is a [de-identified]y o  year old male diagnosed with advanced unresectable pancreatic cancer in March 2022  Currently under the care of oncology Dr Augusta Walker  Underwent chemotherapy April 2022 through July with complications of diarrhea requiring dosage adjustment    He also underwent radiation August through September 29, 2022  Surveillance CT scan 10/28/2022 showed progression of disease with worsening peritoneal carcinomatosis, small ascites and multiple new peritoneal nodules noted    Treatment regimen changed to gem/ abraxone  Received 1 dose 11/8 but has been experiencing significant weakness, lethargy and nausea  Was evaluated as outpatient in oncology office 11/21 and had lost significant weight  Was too weak to obtain groceries or prepare food  Also reports frequent nausea and decreased appetite  Weight in March 2022 was 136, most recent weight 115  Receiving interval IV fluids in oncology office due to the dehydration  Was referred to the ED today due to weakness, lethargy, abdominal pain and nausea    CT scan abdomen and pelvis in the ED showed massive ascites, no new pancreatic lesion, no evidence of new metastasis in chest, abdomen and pelvis  Noted to have generalized dilation of fluid-filled esophagus consistent with possible esophageal dysmotility, reflux or distal esophageal obstruction  Blood work reviewed  Alkaline phosphatase slightly elevated 171 but other LFTs within normal limits, white count normal at 6 21, hemoglobin 10 3 with normal MCV-92, platelets 919    Patient denies abdominal pain at present but does report reflux symptoms which he states is usually controlled with omeprazole at home  Also reporting daily and frequent nausea but no emesis  Denies dysphagia or sensation of food sticking in esophagus but admits to poor appetite and minimal p o  intake over the past 4 to 5 days  Reports constipation with hard formed bowel movement every 3 to 4 days, denies melena or rectal bleeding        Review of Systems   Constitutional: Positive for activity change, appetite change, fatigue and unexpected weight change  HENT: Negative for trouble swallowing  Respiratory: Positive for shortness of breath  Cardiovascular: Negative  Gastrointestinal: Positive for abdominal distention, abdominal pain and nausea  Genitourinary: Negative      Musculoskeletal: Positive for back pain  Skin: Positive for pallor  Neurological: Positive for dizziness and weakness  Hematological: Negative  Psychiatric/Behavioral: Negative  Historical Information   Past Medical History:   Diagnosis Date   • Ambulates with cane    • Anxiety    • Depression    • Diabetes mellitus ([facility] )    • GERD (gastroesophageal reflux disease)    • History of pulmonary embolus (PE)    • Hyperlipidemia    • Multiple thyroid nodules     pt states about 40yrs ago   • Pancreatic cancer ([facility] )    • Pancreatic mass      Past Surgical History:   Procedure Laterality Date   • APPENDECTOMY     • CATARACT EXTRACTION Right    • FL GUIDED CENTRAL VENOUS ACCESS DEVICE INSERTION  2022   • TUNNELED VENOUS PORT PLACEMENT Right 2022    Procedure: INSERTION VENOUS PORT (PORT-A-CATH);   Surgeon: Vernon Srivastava MD;  [facility];  Service: Surgical Oncology     Social History   Social History     Substance and Sexual Activity   Alcohol Use Not Currently    Comment: seldom; socially     Social History     Substance and Sexual Activity   Drug Use No     Social History     Tobacco Use   Smoking Status Former   • Years: 5 00   • Types: Cigarettes   • Start date: 58   • Quit date: 67   • Years since quittin 9   Smokeless Tobacco Never   Tobacco Comments    quit in 1960s; smoked cigars for several yrs     Family History   Problem Relation Age of Onset   • Alcohol abuse Mother    • Diabetes Father    • Throat cancer Brother        Meds/Allergies   Current Facility-Administered Medications   Medication Dose Route Frequency   • acetaminophen (TYLENOL) tablet 650 mg  650 mg Oral Q6H PRN   • ALPRAZolam (XANAX) tablet 0 25 mg  0 25 mg Oral TID PRN   • apixaban (ELIQUIS) tablet 5 mg  5 mg Oral BID   • calcium carbonate (TUMS) chewable tablet 500 mg  500 mg Oral Daily PRN   • HYDROmorphone (DILAUDID) injection 0 5 mg  0 5 mg Intravenous Q6H PRN   • insulin lispro (HumaLOG) 100 units/mL subcutaneous injection 1-5 Units  1-5 Units Subcutaneous TID AC   • insulin lispro (HumaLOG) 100 units/mL subcutaneous injection 1-5 Units  1-5 Units Subcutaneous HS   • metoclopramide (REGLAN) injection 10 mg  10 mg Intravenous Q6H PRN   • ondansetron (ZOFRAN) injection 4 mg  4 mg Intravenous Q4H PRN   • ondansetron (ZOFRAN-ODT) dispersible tablet 4 mg  4 mg Oral Q8H [facility] longterm   • pantoprazole (PROTONIX) EC tablet 40 mg  40 mg Oral BID AC   • potassium chloride (K-DUR,KLOR-CON) CR tablet 40 mEq  40 mEq Oral Daily   • pravastatin (PRAVACHOL) tablet 20 mg  20 mg Oral Daily   • sodium chloride 0 9 % infusion  125 mL/hr Intravenous Continuous     (Not in a hospital admission)      Allergies   Allergen Reactions   • Aleve [Naproxen] Swelling           Physical Exam   Constitutional: Appears weak, cachectic  HENT: WNL  Head: Normocephalic and atraumatic  Eyes: Anicteric  Neck: Normal range of motion  Neck supple  Cardiovascular: Normal rate and regular rhythm  S1 and S2 normal, no murmur rub or gallop  Pulmonary/Chest: Effort normal and breath sounds normal    Abdominal: Abdomen distended, firm, nontender with palpation  Bowel sounds present  Musculoskeletal: Normal range of motion  Extremities:  No edema  Neurological: alert and oriented to person, place, and time  ,  Forgetful  Skin: Pale, warm and dry  Psychiatric: normal mood and affect         Lab Results:   Admission on 12/05/2022   Component Date Value   • Ventricular Rate 12/05/2022 86    • Atrial Rate 12/05/2022 86    • AZ Interval 12/05/2022 200    • QRSD Interval 12/05/2022 134    • QT Interval 12/05/2022 404    • QTC Interval 12/05/2022 483    • P Axis 12/05/2022 83    • QRS Axis 12/05/2022 -71    • T Wave Axis 12/05/2022 93    • WBC 12/05/2022 4 44    • RBC 12/05/2022 3 82 (L)    • Hemoglobin 12/05/2022 11 1 (L)    • Hematocrit 12/05/2022 35 0 (L)    • MCV 12/05/2022 92    • MCH 12/05/2022 29 1    • MCHC 12/05/2022 31 7    • RDW 12/05/2022 15 1    • MPV 12/05/2022 9 7    • Platelets 71/25/0430 273    • nRBC 12/05/2022 0    • Neutrophils Relative 12/05/2022 84 (H)    • Immat GRANS % 12/05/2022 1    • Lymphocytes Relative 12/05/2022 4 (L)    • Monocytes Relative 12/05/2022 11    • Eosinophils Relative 12/05/2022 0    • Basophils Relative 12/05/2022 0    • Neutrophils Absolute 12/05/2022 3 73    • Immature Grans Absolute 12/05/2022 0 04    • Lymphocytes Absolute 12/05/2022 0 19 (L)    • Monocytes Absolute 12/05/2022 0 47    • Eosinophils Absolute 12/05/2022 0 00    • Basophils Absolute 12/05/2022 0 01    • Sodium 12/05/2022 133 (L)    • Potassium 12/05/2022 4 8    • Chloride 12/05/2022 99    • CO2 12/05/2022 23    • ANION GAP 12/05/2022 11    • BUN 12/05/2022 67 (H)    • Creatinine 12/05/2022 1 64 (H)    • Glucose 12/05/2022 146 (H)    • Calcium 12/05/2022 9 5    • Corrected Calcium 12/05/2022 10 3 (H)    • AST 12/05/2022 22    • ALT 12/05/2022 23    • Alkaline Phosphatase 12/05/2022 206 (H)    • Total Protein 12/05/2022 7 4    • Albumin 12/05/2022 3 0 (L)    • Total Bilirubin 12/05/2022 0 60    • eGFR 12/05/2022 38    • LACTIC ACID 12/05/2022 1 8    • Procalcitonin 12/05/2022 0 43 (H)    • Protime 12/05/2022 14 0    • INR 12/05/2022 1 01    • PTT 12/05/2022 29    • Blood Culture 12/05/2022 Received in Microbiology Lab  Culture in Progress  • Blood Culture 12/05/2022 Received in Microbiology Lab  Culture in Progress      • NT-proBNP 12/05/2022 492 (H)    • SARS-CoV-2 12/05/2022 Negative    • INFLUENZA A PCR 12/05/2022 Negative    • INFLUENZA B PCR 12/05/2022 Negative    • RSV PCR 12/05/2022 Negative    • hs TnI 0hr 12/05/2022 10    • Color, UA 12/05/2022 Yellow    • Clarity, UA 12/05/2022 Clear    • Specific Gravity, UA 12/05/2022 1 025    • pH, UA 12/05/2022 5 0    • Leukocytes, UA 12/05/2022 Small (A)    • Nitrite, UA 12/05/2022 Negative    • Protein, UA 12/05/2022 Trace (A)    • Glucose, UA 12/05/2022 Negative    • Ketones, UA 12/05/2022 Trace (A)    • Urobilinogen, UA 12/05/2022 <2 0    • Bilirubin, UA 12/05/2022 Negative    • Occult Blood, UA 12/05/2022 Negative    • ph, Thomas ISTAT 12/05/2022 7 399    • pCO2, Thomas i-STAT 12/05/2022 34 4 (L)    • pO2, Thomas i-STAT 12/05/2022 27 0 (L)    • BE, i-STAT 12/05/2022 -3 (L)    • HCO3, Thomas i-STAT 12/05/2022 21 2 (L)    • CO2, i-STAT 12/05/2022 22    • O2 Sat, i-STAT 12/05/2022 51 (L)    • SODIUM, I-STAT 12/05/2022 132 (L)    • Potassium, i-STAT 12/05/2022 4 7    • Calcium, Ionized i-STAT 12/05/2022 1 20    • Hct, i-STAT 12/05/2022 34 (L)    • Hgb, i-STAT 12/05/2022 11 6 (L)    • Glucose, i-STAT 12/05/2022 144 (H)    • Specimen Type 12/05/2022 VENOUS    • hs TnI 2hr 12/05/2022 10    • Delta 2hr hsTnI 12/05/2022 0    • hs TnI 4hr 12/05/2022 9    • Delta 4hr hsTnI 12/05/2022 -1    • POC Glucose 12/05/2022 137    • POC Glucose 12/05/2022 140    • RBC, UA 12/05/2022 0-1    • WBC, UA 12/05/2022 4-10 (A)    • Epithelial Cells 12/05/2022 Occasional    • Bacteria, UA 12/05/2022 Occasional    • Hyaline Casts, UA 12/05/2022 30-50 (A)    • Sodium 12/06/2022 135    • Potassium 12/06/2022 4 9    • Chloride 12/06/2022 103    • CO2 12/06/2022 20 (L)    • ANION GAP 12/06/2022 12    • BUN 12/06/2022 74 (H)    • Creatinine 12/06/2022 1 53 (H)    • Glucose 12/06/2022 141 (H)    • Calcium 12/06/2022 8 7    • Corrected Calcium 12/06/2022 9 8    • AST 12/06/2022 20    • ALT 12/06/2022 22    • Alkaline Phosphatase 12/06/2022 171 (H)    • Total Protein 12/06/2022 6 6    • Albumin 12/06/2022 2 6 (L)    • Total Bilirubin 12/06/2022 0 50    • eGFR 12/06/2022 42    • Magnesium 12/06/2022 2 1    • Phosphorus 12/06/2022 4 8 (H)    • WBC 12/06/2022 6 21    • RBC 12/06/2022 3 49 (L)    • Hemoglobin 12/06/2022 10 3 (L)    • Hematocrit 12/06/2022 32 0 (L)    • MCV 12/06/2022 92    • MCH 12/06/2022 29 5    • MCHC 12/06/2022 32 2    • RDW 12/06/2022 15 2 (H)    • MPV 12/06/2022 10 1    • Platelets 40/11/5109 227    • nRBC 12/06/2022 0    • Neutrophils Relative 12/06/2022 88 (H)    • Immat GRANS % 12/06/2022 1    • Lymphocytes Relative 12/06/2022 3 (L)    • Monocytes Relative 12/06/2022 8    • Eosinophils Relative 12/06/2022 0    • Basophils Relative 12/06/2022 0    • Neutrophils Absolute 12/06/2022 5 46    • Immature Grans Absolute 12/06/2022 0 03    • Lymphocytes Absolute 12/06/2022 0 20 (L)    • Monocytes Absolute 12/06/2022 0 51    • Eosinophils Absolute 12/06/2022 0 00    • Basophils Absolute 12/06/2022 0 01    • Procalcitonin 12/06/2022 0 48 (H)    • POC Glucose 12/06/2022 145 (H)    • POC Glucose 12/06/2022 185 (H)      Imaging Studies: I have personally reviewed pertinent reports  EKG, Pathology, and Other Studies: I have personally reviewed pertinent reports  Counseling / Coordination of Care  Total floor / unit time spent today 45 minutes

## 2022-12-06 NOTE — PHYSICAL THERAPY NOTE
PHYSICAL THERAPY Evaluation    Performed at least 2 patient identifiers during session:  Patient Active Problem List   Diagnosis    Type 2 diabetes mellitus without complication, with long-term current use of insulin (HCC)    GERD (gastroesophageal reflux disease)    Pancreatic mass    History of pulmonary embolism    Hyperbilirubinemia    Anxiety about health    Pancreatic adenocarcinoma ([facility] )    Chemotherapy induced neutropenia (HCC)    Hypokalemia    Mild protein-calorie malnutrition (HCC)    Acute exacerbation of chronic obstructive pulmonary disease (HCC)    Dehydration    Lethargy    SHRAVAN (acute kidney injury) ([facility] )    Ascites       Past Medical History:   Diagnosis Date    Ambulates with cane     Anxiety     Depression     Diabetes mellitus ([facility] )     GERD (gastroesophageal reflux disease)     History of pulmonary embolus (PE)     Hyperlipidemia     Multiple thyroid nodules     pt states about 40yrs ago    Pancreatic cancer ([facility] )     Pancreatic mass        Past Surgical History:   Procedure Laterality Date    APPENDECTOMY      CATARACT EXTRACTION Right     FL GUIDED CENTRAL VENOUS ACCESS DEVICE INSERTION  4/11/2022    TUNNELED VENOUS PORT PLACEMENT Right 4/11/2022    Procedure: INSERTION VENOUS PORT (PORT-A-CATH); Surgeon: Christopher Wick MD;  Location: [facility];  Service: Surgical Oncology        12/06/22 1013   PT Last Visit   PT Visit Date 12/06/22   Note Type   Note type Evaluation   Pain Assessment   Pain Assessment Tool 0-10   Pain Score No Pain   Home Living   Type of Home Apartment  [facility] good in [facility])   Home Layout One level;Elevator   Bathroom Shower/Tub Tub/shower unit   [address] [place] [address] Dr garcia   Prior Function   Level of [place] Independent with ADLs; Independent with functional mobility   Lives With Alone   IADLs Independent with driving; Independent with meal prep;Independent with medication management   Falls in the last 6 months 1 to 4   Comments Pt reports that while he is able to drive, relies on [facility] transport to infusion appts  General   Additional Pertinent History Orthostatic BP measurements this session:  Supine:  114/79mmHg, sitting in chair: 79/54mmHg;  reclined in chair at end of session: 99/62mmHg, nurse aware  Cognition   Overall Cognitive Status Impaired  (forgetful, decreased insight;  often repeats self and needs instructions repeated )   Arousal/Participation Cooperative   Attention Attends with cues to redirect   Orientation Level Oriented X4   Memory Decreased short term memory   Following Commands Follows one step commands with increased time or repetition   RUE Assessment   RUE Assessment WFL   LUE Assessment   LUE Assessment WFL   RLE Assessment   RLE Assessment WFL   LLE Assessment   LLE Assessment WFL   Bed Mobility   Supine to Sit 6  Modified independent   Additional Comments Pt remains reclined in chair at end of session, chair alarm intact  Transfers   Sit to Stand 6  Modified independent  (SPC)   Stand to Sit 6  Modified independent   Additional items Armrests   Stand pivot 5  Supervision   Additional items Increased time required  [facility])   Ambulation/Elevation   Gait pattern Forward Flexion;Decreased foot clearance   Gait Assistance 5  Supervision   Additional items Assist x 1   Assistive Device Straight cane   Distance 3ft bed to chair, limited by orthostatic BP measurements;  no LOB or unsteadiness noted  Balance   Static Sitting Good   Dynamic Sitting Fair +   Static Standing Fair +   Dynamic Standing Fair +   Activity Tolerance   Activity Tolerance Treatment limited secondary to medical complications (Comment)  (orthostatic BP measurements as above)   Medical Staff Made Aware  [address]   Assessment   Prognosis Guarded   Problem List Decreased endurance;Decreased mobility   Assessment Pt is an [de-identified] y o  male who presented to ED 12/5/22 with c/o lethargy and weakness from the oncologist   Dx:  Acute renal insufficiency, dehydration, pancreatic Ca, dehydration  Comorbidities affecting pt's physical performance at time of assessment include: Ca, depression  Personal factors affecting pt at time of IE include: fatigue, hypotension, GERD  PLOF and home set up listed above  Upon evaluation: Pt mod I for bed mobility, mod I for sit to stand, and S for stepping to chair with SPC; limited by orthostatic BP measurements  Full objective findings from PT assessment regarding body systems outlined above  Current limitations include impaired balance, decreased endurance/activity tolerance, gait deviations  The following objective measures performed on IE also reveal limitations: orthostatic BP measurements as above  Pt's clinical presentation is currently unstable/unpredictable seen in pt's presentation of continuous monitoring in ED and BP  Pt would benefit from continued PT while in hospital and follow up with [facility]CPT at D/C to increase strength, balance, endurance, independence with funcitonal mobility to return to PLOF, maximize independence, decrease caregiver burden and improve quality of life  The patient's AM-PAC Basic Mobility Inpatient Short Form Raw Score is 23  A Raw score of greater than 17 suggests the patient may benefit from discharge to home  Please also refer to the recommendation of the Physical Therapist for safe discharge planning  Barriers to Discharge Decreased caregiver support   Goals   Patient Goals to get better   STG Expiration Date 12/20/22   Short Term Goal #1 Pt will be able to demo:  mod I ambulation x150ft with RW; to increase endurance and return to PLOF  Plan   Treatment/Interventions ADL retraining;Functional transfer training;LE strengthening/ROM; Elevations; Therapeutic exercise; Endurance training;Cognitive reorientation;Patient/family training;Equipment eval/education; Bed mobility;Continued evaluation;Gait training; Compensatory technique education;Spoke to nursing;Spoke to case management;OT   PT Frequency 1-2x/wk   Recommendation   PT Discharge Recommendation Home with home health rehabilitation   [address] Mobility Inpatient   Turning in Bed Without Bedrails 4   Lying on Back to Sitting on Edge of Flat Bed 4   Moving Bed to Chair 4   Standing Up From Chair 4   Walk in Room 4   Climb 3-5 Stairs 3   Basic Mobility Inpatient Raw Score 23   Basic Mobility Standardized Score 50 88   Highest Level Of Mobility   JH-HLM Goal 7: Walk 25 feet or more   JH-HLM Achieved 4: Move to chair/commode   Corrina Payne      Patient Name: Armond Jerome Date: 12/6/2022

## 2022-12-06 NOTE — PROGRESS NOTES
Spiritual Care Progress Note    2022  Patient: Yuri Mora : 1940  Admission Date & Time: 2022 1400  MRN: [id number] [facility]: [id number]      Met with pt "Ahsan Hadley" as per Palliative referral for emotional support  Discussed worsening prognosis ("I don't always have pain, but when I do it's bad"), support system ("my sister, but she lives far away"), hopes for diagnosis ("I want to get better    I don't want to die") and comfortablity with diagnosis ("I don't want to give up   it makes me depressed to think about it ") Ahsan Hadley expresses gratitude for care that he has received and is open to continued spiritual/emotional support  Available to follow upon request  Please contact via [facility]       Thank you!                 [id number]   Clinical Encounter Type   Visited With Patient   Routine Visit Introduction   Crisis Visit   (worsening condition)   Referral From Physician   Referral To

## 2022-12-06 NOTE — ASSESSMENT & PLAN NOTE
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  · initiated on chemotherapy April 2022, most recently on Abraxane and Gemzar, patient states he is not currently receiving chemotherapy, thinks his last infusion was within the past several months  He does receive hydration infusion every Tuesday  · S/P radiation in august  · Seen in office 11/20 01/2022 with Dr Ash Pena --> at that time was documented with progressively reduced appetite, weakness, poor family support, notably was falling asleep during office visit  Reportedly had canceled his chemo cycle 8 due to weakness     · Patient was seen again in office today 12/5, referred to ED by Dr Ash Pena   · Palliative care consult

## 2022-12-06 NOTE — ASSESSMENT & PLAN NOTE
· Baseline Cr 0 8-1  · Cr on admission 1 64, suspect prerenal dehydration, patient is with poor PO intake   · Continue IVF  · Retention protocol   · Avoid hypotension and nephrotoxins

## 2022-12-06 NOTE — PROGRESS NOTES
[place]  Progress Note - Ivett Romp 1940, [de-identified] y o  male MRN: [id number]  Unit/Bed#: ED 02 Encounter: [id number]  Primary Care Provider: Chiara Anguiano   Date and time admitted to hospital: 12/5/2022  2:00 PM    Ascites  Assessment & Plan  CT chest abdomen pelvis showed-" Massive ascites, developing since a CT from 10/28/2022  No new pancreatic head tumor poorly evaluated in the absence of IV and enteric contrast   Grossly unchanged in size  Previously demonstrated mesenteric metastases not visualized with certainty on this examination, most likely due to combination of ascites, mesenteric edema and absence of IV contrast   No evidence of new metastases in the chest, abdomen or pelvis  Stable small pulmonary nodules  Generalized dilatation of fluid-filled esophagus, consistent with esophageal dysmotility, reflux or, less likely, distal esophageal obstruction "    IR consult for paracentesis  GI consult regarding esophageal dysmotility    SHRAVAN (acute kidney injury) ([facility] [address] )  Assessment & Plan  · Baseline Cr 0 8-1  · Cr on admission 1 64, suspect prerenal dehydration, patient is with poor PO intake   · Continue IVF  · Retention protocol   · Avoid hypotension and nephrotoxins    Mild protein-calorie malnutrition (HCC)  Assessment & Plan  Malnutrition Findings:     BMI Findings: Body mass index is 18 01 kg/m²  Nutrition consult and recommendation    Pancreatic adenocarcinoma [facility])  Assessment & Plan  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  · initiated on chemotherapy April 2022, most recently on Abraxane and Gemzar, patient states he is not currently receiving chemotherapy, thinks his last infusion was within the past several months    He does receive hydration infusion every Tuesday  · S/P radiation in august  · Seen in office 11/20 01/2022 with   Agostino --> at that time was documented with progressively reduced appetite, weakness, poor family support, notably was falling asleep during office visit  Reportedly had canceled his chemo cycle 8 due to weakness  · Patient was seen again in office today 12/5, referred to ED by Dr Kimberly Benitez   · Palliative care consult     History of pulmonary embolism  Assessment & Plan  · Continue Eliquis 5 mg b i d  Type 2 diabetes mellitus without complication, with long-term current use of insulin [facility])  Assessment & Plan  Lab Results   Component Value Date    HGBA1C 6 1 08/28/2021     · Hold home metformin  · ISS and accucheks     * Lethargy  Assessment & Plan  · Referred to ED by OP Oncology due to report of lethargy/weakness after begin seen in office 12/5  Appears to be ongoing since 11/21 where lethargy/progressive decline in funcitonal status is noted in Onc note  Endorses abdominal pain, nausea/vomiting, diarrhea  · SIRS:  No leukocytosis, no fever, no tachycardia, no tachypnea  · Lactic is normal  · Procalcitonin slightly elevated, repeat morning  · No evidence/source of infection at this time  · UA is unremarkable  · CXR is unremarkable  · Follow-up culture results  · PT/OT eval   · Palliative care consult due to progressive decline in setting of metastatic cancer   · Suspect likely due to progressive metastatic cancer      Labs & Imaging: I have personally reviewed pertinent reports  VTE Prophylaxis: in place  Code Status:   Level 1 - Full Code    Patient Centered Rounds: I have performed bedside rounds with nursing staff today  Discussions with Specialists or Other Care Team Provider:     Education and Discussions with Family / Patient: Patient will update his daughter    I offered to call    Current Length of Stay: 1 day(s)    Current Patient Status: Inpatient   Certification Statement: The patient will continue to require additional inpatient hospital stay due to see my assessment and plan      Subjective:   Patient is seen and examined at bedside  Complains of having generalized weakness  No other complaints  Afebrile  All other ROS are negative  Objective:    Vitals: Blood pressure 105/65, pulse 94, temperature (!) 97 3 °F (36 3 °C), resp  rate 21, height 5' 7" (1 702 m), weight 52 2 kg (115 lb), SpO2 98 %  ,Body mass index is 18 01 kg/m²  SPO2 RA Rest    Flowsheet Row ED from 12/5/2022 in  [address] Emergency Department   SpO2 98 %   SpO2 Activity At Rest   O2 Device None (Room air)   O2 Flow Rate --        I&O:     Intake/Output Summary (Last 24 hours) at 12/6/2022 1056  Last data filed at 12/5/2022 1715  Gross per 24 hour   Intake 1000 ml   Output --   Net 1000 ml       Physical Exam:    General- Alert, sitting in chair  Not in any acute distress  Neck- Supple, No JVD  CVS- regular, S1 and S2 normal   Chest- Bilateral Air entry, No rhochi, crackles or wheezing present  Abdomen- soft, nontender, not distended, no guarding or rigidity, BS+  Extremities-  No pedal edema, No calf tenderness  CNS-   Alert, awake and orientedx3  No focal deficits present      Invasive Devices     Central Venous Catheter Line  Duration           Port A Cath 04/11/22 Right Chest 238 days          Peripheral Intravenous Line  Duration           Peripheral IV 12/05/22 Right;Ventral (anterior) Forearm <1 day                      Social History  reviewed  Family History   Problem Relation Age of Onset   • Alcohol abuse Mother    • Diabetes Father    • Throat cancer Brother     reviewed    Meds:  Current Facility-Administered Medications   Medication Dose Route Frequency Provider Last Rate Last Admin   • acetaminophen (TYLENOL) tablet 650 mg  650 mg Oral Q6H PRN Lo Gutierrez PA-C       • ALPRAZolam Amy Olp) tablet 0 25 mg  0 25 mg Oral TID PRN Lo Gutierrez PA-C       • apixaban Trina Bares) tablet 5 mg  5 mg Oral BID Lo Gutierrez PA-C   5 mg at 12/06/22 7714   • calcium carbonate (TUMS) chewable tablet 500 mg  500 mg Oral Daily PRN Virginia Dunn MD   500 mg at 12/06/22 1024   • HYDROmorphone (DILAUDID) injection 0 5 mg  0 5 mg Intravenous Q6H PRN Luke Gutierrez PA-C       • insulin lispro (HumaLOG) 100 units/mL subcutaneous injection 1-5 Units  1-5 Units Subcutaneous TID AC Jody Gutierrez PA-C       • insulin lispro (HumaLOG) 100 units/mL subcutaneous injection 1-5 Units  1-5 Units Subcutaneous HS Luke Gutierrez PA-C       • metoclopramide (REGLAN) injection 10 mg  10 mg Intravenous Q6H PRN Preston Vega PA-C   10 mg at 12/05/22 2138   • ondansetron (ZOFRAN) injection 4 mg  4 mg Intravenous Q4H PRN Luke Gutierrez PA-C   4 mg at 12/05/22 1945   • pantoprazole (PROTONIX) EC tablet 40 mg  40 mg Oral Early Morning Luke Gutierrez PA-C   40 mg at 12/06/22 5534   • potassium chloride (K-DUR,KLOR-CON) CR tablet 40 mEq  40 mEq Oral Daily Luke Gutierrez PA-C   40 mEq at 12/06/22 6459   • pravastatin (PRAVACHOL) tablet 20 mg  20 mg Oral Daily Luke Gutiererz PA-C   20 mg at 12/06/22 2224   • sodium chloride 0 9 % infusion  125 mL/hr Intravenous Continuous Virginia Dunn  mL/hr at 12/05/22 1809 125 mL/hr at 12/05/22 1809     Current Outpatient Medications   Medication Sig Dispense Refill   • Alcohol Swabs 70 % PADS May substitute brand based on insurance coverage  Check glucose BID  100 each 0   • ALPRAZolam (XANAX) 0 5 mg tablet Take 0 5 tablets (0 25 mg total) by mouth 3 (three) times a day as needed for anxiety 90 tablet 0   • apixaban (ELIQUIS) 5 mg Take 5 mg by mouth 2 (two) times a day     • Blood Glucose Monitoring Suppl (OneTouch Verio Reflect) w/Device KIT May substitute brand based on insurance coverage   Check glucose BID  1 kit 0   • diphenoxylate-atropine (LOMOTIL) 2 5-0 025 mg per tablet Take 1 tablet by mouth 4 (four) times a day as needed for diarrhea 60 tablet 0   • glucose blood (OneTouch Verio) test strip May substitute brand based on insurance coverage  Check glucose BID  100 each 0   • Klor-Con M20 20 MEQ tablet TAKE 2 TABLETS BY MOUTH DAILY 180 tablet 1   • metFORMIN (GLUCOPHAGE) 1000 MG tablet Take 1,000 mg by mouth 2 (two) times a day with meals     • metFORMIN (GLUCOPHAGE-XR) 500 mg 24 hr tablet Take 500 mg by mouth 2 (two) times a day     • Multiple Vitamin (MULTIVITAMIN ADULT PO) Take 1 tablet by mouth daily     • omeprazole (PriLOSEC) 20 mg delayed release capsule      • omeprazole (PriLOSEC) 40 MG capsule Take 40 mg by mouth every evening     • ondansetron (ZOFRAN) 8 mg tablet Take 1 tablet (8 mg total) by mouth every 8 (eight) hours as needed for nausea or vomiting 30 tablet 3   • OneTouch Delica Lancets 21R MISC May substitute brand based on insurance coverage   Check glucose BID  100 each 0   • polyethylene glycol (MIRALAX) 17 g packet Take 17 g by mouth daily (Patient not taking: Reported on 11/3/2022)     • pravastatin (PRAVACHOL) 20 mg tablet Take 20 mg by mouth daily     • prochlorperazine (COMPAZINE) 10 mg tablet Take 1 tablet (10 mg total) by mouth every 6 (six) hours as needed for nausea or vomiting 30 tablet 3   • traMADol (ULTRAM) 50 mg tablet Take 1 tablet (50 mg total) by mouth every 6 (six) hours as needed for moderate pain 60 tablet 0      (Not in a hospital admission)      Labs:  Results from last 7 days   Lab Units 12/06/22  0615 12/05/22  1443 12/05/22  1437 11/29/22  1422   WBC Thousand/uL 6 21  --  4 44 2 84*   HEMOGLOBIN g/dL 10 3*  --  11 1* 10 0*   I STAT HEMOGLOBIN g/dl  --  11 6*  --   --    HEMATOCRIT % 32 0*  --  35 0* 31 0*   HEMATOCRIT, ISTAT %  --  34*  --   --    PLATELETS Thousands/uL 227  --  273 321   NEUTROS PCT % 88*  --  84* 62   LYMPHS PCT % 3*  --  4* 12*   MONOS PCT % 8  --  11 22*   EOS PCT % 0  --  0 2     Results from last 7 days   Lab Units 12/06/22  0615 12/05/22  1443 12/05/22  1437 11/29/22  1422   POTASSIUM mmol/L 4 9 --  4 8 4 6   CHLORIDE mmol/L 103  --  99 100   CO2 mmol/L 20*  --  23 25   CO2, I-STAT mmol/L  --  22  --   --    BUN mg/dL 74*  --  67* 35*   CREATININE mg/dL 1 53*  --  1 64* 1 08   CALCIUM mg/dL 8 7  --  9 5 9 5   ALK PHOS U/L 171*  --  206* 223*   ALT U/L 22  --  23 24   AST U/L 20  --  22 22   GLUCOSE, ISTAT mg/dl  --  144*  --   --      No results found for: TROPONINI, CKMB, CKTOTAL  Results from last 7 days   Lab Units 12/05/22  1437   INR  1 01     Lab Results   Component Value Date    BLOODCX Received in Microbiology Lab  Culture in Progress  12/05/2022    BLOODCX Received in Microbiology Lab  Culture in Progress  12/05/2022         Imaging:  Results for orders placed during the hospital encounter of 12/05/22    XR chest portable    Narrative  CHEST    INDICATION:   weakness, known pancreatic CA  COMPARISON:  Chest radiograph April 11, 2022  CT chest October 28, 2022  EXAM PERFORMED/VIEWS:  XR CHEST PORTABLE      FINDINGS:  Venous infusion device entering from right subclavian approach terminates in expected location of the superior vena cava at the level of the right main bronchus  Cardiomediastinal silhouette appears unremarkable  The lungs are clear  No pneumothorax or pleural effusion  Old fracture deformity proximal humerus  No suspicious lytic or sclerotic bone lesions  Impression  No acute cardiopulmonary disease  Workstation performed: [id number]    No results found for this or any previous visit        Last 24 Hours Medication List:   Current Facility-Administered Medications   Medication Dose Route Frequency Provider Last Rate   • acetaminophen  650 mg Oral Q6H PRN Douglas Deniseaine, PA-C     • ALPRAZolam  0 25 mg Oral TID PRN [place] Makennaop Fojarvisaine, PA-C     • apixaban  5 mg Oral BID Douglas Gutierrez PA-C     • calcium carbonate  500 mg Oral Daily PRN Aida Cao MD     • HYDROmorphone  0 5 mg Intravenous Q6H PRN [facility] ERNIE     • insulin lispro  1-5 Units Subcutaneous TID AC [facility], ERNIE     • insulin lispro  1-5 Units Subcutaneous HS Douglas Enriquez PA-C     • metoclopramide  10 mg Intravenous Q6H PRN Tammy Hobson PA-C     • ondansetron  4 mg Intravenous Q4H PRN Douglas Gutierrez PA-C     • pantoprazole  40 mg Oral Early Morning Douglas Enriquez PA-C     • potassium chloride  40 mEq Oral Daily Carmen Pulido     • pravastatin  20 mg Oral Daily Douglas Enriquez PA-C     • sodium chloride  125 mL/hr Intravenous Continuous Aida Cao  mL/hr (12/05/22 1809)        Today, Patient Was Seen By: Aida Cao MD    ** Please Note: Dictation voice to text software may have been used in the creation of this document   **

## 2022-12-06 NOTE — UTILIZATION REVIEW
Initial Clinical Review    Admission: Date/Time/Statement:   Admission Orders (From admission, onward)     Ordered        12/05/22 1610  INPATIENT ADMISSION  Once                      Orders Placed This Encounter   Procedures   • INPATIENT ADMISSION     Standing Status:   Standing     Number of Occurrences:   1     Order Specific Question:   Level of Care     Answer:   Med Surg [16]     Order Specific Question:   Estimated length of stay     Answer:   More than 2 Midnights     Order Specific Question:   Certification     Answer:   I certify that inpatient services are medically necessary for this patient for a duration of greater than two midnights  See H&P and MD Progress Notes for additional information about the patient's course of treatment  ED Arrival Information     Expected   12/5/2022     Arrival   12/5/2022 13:59    Acuity   Emergent            Means of arrival   Ambulance    Escorted by   [facility]    Service   Hospitalist    Admission type   Emergency            Arrival complaint   weakness           Chief Complaint   Patient presents with   • Shortness of Breath     Patient presents to ED sent by oncologist for evaluation of "lethargy" which patient reports has been from not sleeping along with shortness of breath worsened by movement  Patient had documented decreased oxygen saturation at office, but patient has poor perfusion  Initial Presentation: [de-identified] y o  male from home to ED, per oncology, admitted inpatient due to SHRAVAN/Weakness  Has pancreatitic adenocarcinoma, 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, on hydration infusion weekly  Presented due to lethargy, and weakness with increased fatigue and shortness of breath  On day of arrival at oncologist hypotensive and hypoxic  Intake poor  On exam cachectic  Procalcitonin 0 43  Bun 67, creatinine 1 64 with baseline of 0 8 - 1  Ct chest/abdomen showed massive ascites  Mesenteric edema  No new metastases  Fluid filled esophagus     In the ED given 4 doses of IV Zofran, IV Reglan  1 liter IVF bolus  Plan is continued IVF, trend BMP  PT/OT  Consult Palliative care  Hold home metformin  Start SSI with accuchecks  Date: 12/6/22   Day 2: continued general weakness  Abdominal discomfort, rates 6/10  + nausea  H&H 10 3/32  Bun 74, creatinine 1 53  On exam: alert  On continued IVF, antiemetics as needed  GI consulted  IR for paracentesis  12/6/22 per GI - patient with advanced unresectable pancreatic cancer, esophageal abnormality on ct and gerd  Plan is IR paracentesis for new ascites  Add scheduled Zofran for nausea,  Continue PPI  Clears  Palliative care for goals of care discussion  12/6/22 per IR 5500cc's of clear yellow fluid drained from Rt side of abdomen  12/6/22 per Palliative care - patient has metastatic prostate cancer  Patient has poor insight  Family discussion with sister and daughter,  Feel patient needs more help  Is a level one code  Patient wants to live and has hope        ED Triage Vitals   Temperature Pulse Respirations Blood Pressure SpO2   12/05/22 1406 12/05/22 1406 12/05/22 1406 12/05/22 1406 12/05/22 1406   (!) 97 3 °F (36 3 °C) 58 18 93/64 91 %      Temp src Heart Rate Source Patient Position - Orthostatic VS BP Location FiO2 (%)   -- 12/05/22 1406 12/05/22 1406 12/05/22 1406 --    Monitor Sitting Left arm       Pain Score       12/05/22 1714       4          Wt Readings from Last 1 Encounters:   12/05/22 52 2 kg (115 lb)     Additional Vital Signs:   12/06/22 1030 -- 94 -- 105/65 81 98 % None (Room air) Lying   12/06/22 0931 -- -- -- 93/61 -- -- -- --   12/06/22 0630 -- 85 21 -- -- 99 % -- --   12/06/22 0400 -- 85 -- -- -- 98 % -- --   12/06/22 0300 -- 92 -- -- -- 100 % -- --   12/06/22 0000 -- 93 20 -- -- 98 % -- --   12/05/22 2315 -- 96 17 -- -- 97 % None (Room air) --   12/05/22 1800 -- 83 20 104/70 82 99 % -- --   12/05/22 1715 -- 83 16 114/73 83 98 % -- --   12/05/22 1506 -- -- -- -- -- -- None (Room air) --   12/05/22 1500 -- 86 19 100/70 81 99 % None (Room air)      Pertinent Labs/Diagnostic Test Results:   CT chest abdomen pelvis wo contrast   Final Result by Tenzin Edmond MD (12/06 2273)      1  Limited examination due to lack of IV contrast, as discussed above  2   Massive ascites, developing since a CT from 10/28/2022  3   No new pancreatic head tumor poorly evaluated in the absence of IV and enteric contrast   Grossly unchanged in size  4   Previously demonstrated mesenteric metastases not visualized with certainty on this examination, most likely due to combination of ascites, mesenteric edema and absence of IV contrast       5   No evidence of new metastases in the chest, abdomen or pelvis  6   Stable small pulmonary nodules  7   Generalized dilatation of fluid-filled esophagus, consistent with esophageal dysmotility, reflux or, less likely, distal esophageal obstruction  The study was marked in [facility] for immediate notification  Workstation performed: [id number]         XR chest portable   ED Interpretation by Ebony Wagner DO (12/05 1518)   No acute cardiopulmonary process  Unchanged when compared to previous      Final Result by Fernanda Patrick MD (12/05 2050)      No acute cardiopulmonary disease                    Workstation performed: [id number]         IR INPATIENT Paracentesis    (Results Pending)     12/5/22 ecg Normal sinus rhythm  Right bundle branch block  Left anterior fascicular block  ** Bifascicular block **  Left ventricular hypertrophy with repolarization abnormality  Cannot rule out Septal infarct , age undetermined  Abnormal ECG    Results from last 7 days   Lab Units 12/05/22  1456   SARS-COV-2  Negative     Results from last 7 days   Lab Units 12/06/22  0615 12/05/22  1443 12/05/22  1437 11/29/22  1422   WBC Thousand/uL 6 21  --  4 44 2 84*   HEMOGLOBIN g/dL 10 3*  --  11 1* 10 0*   I STAT HEMOGLOBIN g/dl --  11 6*  --   --    HEMATOCRIT % 32 0*  --  35 0* 31 0*   HEMATOCRIT, ISTAT %  --  34*  --   --    PLATELETS Thousands/uL 227  --  273 321   NEUTROS ABS Thousands/µL 5 46  --  3 73 1 80*     Results from last 7 days   Lab Units 12/06/22  0615 12/05/22  1443 12/05/22  1437 11/29/22  1422   SODIUM mmol/L 135  --  133* 132*   POTASSIUM mmol/L 4 9  --  4 8 4 6   CHLORIDE mmol/L 103  --  99 100   CO2 mmol/L 20*  --  23 25   CO2, I-STAT mmol/L  --  22  --   --    ANION GAP mmol/L 12  --  11 7   BUN mg/dL 74*  --  67* 35*   CREATININE mg/dL 1 53*  --  1 64* 1 08   EGFR ml/min/1 73sq m 42  --  38 64   CALCIUM mg/dL 8 7  --  9 5 9 5   CALCIUM, IONIZED, ISTAT mmol/L  --  1 20  --   --    MAGNESIUM mg/dL 2 1  --   --  2 4   PHOSPHORUS mg/dL 4 8*  --   --   --      Results from last 7 days   Lab Units 12/06/22  0615 12/05/22  1437 11/29/22  1422   AST U/L 20 22 22   ALT U/L 22 23 24   ALK PHOS U/L 171* 206* 223*   TOTAL PROTEIN g/dL 6 6 7 4 6 8   ALBUMIN g/dL 2 6* 3 0* 3 0*   TOTAL BILIRUBIN mg/dL 0 50 0 60 0 46     Results from last 7 days   Lab Units 12/06/22  1108 12/06/22  0715 12/05/22  2055 12/05/22  1733   POC GLUCOSE mg/dl 185* 145* 140 137     Results from last 7 days   Lab Units 12/06/22  0615 12/05/22  1437 11/29/22  1422   GLUCOSE RANDOM mg/dL 141* 146* 139     Results from last 7 days   Lab Units 12/05/22  1443   PH, TANIKA I-STAT  7 399   PCO2, TANIKA ISTAT mm HG 34 4*   PO2, TANIKA ISTAT mm HG 27 0*   HCO3, TANIKA ISTAT mmol/L 21 2*   I STAT BASE EXC mmol/L -3*   I STAT O2 SAT % 51*     Results from last 7 days   Lab Units 12/05/22 2005 12/05/22  1714 12/05/22  1437   HS TNI 0HR ng/L  --   --  10   HS TNI 2HR ng/L  --  10  --    HSTNI D2 ng/L  --  0  --    HS TNI 4HR ng/L 9  --   --    HSTNI D4 ng/L -1  --   --      Results from last 7 days   Lab Units 12/05/22  1437   PROTIME seconds 14 0   INR  1 01   PTT seconds 29     Results from last 7 days   Lab Units 12/06/22  0615 12/05/22  1437   PROCALCITONIN ng/ml 0 48* 0 43* Results from last 7 days   Lab Units 12/05/22  1437   LACTIC ACID mmol/L 1 8     Results from last 7 days   Lab Units 12/05/22  1437   NT-PRO BNP pg/mL 492*     Results from last 7 days   Lab Units 12/05/22  2117   CLARITY UA  Clear   COLOR UA  Yellow   SPEC GRAV UA  1 025   PH UA  5 0   GLUCOSE UA mg/dl Negative   KETONES UA mg/dl Trace*   BLOOD UA  Negative   PROTEIN UA mg/dl Trace*   NITRITE UA  Negative   BILIRUBIN UA  Negative   UROBILINOGEN UA (BE) mg/dl <2 0   LEUKOCYTES UA  Small*   WBC UA /hpf 4-10*   RBC UA /hpf 0-1   BACTERIA UA /hpf Occasional   EPITHELIAL CELLS WET PREP /hpf Occasional     Results from last 7 days   Lab Units 12/05/22  1456   INFLUENZA A PCR  Negative   INFLUENZA B PCR  Negative   RSV PCR  Negative     Results from last 7 days   Lab Units 12/05/22  1456 12/05/22  1437   BLOOD CULTURE  Received in Microbiology Lab  Culture in Progress  Received in Microbiology Lab  Culture in Progress         ED Treatment:   Medication Administration from 12/05/2022 1357 to 12/06/2022 1233       Date/Time Order Dose Route Action Comments     12/05/2022 1434 EST ondansetron (ZOFRAN) injection 4 mg 4 mg Intravenous Given --     12/05/2022 1532 EST sodium chloride 0 9 % bolus 1,000 mL 1,000 mL Intravenous New Bag --     12/05/2022 1715 EST ondansetron (ZOFRAN) injection 4 mg 4 mg Intravenous Given --     12/06/2022 0931 EST apixaban (ELIQUIS) tablet 5 mg 5 mg Oral Given --     12/05/2022 1732 EST apixaban (ELIQUIS) tablet 5 mg 5 mg Oral Given --     12/06/2022 0814 EST pantoprazole (PROTONIX) EC tablet 40 mg 40 mg Oral Given --     12/06/2022 0931 EST pravastatin (PRAVACHOL) tablet 20 mg 20 mg Oral Given --     12/06/2022 0931 EST potassium chloride (K-DUR,KLOR-CON) CR tablet 40 mEq 40 mEq Oral Given --     12/06/2022 1151 EST insulin lispro (HumaLOG) 100 units/mL subcutaneous injection 1-5 Units 1 Units Subcutaneous Given --     12/05/2022 1714 EST HYDROmorphone (DILAUDID) injection 0 5 mg 0 5 mg Intravenous Given --     12/05/2022 1945 EST ondansetron (ZOFRAN) injection 4 mg 4 mg Intravenous Given --     12/05/2022 1809 EST sodium chloride 0 9 % infusion 125 mL/hr Intravenous Rate/Dose Change --     12/05/2022 1716 EST sodium chloride 0 9 % infusion 50 mL/hr Intravenous New Bag --     12/05/2022 2138 EST metoclopramide (REGLAN) injection 10 mg 10 mg Intravenous Given --     12/06/2022 1024 EST calcium carbonate (TUMS) chewable tablet 500 mg 500 mg Oral Given --        Past Medical History:   Diagnosis Date   • Ambulates with cane    • Anxiety    • Depression    • Diabetes mellitus (HCC)    • GERD (gastroesophageal reflux disease)    • History of pulmonary embolus (PE)    • Hyperlipidemia    • Multiple thyroid nodules     pt states about 40yrs ago   • Pancreatic cancer ([facility] )    • Pancreatic mass      Present on Admission:  • Pancreatic adenocarcinoma ([facility] )  • Mild protein-calorie malnutrition (HCC)  • Lethargy  • SHRAVAN (acute kidney injury) ([place] [address] )  • History of pulmonary embolism      Admitting Diagnosis: Dehydration [E86 0]  Pancreatic cancer ([place] [address] ) [C25 9]  Age/Sex: [de-identified] y o  male  Admission Orders: 12/5/22 1610 inpatient   Scheduled Medications:  apixaban, 5 mg, Oral, BID  insulin lispro, 1-5 Units, Subcutaneous, TID AC  insulin lispro, 1-5 Units, Subcutaneous, HS  pantoprazole, 40 mg, Oral, Early Morning  potassium chloride, 40 mEq, Oral, Daily  pravastatin, 20 mg, Oral, Daily    ondansetron (ZOFRAN-ODT) dispersible tablet 4 mg  Dose: 4 mg  Freq: Every 8 hours scheduled Route: PO  Start: 12/06/22 1400    Continuous IV Infusions:  sodium chloride, 125 mL/hr, Intravenous, Continuous then  12/16  sodium chloride 0 9 % infusion  Rate: 75 mL/hr Dose: 75 mL/hr  Freq: Continuous Route: IV  Indications of Use: IV Hydration  Start: 12/06/22 1445      PRN Meds:  acetaminophen, 650 mg, Oral, Q6H PRN  ALPRAZolam, 0 25 mg, Oral, TID PRN  calcium carbonate, 500 mg, Oral, Daily PRN - used x 1 12/6/22   HYDROmorphone, 0 5 mg, Intravenous, Q6H PRN  metoclopramide, 10 mg, Intravenous, Q6H PRN - used x 1 12/5/22   ondansetron, 4 mg, Intravenous, Q4H PRN - used x 1 12/5, x 1 12/6/22     Clears     IP CONSULT TO PALLIATIVE CARE  IP CONSULT TO GASTROENTEROLOGY      Network Utilization Review Department  ATTENTION: Please call with any questions or concerns to [phone] and carefully listen to the prompts so that you are directed to the right person  All voicemails are confidential   Eduard Clarke all requests for admission clinical reviews, approved or denied determinations and any other requests to dedicated fax number below belonging to the campus where the patient is receiving treatment   List of dedicated fax numbers for the Facilities:  [address] DENIALS (Administrative/Medical Necessity) [phone]   [address] (Maternity/NICU/Pediatrics) [phone]   [address] [phone]   [facility] [phone]   [address] [place] [address] [address] [id number] [place] [address] 28 [phone]   [address] [facility] 134 815 [facility] [phone]

## 2022-12-06 NOTE — PLAN OF CARE
Problem: PHYSICAL THERAPY ADULT  Goal: Performs mobility at highest level of function for planned discharge setting  See evaluation for individualized goals  Description: Treatment/Interventions: ADL retraining, Functional transfer training, LE strengthening/ROM, Elevations, Therapeutic exercise, Endurance training, Cognitive reorientation, Patient/family training, Equipment eval/education, Bed mobility, Continued evaluation, Gait training, Compensatory technique education, Spoke to nursing, Spoke to case management, OT          See flowsheet documentation for full assessment, interventions and recommendations  Note: Prognosis: Guarded  Problem List: Decreased endurance, Decreased mobility  Assessment: Pt is an [de-identified] y o  male who presented to ED 12/5/22 with c/o lethargy and weakness from the oncologist   Dx:  Acute renal insufficiency, dehydration, pancreatic Ca, dehydration  Comorbidities affecting pt's physical performance at time of assessment include: Ca, depression  Personal factors affecting pt at time of IE include: fatigue, hypotension, GERD  PLOF and home set up listed above  Upon evaluation: Pt mod I for bed mobility, mod I for sit to stand, and S for stepping to chair with SPC; limited by orthostatic BP measurements  Full objective findings from PT assessment regarding body systems outlined above  Current limitations include impaired balance, decreased endurance/activity tolerance, gait deviations  The following objective measures performed on IE also reveal limitations: orthostatic BP measurements as above  Pt's clinical presentation is currently unstable/unpredictable seen in pt's presentation of continuous monitoring in ED and BP  Pt would benefit from continued PT while in hospital and follow up with HHCPT at D/C to increase strength, balance, endurance, independence with funcitonal mobility to return to PLOF, maximize independence, decrease caregiver burden and improve quality of life   The patient's AM-PAC Basic Mobility Inpatient Short Form Raw Score is 23  A Raw score of greater than 17 suggests the patient may benefit from discharge to home  Please also refer to the recommendation of the Physical Therapist for safe discharge planning  Barriers to Discharge: Decreased caregiver support     PT Discharge Recommendation: Home with home health rehabilitation    See flowsheet documentation for full assessment

## 2022-12-06 NOTE — CONSULTS
Consultation - Palliative and Supportive Care   Joaquim Eagle [de-identified] y o  male [id number]    Assessment:  Metastatic pancreatic cancer  SHRAVAN  Weakness  Lethargy  Nausea  Vomiting  Chronic diarrhea  Anxiety about health  History of schizophrenia  Cancer related pain  Mild protein calorie malnutrtion  Goals of care counseling    Goals:  · Level 1 code status -code status not addressed on this discussion  · Hemant Alberto is not competent on my exam; he exhibits poor insight and is unable to engage in realistic goals discussions  He encourages me to speak with his daughter Gely Loyola and his sister (CARIDAD) Yung Ohs  · I called Rohini to discuss; she welcomes input from Jennifer 74 daughter Gely Loyola as well  Rohini notes that Hemant Alberto does not have sufficient help at [facility] and is unable to take care of himself  She would like him to go to skilled nursing facility  · Shaunna Loyola agrees with SNF placement if possible  · Will plan to discuss SNF for rehab vs SNF for hospice tomorrow  Rohini understands the advanced nature of Sebastien's cancer and intolerance to treatment  She is open to hospice discussion  · Palliative will follow 12/7/22 unless otherwise specified  Plan:  Symptom management:  Will defer symptom management to primary team      Social support:  · Supportive listening provided  · Normalized experience of patient/family  · Provided anxiety containment              I have reviewed the patient's controlled substance dispensing history in the Prescription Drug Monitoring Program in compliance with the [facility] regulations before prescribing any controlled substances    Last refills   11/16/2022  10/26/2022   1  Tramadol Hcl 50 Mg Tablet 30 00  15  De Hipolito  [id number]   Pen (8306)   10 00 [place]     10/31/2022  10/31/2022   1  Diphenoxylate-Atrop 2 5-0 025 60 00  15  Ni Ago  [id number]   Pen (8306)   0 00 [place]     10/26/2022  10/26/2022   1  Tramadol Hcl 50 Mg Tablet 30 00  15  De Hipolito  [id number]   Pen (8306) 10 00 [place]     09/07/2022 09/07/2022   1  Diphenoxylate-Atrop 2 5-0 025 60 00  15  Ni Ago  [id number]   Pen (3206)   0 00 [place]     08/24/2022 08/24/2022   1  Alprazolam 0 5 Mg Tablet 90 00  60  Ch Fis              Decisional apparatus:  Patient is not competent on exam today  If competency is lost, patient's substitute decision maker would default to the following based on AD and POA documents  1  Sister Tyrell Severe [phone]  2  [facility]   3  Friend Lucio Manjarrez  Advance Directive/Living Will: on file and reviewed - signed 9/19/2019  POLST: None on file  POA Forms: on file and reviewed - signed 9/19/2019    We appreciate the invitation to be involved in this patient's care  We will continue to follow throughout this hospitalization  Please do not hesitate to reach our on call provider through our clinic answering service at  should you have acute symptom control concerns  Andres Christine PA-C  Palliative and Supportive Care  Clinic/Answering Service: [phone]  You can find me on [employer] IDENTIFICATION:  Inpatient consult to Palliative Care  Consult performed by: Troy Tamayo PA-C  Consult ordered by: Yolie Gomes PA-C        Physician Requesting Consult: Carolina Agarwal MD  Reason for Consult / Principal Problem: GOC counseling and SM secondary to pancreatic cancer, weakness and     History of Present Illness:  Joaquim Eagle is a [de-identified] y o  male who presents with a palliative diagnosis of pancreatic cancer  He is followed by Dr Micki Brennan  He ompleted radiation in August and his current chemo regimen including Abraxes and Gemzar has been put on hold secondary to weakness  He was being seen for follow up in Dr Kathy Pineda on 12/5 and was sent to the ED from that visit due to shortness of breath, weakness and lethargy  Patient also reported poor PO intake upon arrival  He receives IV fluids every Tuesday  Patient is known to Palliative Medicine with his last visit being 11/3/22Palliative Medicine has been consulted to assist with symptom management and goals of care counseling during this admission  Review of Systems   Constitutional: Positive for appetite change  Negative for activity change, chills, fatigue and fever  HENT: Negative for congestion, rhinorrhea, sinus pressure, sinus pain and sore throat  Eyes: Negative for discharge and visual disturbance  Respiratory: Negative for cough, chest tightness, shortness of breath, wheezing and stridor  Cardiovascular: Negative for chest pain and palpitations  Gastrointestinal: Negative for abdominal distention, abdominal pain, constipation, diarrhea, nausea and vomiting  Endocrine: Negative for cold intolerance and heat intolerance  Genitourinary: Negative for difficulty urinating  Musculoskeletal: Negative for arthralgias, back pain and joint swelling  Skin: Negative for color change, pallor and rash  Allergic/Immunologic: Negative for environmental allergies and food allergies  Neurological: Positive for weakness  Negative for dizziness, syncope, facial asymmetry, speech difficulty, light-headedness, numbness and headaches  Psychiatric/Behavioral: Negative for agitation and confusion  Past Medical History:   Diagnosis Date   • Ambulates with cane    • Anxiety    • Depression    • Diabetes mellitus ([facility] )    • GERD (gastroesophageal reflux disease)    • History of pulmonary embolus (PE)    • Hyperlipidemia    • Multiple thyroid nodules     pt states about 40yrs ago   • Pancreatic cancer ([facility] )    • Pancreatic mass      Past Surgical History:   Procedure Laterality Date   • APPENDECTOMY     • CATARACT EXTRACTION Right    • FL GUIDED CENTRAL VENOUS ACCESS DEVICE INSERTION  4/11/2022   • TUNNELED VENOUS PORT PLACEMENT Right 4/11/2022    Procedure: INSERTION VENOUS PORT (PORT-A-CATH);   Surgeon: Rob Ahn MD;  [facility]; Service: Surgical Oncology     Social History     Socioeconomic History   • Marital status:      Spouse name: Not on file   • Number of children: Not on file   • Years of education: Not on file   • Highest education level: Not on file   Occupational History   • Not on file   Tobacco Use   • Smoking status: Former     Years: 5 00     Types: Cigarettes     Start date: 58     Quit date:      Years since quittin 9   • Smokeless tobacco: Never   • Tobacco comments:     quit in 1960s; smoked cigars for several yrs   Vaping Use   • Vaping Use: Never used   Substance and Sexual Activity   • Alcohol use: Not Currently     Comment: seldom; socially   • Drug use: No   • Sexual activity: Not on file   Other Topics Concern   • Not on file   Social History Narrative   • Not on file     Social Determinants of Health     Financial Resource Strain: Not on file   Food Insecurity: No Food Insecurity   • Worried About Running Out of Food in the Last Year: Never true   • Ran Out of Food in the Last Year: Never true   Transportation Needs: No Transportation Needs   • Lack of Transportation (Medical): No   • Lack of Transportation (Non-Medical):  No   Physical Activity: Not on file   Stress: Not on file   Social Connections: Not on file   Intimate Partner Violence: Not on file   Housing Stability: Unknown   • Unable to Pay for Housing in the Last Year: No   • Number of Places Lived in the Last Year: Not on file   • Unstable Housing in the Last Year: No     Family History   Problem Relation Age of Onset   • Alcohol abuse Mother    • Diabetes Father    • Throat cancer Brother      Medications:  all current active meds have been reviewed    Allergies   Allergen Reactions   • Aleve [Naproxen] Swelling       Objective:  /71   Pulse 94   Temp (!) 97 3 °F (36 3 °C)   Resp 18   Ht 5' 7" (1 702 m)   Wt 52 2 kg (115 lb)   SpO2 98%   BMI 18 01 kg/m²   Physical Exam:   Constitutional: Appears chronically ill and malnourished  Somnolent however easily arousable  Nods off during our conversation  In no acute distress  Head: Normocephalic and atraumatic  Eyes: EOM are normal  No ocular discharge  No scleral icterus  Neck: no visible adenopathy or masses  Cardiac: Normal rate and rhythm, palpable pulses  Respiratory: Effort normal  No stridor  No respiratory distress  No cough  Gastrointestinal: No abdominal distension  Musculoskeletal: No edema  Neurological: Alert, oriented and appropriately conversant  Skin: Dry, no diaphoresis  Psychiatric: Displays an anxious mood and affect  Paranoid questioning  Lab Results:   I have personally reviewed pertinent labs  , CBC:   Lab Results   Component Value Date    WBC 6 21 12/06/2022    HGB 10 3 (L) 12/06/2022    HCT 32 0 (L) 12/06/2022    MCV 92 12/06/2022     12/06/2022    MCH 29 5 12/06/2022    MCHC 32 2 12/06/2022    RDW 15 2 (H) 12/06/2022    MPV 10 1 12/06/2022    NRBC 0 12/06/2022   , CMP:   Lab Results   Component Value Date    SODIUM 135 12/06/2022    K 4 9 12/06/2022     12/06/2022    CO2 20 (L) 12/06/2022    BUN 74 (H) 12/06/2022    CREATININE 1 53 (H) 12/06/2022    CALCIUM 8 7 12/06/2022    AST 20 12/06/2022    ALT 22 12/06/2022    ALKPHOS 171 (H) 12/06/2022    EGFR 42 12/06/2022     Imaging Studies: I have personally reviewed pertinent reports  EKG, Pathology, and Other Studies: I have personally reviewed pertinent reports  Counseling / Coordination of Care  Total floor / unit time spent today 90+ minutes  Greater than 50% of total time was spent with the patient and / or family counseling and / or coordination of care  A description of the counseling / coordination of care: Reviewed chart,  provided medical updates, determined goals of care, discussed palliative care and symptom management, discussed comfort care and hospice care, determined competency and POA/HCA, determined social/family support, provided psychosocial support  Reviewed with SLIM team, RN and CM  Portions of this document may have been created using dictation software and as such some "sound alike" terms may have been generated by the system  Do not hesitate to contact me with any questions or clarifications

## 2022-12-06 NOTE — PLAN OF CARE
Problem: OCCUPATIONAL THERAPY ADULT  Goal: Performs self-care activities at highest level of function for planned discharge setting  See evaluation for individualized goals  Description:            See flowsheet documentation for full assessment, interventions and recommendations  Note:       Assessment: Pt is a [de-identified] y o  male seen for OT evaluation at [facility], admitted 12/5/2022 w/ Lethargy  OT completed expanded review of pt's medical and social history  Comorbidities affecting pt's functional performance at time of assessment include: metastatic pancreatic adenocarcinoma, diabetes, history of PE on Eliquis, hyperlipidemia  Personal factors affecting pt at time of IE include:limited home support  Prior to admission, pt was living alone in [facility] apt with elevator  Pt was I w/  ADLS and IADLS, (+) drove, & required cane PTA  Upon evaluation: Pt requires mod I for bed mobility, sup-mod I for functional mobility/transfers, sup/I for UB ADLs and sup for LB ADLS 2* the following deficits impacting occupational performance: weakness and orthostatic hypotension  Full objective findings from OT assessment regarding body systems outlined above  Occupational Performance areas to address include: bathing/shower, toilet hygiene, dressing and functional mobility  Based on findings, pt is of moderate complexity  The patient's raw score on the AM-PAC Daily Activity inpatient short form is 21, standardized score is 44 27, greater than 39 4  Patients at this level are likely to benefit from DC to home, which DOES coincide with CURRENT above OT recommendations  However please refer to therapist recommendation for discharge planning given other factors that may influence destination  At this time, OT recommendations at time of discharge are home with home OT       OT Discharge Recommendation: Home with home health rehabilitation

## 2022-12-06 NOTE — OCCUPATIONAL THERAPY NOTE
Occupational Therapy Evaluation     Patient Name: Jeanine DAVE Date: 12/6/2022  Problem List  Principal Problem:    Lethargy  Active Problems:    Type 2 diabetes mellitus without complication, with long-term current use of insulin (HCC)    History of pulmonary embolism    Pancreatic adenocarcinoma (HCC)    Mild protein-calorie malnutrition (HCC)    SHRAVAN (acute kidney injury) ([facility] )    Ascites    Past Medical History  Past Medical History:   Diagnosis Date   • Ambulates with cane    • Anxiety    • Depression    • [facility] )    • GERD (gastroesophageal reflux disease)    • History of pulmonary embolus (PE)    • Hyperlipidemia    • Multiple thyroid nodules     pt states about 40yrs ago   • Pancreatic cancer ([facility] )    • Pancreatic mass      Past Surgical History  Past Surgical History:   Procedure Laterality Date   • APPENDECTOMY     • CATARACT EXTRACTION Right    • FL GUIDED CENTRAL VENOUS ACCESS DEVICE INSERTION  4/11/2022   • TUNNELED VENOUS PORT PLACEMENT Right 4/11/2022    Procedure: INSERTION VENOUS PORT (PORT-A-CATH); Surgeon: Meagan Costello MD;  [facility];  Service: Surgical Oncology         12/06/22 1014   OT Last Visit   OT Visit Date 12/06/22   Note Type   Note type Evaluation   Pain Assessment   Pain Assessment Tool 0-10   Pain Score No Pain   Home Living   Type of Home Apartment  Yoko Gill)   Home Layout One level   Bathroom Shower/Tub Tub/shower unit   Bathroom Toilet Standard   Bathroom Equipment Shower chair   Bathroom Accessibility Accessible   Prior Function   Level of [place] Independent with ADLs   Lives With Alone   IADLs Independent with driving; Independent with meal prep; Independent with medication management   Falls in the last 6 months 1 to 4   Comments Pt reports that while he is able to drive, relies on [facility] transport to infusion appts  Subjective   Subjective Pt received in bed   Pt asking for "tums"   ADL   Eating Assistance 7  Independent Grooming Assistance 7  Independent   UB Bathing Assistance 5  Supervision/Setup   LB Bathing Assistance 5  Supervision/Setup   UB Dressing Assistance 5  Supervision/Setup   LB Dressing Assistance 5  Supervision/Setup   Toileting Assistance  5  Supervision/Setup   Bed Mobility   Supine to Sit 6  Modified independent   Additional Comments Pt remained seated in recliner by end of session  Transfers   Sit to Stand 6  Modified independent   Stand to Sit 6  Modified independent   Stand pivot 5  Supervision   Additional items   (cane)   Balance   Static Sitting Good   Dynamic Sitting Fair +   Static Standing Fair +   Dynamic Standing Fair +   Activity Tolerance   Activity Tolerance Patient limited by fatigue  (Treatment limited by orthostatic hypotension  Supine BP: 114/79, BP post activity and seated in chair 79/54  Chair reclined 99/62  RN Dewayne Michael made aware )   Medical Staff Made Aware PT Maira   Nurse Made Aware LINCOLN Michael   RUE Assessment   RUE Assessment WFL   LUE Assessment   LUE Assessment [facility]   Cognition   Arousal/Participation Alert   Attention Attends with cues to redirect   Orientation Level Oriented X4   Memory Decreased short term memory   Following Commands Follows one step commands with increased time or repetition   Assessment   Assessment Pt is a [de-identified] y o  male seen for OT evaluation at [address] [place] [address], admitted 12/5/2022 w/ Lethargy  OT completed expanded review of pt's medical and social history  Comorbidities affecting pt's functional performance at time of assessment include: metastatic pancreatic adenocarcinoma, diabetes, history of PE on Eliquis, hyperlipidemia  Personal factors affecting pt at time of IE include:limited home support  Prior to admission, pt was living alone in [facility] apt with elevator  Pt was I w/  ADLS and IADLS, (+) drove, & required cane PTA   Upon evaluation: Pt requires mod I for bed mobility, sup-mod I for functional mobility/transfers, sup/I for UB ADLs and sup for LB ADLS 2* the following deficits impacting occupational performance: weakness and orthostatic hypotension  Full objective findings from OT assessment regarding body systems outlined above  Occupational Performance areas to address include: bathing/shower, toilet hygiene, dressing and functional mobility  Based on findings, pt is of moderate complexity  The patient's raw score on the AM-PAC Daily Activity inpatient short form is 21, standardized score is 44 27, greater than 39 4  Patients at this level are likely to benefit from DC to home, which DOES coincide with CURRENT above OT recommendations  However please refer to therapist recommendation for discharge planning given other factors that may influence destination  At this time, OT recommendations at time of discharge are home with home OT  Goals   Patient Goals Pt wishes to feel better  Plan   Treatment Interventions ADL retraining;Functional transfer training;Patient/family training; Compensatory technique education; Endurance training   Goal Expiration Date 12/16/22   OT Treatment Day 0   OT Frequency 2-3x/wk      Recommendation   OT Discharge Recommendation Home with home health rehabilitation   [facility] Daily Activity Inpatient   Lower Body Dressing 3   Bathing 3   Toileting 3   Upper Body Dressing 4   Grooming 4   Eating 4   Daily Activity Raw Score 21   Daily Activity Standardized Score (Calc for Raw Score >=11) 44 27   AM-PAC Applied Cognition Inpatient   Following a Speech/Presentation 3   Understanding Ordinary Conversation 4   Taking Medications 4   Remembering Where Things Are Placed or Put Away 3   Remembering List of 4-5 Errands 3   Taking Care of Complicated Tasks 3   Applied Cognition Raw Score 20   Applied Cognition Standardized Score 41 76   End of Consult   Education Provided Yes   Patient Position at End of Consult Bedside chair;Bed/Chair alarm activated; All needs within reach   Nurse Communication Nurse aware of consult         Pt will achieve the following goals within 10 days  *Pt will complete UB bathing and dressing with mod I     *Pt will complete LB bathing and dressing with mod I      * Pt will complete toileting w/ mod I w/ G hygiene/thoroughness using DME PRN    *Pt will perform functional transfers with on/off all surfaces with mod I using DME as needed w/ G balance/safety  *Pt will increase standing tolerance to 5 minutes in order to complete sinkside ADL task  *Pt will identify 3-5 fall risks during ADL routine to ensure home safety upon discharge  *Pt will improve functional mobility during ADL/IADL/leisure tasks to mod I using DME as needed w/ G balance/safety         Jay Hugo, OTR/L

## 2022-12-07 NOTE — PLAN OF CARE
Problem: MOBILITY - ADULT  Goal: Maintain or return to baseline ADL function  Description: INTERVENTIONS:  -  Assess patient's ability to carry out ADLs; assess patient's baseline for ADL function and identify physical deficits which impact ability to perform ADLs (bathing, care of mouth/teeth, toileting, grooming, dressing, etc )  - Assess/evaluate cause of self-care deficits   - Assess range of motion  - Assess patient's mobility; develop plan if impaired  - Assess patient's need for assistive devices and provide as appropriate  - Encourage maximum independence but intervene and supervise when necessary  - Involve family in performance of ADLs  - Assess for home care needs following discharge   - Consider OT consult to assist with ADL evaluation and planning for discharge  - Provide patient education as appropriate  12/7/2022 1149 by Angelica Lyles RN  Outcome: Progressing  12/7/2022 1148 by Angelica Lyles RN  Outcome: Progressing  Goal: Maintains/Returns to pre admission functional level  Description: INTERVENTIONS:  - Perform BMAT or MOVE assessment daily    - Set and communicate daily mobility goal to care team and patient/family/caregiver  - Collaborate with rehabilitation services on mobility goals if consulted  - Perform Range of Motion 2 times a day  - Reposition patient every 2 hours  - Dangle patient 2 times a day  - Stand patient 2 times a day  - Ambulate patient 2 times a day  - Out of bed to chair 2 times a day   - Out of bed for meals 2 times a day  - Out of bed for toileting  - Record patient progress and toleration of activity level   12/7/2022 1149 by Angelica Lyles RN  Outcome: Progressing  12/7/2022 1148 by Angelica Lyles RN  Outcome: Progressing     Problem: Nutrition/Hydration-ADULT  Goal: Nutrient/Hydration intake appropriate for improving, restoring or maintaining nutritional needs  Description: Monitor and assess patient's nutrition/hydration status for malnutrition  Collaborate with interdisciplinary team and initiate plan and interventions as ordered  Monitor patient's weight and dietary intake as ordered or per policy  Utilize nutrition screening tool and intervene as necessary  Determine patient's food preferences and provide high-protein, high-caloric foods as appropriate       INTERVENTIONS:  - Monitor oral intake, urinary output, labs, and treatment plans  - Assess nutrition and hydration status and recommend course of action  - Evaluate amount of meals eaten  - Assist patient with eating if necessary   - Allow adequate time for meals  - Recommend/ encourage appropriate diets, oral nutritional supplements, and vitamin/mineral supplements  - Order, calculate, and assess calorie counts as needed  - Recommend, monitor, and adjust tube feedings and TPN/PPN based on assessed needs  - Assess need for intravenous fluids  - Provide specific nutrition/hydration education as appropriate  - Include patient/family/caregiver in decisions related to nutrition  12/7/2022 1149 by Ramone Warren RN  Outcome: Progressing  12/7/2022 1148 by Ramone Warren RN  Outcome: Progressing     Problem: INFECTION - ADULT  Goal: Absence or prevention of progression during hospitalization  Description: INTERVENTIONS:  - Assess and monitor for signs and symptoms of infection  - Monitor lab/diagnostic results  - Monitor all insertion sites, i e  indwelling lines, tubes, and drains  - Monitor endotracheal if appropriate and nasal secretions for changes in amount and color  - Pax appropriate cooling/warming therapies per order  - Administer medications as ordered  - Instruct and encourage patient and family to use good hand hygiene technique  - Identify and instruct in appropriate isolation precautions for identified infection/condition  12/7/2022 1149 by Ramone Warren RN  Outcome: Progressing  12/7/2022 1148 by Ramone Warren RN  Outcome: Progressing  Goal: Absence of fever/infection during neutropenic period  Description: INTERVENTIONS:  - Monitor WBC    12/7/2022 1149 by Sabas Hunt RN  Outcome: Progressing  12/7/2022 1148 by Sabas Hunt RN  Outcome: Progressing     Problem: SAFETY ADULT  Goal: Maintain or return to baseline ADL function  Description: INTERVENTIONS:  -  Assess patient's ability to carry out ADLs; assess patient's baseline for ADL function and identify physical deficits which impact ability to perform ADLs (bathing, care of mouth/teeth, toileting, grooming, dressing, etc )  - Assess/evaluate cause of self-care deficits   - Assess range of motion  - Assess patient's mobility; develop plan if impaired  - Assess patient's need for assistive devices and provide as appropriate  - Encourage maximum independence but intervene and supervise when necessary  - Involve family in performance of ADLs  - Assess for home care needs following discharge   - Consider OT consult to assist with ADL evaluation and planning for discharge  - Provide patient education as appropriate  12/7/2022 1149 by Sabas Hunt RN  Outcome: Progressing  12/7/2022 1148 by Sabas Hunt RN  Outcome: Progressing  Goal: Maintains/Returns to pre admission functional level  Description: INTERVENTIONS:  - Perform BMAT or MOVE assessment daily    - Set and communicate daily mobility goal to care team and patient/family/caregiver  - Collaborate with rehabilitation services on mobility goals if consulted  - Perform Range of Motion 2 times a day  - Reposition patient every 2 hours    - Dangle patient 2 times a day  - Stand patient 2 times a day  - Ambulate patient 2 times a day  - Out of bed to chair 2 times a day   - Out of bed for meals 2 times a day  - Out of bed for toileting  - Record patient progress and toleration of activity level   12/7/2022 1149 by Sabas Hunt RN  Outcome: Progressing  12/7/2022 1148 by Sabas Hunt RN  Outcome: Progressing  Goal: Patient will remain free of falls  Description: INTERVENTIONS:  - Educate patient/family on patient safety including physical limitations  - Instruct patient to call for assistance with activity   - Consult OT/PT to assist with strengthening/mobility   - Keep Call bell within reach  - Keep bed low and locked with side rails adjusted as appropriate  - Keep care items and personal belongings within reach  - Initiate and maintain comfort rounds  - Make Fall Risk Sign visible to staff  - Offer Toileting every 2 Hours, in advance of need  - Initiate/Maintain bed alarm  - Obtain necessary fall risk management equipment:   - Apply yellow socks and bracelet for high fall risk patients  - Consider moving patient to room near nurses station  Outcome: Progressing

## 2022-12-07 NOTE — PROGRESS NOTES
Progress note - Gastroenterology   Moses Hernandez [de-identified] y o  male MRN: [id number]  Unit/Bed#: -01 Encounter: [id number]    ASSESSMENT and PLAN  1  Advanced unresectable pancreatic CA    63-year-old male diagnosed with advanced unresectable pancreatic cancer in March 2022  Received chemotherapy April through July 2022  Also underwent radiation treatment August through September 29, 2022  Surveillance CT scan 10/28/2022 showed progression of disease  Received additional chemotherapy 11/8/2022  Presented to ED after evaluated by oncologist in office- patient with severe weakness, lethargy, abdominal pain, nausea and anorexia  CT scan of the abdomen shows massive ascites  Paracentesis yesterday and 5500 cc of fluid removed-no SBP  Abdominal pain improved today  Nausea improved with Zofran every 8 hours ATC  Clear liquids for barium swallow today  Evaluated by palliative care yesterday 12/6-patient with poor insight and unable to engage and realistic goals discussion  Defered to his sister and daughter who both feel that patient is unable to care for himself and would like him to go to skilled nursing facility  Family aware of advanced disease and intolerance to treatment, open to hospice discussion  -Continue IV fluid  -Advance diet if barium swallow negative for acute finding  -Continue Zofran around-the-clock  -Appreciate palliative care's input and recommendation      2  Esophageal abnormality on CT scan  3    GERD  CT scan of abdomen and pelvis without contrast 12/6 showed dilation of fluid-filled esophagus  Patient denies recent dysphagia but has had minimal p o  intake for at least 5 days  Reports frequent nausea contributing to anorexia  -Check barium swallow/esophagram to evaluate for stenosis or obstruction  -Clear liquids for now until esophagus further evaluated  - Continue Zofran 3 times daily scheduled dosing for nausea  -Continue PPI twice daily        Chief Complaint   Patient presents with • Shortness of Breath     Patient presents to ED sent by oncologist for evaluation of "lethargy" which patient reports has been from not sleeping along with shortness of breath worsened by movement  Patient had documented decreased oxygen saturation at office, but patient has poor perfusion          SUBJECTIVE/HPI   Reports abdominal pain has resolved  Nausea also resolved with scheduled Zofran 3 times daily  Underwent paracentesis yesterday, 5500 cc clear fluid removed  Fluid analysis negative for SBP, cultures and cytology pending      BP 92/55   Pulse 86   Temp 97 6 °F (36 4 °C) (Oral)   Resp 18   Ht 5' 7" (1 702 m)   Wt 48 7 kg (107 lb 6 4 oz)   SpO2 100%   BMI 16 82 kg/m²     PHYSICALEXAM  General appearance: alert, cachectic  Eyes:  no icterus   Head: Normocephalic, without obvious abnormality, atraumatic  Lungs: clear to auscultation bilaterally  Heart: regular rate and rhythm, S1, S2 normal, no murmur, click, rub or gallop  Abdomen: soft, nontender with palpation in all quadrants; bowel sounds normal, no BM, no rectal bleeding  Extremities: extremities normal, atraumatic, no cyanosis or edema  Neurologic: Grossly normal    Lab Results   Component Value Date    GLUCOSE 144 (H) 12/05/2022    CALCIUM 8 5 12/07/2022    K 4 9 12/07/2022    CO2 20 (L) 12/07/2022     12/07/2022    BUN 62 (H) 12/07/2022    CREATININE 1 32 (H) 12/07/2022     Lab Results   Component Value Date    WBC 6 28 12/07/2022    HGB 10 0 (L) 12/07/2022    HCT 31 4 (L) 12/07/2022    MCV 92 12/07/2022     12/07/2022     Lab Results   Component Value Date    ALT 20 12/07/2022    AST 18 12/07/2022    ALKPHOS 158 (H) 12/07/2022       Lab Results   Component Value Date    LIPASE 650 (H) 03/02/2022       Lab Results   Component Value Date    INR 1 01 12/05/2022

## 2022-12-07 NOTE — PROGRESS NOTES
[place]  Progress Note - Jennifer Fortune 1940, [de-identified] y o  male MRN: [id number]  Unit/Bed#: -01 Encounter: [id number]  Primary Care Provider: Norma Valenzuela   Date and time admitted to hospital: 12/5/2022  2:00 PM    * Lethargy  Assessment & Plan  · Referred to ED by OP Oncology due to report of lethargy/weakness after begin seen in office 12/5  Appears to be ongoing since 11/21 where lethargy/progressive decline in funcitonal status is noted in Onc note  Endorses abdominal pain, nausea/vomiting, diarrhea  · SIRS:  No leukocytosis, no fever, no tachycardia, no tachypnea  · Lactic is normal  · Procalcitonin slightly elevated, repeat morning  · No evidence/source of infection at this time  · UA is unremarkable  · CXR is unremarkable  · Blood culture negative x24 hours  · PT/OT eval   · Palliative care consult due to progressive decline in setting of metastatic cancer   · Family agreeable to hospice  · Suspect likely due to progressive metastatic cancer    Ascites  Assessment & Plan  · CT chest abdomen pelvis showed-" Massive ascites, developing since a CT from 10/28/2022  No new pancreatic head tumor poorly evaluated in the absence of IV and enteric contrast   Grossly unchanged in size  Previously demonstrated mesenteric metastases not visualized with certainty on this examination, most likely due to combination of ascites, mesenteric edema and absence of IV contrast   No evidence of new metastases in the chest, abdomen or pelvis  Stable small pulmonary nodules    Generalized dilatation of fluid-filled esophagus, consistent with esophageal dysmotility, reflux or, less likely, distal esophageal obstruction  · IR consult for paracentesis  · Gram stain negative for growth  · GI consult regarding esophageal dysmotility  · Barium swallow pending    SHRAVAN (acute kidney injury) ([facility] [address] )  Assessment & Plan  · Baseline Cr 0 8-1  · Cr on admission 1 64, suspect prerenal dehydration, patient is with poor PO intake   · Continue IVF  · Retention protocol   · Avoid hypotension and nephrotoxins    Mild protein-calorie malnutrition (HCC)  Assessment & Plan  Malnutrition Findings:     BMI Findings: Body mass index is 16 82 kg/m²  Nutrition consult and recommendation    Pancreatic adenocarcinoma [facility])  Assessment & Plan  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  · initiated on chemotherapy April 2022, most recently on Abraxane and Gemzar, patient states he is not currently receiving chemotherapy, thinks his last infusion was within the past several months  He does receive hydration infusion every Tuesday  · S/P radiation in august  · Seen in office 11/20 01/2022 with Dr Suzette Graff --> at that time was documented with progressively reduced appetite, weakness, poor family support, notably was falling asleep during office visit  Reportedly had canceled his chemo cycle 8 due to weakness  · Patient was seen again in office today 12/5, referred to ED by Dr Suzette Graff   · Palliative care consult     History of pulmonary embolism  Assessment & Plan  · Continue Eliquis 5 mg b i d  Type 2 diabetes mellitus without complication, with long-term current use of insulin [facility])  Assessment & Plan  Lab Results   Component Value Date    HGBA1C 6 1 08/28/2021     · Hold home metformin  · ISS and accucheks       VTE Pharmacologic Prophylaxis: VTE Score: 7 High Risk (Score >/= 5) - Pharmacological DVT Prophylaxis Ordered: apixaban (Eliquis)  Sequential Compression Devices Ordered  Patient Centered Rounds: I performed bedside rounds with nursing staff today  Discussions with Specialists or Other Care Team Provider: Reviewed with PT/OT and case management    Education and Discussions with Family / Patient: Attempted to update  (sister) via phone  Left voicemail       Time Spent for Care: 30 minutes  More than 50% of total time spent on counseling and coordination of care as described above  Current Length of Stay: 2 day(s)  Current Patient Status: Inpatient   Certification Statement: The patient will continue to require additional inpatient hospital stay due to Discharge planning  Discharge Plan: Anticipate discharge in 48-72 hrs to SNF for hospice    Code Status: Level 1 - Full Code    Subjective: Attempting to make a phone call during examination, offers no complaints other than wanting help using his phone  Objective:     Vitals:   Temp (24hrs), Av 6 °F (36 4 °C), Min:97 4 °F (36 3 °C), Max:97 7 °F (36 5 °C)    Temp:  [97 4 °F (36 3 °C)-97 7 °F (36 5 °C)] 97 6 °F (36 4 °C)  HR:  [86-91] 86  Resp:  [16-18] 18  BP: (92-95)/(53-60) 92/55  SpO2:  [97 %-100 %] 97 %  Body mass index is 16 82 kg/m²  Input and Output Summary (last 24 hours): Intake/Output Summary (Last 24 hours) at 2022 1420  Last data filed at 2022 0620  Gross per 24 hour   Intake 180 ml   Output 150 ml   Net 30 ml       Physical Exam:   Physical Exam  Vitals and nursing note reviewed  Constitutional:       General: He is not in acute distress  Appearance: Normal appearance  He is well-developed  He is cachectic  He is ill-appearing  HENT:      Head: Normocephalic and atraumatic  Eyes:      General: No scleral icterus  Conjunctiva/sclera: Conjunctivae normal    Cardiovascular:      Rate and Rhythm: Normal rate and regular rhythm  Heart sounds: No murmur heard  Pulmonary:      Effort: Pulmonary effort is normal       Breath sounds: No wheezing, rhonchi or rales  Abdominal:      General: There is no distension  Palpations: Abdomen is soft  Skin:     General: Skin is warm and dry  Coloration: Skin is ashen and jaundiced  Neurological:      General: No focal deficit present  Mental Status: He is alert     Psychiatric:         Mood and Affect: Mood normal  Additional Data:     Labs:  Results from last 7 days   Lab Units 12/07/22  0555   WBC Thousand/uL 6 28   HEMOGLOBIN g/dL 10 0*   HEMATOCRIT % 31 4*   PLATELETS Thousands/uL 187   NEUTROS PCT % 88*   LYMPHS PCT % 3*   MONOS PCT % 8   EOS PCT % 0     Results from last 7 days   Lab Units 12/07/22  0555   SODIUM mmol/L 137   POTASSIUM mmol/L 4 9   CHLORIDE mmol/L 108   CO2 mmol/L 20*   BUN mg/dL 62*   CREATININE mg/dL 1 32*   ANION GAP mmol/L 9   CALCIUM mg/dL 8 5   ALBUMIN g/dL 2 3*   TOTAL BILIRUBIN mg/dL 0 50   ALK PHOS U/L 158*   ALT U/L 20   AST U/L 18   GLUCOSE RANDOM mg/dL 127     Results from last 7 days   Lab Units 12/05/22  1437   INR  1 01     Results from last 7 days   Lab Units 12/07/22  1112 12/07/22  0727 12/06/22  2115 12/06/22  1700 12/06/22  1108 12/06/22  0715 12/05/22  2055 12/05/22  1733   POC GLUCOSE mg/dl 108 126 123 123 185* 145* 140 137         Results from last 7 days   Lab Units 12/06/22  0615 12/05/22  1437   LACTIC ACID mmol/L  --  1 8   PROCALCITONIN ng/ml 0 48* 0 43*       Lines/Drains:  Invasive Devices     Central Venous Catheter Line  Duration           Port A Cath 04/11/22 Right Chest 240 days          Peripheral Intravenous Line  Duration           Peripheral IV 12/05/22 Right;Ventral (anterior) Forearm 1 day                Central Line:  Goal for removal: Will discontinue when meds requiring line are completed  Imaging: Reviewed radiology reports from this admission including: chest CT scan and abdominal/pelvic CT    Recent Cultures (last 7 days):   Results from last 7 days   Lab Units 12/06/22  1318 12/05/22  1456 12/05/22  1437   BLOOD CULTURE   --  No Growth at 24 hrs  No Growth at 24 hrs     GRAM STAIN RESULT  Rare Polys  No organisms seen  --   --    BODY FLUID CULTURE, STERILE  No growth  --   --        Last 24 Hours Medication List:   Current Facility-Administered Medications   Medication Dose Route Frequency Provider Last Rate   • acetaminophen  650 mg Oral Q6H PRN Luke Gutierrez PA-C     • ALPRAZolam  0 25 mg Oral TID PRN Luke Gutierrez PA-C     • apixaban  5 mg Oral BID Luke Enriquez PA-C     • calcium carbonate  500 mg Oral Daily PRN Virginia Dunn MD     • HYDROmorphone  0 5 mg Intravenous Q6H PRN Luke Gutierrez PA-C     • insulin lispro  1-5 Units Subcutaneous TID [facility]     • insulin lispro  1-5 Units Subcutaneous HS Luke Enriquez PA-C     • metoclopramide  10 mg Intravenous Q6H PRN Preston Vega PA-C     • ondansetron  4 mg Intravenous Q4H PRN Virginia Dunn MD     • ondansetron  4 mg Oral [facility] TAMIKA Abad     • pantoprazole  40 mg Oral BID AC TAMIKA Abad     • potassium chloride  40 mEq Oral Daily Carmen Skelton     • pravastatin  20 mg Oral Daily Luke Enriquez PA-C     • sodium chloride  125 mL/hr Intravenous Continuous Virginia Dunn  mL/hr (12/07/22 5935)        Today, Patient Was Seen By: Amber Melo PA-C    **Please Note: This note may have been constructed using a voice recognition system  **

## 2022-12-07 NOTE — PROGRESS NOTES
Progress Note - Palliative & Supportive Care  Tip Urban  [de-identified] y o   male  /-01   MRN: [id number]  Encounter: [id number]     Assessment/Problems Actively Addressed this Visit:  Unresectable pancreatic cancer  Mesenteric metastasis  Ascites s/p paracentesis  SHRAVAN  Weakness  Lethargy  Nausea  Vomiting  Failure to thrive   Chronic diarrhea  Anxiety about health  History of schizophrenia  Cancer related pain  Mild protein calorie malnutrtion  Goals of care counseling    Goals/Recommendations:  · Level 1 code status - not addressed in this discussion  · I spoke with Altaf Cee and with his POA Rohini separately  · Altaf Cee is agreeable to SNF at discharge, which is family's wish as well  · Beyond this, Altaf Cee refuses to speak about anything "negative" or tell me about his medical status  Per Altaf Cee, "I want to have hope  I want to live as long as possible "  · Although I explain that talking about the next steps is vital for his wellbeing, he refuses, which has been his pattern on all my visits with him  · From my discussions, I do not believe Altaf Cee is capable of making medical decisions as he does not comprehend the severity of his disease or his medical course so far  · Per Thaddeus Peña, she believes a direct transition to hospice after discharge is in Sebastein's best interest  She notes that Altaf Cee is extremely medically frail and may not be able to participate with therapy if he goes to rehab first     · Sushila Snow, and I are coordinating a time to speak with Altaf Cee together about hospice as they would like him to hear it from them  Further recommendations to follow  · Of note, Rohini and Teresa plan to visit the area on Saturday and can help retrieve items form Sebastien's apartment--he is aware  Plan:  Symptom Management  Will defer symptom management to primary team      24 History  Chart reviewed before visit  Fluid cytology pending  Sebastien's oxygen needs are improving    He is frail and was not able to get out of bed with physical therapy per documentation yesterday  Met with him at bedside and I also called his sister  We are planning further discussions    Decisional apparatus:  Patient is not competent on exam today  If competence is lost, patient's substitute decision maker would default to CARIDAD [place] by [place] Act 169  POA Forms: Living Will and POA documents are on file  Review of Systems:   Review of Systems   Constitutional: Positive for decreased appetite  Negative for fever and malaise/fatigue  HENT: Negative for congestion, hearing loss, hoarse voice and nosebleeds  Eyes: Negative for double vision  Cardiovascular: Negative for chest pain, cyanosis and dyspnea on exertion  Respiratory: Negative for cough, shortness of breath and sleep disturbances due to breathing  Skin: Negative for itching and poor wound healing  Musculoskeletal: Negative for arthritis, back pain and falls  Gastrointestinal: Positive for anorexia  Negative for bloating, abdominal pain, nausea and vomiting  Genitourinary: Negative for bladder incontinence, dysuria, pelvic pain and urgency  Neurological: Positive for weakness  Negative for brief paralysis, difficulty with concentration, light-headedness and loss of balance  Psychiatric/Behavioral: The patient is nervous/anxious          Medications    Current Facility-Administered Medications:   •  acetaminophen (TYLENOL) tablet 650 mg, 650 mg, Oral, Q6H PRN, Efren Shivers Fountaine, PA-C  •  ALPRAZolam [place]) tablet 0 25 mg, 0 25 mg, Oral, TID PRN, Efren Shivers Fountaine, PA-C  •  apixaban (ELIQUIS) tablet 5 mg, 5 mg, Oral, BID, Efren Shivers Fountaine, PA-C, 5 mg at 12/06/22 1700  •  calcium carbonate (TUMS) chewable tablet 500 mg, 500 mg, Oral, Daily PRN, Gudelia Her MD, 500 mg at 12/06/22 1024  •  HYDROmorphone (DILAUDID) injection 0 5 mg, 0 5 mg, Intravenous, Q6H PRN, Efren Shivers Fountaine, PA-C  •  insulin lispro (HumaLOG) 100 units/mL subcutaneous injection 1-5 Units, 1-5 Units, Subcutaneous, TID AC, 1 Units at 12/06/22 1151 **AND** Fingerstick Glucose (POCT), , , TID AC, Lo Gutierrez PA-C  •  insulin lispro (HumaLOG) 100 units/mL subcutaneous injection 1-5 Units, 1-5 Units, Subcutaneous, HS, Lo Gutierrez PA-C  •  metoclopramide (REGLAN) injection 10 mg, 10 mg, Intravenous, Q6H PRN, Mert Rodriguez PA-C, 10 mg at 12/05/22 2138  •  ondansetron (ZOFRAN) injection 4 mg, 4 mg, Intravenous, Q4H PRN, Carole Cole MD  •  ondansetron (ZOFRAN-ODT) dispersible tablet 4 mg, 4 mg, Oral, Q8H [address], TAMIKA Plummer, 4 mg at 12/07/22 0555  •  pantoprazole (PROTONIX) EC tablet 40 mg, 40 mg, Oral, BID AC, TAMIKA Plummer, 40 mg at 12/07/22 0608  •  potassium chloride (K-DUR,KLOR-CON) CR tablet 40 mEq, 40 mEq, Oral, Daily, Lo Gutierrez PA-C, 40 mEq at 12/06/22 2759  •  pravastatin (PRAVACHOL) tablet 20 mg, 20 mg, Oral, Daily, Lo Guteirrez PA-C, 20 mg at 12/06/22 0931  •  sodium chloride 0 9 % infusion, 125 mL/hr, Intravenous, Continuous, Carole Cole MD, Last Rate: 125 mL/hr at 12/07/22 0658, 125 mL/hr at 12/07/22 0658    Objective  BP 92/55   Pulse 86   Temp 97 6 °F (36 4 °C) (Oral)   Resp 18   Ht 5' 7" (1 702 m)   Wt 48 7 kg (107 lb 6 4 oz)   SpO2 100%   BMI 16 82 kg/m²     Physical Exam  Vitals and nursing note reviewed  Exam conducted with a chaperone present  Constitutional:       General: He is sleeping  He is not in acute distress  Appearance: He is cachectic  He is ill-appearing  Comments: Resting comfortably in bed  Frail and cachectic with prominent muscle wasting and fat loss  Somnolent but easily aroused  Nods off during our conversation  Appears fatigued and sickly  HENT:      Head: Normocephalic and atraumatic  Eyes:      Conjunctiva/sclera: Conjunctivae normal    Cardiovascular:      Rate and Rhythm: Normal rate     Pulmonary:      Effort: Pulmonary effort is normal  No respiratory distress  Abdominal:      Palpations: Abdomen is soft  Tenderness: There is no abdominal tenderness  Musculoskeletal:         General: No swelling  Cervical back: Neck supple  Skin:     General: Skin is warm and dry  Capillary Refill: Capillary refill takes less than 2 seconds  Coloration: Skin is pale  Neurological:      Mental Status: He is easily aroused  Psychiatric:         Mood and Affect: Mood is anxious  Cognition and Memory: Cognition is impaired  Comments: Perseverating thought process           Lab Results:   I have personally reviewed pertinent labs  , CBC:   Lab Results   Component Value Date    WBC 6 28 12/07/2022    HGB 10 0 (L) 12/07/2022    HCT 31 4 (L) 12/07/2022    MCV 92 12/07/2022     12/07/2022    MCH 29 4 12/07/2022    MCHC 31 8 12/07/2022    RDW 15 6 (H) 12/07/2022    MPV 9 3 12/07/2022    NRBC 0 12/07/2022   , CMP:   Lab Results   Component Value Date    SODIUM 137 12/07/2022    K 4 9 12/07/2022     12/07/2022    CO2 20 (L) 12/07/2022    BUN 62 (H) 12/07/2022    CREATININE 1 32 (H) 12/07/2022    CALCIUM 8 5 12/07/2022    AST 18 12/07/2022    ALT 20 12/07/2022    ALKPHOS 158 (H) 12/07/2022    EGFR 50 12/07/2022     Imaging Studies: I have personally reviewed pertinent reports  EKG, Pathology, and Other Studies: I have personally reviewed pertinent reports  Counseling / Coordination of Care  Total floor / unit time spent today 45 minutes  Greater than 50% of total time was spent with the patient and / or family counseling and / or coordinating of care  A description of the counseling / coordination of care: Chart reviewed,  provided medical updates, determined goals of care, discussed palliative care and symptom management, discussed comfort care and hospice, determined competency and POA/HCA, determined social/family support, provided psychosocial support   Reviewed with SLIM team, RN and CM     reasonable likelihood of causing patient harm    Ovi Robins MFA-CWP  Palliative & Supportive Care    Portions of this document may have been created using dictation software and as such some "sound alike" terms may have been generated by the system  Do not hesitate to contact me with any questions or clarifications

## 2022-12-07 NOTE — ASSESSMENT & PLAN NOTE
Malnutrition Findings:     BMI Findings: Body mass index is 16 82 kg/m²     Nutrition consult and recommendation

## 2022-12-07 NOTE — CASE MANAGEMENT
Case Management Assessment & Discharge Planning Note    Patient name Souleymane Tadeo  Location /-25 MRN [id number]  : 1940 Date 2022       Current Admission Date: 2022  Current Admission Diagnosis:Lethargy   Patient Active Problem List    Diagnosis Date Noted   • Ascites 2022   • Lethargy 2022   • SHRAVAN (acute kidney injury) ([facility] [address] ) 2022   • Dehydration 10/22/2022   • Mild protein-calorie malnutrition ([facility] ) 2022   • Acute exacerbation of chronic obstructive pulmonary disease ([facility] ) 2022   • Hypokalemia 2022   • Chemotherapy induced neutropenia ([facility] ) 2022   • Pancreatic adenocarcinoma ([place] [address] ) 2022   • Type 2 diabetes mellitus without complication, with long-term current use of insulin (HCC)    • GERD (gastroesophageal reflux disease)    • Pancreatic mass    • History of pulmonary embolism    • Hyperbilirubinemia    • Anxiety about health       LOS (days): 2  Geometric Mean LOS (GMLOS) (days): 3 30  Days to GMLOS:1 3     OBJECTIVE:    Risk of Unplanned Readmission Score: 25 37         Current admission status: Inpatient  Referral Reason: SNF, Initial    Preferred Pharmacy:   [facility]/pharmacy #[id number] Munoz, [address]  Phone: [phone] Fax: [facility], [address] [facility] [address] [address] [address]  Phone: [phone] Fax: [address] [facility] [address], [address] [place] [address]  [address]  [facility]  Phone: [phone] Fax: [phone]    Primary Care Provider: Lori Benavidez    Primary Insurance: Swati Gutierrez [facility]  Secondary Insurance:     ASSESSMENT:  [place], [address] Representative - Sister   Primary Phone: [phone] (Mobile)               Advance Directives  Does patient have a [address] [place] [address] Avenue?: Yes  Does patient have Advance Directives?: Yes  Advance Directives: Power of  for health care  Primary Contact: Rohini Sinha         Readmission Root Cause  30 Day Readmission: No    Patient Information  Admitted from[de-identified] Home  Mental Status: Alert (highly anxious causing decreased insight)  During Assessment patient was accompanied by: Not accompanied during assessment, Parent  Assessment information provided by[de-identified] Patient, Sister  Primary Caregiver: Self  Support Systems: Self, Daughter, Family members  [place] of Residence: [address] [place] [address] do you live in?: [place], [address] [place] [address] entry access options   Select all that apply : Elevator  Type of Current Residence: Apartment  Floor Level: 4  Upon entering residence, is there a bedroom on the main floor (no further steps)?: Yes  Upon entering residence, is there a bathroom on the main floor (no further steps)?: Yes  Living Arrangements: Lives Alone  Is patient a ?: No    Activities of Daily Living Prior to Admission  Functional Status: Independent  Completes ADLs independently?: Yes  Ambulates independently?: Yes  Does patient use assisted devices?: Yes  Assisted Devices (DME) used: Farzana Meek  Does patient currently own DME?: Yes  What DME does the patient currently own?: Augusta Ceballos, Shower Chair, Walker  Does patient have a history of Outpatient Therapy (PT/OT)?: No  Does the patient have a history of Short-Term Rehab?: No  Does patient have a history of HHC?: Yes  Does patient currently have [facility]?: No         Patient Information Continued  Does patient have prescription coverage?: Yes  Within the past 12 months, you worried that your food would run out before you got the money to buy more : Never true  Within the past 12 months, the food you bought just didn't last and you didn't have money to get more : Never true  Does patient receive dialysis treatments?: No  Does patient have a history of substance abuse?: No  Does patient have a history of Mental Health Diagnosis?: Yes  Is patient receiving treatment for mental health?: No  Treatment options were provided  Has patient received inpatient treatment related to mental health in the last 2 years?: No    Means of Transportation  Means of Transport to Appts[de-identified] Drives Self  In the past 12 months, has lack of transportation kept you from medical appointments or from getting medications?: No  In the past 12 months, has lack of transportation kept you from meetings, work, or from getting things needed for daily living?: No  Was application for public transport provided?: N/A    DISCHARGE DETAILS:    Discharge planning discussed with[de-identified] patient and his sister  Freedom of Choice: Yes  Comments - Freedom of Choice: referrals being made to HOSP DR ELVIS AMEZCUA  CM contacted family/caregiver?: Yes  Were Treatment Team discharge recommendations reviewed with patient/caregiver?: Yes  Did patient/caregiver verbalize understanding of patient care needs?: Yes  Were patient/caregiver advised of the risks associated with not following Treatment Team discharge recommendations?: Yes    Contacts  Patient Contacts: sister Dominique Girard  Relationship to Patient[de-identified] Family  Contact Method: Phone  Phone Number: [phone]  Reason/Outcome: Continuity of Care, Emergency Contact, Discharge [address]         Is the patient interested in [address] at discharge?: No    Other Referral/Resources/Interventions Provided:  Interventions: SNF    Treatment Team Recommendation: SNF  Discharge Destination Plan[de-identified] SNF     Family notified[de-identified] YES  Additional Comments: Cm met with pt to open his case and discuss d/c planning  Pt was alert and able to provide hx of tx  He lives in a [facility] in [place] by himself  He was about to receive some home health aide support from [address] on Aging PTA  He was independent PTA using a walker, cane an shower chair  He uses an elevator to access his apartment   He has had home health [facility] in the past  He also resided at [facility] from [address] to 1965 with a diagnosis of schizophrenia  Pt cannot recall exact dates and sister is unaware  He has prescription coverage through his insurance and [employer]  [facility] in [facility]  He feels discharge to a SNF is the safest thing for him at this time  His sister and daughter are in agreement  Per palliative, they feel hospice at a SNF is appropriate  Palliative and family plan to discuss with pt in the next two days  Sister and daughter are planning to come to assist on Saturday 12/10/22  Cm explained Options process to his sister over the phone and is working on submitting to [employer] on [address]  [facility] referrals will be placed via [employer] to local SNFs for placement

## 2022-12-07 NOTE — PLAN OF CARE
Problem: SAFETY ADULT  Goal: Maintain or return to baseline ADL function  Description: INTERVENTIONS:  -  Assess patient's ability to carry out ADLs; assess patient's baseline for ADL function and identify physical deficits which impact ability to perform ADLs (bathing, care of mouth/teeth, toileting, grooming, dressing, etc )  - Assess/evaluate cause of self-care deficits   - Assess range of motion  - Assess patient's mobility; develop plan if impaired  - Assess patient's need for assistive devices and provide as appropriate  - Encourage maximum independence but intervene and supervise when necessary  - Involve family in performance of ADLs  - Assess for home care needs following discharge   - Consider OT consult to assist with ADL evaluation and planning for discharge  - Provide patient education as appropriate  12/6/2022 2336 by Carlos Manzo  Outcome: Progressing    Problem: SAFETY ADULT  Goal: Patient will remain free of falls  Description: INTERVENTIONS:  - Educate patient/family on patient safety including physical limitations  - Instruct patient to call for assistance with activity   - Consult OT/PT to assist with strengthening/mobility   - Keep Call bell within reach  - Keep bed low and locked with side rails adjusted as appropriate  - Keep care items and personal belongings within reach  - Initiate and maintain comfort rounds  - Make Fall Risk Sign visible to staff  - Offer Toileting every 2 Hours, in advance of need  - Initiate/Maintain bed alarm  - Obtain necessary fall risk management equipment: fall risk signs   - Apply yellow socks and bracelet for high fall risk patients  - Consider moving patient to room near nurses station  12/6/2022 2336 by Carlos Manzo  Outcome: Progressing

## 2022-12-07 NOTE — ASSESSMENT & PLAN NOTE
· CT chest abdomen pelvis showed-" Massive ascites, developing since a CT from 10/28/2022  No new pancreatic head tumor poorly evaluated in the absence of IV and enteric contrast   Grossly unchanged in size  Previously demonstrated mesenteric metastases not visualized with certainty on this examination, most likely due to combination of ascites, mesenteric edema and absence of IV contrast   No evidence of new metastases in the chest, abdomen or pelvis  Stable small pulmonary nodules    Generalized dilatation of fluid-filled esophagus, consistent with esophageal dysmotility, reflux or, less likely, distal esophageal obstruction  · IR consult for paracentesis  · Gram stain negative for growth  · GI consult regarding esophageal dysmotility  · Barium swallow pending

## 2022-12-07 NOTE — ASSESSMENT & PLAN NOTE
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  · initiated on chemotherapy April 2022, most recently on Abraxane and Gemzar, patient states he is not currently receiving chemotherapy, thinks his last infusion was within the past several months  He does receive hydration infusion every Tuesday  · S/P radiation in august  · Seen in office 11/20 01/2022 with Dr Shima Borrego --> at that time was documented with progressively reduced appetite, weakness, poor family support, notably was falling asleep during office visit  Reportedly had canceled his chemo cycle 8 due to weakness     · Patient was seen again in office today 12/5, referred to ED by Dr Shima Borrego   · Palliative care consult

## 2022-12-07 NOTE — ASSESSMENT & PLAN NOTE
· Referred to ED by OP Oncology due to report of lethargy/weakness after begin seen in office 12/5  Appears to be ongoing since 11/21 where lethargy/progressive decline in funcitonal status is noted in Onc note    Endorses abdominal pain, nausea/vomiting, diarrhea  · SIRS:  No leukocytosis, no fever, no tachycardia, no tachypnea  · Lactic is normal  · Procalcitonin slightly elevated, repeat morning  · No evidence/source of infection at this time  · UA is unremarkable  · CXR is unremarkable  · Blood culture negative x24 hours  · PT/OT eval   · Palliative care consult due to progressive decline in setting of metastatic cancer   · Family agreeable to hospice  · Suspect likely due to progressive metastatic cancer

## 2022-12-08 NOTE — ASSESSMENT & PLAN NOTE
· CT chest abdomen pelvis showed-" Massive ascites, developing since a CT from 10/28/2022  No new pancreatic head tumor poorly evaluated in the absence of IV and enteric contrast   Grossly unchanged in size  Previously demonstrated mesenteric metastases not visualized with certainty on this examination, most likely due to combination of ascites, mesenteric edema and absence of IV contrast   No evidence of new metastases in the chest, abdomen or pelvis  Stable small pulmonary nodules    Generalized dilatation of fluid-filled esophagus, consistent with esophageal dysmotility, reflux or, less likely, distal esophageal obstruction  · IR consult for paracentesis  · Gram stain negative for growth  · GI consult regarding esophageal dysmotility-signed off as patient will be transitioning to hospice, continue supportive measures  · Barium swallow -negative for dysmotility or stricture

## 2022-12-08 NOTE — PROGRESS NOTES
[place]  Progress Note - Jeanine Elias 1940, [de-identified] y o  male MRN: [id number]  Unit/Bed#: -01 Encounter: [id number]  Primary Care Provider: Vic Doherty   Date and time admitted to hospital: 12/5/2022  2:00 PM    * Lethargy  Assessment & Plan  · Referred to ED by OP Oncology due to report of lethargy/weakness after begin seen in office 12/5  Appears to be ongoing since 11/21 where lethargy/progressive decline in funcitonal status is noted in Onc note  Endorses abdominal pain, nausea/vomiting, diarrhea  · SIRS:  No leukocytosis, no fever, no tachycardia, no tachypnea  · Lactic is normal  · Procalcitonin slightly elevated, repeat morning  · No evidence/source of infection at this time  · UA is unremarkable  · CXR is unremarkable  · Blood culture negative x48 hours  · PT/OT eval   · Palliative care consult due to progressive decline in setting of metastatic cancer   · Family agreeable to hospice-patient for discharge to SNF, and prior mental health hospitalization, patient requires options process for placement    Ascites  Assessment & Plan  · CT chest abdomen pelvis showed-" Massive ascites, developing since a CT from 10/28/2022  No new pancreatic head tumor poorly evaluated in the absence of IV and enteric contrast   Grossly unchanged in size  Previously demonstrated mesenteric metastases not visualized with certainty on this examination, most likely due to combination of ascites, mesenteric edema and absence of IV contrast   No evidence of new metastases in the chest, abdomen or pelvis  Stable small pulmonary nodules    Generalized dilatation of fluid-filled esophagus, consistent with esophageal dysmotility, reflux or, less likely, distal esophageal obstruction  · IR consult for paracentesis  · Gram stain negative for growth  · GI consult regarding esophageal dysmotility-signed off as patient will be transitioning to hospice, continue supportive measures  · Barium swallow -negative for dysmotility or stricture    SHRAVAN (acute kidney injury) ([facility] [address] )  Assessment & Plan  · Baseline Cr 0 8-1  · Cr on admission 1 64, suspect prerenal dehydration, patient is with poor PO intake   · IVF discontinued 12/8 as patient is having appropriate oral intake  · Retention protocol   · Avoid hypotension and nephrotoxins    Mild protein-calorie malnutrition ([facility] [address] )  Assessment & Plan  Malnutrition Findings:     BMI Findings: Body mass index is 17 48 kg/m²  Nutrition consult and recommendation    Pancreatic adenocarcinoma [facility])  Assessment & Plan  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  · initiated on chemotherapy April 2022, most recently on Abraxane and Gemzar, patient states he is not currently receiving chemotherapy, thinks his last infusion was within the past several months  He does receive hydration infusion every Tuesday  · S/P radiation in august  · Seen in office 11/20 01/2022 with Dr Tobias Segundo --> at that time was documented with progressively reduced appetite, weakness, poor family support, notably was falling asleep during office visit  Reportedly had canceled his chemo cycle 8 due to weakness  · Patient was seen again in office today 12/5, referred to ED by Dr Tobias Segundo   · Palliative care consult     History of pulmonary embolism  Assessment & Plan  · Continue Eliquis 5 mg b i d  Type 2 diabetes mellitus without complication, with long-term current use of insulin [facility])  Assessment & Plan  Lab Results   Component Value Date    HGBA1C 6 1 08/28/2021     · Hold home metformin  · ISS and accucheks       VTE Pharmacologic Prophylaxis: VTE Score: 7 High Risk (Score >/= 5) - Pharmacological DVT Prophylaxis Ordered: apixaban (Eliquis)  Sequential Compression Devices Ordered      Patient Centered Rounds: I performed bedside rounds with nursing staff today   Discussions with Specialists or Other Care Team Provider: Discussed with gastroenterology will sign off  Reviewed patient with case management, patient requires psych eval prior to discharge as he had a prior psych hospitalization in the 62s    Education and Discussions with Family / Patient: Updated  (sister) via phone  Time Spent for Care: 30 minutes  More than 50% of total time spent on counseling and coordination of care as described above  Current Length of Stay: 3 day(s)  Current Patient Status: Inpatient   Certification Statement: The patient will continue to require additional inpatient hospital stay due to Discharge planning  Discharge Plan: Anticipate discharge in >72 hrs to rehab facility  for hospice    Code Status: Level 1 - Full Code    Subjective:   Patient reports he is tired and just wants to rest, is having improved oral intake today  Objective:     Vitals:   Temp (24hrs), Av 6 °F (36 4 °C), Min:97 3 °F (36 3 °C), Max:97 9 °F (36 6 °C)    Temp:  [97 3 °F (36 3 °C)-97 9 °F (36 6 °C)] 97 3 °F (36 3 °C)  HR:  [82-83] 82  Resp:  [16-18] 18  BP: (100-105)/(65-66) 105/66  SpO2:  [100 %] 100 %  Body mass index is 17 48 kg/m²  Input and Output Summary (last 24 hours): Intake/Output Summary (Last 24 hours) at 2022 1550  Last data filed at 2022 0601  Gross per 24 hour   Intake 200 ml   Output 200 ml   Net 0 ml       Physical Exam:   Physical Exam  Vitals and nursing note reviewed  Constitutional:       General: He is not in acute distress  Appearance: Normal appearance  He is well-developed  He is cachectic  He is ill-appearing  HENT:      Head: Normocephalic and atraumatic  Eyes:      General: No scleral icterus  Conjunctiva/sclera: Conjunctivae normal    Cardiovascular:      Rate and Rhythm: Normal rate and regular rhythm  Heart sounds: No murmur heard  Pulmonary:      Effort: Pulmonary effort is normal       Breath sounds:  No wheezing, rhonchi or rales  Abdominal:      General: There is no distension  Palpations: Abdomen is soft  Skin:     General: Skin is warm and dry  Neurological:      General: No focal deficit present  Mental Status: He is alert  Psychiatric:         Mood and Affect: Mood normal           Additional Data:     Labs:  Results from last 7 days   Lab Units 12/07/22  0555   WBC Thousand/uL 6 28   HEMOGLOBIN g/dL 10 0*   HEMATOCRIT % 31 4*   PLATELETS Thousands/uL 187   NEUTROS PCT % 88*   LYMPHS PCT % 3*   MONOS PCT % 8   EOS PCT % 0     Results from last 7 days   Lab Units 12/07/22  0555   SODIUM mmol/L 137   POTASSIUM mmol/L 4 9   CHLORIDE mmol/L 108   CO2 mmol/L 20*   BUN mg/dL 62*   CREATININE mg/dL 1 32*   ANION GAP mmol/L 9   CALCIUM mg/dL 8 5   ALBUMIN g/dL 2 3*   TOTAL BILIRUBIN mg/dL 0 50   ALK PHOS U/L 158*   ALT U/L 20   AST U/L 18   GLUCOSE RANDOM mg/dL 127     Results from last 7 days   Lab Units 12/05/22  1437   INR  1 01     Results from last 7 days   Lab Units 12/08/22  1525 12/08/22  1140 12/08/22  0804 12/07/22  2057 12/07/22  1551 12/07/22  1112 12/07/22  0727 12/06/22  2115 12/06/22  1700 12/06/22  1108 12/06/22  0715 12/05/22  2055   POC GLUCOSE mg/dl 136 206* 137 163* 109 108 126 123 123 185* 145* 140         Results from last 7 days   Lab Units 12/06/22  0615 12/05/22  1437   LACTIC ACID mmol/L  --  1 8   PROCALCITONIN ng/ml 0 48* 0 43*       Lines/Drains:  Invasive Devices     Central Venous Catheter Line  Duration           Port A Cath 04/11/22 Right Chest 241 days          Peripheral Intravenous Line  Duration           Peripheral IV 12/05/22 Right;Ventral (anterior) Forearm 3 days                Central Line:  Goal for removal: Port accessed  Will de-access as appropriate               Imaging: Reviewed radiology reports from this admission including: procedure reports    Recent Cultures (last 7 days):   Results from last 7 days   Lab Units 12/06/22  1318 12/05/22  1456 12/05/22  1437   BLOOD CULTURE   --  No Growth at 48 hrs  No Growth at 48 hrs  GRAM STAIN RESULT  Rare Polys  No organisms seen  --   --    BODY FLUID CULTURE, STERILE  No growth  --   --        Last 24 Hours Medication List:   Current Facility-Administered Medications   Medication Dose Route Frequency Provider Last Rate   • acetaminophen  650 mg Oral Q6H PRN Flor Hence ERNIE Gutirerez     • ALPRAZolam  0 25 mg Oral TID PRN Beverly Hence ERNIE Gutierrez     • apixaban  5 mg Oral BID Flor Hence ERNIE Gutierrez     • calcium carbonate  500 mg Oral Daily PRN Ayana Andres MD     • HYDROmorphone  0 5 mg Intravenous Q6H PRN Beverly [facility] ERNIE Gutierrez     • insulin lispro  1-5 Units Subcutaneous TID AC Jody Finnegan PA-C     • insulin lispro  1-5 Units Subcutaneous HS Flor Hence ERNIE Enriquez     • metoclopramide  10 mg Intravenous Q6H PRN Tonja Newman PA-C     • ondansetron  4 mg Intravenous Q4H PRN Ayana Andres MD     • ondansetron  4 mg Oral [facility] TAMIKA Sarkar     • pantoprazole  40 mg Oral BID AC TAMIKA Sarkar     • potassium chloride  40 mEq Oral Daily [place]     • pravastatin  20 mg Oral Daily Lalito Lu PA-C          Today, Patient Was Seen By: Clarisa Degroot PA-C    **Please Note: This note may have been constructed using a voice recognition system  **

## 2022-12-08 NOTE — ASSESSMENT & PLAN NOTE
· Referred to ED by OP Oncology due to report of lethargy/weakness after begin seen in office 12/5  Appears to be ongoing since 11/21 where lethargy/progressive decline in funcitonal status is noted in Onc note    Endorses abdominal pain, nausea/vomiting, diarrhea  · SIRS:  No leukocytosis, no fever, no tachycardia, no tachypnea  · Lactic is normal  · Procalcitonin slightly elevated, repeat morning  · No evidence/source of infection at this time  · UA is unremarkable  · CXR is unremarkable  · Blood culture negative x48 hours  · PT/OT eval   · Palliative care consult due to progressive decline in setting of metastatic cancer   · Family agreeable to hospice-patient for discharge to SNF, and prior mental health hospitalization, patient requires options process for placement

## 2022-12-08 NOTE — ASSESSMENT & PLAN NOTE
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  · initiated on chemotherapy April 2022, most recently on Abraxane and Gemzar, patient states he is not currently receiving chemotherapy, thinks his last infusion was within the past several months  He does receive hydration infusion every Tuesday  · S/P radiation in august  · Seen in office 11/20 01/2022 with Dr Kavon Morejon --> at that time was documented with progressively reduced appetite, weakness, poor family support, notably was falling asleep during office visit  Reportedly had canceled his chemo cycle 8 due to weakness     · Patient was seen again in office today 12/5, referred to ED by Dr Kavon Morejon   · Palliative care consult

## 2022-12-08 NOTE — ASSESSMENT & PLAN NOTE
· Baseline Cr 0 8-1  · Cr on admission 1 64, suspect prerenal dehydration, patient is with poor PO intake   · IVF discontinued 12/8 as patient is having appropriate oral intake  · Retention protocol   · Avoid hypotension and nephrotoxins

## 2022-12-08 NOTE — CASE MANAGEMENT
Case Management Progress Note    Patient name Ivett Romp  Location /-18 MRN [id number]  : 1940 Date 2022       LOS (days): 3  Geometric Mean LOS (GMLOS) (days): 3 30  Days to GMLOS:0 3        OBJECTIVE:        Current admission status: Inpatient  Preferred Pharmacy:   [id number], [address]  Phone: [phone] Fax: [address], [address] [place] [place] [address] [address] [address]  Phone: [phone] Fax: [address] [facility] [address] [place] [address]  [address]  [address] [address]  Phone: [phone] Fax: [phone]    Primary Care Provider: Chiara Anguiano    Primary Insurance: [facility]  Secondary Insurance:     PROGRESS NOTE:    Cm [facility] [facility] at [phone] and she scheduled Options screening for determination of skilled nursing need for tomorrow,  at 3:30 pm  Level 2 screening on PASSR needs to be completed  They will determine if a new letter of determination needs to be sent

## 2022-12-08 NOTE — PROGRESS NOTES
Progress note - Gastroenterology   Jennifer Fortune [de-identified] y o  male MRN: [id number]  Unit/Bed#: -01 Encounter: [id number]    ASSESSMENT and PLAN  1   Advanced unresectable pancreatic CA  61-year-old male diagnosed with advanced unresectable pancreatic cancer in March 2022     Received chemotherapy April through July 2022    Scottie Moncada underwent radiation treatment August through September 29, 2022  Surveillance CT scan 10/28/2022 showed progression of disease  Received additional chemotherapy 11/8/2022  Presented to ED after evaluated by oncologist in office- patient with severe weakness, lethargy, abdominal pain, nausea and anorexia  CT scan of the abdomen shows massive ascites  Paracentesis 12/6 -5500 cc of fluid removed-no SBP  Abdominal pain improved today  Evaluated by palliative care 12/6-patient with poor insight and unable to engage and realistic goals discussion  Defered to his sister and daughter who both feel that patient is unable to care for himself and would like him to go to skilled nursing facility  Family aware of advanced disease and intolerance to treatment, open to hospice discussion  -Would liberate diet and allow patient to eat what he likes  -Glucerna supplement  -Continue Zofran around-the-clock  -Appreciate palliative care's input and recommendation    Family meeting planned for today        2   Esophageal abnormality on CT scan  3   GERD  CT scan of abdomen and pelvis without contrast 12/6 showed dilation of fluid-filled esophagus  Patient denies recent dysphagia but has had minimal p o  intake for at least 5 days  Reports frequent nausea contributing to anorexia  Barium swallow 12/7 was normal without esophageal stricture or obstruction  -Liberalize diet, currently on low-carb  -Glucerna supplement  - Continue Zofran 3 times daily scheduled dosing for nausea  -Continue PPI twice daily      Chief Complaint   Patient presents with   • Shortness of Breath     Patient presents to ED sent by oncologist for evaluation of "lethargy" which patient reports has been from not sleeping along with shortness of breath worsened by movement  Patient had documented decreased oxygen saturation at office, but patient has poor perfusion  SUBJECTIVE/HPI   Barium swallow yesterday limited as patient was unable to stand  Ingested contrast passes freely from the esophagus into the stomach without evidence of dysmotility or stricture  He denies dysphagia and was eating breakfast this morning, feels hungry  Nausea has resolved-receiving Zofran around-the-clock before meals  Denies abdominal pain      /66   Pulse 82   Temp (!) 97 3 °F (36 3 °C) (Oral)   Resp 18   Ht 5' 7" (1 702 m)   Wt 50 6 kg (111 lb 9 6 oz)   SpO2 100%   BMI 17 48 kg/m²     PHYSICALEXAM  General appearance: alert, cachectic  Eyes:no icterus   Head: Normocephalic, without obvious abnormality, atraumatic  Lungs: clear to auscultation bilaterally  Heart: regular rate and rhythm, S1, S2 normal, no murmur, click, rub or gallop  Abdomen: soft, nondistended, nontender with palpation today  Bowel sounds present    Denies nausea and eating breakfast at the time of visit  Extremities: extremities normal, atraumatic, no cyanosis or edema  Neurologic: Grossly normal    Lab Results   Component Value Date    GLUCOSE 144 (H) 12/05/2022    CALCIUM 8 5 12/07/2022    K 4 9 12/07/2022    CO2 20 (L) 12/07/2022     12/07/2022    BUN 62 (H) 12/07/2022    CREATININE 1 32 (H) 12/07/2022     Lab Results   Component Value Date    WBC 6 28 12/07/2022    HGB 10 0 (L) 12/07/2022    HCT 31 4 (L) 12/07/2022    MCV 92 12/07/2022     12/07/2022     Lab Results   Component Value Date    ALT 20 12/07/2022    AST 18 12/07/2022    ALKPHOS 158 (H) 12/07/2022       Lab Results   Component Value Date    LIPASE 650 (H) 03/02/2022       Lab Results   Component Value Date    INR 1 01 12/05/2022

## 2022-12-08 NOTE — CASE MANAGEMENT
Case Management Discharge Planning Note    Patient name Kat Hidkarl  Location /-12 MRN [id number]  : 1940 Date 2022       Current Admission Date: 2022  Current Admission Diagnosis:Lethargy   Patient Active Problem List    Diagnosis Date Noted   • Ascites 2022   • Lethargy 2022   • SHRAVAN (acute kidney injury) ([facility] [address] ) 2022   • Dehydration 10/22/2022   • Mild protein-calorie malnutrition ([facility] [address] ) 2022   • Acute exacerbation of chronic obstructive pulmonary disease ([facility] [address] ) 2022   • Hypokalemia 2022   • Chemotherapy induced neutropenia ([facility] [address] ) 2022   • Pancreatic adenocarcinoma ([facility] [address] ) 2022   • Type 2 diabetes mellitus without complication, with long-term current use of insulin ([facility] [address] )    • GERD (gastroesophageal reflux disease)    • Pancreatic mass    • History of pulmonary embolism    • Hyperbilirubinemia    • Anxiety about health       LOS (days): 3  Geometric Mean LOS (GMLOS) (days): 3 30  Days to GMLOS:0 3     OBJECTIVE:  Risk of Unplanned Readmission Score: 25 65         Current admission status: Inpatient   Preferred Pharmacy:   [id number]  Phone: [phone] [facility] [address] [facility] [address] [address] [address]  Phone: [phone] Fax: [address] [facility] [address], [address] [place] [address]  Phone: [phone] Fax: [phone]    Primary Care Provider: Junito Salmeron    Primary Insurance: Tarik Smith [facility]  Secondary Insurance:     DISCHARGE DETAILS:      Other Referral/Resources/Interventions Provided:  Interventions: SNF  Referral Comments: After receiving determination letter for [facility] from [facility] [address] [place] on A[facility], kennedy placed blanket referral in 35 mile radius to SNFs for placement   Determination letter placed in [address]  Government Services[de-identified] [facility] on Aging     Discharge Destination Plan[de-identified] SNF, Hospice    Options paperwork including [place] and PASSR sent to [facility] on [address], After spekaing with and receiving determination letter for [employer] from [facility]  [address] on Aging, cm placed blanket referral in 35 mile radius to SNFs for placement via [employer]idin  Determination letter placed in [address]

## 2022-12-08 NOTE — PROGRESS NOTES
Progress Note - Palliative & Supportive Care  Jabari Gorman  [de-identified] y o   male  /-01   MRN: [id number]  Encounter: [id number]     Patient Active Problem List   Diagnosis   • Type 2 diabetes mellitus without complication, with long-term current use of insulin ([facility] [address] )   • GERD (gastroesophageal reflux disease)   • Pancreatic mass   • History of pulmonary embolism   • Hyperbilirubinemia   • Anxiety about health   • Pancreatic adenocarcinoma (HCC)   • Chemotherapy induced neutropenia (HCC)   • Hypokalemia   • Mild protein-calorie malnutrition (HCC)   • Acute exacerbation of chronic obstructive pulmonary disease (HCC)   • Dehydration   • Lethargy   • SHRAVAN (acute kidney injury) ([facility] [address] )   • Ascites       Assessment/Problems Actively Addressed this Visit:  Unresectable pancreatic cancer  Mesenteric metastasis  Ascites s/p paracentesis  SHRAVAN  Weakness  Lethargy  Nausea  Vomiting  Failure to thrive   Chronic diarrhea  Anxiety about health  Cancer related pain  Mild protein calorie malnutrtion  Goals of care counseling    Goals/Recommendations:  · Level 3 code status   · Family meeting held today at [address] [place] [address] for Avril Solorio and family to discuss next steps  · Avril Solorio agrees with hospice care at discharge  This was explained at length and he seems to understand  · Continuing conservative medical care while inpatient  · Placement for SNF in process  Plan:  Symptom Management    Will defer symptom management to primary team      24 History  Chart reviewed before visit  Discussed with RN, case management, and primary team   I spoke with Avril Solorio and he is agreeable to a family meeting today  In the family meeting he is participative and conversational     Decisional apparatus:  Patient is possibly competent on exam today  If competence is lost, patient's substitute decision maker would default to  sister MJ [place]    Advance Directive/Living Will: Yes     Review of Systems:  Review of Systems   Constitutional: Positive for decreased appetite, malaise/fatigue and weight loss  HENT: Negative for congestion, ear pain and hearing loss  Cardiovascular: Negative for chest pain, dyspnea on exertion and irregular heartbeat  Respiratory: Negative for shortness of breath and sleep disturbances due to breathing  Skin: Negative for poor wound healing  Musculoskeletal: Negative for arthritis and back pain  Gastrointestinal: Negative for bloating, abdominal pain and jaundice  Genitourinary: Negative for bladder incontinence and incomplete emptying  Neurological: Positive for excessive daytime sleepiness and weakness  Negative for difficulty with concentration, disturbances in coordination and light-headedness  Psychiatric/Behavioral: Negative for depression and memory loss  The patient is nervous/anxious        Medications    Current Facility-Administered Medications:   •  acetaminophen (TYLENOL) tablet 650 mg, 650 mg, Oral, Q6H PRN, Amie Piercegel Cameliaaine, PA-C, 650 mg at 12/08/22 0522  •  ALPRAZolam Usama Chepe) tablet 0 25 mg, 0 25 mg, Oral, TID PRN, Amie Gutierrez, PA-C  •  apixaban (ELIQUIS) tablet 5 mg, 5 mg, Oral, BID, Amie Gutierrez, PA-C, 5 mg at 12/08/22 8115  •  calcium carbonate (TUMS) chewable tablet 500 mg, 500 mg, Oral, Daily PRN, Katiuska Malcolm MD, 500 mg at 12/06/22 1024  •  HYDROmorphone (DILAUDID) injection 0 5 mg, 0 5 mg, Intravenous, Q6H PRN, Amie Gutierrez, PA-C, 0 5 mg at 12/07/22 2051  •  insulin lispro (HumaLOG) 100 units/mL subcutaneous injection 1-5 Units, 1-5 Units, Subcutaneous, TID AC, 1 Units at 12/06/22 1151 **AND** Fingerstick Glucose (POCT), , , TID AC, Amie Gutierrez PA-C  •  insulin lispro (HumaLOG) 100 units/mL subcutaneous injection 1-5 Units, 1-5 Units, Subcutaneous, HS, Amie Gutierrez PA-C  •  metoclopramide (REGLAN) injection 10 mg, 10 mg, Intravenous, Q6H PRN, Thomas Darby PA-C, 10 mg at 12/05/22 2138  •  ondansetron [facility] injection 4 mg, 4 mg, Intravenous, Q4H PRN, Nuha Mathis MD, 4 mg at 12/07/22 1629  •  ondansetron (ZOFRAN-ODT) dispersible tablet 4 mg, 4 mg, Oral, Q8H [facility], Adam ChavarriaTAMIKA, 4 mg at 12/08/22 0522  •  pantoprazole (PROTONIX) EC tablet 40 mg, 40 mg, Oral, BID AC, Adam CHRIS ChavarriaNP, 40 mg at 12/08/22 0522  •  potassium chloride (K-DUR,KLOR-CON) CR tablet 40 mEq, 40 mEq, Oral, Daily, Teofilo [place], PA-C, 40 mEq at 12/08/22 1499  •  pravastatin (PRAVACHOL) tablet 20 mg, 20 mg, Oral, Daily, Braman [place], PA-C, 20 mg at 12/08/22 0945  •  sodium chloride 0 9 % infusion, 125 mL/hr, Intravenous, Continuous, Nuha Mathis MD, Last Rate: 125 mL/hr at 12/08/22 0705, 125 mL/hr at 12/08/22 0705    Objective  /66   Pulse 82   Temp (!) 97 3 °F (36 3 °C) (Oral)   Resp 18   Ht 5' 7" (1 702 m)   Wt 50 6 kg (111 lb 9 6 oz)   SpO2 100%   BMI 17 48 kg/m²     Physical Exam  Vitals and nursing note reviewed  Exam conducted with a chaperone present  Constitutional:       General: He is not in acute distress  Appearance: He is well-developed  He is ill-appearing  Comments: Fully conversational   At times somnolent but easily aroused  HENT:      Head: Normocephalic and atraumatic  Eyes:      General:         Right eye: No discharge  Left eye: No discharge  Conjunctiva/sclera: Conjunctivae normal    Cardiovascular:      Rate and Rhythm: Normal rate  Pulmonary:      Effort: Pulmonary effort is normal  No respiratory distress  Musculoskeletal:         General: No swelling  Cervical back: Neck supple  Comments: Fat loss and muscle wasting present   Skin:     General: Skin is warm and dry  Capillary Refill: Capillary refill takes less than 2 seconds  Neurological:      Mental Status: He is alert  Psychiatric:         Mood and Affect: Mood normal        Lab Results: I have personally reviewed pertinent labs  , CBC: No results found for: WBC, HGB, HCT, MCV, PLT, ADJUSTEDWBC, MCH, MCHC, RDW, MPV, NRBC, CMP: No results found for: SODIUM, K, CL, CO2, ANIONGAP, BUN, CREATININE, GLUCOSE, CALCIUM, AST, ALT, ALKPHOS, PROT, BILITOT, EGFR  Imaging Studies: I have personally reviewed pertinent reports  EKG, Pathology, and Other Studies: I have personally reviewed pertinent reports  Counseling / Coordination of Care  Total floor / unit time spent today 45 minutes  Greater than 50% of total time was spent with the patient and / or family counseling and / or coordinating of care  A description of the counseling / coordination of care: Chart reviewed,  provided medical updates, determined goals of care, discussed palliative care and symptom management, discussed code status, discussed comfort care and hospice, determined competency and POA/HCA, determined social/family support, provided psychosocial support  Reviewed with ALEXANDER team, RN and CM  Cresencio Kline MFA-VINCENT  Palliative & Supportive Care    Portions of this document may have been created using dictation software and as such some "sound alike" terms may have been generated by the system  Do not hesitate to contact me with any questions or clarifications

## 2022-12-08 NOTE — PLAN OF CARE
Problem: INFECTION - ADULT  Goal: Absence or prevention of progression during hospitalization  Description: INTERVENTIONS:  - Assess and monitor for signs and symptoms of infection  - Monitor lab/diagnostic results  - Monitor all insertion sites, i e  indwelling lines, tubes, and drains  - Monitor endotracheal if appropriate and nasal secretions for changes in amount and color  - Brooks appropriate cooling/warming therapies per order  - Administer medications as ordered  - Instruct and encourage patient and family to use good hand hygiene technique  - Identify and instruct in appropriate isolation precautions for identified infection/condition  Outcome: Progressing  Goal: Absence of fever/infection during neutropenic period  Description: INTERVENTIONS:  - Monitor WBC    Outcome: Progressing

## 2022-12-08 NOTE — ACP (ADVANCE CARE PLANNING)
Record of Family Meeting - Palliative and Supportive Care   Jeff Mention [de-identified] y o  male [id number]      Recommendations and Plan:  · Pending SNF dispo at discharge, would consult hospice care  · Noel Lucas fully agrees with his daughter and sister Sun Segundo that hospice is the next best step for him  · Plan to transition to hospice cares directly at discharge  · While inpatient here, maintain conservative medical care however code status adjusted to level 3 DNR/DNI  · Sister, [place], is able to help with upcoming transitions  Sister and daughter plan to visit oNel Lucas this weekend  A family meeting was held for [employer]  This meeting was necessary for determine the appropriate course of treatment  Time of MeetinAM  Meeting Location: patient's room   Participants: [employer] and sister Sander Milton  Daughter Baldemar Weiss was unable to attend meeting  Staff: Ismael Lamar PA-C     Patient Participation: Noel Lucas actively participated in this discussion    Advanced Directive of POLST available: yes    Topics of Discussion:  · We discussed medical updates so far  · We discussed Sebastien's decision to pursue SNF placement  · We discussed lack of aggressive medical options for Noel Lucas and that he is no longer a candidate for cancer treatment  · We discussed comfort cares and hospice cares  We discussed transitioning to hospice care at discharge  · Noel Lucas is agreeable to hospice cares because it is a "safe" option  Noel Lucas is interested in the emotional support that hospice would provide  · We discussed next steps  We discussed emotional support  Time Involved in Meetinmin, beginning at approximately  11AM and ending at approximately 11:25AM      Ismael Lamar PA-C   Palliative and Supportive Care  Clinic/Answering Service: [phone]  You can find me on [employer]!

## 2022-12-08 NOTE — QUICK NOTE
[id number] [place] [address] today 12/8 at 11AM with Evelyn Su ("MJ"), and Clover Mejia to discuss next steps for Jose Bernard  Further recommendations to follow       Ama Garcia PA-C  [address] [place] [address]

## 2022-12-08 NOTE — ASSESSMENT & PLAN NOTE
Malnutrition Findings:     BMI Findings: Body mass index is 17 48 kg/m²     Nutrition consult and recommendation

## 2022-12-08 NOTE — TELEMEDICINE
TeleConsultation - [address] [place] [de-identified] y o  male MRN: [id number]  Unit/Bed#: -01 Encounter: [id number]        REQUIRED DOCUMENTATION:     1  This service was provided via Telemedicine  2  Provider located at [place]  3  TeleMed provider: Philippe Castro MD   4  Identify all parties in room with patient during tele consult:  Patient  5  Patient was then informed that this was a Telemedicine visit and that the exam was being conducted confidentially over secure lines  My office door was closed  No one else was in the room  Patient acknowledged consent and understanding of privacy and security of the Telemedicine visit, and gave us permission to have the assistant stay in the room in order to assist with the history and to conduct the exam   I informed the patient that I have reviewed their record in Epic and presented the opportunity for them to ask any questions regarding the visit today  The patient agreed to participate  Assessment/Plan     Principal Problem:    Lethargy  Active Problems:    Type 2 diabetes mellitus without complication, with long-term current use of insulin (HCC)    History of pulmonary embolism    Pancreatic adenocarcinoma (HCC)    Mild protein-calorie malnutrition (HCC)    SHRAVAN (acute kidney injury) ([facility] [address] )    Ascites    Assessment:    The patient reports history of paranoia believing he was hearing people talk about him when he was hospitalized at the [facility] in the 1960s and given a diagnosis of schizophrenia  He reports however that he has not had any such symptoms since the 1970s or 1980s suggesting that the diagnosis of schizophrenia may not have been accurate  At this time the patient meets no criteria for a psychiatric diagnosis  There is no psychiatric contraindication for rehab placement or nursing care placement  Treatment Plan:      No psychiatric treatment is indicated at this time    There is no psychiatric contraindication for rehab placement or nursing care placement  Current Medications:     Current Facility-Administered Medications   Medication Dose Route Frequency Provider Last Rate   • acetaminophen  650 mg Oral Q6H PRN Thao Gutierrez PA-C     • ALPRAZolam  0 25 mg Oral TID PRN Thao Gutierrez PA-C     • apixaban  5 mg Oral BID Thao Gutierrez PA-C     • calcium carbonate  500 mg Oral Daily PRN Trey Mann MD     • HYDROmorphone  0 5 mg Intravenous Q6H PRN Thao Gutierrez PA-C     • insulin lispro  1[facility]5 [facility]     • insulin lispro  1-5 Units Subcutaneous HS Thao Enriquez PA-C     • metoclopramide  10 mg Intravenous Q6H PRN Sumeet Gallego PA-C     • ondansetron  4 mg Intravenous Q4H PRN Trey Mann MD     • ondansetron  4 mg Oral [facility] Allyssa Spine, CRNP     • pantoprazole  40 mg Oral BID  Allyssa Spine, CRNP     • potassium chloride  40 mEq Oral Daily Carmen Bustillos     • pravastatin  20 mg Oral Daily Thao Gutierrez PA-C     • sodium chloride  125 mL/hr Intravenous Continuous Trey Mann  mL/hr (12/08/22 9468)       Risks / Benefits of Treatment:    Risks, benefits, and possible side effects of medications explained to patient and patient verbalizes understanding  Inpatient consult to Psychiatry  Consult performed by: Yary Smith MD  Consult ordered by: Los Herrera PA-C        Physician Requesting Consult: Trey Mann MD  Principal Problem:Lethargy    Reason for Consult: Optioning/psychiatric clearance for rehab placement  History of Present Illness      Patient is a [de-identified] y o  male who presented to the emergency department where the provider document the following: "  Patient presents to ED sent by oncologist for evaluation of "lethargy" which patient reports has been from not sleeping along with shortness of breath worsened by movement   Patient had documented decreased oxygen saturation at office, but patient has poor perfusion        46-year-old male presents for evaluation of lethargy and weakness from the oncologist   The patient has had increased fatigue with sleeping as well as increasing shortness of breath and inability to complete his ADLs  The patient was reportedly hypoxic and hypotensive in the office  Patient states that his fatigue and shortness of breath or worse with exertion  Nothing has made it better  The patient states that he has had an increasingly poor appetite               Prior to Admission Medications   Prescriptions Last Dose Informant Patient Reported? Taking? ALPRAZolam (XANAX) 0 5 mg tablet     No No   Sig: Take 0 5 tablets (0 25 mg total) by mouth 3 (three) times a day as needed for anxiety   Alcohol Swabs 70 % PADS     No No   Sig: May substitute brand based on insurance coverage  Check glucose BID  Blood Glucose Monitoring Suppl (OneTouch Verio Reflect) w/Device KIT     No No   Sig: May substitute brand based on insurance coverage  Check glucose BID  Klor-Con M20 20 MEQ tablet     No No   Sig: TAKE 2 TABLETS BY MOUTH DAILY   Multiple Vitamin (MULTIVITAMIN ADULT PO)     Yes No   Sig: Take 1 tablet by mouth daily   OneTouch Delica Lancets 16S MISC     No No   Sig: May substitute brand based on insurance coverage  Check glucose BID  apixaban (ELIQUIS) 5 mg     Yes No   Sig: Take 5 mg by mouth 2 (two) times a day   diphenoxylate-atropine (LOMOTIL) 2 5-0 025 mg per tablet     No No   Sig: Take 1 tablet by mouth 4 (four) times a day as needed for diarrhea   glucose blood (OneTouch Verio) test strip     No No   Sig: May substitute brand based on insurance coverage   Check glucose BID    metFORMIN (GLUCOPHAGE) 1000 MG tablet     Yes No   Sig: Take 1,000 mg by mouth 2 (two) times a day with meals   metFORMIN (GLUCOPHAGE-XR) 500 mg 24 hr tablet     Yes No   Sig: Take 500 mg by mouth 2 (two) times a day   omeprazole (PriLOSEC) 20 mg delayed release capsule     Yes No   omeprazole (PriLOSEC) 40 MG capsule     Yes No   Sig: Take 40 mg by mouth every evening   ondansetron (ZOFRAN) 8 mg tablet     No No   Sig: Take 1 tablet (8 mg total) by mouth every 8 (eight) hours as needed for nausea or vomiting   polyethylene glycol (MIRALAX) 17 g packet     Yes No   Sig: Take 17 g by mouth daily   Patient not taking: Reported on 11/3/2022   pravastatin (PRAVACHOL) 20 mg tablet     Yes No   Sig: Take 20 mg by mouth daily   prochlorperazine (COMPAZINE) 10 mg tablet     No No   Sig: Take 1 tablet (10 mg total) by mouth every 6 (six) hours as needed for nausea or vomiting   traMADol (ULTRAM) 50 mg tablet     No No   Sig: Take 1 tablet (50 mg total) by mouth every 6 (six) hours as needed for moderate pain      Facility-Administered Medications: None         Medical History[]Expand by Default        Past Medical History:   Diagnosis Date   • Ambulates with cane     • Anxiety     • Depression     • Diabetes mellitus (HCC)     • GERD (gastroesophageal reflux disease)     • History of pulmonary embolus (PE)     • Hyperlipidemia     • Multiple thyroid nodules       pt states about 40yrs ago   • Pancreatic cancer ([facility] [address] )     • Pancreatic mass              Surgical History[]Expand by Default         Past Surgical History:   Procedure Laterality Date   • APPENDECTOMY       • CATARACT EXTRACTION Right     • FL GUIDED CENTRAL VENOUS ACCESS DEVICE INSERTION   4/11/2022   • TUNNELED VENOUS PORT PLACEMENT Right 4/11/2022     Procedure: INSERTION VENOUS PORT (PORT-A-CATH);   Surgeon: Leanne So MD;  Location: [facility];  Service: Surgical Oncology            Family History[]Expand by Default         Family History   Problem Relation Age of Onset   • Alcohol abuse Mother     • Diabetes Father     • Throat cancer Brother           I have reviewed and agree with the history as documented            E-Cigarette/Vaping   • E-Cigarette Use Never User       E-Cigarette/Vaping Substances   • Nicotine No     • Flavoring No        Social History            Tobacco Use   • Smoking status: Former       Years: 5 00       Types: Cigarettes       Start date: 58       Quit date: 67       Years since quittin 9   • Smokeless tobacco: Never   • Tobacco comments:       quit in 1960s; smoked cigars for several yrs   Vaping Use   • Vaping Use: Never used   Substance Use Topics   • Alcohol use: Not Currently       Comment: seldom; socially   • Drug use: No         Review of Systems   Constitutional: Positive for fatigue  Negative for chills and fever  HENT: Negative for sore throat  Respiratory: Positive for shortness of breath  Negative for cough and chest tightness  Cardiovascular: Negative for chest pain and palpitations  Gastrointestinal: Negative for abdominal pain, constipation, diarrhea, nausea and vomiting  Genitourinary: Negative for difficulty urinating, dysuria, frequency and urgency  Musculoskeletal: Negative for back pain  Skin: Negative for rash  Neurological: Negative for dizziness, seizures, syncope, weakness and headaches  All other systems reviewed and are negative  The patient has no psychiatric complaint at this time  Past psychiatric history: The patient reports that in the  he was living with his alcoholic mother and suffering from much stress from his situation  He states this resulted in his hospitalization at the [facility] in the   He reports at that time he was experiencing some suspiciousness believing that he was hearing people talk about him  He was given a diagnosis of schizophrenia  He was discharged from inpatient and followed on outpatient basis until the  or   He states he has had no such symptoms since that time and has been extremely stable experiencing no need to see a psychiatrist     Social history: The patient is   He has a daughter    He lives alone in his apartment he reports that everything is fine at home  He reports no abuse  Family history: Unremarkable    Substance use history: The patient denies use of alcohol and other substances  Mental status examination: The patient is alert and well oriented all spheres  Affect is somewhat constricted but pleasant and cooperative  Speech is unremarkable  Thought processes logical linear  Sensorium is clear  Associations are tight  Thought content is reality based  Associations are tight  He appears to be of average intelligence by his use of vocabulary, general fund of knowledge, sentence structure and syntax  He denies suicidal homicidal ideation  He denies hallucinations  He does admit that in the 1960s when he was experiencing suspiciousness of others he felt at times that he was hearing others talk about him but not since that time  Insight and judgment are intact  He describes his mood as feeling somewhat tired likely secondary to his medical conditions but denies any depression  He denies anhedonia stating he still enjoys his pastimes  Psychiatric review of systems is otherwise unremarkable      Past Medical History:   Diagnosis Date   • Ambulates with cane    • Anxiety    • Depression    • Diabetes mellitus ([email or web address] )    • GERD (gastroesophageal reflux disease)    • History of pulmonary embolus (PE)    • Hyperlipidemia    • Multiple thyroid nodules     pt states about 40yrs ago   • Pancreatic cancer ([email or web address] )    • Pancreatic mass        Medical Review Of Systems:    Review of Systems    Meds/Allergies     all current active meds have been reviewed  Allergies   Allergen Reactions   • Aleve [Naproxen] Swelling       Objective     Vital signs in last 24 hours:  Temp:  [97 3 °F (36 3 °C)-97 9 °F (36 6 °C)] 97 3 °F (36 3 °C)  HR:  [80-83] 82  Resp:  [16-22] 18  BP: (100-106)/(65-66) 105/66      Intake/Output Summary (Last 24 hours) at 12/8/2022 1423  Last data filed at 12/8/2022 0601  Gross per 24 hour Intake 200 ml   Output 200 ml   Net 0 ml           Lab Results: I have personally reviewed all pertinent laboratory/tests results  Imaging Studies: CT chest abdomen pelvis wo contrast    Result Date: 12/6/2022  Narrative: CT CHEST, ABDOMEN AND PELVIS WITHOUT IV CONTRAST INDICATION:   metastatic pancreatic cancer, abdominal pain, weakness, lethargy, vomiting  [de-identified] male COMPARISON:  CTs of the chest, abdomen and pelvis from July 6, 2022 and October 2022  TECHNIQUE: CT examination of the chest, abdomen and pelvis was performed without intravenous contrast  Axial, sagittal, and coronal 2D reformatted images were created from the source data and submitted for interpretation  Radiation dose length product (DLP) for this visit:  617 67 mGy-cm   This examination, like all CT scans performed in the [facility], was performed utilizing techniques to minimize radiation dose exposure, including the use of iterative  reconstruction and automated exposure control  Enteric contrast not given  Absence of intravenous and gastrointestinal contrast limits evaluation of the abdominal and pelvic viscera  FINDINGS: CHEST LUNGS:  Four pulmonary nodules, unchanged since earlier CTs: -3 mm, left lower lobe (series 3, image 41) -4 mm, left lower lobe (series 3, image 54) -3 mm, superior segment right lower lobe (series 3, image 60) - 4 mm, left lower lobe (series 3, image 66)  Several additional 1-2 mm nodules, also unchanged  No new or enlarging nodules  No mass or consolidation  PLEURA:  Unremarkable  HEART/GREAT VESSELS: Coronary artery calcifications  Heart otherwise unremarkable  Tip of Port-A-Cath in superior vena cava  No thoracic aortic aneurysm  MEDIASTINUM AND HEMANTH: No lymphadenopathy or mass     Small hiatal hernia  Generalized dilatation of a fluid-filled esophagus  Trachea and main stem bronchi normal  CHEST WALL AND LOWER NECK:  Marked loss of subcutaneous soft tissue in the chest wall  Multinodular substernal goiter unchanged  ABDOMEN LIVER/BILIARY TREE:  Limited evaluation without IV contrast no evidence of mass  Chronic pneumobilia  GALLBLADDER:  No calcified gallstones  Small amount of air in the nondependent portion of the gallbladder lumen, probably reflux of pneumobilia through the cystic duct  Gallbladder wall normal in thickness  SPLEEN:  Unremarkable  PANCREAS:  Limited evaluation without IV contrast   Specifically, the pancreatic head mass is poorly visualized  It appears grossly unchanged in size since 10/28/2022 its relationship to the SMA and SMV cannot be determined without IV contrast  ADRENAL GLANDS:  Unremarkable  KIDNEYS/URETERS:  Limited evaluation without IV contrast   No calculus or hydronephrosis  STOMACH AND BOWEL:  Small hiatal hernia containing a portion of the gastric fundus  Stomach otherwise grossly normal in appearance  Small intestine and colon unremarkable  APPENDIX:  No findings to suggest appendicitis  ABDOMINOPELVIC CAVITY: Massive ascites, developing since 10/20/2022  No gross evidence of lymphadenopathy or other mass  The mesenteric metastases demonstrated on the prior CT are poorly visualized on this examination due to surrounding ascites, mesenteric edema and lack of IV contrast   No extraluminal gas  VESSELS:  Limited evaluation without IV contrast   Scattered calcified atherosclerotic plaque  No aortic aneurysm  PELVIS REPRODUCTIVE ORGANS:  Prostatomegaly  URINARY BLADDER:  Unremarkable  ABDOMINAL WALL/INGUINAL REGIONS:  Marked loss of subcutaneous soft tissues  Edema in the subcutaneous fat  No evidence of hernia  OSSEOUS STRUCTURES:  Bilateral L5 spondylolysis with first-degree spondylolisthesis of L5 on S1  Generalized thoracic spondylosis  No evidence of recent fracture or destructive lesion  Impression: 1  Limited examination due to lack of IV contrast, as discussed above  2   Massive ascites, developing since a CT from 10/28/2022   3  No new pancreatic head tumor poorly evaluated in the absence of IV and enteric contrast   Grossly unchanged in size  4   Previously demonstrated mesenteric metastases not visualized with certainty on this examination, most likely due to combination of ascites, mesenteric edema and absence of IV contrast  5   No evidence of new metastases in the chest, abdomen or pelvis  6   Stable small pulmonary nodules  7   Generalized dilatation of fluid-filled esophagus, consistent with esophageal dysmotility, reflux or, less likely, distal esophageal obstruction  The study was marked in [facility] for immediate notification  Workstation performed: [id number]     XR chest portable    Result Date: 12/5/2022  Narrative: CHEST INDICATION:   weakness, known pancreatic CA  COMPARISON:  Chest radiograph April 11, 2022  CT chest October 28, 2022  EXAM PERFORMED/VIEWS:  XR CHEST PORTABLE FINDINGS:  Venous infusion device entering from right subclavian approach terminates in expected location of the superior vena cava at the level of the right main bronchus  Cardiomediastinal silhouette appears unremarkable  The lungs are clear  No pneumothorax or pleural effusion  Old fracture deformity proximal humerus  No suspicious lytic or sclerotic bone lesions  Impression: No acute cardiopulmonary disease  Workstation performed: [id number]     IR INPATIENT Paracentesis    Result Date: 12/8/2022  Narrative: Ultrasound-guided paracentesis Clinical History: Ascites  For diagnostic and therapeutic paracentesis    Technique: Patient was brought to the interventional radiology area and placed supine on the stretcher  After a brief ultrasound examination was performed to localize a pocket of fluid,an area on the skin of the right lower abdomen was prepped, and draped in the usual sterile fashion  Lidocaine was administered to the skin and a small skin incision was made   A 5 [place] multisidehole catheter  was advanced into the fluid and 5500 mL of clear yellow fluid was aspirated  Specimen sent as requested After the procedure, the catheter was removed and a bandage applied to the site  The patient tolerated the procedure well and suffered no complications  Impression: Impression: 1  Successful ultrasound-guided paracentesis yielding 5500 mL of clear yellow fluid   Workstation performed: [id number]     FL esophagram complete    Result Date: 12/7/2022  Narrative: BARIUM SWALLOW-ESOPHAGRAM INDICATION:   Dilated fluid-filled esophagus on CT scan  Pancreatic cancer  COMPARISON:  CT performed December 5, 2022 IMAGES: 8 FLUOROSCOPY TIME:   0 4 minutes  TECHNIQUE: The patient was given effervescent granules and barium by mouth and images of the esophagus were obtained  FINDINGS: Patient had difficulty standing and therefore limited evaluation was performed  Ingested enteric contrast passes freely from the esophagus into the stomach without evidence for dysmotility or stricture  Impression: Ingested enteric contrast passes freely from the esophagus into the stomach without evidence for dysmotility or stricture  Workstation performed: [id number]     EKG/Pathology/Other Studies:   Lab Results   Component Value Date    VENTRATE 86 12/05/2022    ATRIALRATE 86 12/05/2022    PRINT 200 12/05/2022    QRSDINT 134 12/05/2022    QTINT 404 12/05/2022    QTCINT 483 12/05/2022    PAXIS 83 12/05/2022    QRSAXIS -71 12/05/2022    TWAVEAXIS 93 12/05/2022        Code Status: Level 1 - Full Code  Advance Directive and Living Will: Yes    Power of : Yes  POLST:      Counseling / Coordination of Care: Total floor / unit time spent today 30 minutes  Greater than 50% of total time was spent with the patient and / or family counseling and / or coordination of care  A description of the counseling / coordination of care: Chart review, patient evaluation, coordination and communication with staff, nursing and provider

## 2022-12-09 NOTE — ASSESSMENT & PLAN NOTE
· Referred to ED by OP Oncology due to report of lethargy/weakness after begin seen in office 12/5  Appears to be ongoing since 11/21 where lethargy/progressive decline in funcitonal status is noted in Onc note    Endorses abdominal pain, nausea/vomiting, diarrhea  · SIRS:  No leukocytosis, no fever, no tachycardia, no tachypnea  · Lactic is normal  · Procalcitonin slightly elevated, repeat morning  · No evidence/source of infection at this time  · UA is unremarkable  · CXR is unremarkable  · Blood culture negative x72 hours  · PT/OT eval   · Palliative care consult due to progressive decline in setting of metastatic cancer   · Family agreeable to hospice-patient for discharge to SNF, and prior mental health hospitalization, patient requires options process for placement

## 2022-12-09 NOTE — PROGRESS NOTES
[place]     Progress Note - Jimmy Davila 1940, [de-identified] y o  male MRN: [id number]  Unit/Bed#: -01 Encounter: [id number]  Primary Care Provider: Hattie Lee   Date and time admitted to hospital: 12/5/2022  2:00 PM    * Lethargy  Assessment & Plan  · Referred to ED by OP Oncology due to report of lethargy/weakness after begin seen in office 12/5  Appears to be ongoing since 11/21 where lethargy/progressive decline in funcitonal status is noted in Onc note  Endorses abdominal pain, nausea/vomiting, diarrhea  · SIRS:  No leukocytosis, no fever, no tachycardia, no tachypnea  · Lactic is normal  · Procalcitonin slightly elevated, repeat morning  · No evidence/source of infection at this time  · UA is unremarkable  · CXR is unremarkable  · Blood culture negative x72 hours  · PT/OT eval   · Palliative care consult due to progressive decline in setting of metastatic cancer   · Family agreeable to hospice-patient for discharge to SNF, and prior mental health hospitalization, patient requires options process for placement    Ascites  Assessment & Plan  · CT chest abdomen pelvis showed-" Massive ascites, developing since a CT from 10/28/2022  No new pancreatic head tumor poorly evaluated in the absence of IV and enteric contrast   Grossly unchanged in size  Previously demonstrated mesenteric metastases not visualized with certainty on this examination, most likely due to combination of ascites, mesenteric edema and absence of IV contrast   No evidence of new metastases in the chest, abdomen or pelvis  Stable small pulmonary nodules    Generalized dilatation of fluid-filled esophagus, consistent with esophageal dysmotility, reflux or, less likely, distal esophageal obstruction  · IR consult for paracentesis  · Gram stain negative for growth  · GI consult regarding esophageal dysmotility-signed off as patient will be transitioning to hospice, continue supportive measures  · Barium swallow -negative for dysmotility or stricture    SHRAVAN (acute kidney injury) ([facility] [address] )  Assessment & Plan  · Baseline Cr 0 8-1  · Cr on admission 1 64, suspect prerenal dehydration, patient is with poor PO intake   · IVF discontinued 12/8 as patient is having appropriate oral intake  · Retention protocol   · Avoid hypotension and nephrotoxins  · Creatinine overall stable, 1 35    Mild protein-calorie malnutrition ([facility] [address] )  Assessment & Plan  Malnutrition Findings:     BMI Findings: Body mass index is 18 32 kg/m²  Nutrition consult and recommendation    Pancreatic adenocarcinoma 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  · initiated on chemotherapy April 2022, most recently on Abraxane and Gemzar, patient states he is not currently receiving chemotherapy, thinks his last infusion was within the past several months  He does receive hydration infusion every Tuesday  · S/P radiation in august  · Seen in office 11/20 01/2022 with Dr Vicente Bliss --> at that time was documented with progressively reduced appetite, weakness, poor family support, notably was falling asleep during office visit  Reportedly had canceled his chemo cycle 8 due to weakness  · Patient was seen again in office today 12/5, referred to ED by Dr Vicente Bliss   · Palliative care consult     History of pulmonary embolism  Assessment & Plan  · Continue Eliquis 5 mg b i d  Type 2 diabetes mellitus without complication, with long-term current use of insulin [facility])  Assessment & Plan  Lab Results   Component Value Date    HGBA1C 6 1 08/28/2021     · Hold home metformin  · ISS and accucheks     VTE Pharmacologic Prophylaxis: VTE Score: 7 High Risk (Score >/= 5) - Pharmacological DVT Prophylaxis Ordered: apixaban (Eliquis)  Sequential Compression Devices Ordered      Patient Centered Rounds: I performed bedside rounds with nursing staff today  Discussions with Specialists or Other Care Team Provider: CM    Education and Discussions with Family / Patient: Updated  (sister) via phone  Time Spent for Care: 30 minutes  More than 50% of total time spent on counseling and coordination of care as described above  Current Length of Stay: 4 day(s)  Current Patient Status: Inpatient   Certification Statement: The patient will continue to require additional inpatient hospital stay due to Placement  Discharge Plan: Discharge planning pending [facility] assessment    Code Status: Level 3 - DNAR and DNI    Subjective:   Patient states he had diffuse abdominal pain earlier this morning which is now resolved after pain medication  Denies chest pain/palpitations, shortness of breath, nausea/vomiting  Objective:     Vitals:   Temp (24hrs), Av 8 °F (36 6 °C), Min:97 6 °F (36 4 °C), Max:98 °F (36 7 °C)    Temp:  [97 6 °F (36 4 °C)-98 °F (36 7 °C)] 97 6 °F (36 4 °C)  HR:  [76-80] 76  Resp:  [16-17] 16  BP: (102-117)/(72-86) 102/86  SpO2:  [100 %] 100 %  Body mass index is 18 32 kg/m²  Input and Output Summary (last 24 hours): Intake/Output Summary (Last 24 hours) at 2022 1328  Last data filed at 2022 0900  Gross per 24 hour   Intake 240 ml   Output --   Net 240 ml       Physical Exam:   Physical Exam  Vitals and nursing note reviewed  Constitutional:       General: He is not in acute distress  Appearance: He is cachectic  Comments: No acute distress   HENT:      Head: Normocephalic and atraumatic  Eyes:      General: No scleral icterus  Extraocular Movements: Extraocular movements intact  Conjunctiva/sclera: Conjunctivae normal    Cardiovascular:      Rate and Rhythm: Normal rate and regular rhythm  Heart sounds: No murmur heard  Pulmonary:      Effort: Pulmonary effort is normal  No respiratory distress  Breath sounds: Normal breath sounds   No wheezing, rhonchi or rales  Abdominal:      General: Bowel sounds are normal       Palpations: Abdomen is soft  Tenderness: There is no abdominal tenderness  There is no guarding or rebound  Musculoskeletal:         General: No swelling  Cervical back: Normal range of motion  Comments: Able to move upper/lower extremities bilaterally, no edema   Skin:     General: Skin is warm and dry  Capillary Refill: Capillary refill takes less than 2 seconds  Neurological:      Mental Status: He is alert  Mental status is at baseline     Psychiatric:         Mood and Affect: Mood normal          Speech: Speech normal          Behavior: Behavior normal           Additional Data:     Labs:  Results from last 7 days   Lab Units 12/07/22  0555   WBC Thousand/uL 6 28   HEMOGLOBIN g/dL 10 0*   HEMATOCRIT % 31 4*   PLATELETS Thousands/uL 187   NEUTROS PCT % 88*   LYMPHS PCT % 3*   MONOS PCT % 8   EOS PCT % 0     Results from last 7 days   Lab Units 12/09/22  0530 12/07/22  0555   SODIUM mmol/L 138 137   POTASSIUM mmol/L 4 8 4 9   CHLORIDE mmol/L 109* 108   CO2 mmol/L 22 20*   BUN mg/dL 57* 62*   CREATININE mg/dL 1 35* 1 32*   ANION GAP mmol/L 7 9   CALCIUM mg/dL 8 7 8 5   ALBUMIN g/dL  --  2 3*   TOTAL BILIRUBIN mg/dL  --  0 50   ALK PHOS U/L  --  158*   ALT U/L  --  20   AST U/L  --  18   GLUCOSE RANDOM mg/dL 133 127     Results from last 7 days   Lab Units 12/05/22  1437   INR  1 01     Results from last 7 days   Lab Units 12/09/22  1139 12/09/22  0728 12/08/22  2134 12/08/22  1525 12/08/22  1140 12/08/22  0804 12/07/22  2057 12/07/22  1551 12/07/22  1112 12/07/22  0727 12/06/22  2115 12/06/22  1700   POC GLUCOSE mg/dl 209* 139 106 136 206* 137 163* 109 108 126 123 123         Results from last 7 days   Lab Units 12/06/22  0615 12/05/22  1437   LACTIC ACID mmol/L  --  1 8   PROCALCITONIN ng/ml 0 48* 0 43*       Lines/Drains:  Invasive Devices     Central Venous Catheter Line  Duration           Port A Cath 04/11/22 Right Chest 242 days          Peripheral Intravenous Line  Duration           Peripheral IV 12/09/22 Dorsal (posterior); Right Forearm <1 day                Central Line:  Goal for removal: N/A - Chronic PICC             Imaging: Reviewed radiology reports from this admission including: chest CT scan and abdominal/pelvic CT    Recent Cultures (last 7 days):   Results from last 7 days   Lab Units 12/06/22  1318 12/05/22  1456 12/05/22  1437   BLOOD CULTURE   --  No Growth at 72 hrs  No Growth at 72 hrs  GRAM STAIN RESULT  Rare Polys  No organisms seen  --   --    BODY FLUID CULTURE, STERILE  No growth  --   --        Last 24 Hours Medication List:   Current Facility-Administered Medications   Medication Dose Route Frequency Provider Last Rate   • acetaminophen  650 mg Oral Q6H PRN Ruddy Gutierrez PA-C     • ALPRAZolam  0 25 mg Oral TID PRN Ruddy Gutierrez PA-C     • apixaban  5 mg Oral BID Ruddy Gutierrez PA-C     • calcium carbonate  500 mg Oral Daily PRN Waylon Warner MD     • HYDROmorphone  0 5 mg Intravenous Q6H PRN Ruddy Gutierrez PA-C     • insulin lispro  1-5 Units Subcutaneous TID AC Jody Finnegan PA-C     • insulin lispro  1-5 Units Subcutaneous HS Ruddy Enriquez PA-C     • metoclopramide  10 mg Intravenous Q6H PRN Tri Swift PA-C     • ondansetron  4 mg Intravenous Q4H PRN Waylon Warner MD     • ondansetron  4 mg Oral [facility] TAMIKA Yates     • pantoprazole  40 mg Oral BID AC TAMIKA Yates     • potassium chloride  40 mEq Oral Daily Ruddy Enriquez [place]     • pravastatin  20 mg Oral Daily Kinsey Donnelly [place]          Today, Patient Was Seen By: Kei Mann PA-C    **Please Note: This note may have been constructed using a voice recognition system  **

## 2022-12-09 NOTE — ASSESSMENT & PLAN NOTE
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  · initiated on chemotherapy April 2022, most recently on Abraxane and Gemzar, patient states he is not currently receiving chemotherapy, thinks his last infusion was within the past several months  He does receive hydration infusion every Tuesday  · S/P radiation in august  · Seen in office 11/20 01/2022 with Dr Agapito Cortez --> at that time was documented with progressively reduced appetite, weakness, poor family support, notably was falling asleep during office visit  Reportedly had canceled his chemo cycle 8 due to weakness     · Patient was seen again in office today 12/5, referred to ED by Dr Agapito Cortez   · Palliative care consult

## 2022-12-09 NOTE — OCCUPATIONAL THERAPY NOTE
Occupational Therapy Cx Note     Patient Name: Delisa Chen  FVUOW'O Date: 12/9/2022  Problem List  Principal Problem:    Lethargy  Active Problems:    Type 2 diabetes mellitus without complication, with long-term current use of insulin ([facility] [address] )    History of pulmonary embolism    Pancreatic adenocarcinoma (HCC)    Mild protein-calorie malnutrition (HCC)    SHRAVAN (acute kidney injury) ([facility] [address] )    Ascites          12/09/22 1316   OT Last Visit   OT Visit Date 12/09/22   Note Type   Note type Cancelled Session   Additional Comments Per chart review:  Plan to transition to hospice cares directly at discharge at [facility]; Will DC OT order             Srinivas Thakkar OTR/L

## 2022-12-09 NOTE — PHYSICAL THERAPY NOTE
Physical Therapy Cancellation Note         12/09/22 1151   PT Last Visit   PT Visit Date 12/09/22   Note Type   Cancel Reasons Medical status  (Per chart review:  Plan to transition to hospice cares directly at discharge at [facility]; Will DC PT order    Encourage ambulation TID and OOB for all meals as tolerated )     Tamar Diggs Pt stopped in  Would like 5/15 xray results     Please call today [phone]

## 2022-12-09 NOTE — ASSESSMENT & PLAN NOTE
Malnutrition Findings:     BMI Findings: Body mass index is 18 32 kg/m²     Nutrition consult and recommendation

## 2022-12-09 NOTE — ASSESSMENT & PLAN NOTE
· Baseline Cr 0 8-1  · Cr on admission 1 64, suspect prerenal dehydration, patient is with poor PO intake   · IVF discontinued 12/8 as patient is having appropriate oral intake  · Retention protocol   · Avoid hypotension and nephrotoxins  · Creatinine overall stable, 1 35

## 2022-12-09 NOTE — CASE MANAGEMENT
Case Management Discharge Planning Note    Patient name Melita Zaidi  Location /-24 MRN [id number]  : 1940 Date 2022       Current Admission Date: 2022  Current Admission Diagnosis:Lethargy   Patient Active Problem List    Diagnosis Date Noted   • Ascites 2022   • Lethargy 2022   • SHRAVAN (acute kidney injury) ([facility] [address] ) 2022   • Dehydration 10/22/2022   • Mild protein-calorie malnutrition ([facility] ) 2022   • Acute exacerbation of chronic obstructive pulmonary disease ([facility] ) 2022   • Hypokalemia 2022   • Chemotherapy induced neutropenia ([facility] ) 2022   • Pancreatic adenocarcinoma ([facility] ) 2022   • Type 2 diabetes mellitus without complication, with long-term current use of insulin (HCC)    • GERD (gastroesophageal reflux disease)    • Pancreatic mass    • History of pulmonary embolism    • Hyperbilirubinemia    • Anxiety about health       LOS (days): 4  Geometric Mean LOS (GMLOS) (days): 3 30  Days to GMLOS:-0 6     OBJECTIVE:  Risk of Unplanned Readmission Score: 23 73      Current admission status: Inpatient   Preferred Pharmacy:   [facility]/pharmacy #[id number] - [address] [place] [address]  Phone: [phone] Fax: [facility] [address] [facility] [address] [address] [address]  Phone: [phone] Fax: [address] [facility] [address] [place] [address]  [address]  [facility] [address]  Phone: [phone] Fax: [phone]    Primary Care Provider: Magaly Sotelo    Primary Insurance: [facility]  Secondary Insurance:     DISCHARGE DETAILS:     Freedom of Choice: Yes  Comments - Freedom of Choice: waiting for assessment to be completed by [facility] referral send to 35 mile radius via [employer], awaiting responses and covid card from family    CM contacted family/caregiver?: Yes  Were Treatment Team discharge recommendations reviewed with patient/caregiver?: Yes  Did patient/caregiver verbalize understanding of patient care needs?: Yes  Were patient/caregiver advised of the risks associated with not following Treatment Team discharge recommendations?: Yes    Contacts  Patient Contacts: sister Marie Pradhan  Relationship to Patient[de-identified] Family  Contact Method: Phone  Phone Number: [phone]  Reason/Outcome: Continuity of Care, Emergency Contact, Discharge [address]         Is the patient interested in [facility] at discharge?: No    DME Referral Provided  Referral made for DME?: No    Other Referral/Resources/Interventions Provided:  Interventions: SNF    Treatment Team Recommendation: Short Term Rehab  Discharge Destination Plan[de-identified] SNF, Hospice     IMM Given (Date):: 12/09/22  IMM Given to[de-identified] Patient  Family notified[de-identified] YES  Additional Comments: Assessment for Options process is scheduled for 3:30 pm on 12/9 [facility] [address]  IMM updated and signed on 12/9  Cm spoke to pt and his sister about facilities requesting COVID vaccination status, dates, and manufacturers  Sister will look for card at his apartment on 12/10  Hospice referral can be placed once a facility accepts pt due to potential of varied locations

## 2022-12-09 NOTE — PLAN OF CARE
Problem: MOBILITY - ADULT  Goal: Maintain or return to baseline ADL function  Description: INTERVENTIONS:  -  Assess patient's ability to carry out ADLs; assess patient's baseline for ADL function and identify physical deficits which impact ability to perform ADLs (bathing, care of mouth/teeth, toileting, grooming, dressing, etc )  - Assess/evaluate cause of self-care deficits   - Assess range of motion  - Assess patient's mobility; develop plan if impaired  - Assess patient's need for assistive devices and provide as appropriate  - Encourage maximum independence but intervene and supervise when necessary  - Involve family in performance of ADLs  - Assess for home care needs following discharge   - Consider OT consult to assist with ADL evaluation and planning for discharge  - Provide patient education as appropriate  Outcome: Progressing     Problem: Nutrition/Hydration-ADULT  Goal: Nutrient/Hydration intake appropriate for improving, restoring or maintaining nutritional needs  Description: Monitor and assess patient's nutrition/hydration status for malnutrition  Collaborate with interdisciplinary team and initiate plan and interventions as ordered  Monitor patient's weight and dietary intake as ordered or per policy  Utilize nutrition screening tool and intervene as necessary  Determine patient's food preferences and provide high-protein, high-caloric foods as appropriate       INTERVENTIONS:  - Monitor oral intake, urinary output, labs, and treatment plans  - Assess nutrition and hydration status and recommend course of action  - Evaluate amount of meals eaten  - Assist patient with eating if necessary   - Allow adequate time for meals  - Recommend/ encourage appropriate diets, oral nutritional supplements, and vitamin/mineral supplements  - Order, calculate, and assess calorie counts as needed  - Recommend, monitor, and adjust tube feedings and TPN/PPN based on assessed needs  - Assess need for intravenous fluids  - Provide specific nutrition/hydration education as appropriate  - Include patient/family/caregiver in decisions related to nutrition  Outcome: Progressing     Problem: INFECTION - ADULT  Goal: Absence or prevention of progression during hospitalization  Description: INTERVENTIONS:  - Assess and monitor for signs and symptoms of infection  - Monitor lab/diagnostic results  - Monitor all insertion sites, i e  indwelling lines, tubes, and drains  - Monitor endotracheal if appropriate and nasal secretions for changes in amount and color  - [place] appropriate cooling/warming therapies per order  - Administer medications as ordered  - Instruct and encourage patient and family to use good hand hygiene technique  - Identify and instruct in appropriate isolation precautions for identified infection/condition  Outcome: Progressing     Problem: INFECTION - ADULT  Goal: Absence of fever/infection during neutropenic period  Description: INTERVENTIONS:  - Monitor WBC    Outcome: Progressing

## 2022-12-09 NOTE — CASE MANAGEMENT
Case Management Progress Note    Patient name Veronica Major  Location /-58 MRN [id number]  : 1940 Date 2022       LOS (days): 4  Geometric Mean LOS (GMLOS) (days): 3 30  Days to GMLOS:-0 7        OBJECTIVE:        Current admission status: Inpatient  Preferred Pharmacy:   [id number] [place], [address]  Phone: [phone] Fax: [address]  [facility] [address]  Phone: [phone] Fax: [address] [facility] [address], [address] [facility] [address] [address]  Phone: [phone] Fax: [phone]    Primary Care Provider: Clarisa Fonseca    Primary Insurance: [facility]  Secondary Insurance:     PROGRESS NOTE: [facility] Assessment for Options program for [facility] determination completed with Rosie Good from [employer] on [address]  Awaiting determination letter

## 2022-12-09 NOTE — QUICK NOTE
12/9/2022 4:06 PM -  Lina Knox's chart and case were reviewed by Darcy Stallworth PA-C  Mode of review included electronic chart check  County assessment today  Symptoms currently controlled  Patient and family goals are clear for transition to hospice cares at discharge  Palliative will return on 12/12/2022 unless otherwise indicated  For urgent issues or any questions/concerns, please notify on-call provider via [facility]  You may also call our answering service 24/7 at   Darcy Stallworth PA-C  Palliative and Supportive Care  Clinic/Answering Service: [phone]  You can find me on [employer]!

## 2022-12-10 NOTE — ASSESSMENT & PLAN NOTE
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  · initiated on chemotherapy April 2022, most recently on Abraxane and Gemzar, patient states he is not currently receiving chemotherapy, thinks his last infusion was within the past several months  He does receive hydration infusion every Tuesday  · S/P radiation in august  · Seen in office 11/20 01/2022 with Dr Pearl Monday --> at that time was documented with progressively reduced appetite, weakness, poor family support, notably was falling asleep during office visit  Reportedly had canceled his chemo cycle 8 due to weakness     · Patient was seen again in office today 12/5, referred to ED by Dr Pearl Monday   · Palliative care consult

## 2022-12-10 NOTE — ASSESSMENT & PLAN NOTE
· Baseline Cr 0 8-1  · Cr on admission 1 64, suspect prerenal dehydration, patient is with poor PO intake   · IVF discontinued 12/8 as patient is having appropriate oral intake  · Retention protocol   · Avoid hypotension and nephrotoxins  · Creatinine overall stable

## 2022-12-10 NOTE — PROGRESS NOTES
[place]     Progress Note - Miguelangel Nick 1940, [de-identified] y o  male MRN: [id number]  Unit/Bed#: -01 Encounter: [id number]  Primary Care Provider: Ruben Harmon   Date and time admitted to hospital: 12/5/2022  2:00 PM    * Lethargy  Assessment & Plan  · Referred to ED by OP Oncology due to report of lethargy/weakness after begin seen in office 12/5  Appears to be ongoing since 11/21 where lethargy/progressive decline in funcitonal status is noted in Onc note  Endorses abdominal pain, nausea/vomiting, diarrhea  · SIRS:  No leukocytosis, no fever, no tachycardia, no tachypnea  · Lactic is normal  · Procalcitonin slightly elevated, repeat morning  · No evidence/source of infection at this time  · UA is unremarkable  · CXR is unremarkable  · Blood culture negative x72 hours  · PT/OT eval   · Palliative care consult due to progressive decline in setting of metastatic cancer   · Family agreeable to hospice-patient for discharge to SNF, and prior mental health hospitalization, patient requires options process for placement    Ascites  Assessment & Plan  · CT chest abdomen pelvis showed-" Massive ascites, developing since a CT from 10/28/2022  No new pancreatic head tumor poorly evaluated in the absence of IV and enteric contrast   Grossly unchanged in size  Previously demonstrated mesenteric metastases not visualized with certainty on this examination, most likely due to combination of ascites, mesenteric edema and absence of IV contrast   No evidence of new metastases in the chest, abdomen or pelvis  Stable small pulmonary nodules    Generalized dilatation of fluid-filled esophagus, consistent with esophageal dysmotility, reflux or, less likely, distal esophageal obstruction  · IR consult for paracentesis  · Gram stain negative for growth  · GI consult regarding esophageal dysmotility-signed off as patient will be transitioning to hospice, continue supportive measures  · Barium swallow -negative for dysmotility or stricture    SHRAVAN (acute kidney injury) ([facility] [address] )  Assessment & Plan  · Baseline Cr 0 8-1  · Cr on admission 1 64, suspect prerenal dehydration, patient is with poor PO intake   · IVF discontinued 12/8 as patient is having appropriate oral intake  · Retention protocol   · Avoid hypotension and nephrotoxins  · Creatinine overall stable    Mild protein-calorie malnutrition ([facility] [address] )  Assessment & Plan  Malnutrition Findings:     BMI Findings: Body mass index is 18 44 kg/m²  Nutrition consult and recommendation    Pancreatic adenocarcinoma 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  · initiated on chemotherapy April 2022, most recently on Abraxane and Gemzar, patient states he is not currently receiving chemotherapy, thinks his last infusion was within the past several months  He does receive hydration infusion every Tuesday  · S/P radiation in august  · Seen in office 11/20 01/2022 with Dr Miguelina Demarco --> at that time was documented with progressively reduced appetite, weakness, poor family support, notably was falling asleep during office visit  Reportedly had canceled his chemo cycle 8 due to weakness  · Patient was seen again in office today 12/5, referred to ED by Dr Miguelina Demarco   · Palliative care consult     History of pulmonary embolism  Assessment & Plan  · Continue Eliquis 5 mg b i d  Type 2 diabetes mellitus without complication, with long-term current use of insulin [facility])  Assessment & Plan  Lab Results   Component Value Date    HGBA1C 6 1 08/28/2021     · Hold home metformin  · ISS and accucheks     VTE Pharmacologic Prophylaxis: VTE Score: 7 High Risk (Score >/= 5) - Pharmacological DVT Prophylaxis Ordered: apixaban (Eliquis)  Sequential Compression Devices Ordered      Patient Centered Rounds: I performed bedside rounds with nursing staff today  Discussions with Specialists or Other Care Team Provider: MARICRUZ    Education and Discussions with Family / Patient: Updated  (sister) via phone  Patient's sister reports she can receive calls moving forward just with new updates  Time Spent for Care: 30 minutes  More than 50% of total time spent on counseling and coordination of care as described above  Current Length of Stay: 5 day(s)  Current Patient Status: Inpatient   Certification Statement: The patient will continue to require additional inpatient hospital stay due to Disposition planning, options process through [place]  Discharge Plan: Anticipate discharge in 48 hrs to SNF pending letter determination from the county    Code Status: Level 3 - DNAR and DNI    Subjective:   Reports ongoing abdominal pain relieved with pain medication  Admits to poor appetite  Denies chest pain/palpitations, shortness of breath, nausea or vomiting  Objective:     Vitals:   Temp (24hrs), Av 1 °F (36 7 °C), Min:97 6 °F (36 4 °C), Max:98 7 °F (37 1 °C)    Temp:  [97 6 °F (36 4 °C)-98 7 °F (37 1 °C)] 97 6 °F (36 4 °C)  HR:  [77-89] 80  Resp:  [16-18] 18  BP: ()/(61-72) 93/62  SpO2:  [99 %-100 %] 100 %  Body mass index is 18 44 kg/m²  Input and Output Summary (last 24 hours): Intake/Output Summary (Last 24 hours) at 12/10/2022 1511  Last data filed at 12/10/2022 0524  Gross per 24 hour   Intake 420 ml   Output 640 ml   Net -220 ml       Physical Exam:   Physical Exam  Vitals and nursing note reviewed  Constitutional:       General: He is not in acute distress  Appearance: He is cachectic  Comments: No acute distress   HENT:      Head: Normocephalic and atraumatic  Eyes:      General: No scleral icterus  Extraocular Movements: Extraocular movements intact  Conjunctiva/sclera: Conjunctivae normal    Cardiovascular:      Rate and Rhythm: Normal rate and regular rhythm        Heart sounds: No murmur heard  Pulmonary:      Effort: Pulmonary effort is normal  No respiratory distress  Breath sounds: Normal breath sounds  No wheezing, rhonchi or rales  Abdominal:      General: There is distension  Palpations: Abdomen is soft  Tenderness: There is no abdominal tenderness  Musculoskeletal:         General: No swelling  Cervical back: Normal range of motion  Comments: Able to move upper/lower extremities bilaterally, no edema   Skin:     General: Skin is warm and dry  Capillary Refill: Capillary refill takes less than 2 seconds  Neurological:      Mental Status: He is alert  Mental status is at baseline     Psychiatric:         Mood and Affect: Mood normal          Speech: Speech normal          Behavior: Behavior normal           Additional Data:     Labs:  Results from last 7 days   Lab Units 12/07/22  0555   WBC Thousand/uL 6 28   HEMOGLOBIN g/dL 10 0*   HEMATOCRIT % 31 4*   PLATELETS Thousands/uL 187   NEUTROS PCT % 88*   LYMPHS PCT % 3*   MONOS PCT % 8   EOS PCT % 0     Results from last 7 days   Lab Units 12/09/22  0530 12/07/22  0555   SODIUM mmol/L 138 137   POTASSIUM mmol/L 4 8 4 9   CHLORIDE mmol/L 109* 108   CO2 mmol/L 22 20*   BUN mg/dL 57* 62*   CREATININE mg/dL 1 35* 1 32*   ANION GAP mmol/L 7 9   CALCIUM mg/dL 8 7 8 5   ALBUMIN g/dL  --  2 3*   TOTAL BILIRUBIN mg/dL  --  0 50   ALK PHOS U/L  --  158*   ALT U/L  --  20   AST U/L  --  18   GLUCOSE RANDOM mg/dL 133 127     Results from last 7 days   Lab Units 12/05/22  1437   INR  1 01     Results from last 7 days   Lab Units 12/10/22  1122 12/10/22  0727 12/09/22  2109 12/09/22  1640 12/09/22  1139 12/09/22  0728 12/08/22  2134 12/08/22  1525 12/08/22  1140 12/08/22  0804 12/07/22  2057 12/07/22  1551   POC GLUCOSE mg/dl 200* 118 185* 177* 209* 139 106 136 206* 137 163* 109         Results from last 7 days   Lab Units 12/06/22  0615 12/05/22  1437   LACTIC ACID mmol/L  --  1 8   PROCALCITONIN ng/ml 0 48* 0 43*       Lines/Drains:  Invasive Devices     Central Venous Catheter Line  Duration           Port A Cath 04/11/22 Right Chest 243 days          Peripheral Intravenous Line  Duration           Peripheral IV 12/09/22 Dorsal (posterior); Right Forearm 1 day                Central Line:  Goal for removal: N/A - Chronic PICC             Imaging: No pertinent imaging reviewed  Recent Cultures (last 7 days):   Results from last 7 days   Lab Units 12/06/22  1318 12/05/22  1456 12/05/22  1437   BLOOD CULTURE   --  No Growth After 4 Days  No Growth After 4 Days  GRAM STAIN RESULT  Rare Polys  No organisms seen  --   --    BODY FLUID CULTURE, STERILE  No growth  --   --        Last 24 Hours Medication List:   Current Facility-Administered Medications   Medication Dose Route Frequency Provider Last Rate   • acetaminophen  650 mg Oral Q6H PRN Jude Gutierrez PA-C     • ALPRAZolam  0 25 mg Oral TID PRN Jude Gutierrez PA-C     • apixaban  5 mg Oral BID Jude Gutierrez PA-C     • calcium carbonate  500 mg Oral Daily PRN Joao Gonsales MD     • HYDROmorphone  0 5 mg Intravenous Q6H PRN Nohemi Cruz PA-C     • insulin lispro  1-5 Units Subcutaneous TID AC Jody Gutierrez PA-C     • insulin lispro  1-5 Units Subcutaneous HS Jude Enriquez PA-C     • metoclopramide  10 mg Intravenous Q6H PRN Alivia Mera PA-C     • ondansetron  4 mg Intravenous Q4H PRN Joao Gonsales MD     • ondansetron  4 mg Oral [facility] TAMIKA Rincon     • oxyCODONE  5 mg Oral Q4H PRN Nohemi Cruz PA-C     • pantoprazole  40 mg Oral BID  TAMIKA Rincon     • potassium chloride  40 mEq Oral Daily Carmen Lake     • pravastatin  20 mg Oral Daily Carmen Alatorre          Today, Patient Was Seen By: Nohemi Cruz PA-C    **Please Note: This note may have been constructed using a voice recognition system  **

## 2022-12-11 NOTE — ASSESSMENT & PLAN NOTE
· Referred to ED by OP Oncology due to report of lethargy/weakness after begin seen in office 12/5  Appears to be ongoing since 11/21 where lethargy/progressive decline in funcitonal status is noted in Onc note    Endorses abdominal pain, nausea/vomiting, diarrhea  · SIRS:  No leukocytosis, no fever, no tachycardia, no tachypnea  · Lactic is normal  · Procalcitonin slightly elevated, repeat morning  · No evidence/source of infection at this time  · UA is unremarkable  · CXR is unremarkable  · Blood culture negative x2 to date  · PT/OT eval   · Palliative care consult due to progressive decline in setting of metastatic cancer   · Family agreeable to hospice-patient for discharge to SNF, and prior mental health hospitalization, patient requires options process for placement

## 2022-12-11 NOTE — ASSESSMENT & PLAN NOTE
· CT chest abdomen pelvis showed-" Massive ascites, developing since a CT from 10/28/2022  No new pancreatic head tumor poorly evaluated in the absence of IV and enteric contrast   Grossly unchanged in size  Previously demonstrated mesenteric metastases not visualized with certainty on this examination, most likely due to combination of ascites, mesenteric edema and absence of IV contrast   No evidence of new metastases in the chest, abdomen or pelvis  Stable small pulmonary nodules    Generalized dilatation of fluid-filled esophagus, consistent with esophageal dysmotility, reflux or, less likely, distal esophageal obstruction  · IR consult for paracentesis  · Gram stain negative for growth  · GI consult regarding esophageal dysmotility-signed off as patient will be transitioning to hospice, continue supportive measures  · Barium swallow -negative for dysmotility or stricture  · Appears to have some re-accumulation of fluid 12/11, consideration for repeat paracentesis for comfort prior to discharge

## 2022-12-11 NOTE — ASSESSMENT & PLAN NOTE
Malnutrition Findings:     BMI Findings: Body mass index is 18 44 kg/m²     Nutrition consult and recommendation

## 2022-12-11 NOTE — PROGRESS NOTES
[place]     Progress Note - Rubin Gao 1940, [de-identified] y o  male MRN: [id number]  Unit/Bed#: -01 Encounter: [id number]  Primary Care Provider: Jose Tran   Date and time admitted to hospital: 12/5/2022  2:00 PM    * Lethargy  Assessment & Plan  · Referred to ED by OP Oncology due to report of lethargy/weakness after begin seen in office 12/5  Appears to be ongoing since 11/21 where lethargy/progressive decline in funcitonal status is noted in Onc note  Endorses abdominal pain, nausea/vomiting, diarrhea  · SIRS:  No leukocytosis, no fever, no tachycardia, no tachypnea  · Lactic is normal  · Procalcitonin slightly elevated, repeat morning  · No evidence/source of infection at this time  · UA is unremarkable  · CXR is unremarkable  · Blood culture negative x2 to date  · PT/OT eval   · Palliative care consult due to progressive decline in setting of metastatic cancer   · Family agreeable to hospice-patient for discharge to SNF, and prior mental health hospitalization, patient requires options process for placement    Ascites  Assessment & Plan  · CT chest abdomen pelvis showed-" Massive ascites, developing since a CT from 10/28/2022  No new pancreatic head tumor poorly evaluated in the absence of IV and enteric contrast   Grossly unchanged in size  Previously demonstrated mesenteric metastases not visualized with certainty on this examination, most likely due to combination of ascites, mesenteric edema and absence of IV contrast   No evidence of new metastases in the chest, abdomen or pelvis  Stable small pulmonary nodules    Generalized dilatation of fluid-filled esophagus, consistent with esophageal dysmotility, reflux or, less likely, distal esophageal obstruction  · IR consult for paracentesis  · Gram stain negative for growth  · GI consult regarding esophageal dysmotility-signed off as patient will be transitioning to hospice, continue supportive measures  · Barium swallow -negative for dysmotility or stricture  · Appears to have some re-accumulation of fluid 12/11, consideration for repeat paracentesis for comfort prior to discharge    SHRAVAN (acute kidney injury) ([facility] [address] )  Assessment & Plan  · Baseline Cr 0 8-1  · Cr on admission 1 64, suspect prerenal dehydration, patient is with poor PO intake   · IVF discontinued 12/8 as patient is having appropriate oral intake  · Retention protocol   · Avoid hypotension and nephrotoxins  · Creatinine has been overall stable    Mild protein-calorie malnutrition ([facility] [address] )  Assessment & Plan  Malnutrition Findings:     BMI Findings: Body mass index is 18 44 kg/m²  Nutrition consult and recommendation    Pancreatic adenocarcinoma 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  · initiated on chemotherapy April 2022, most recently on Abraxane and Gemzar, patient states he is not currently receiving chemotherapy, thinks his last infusion was within the past several months  He does receive hydration infusion every Tuesday  · S/P radiation in august  · Seen in office 11/20 01/2022 with Dr Jordyn Butcher --> at that time was documented with progressively reduced appetite, weakness, poor family support, notably was falling asleep during office visit  Reportedly had canceled his chemo cycle 8 due to weakness  · Patient was seen again in office today 12/5, referred to ED by Dr Jordyn Butcher   · Palliative care consult     History of pulmonary embolism  Assessment & Plan  · Continue Eliquis 5 mg b i d      Type 2 diabetes mellitus without complication, with long-term current use of insulin [facility])  Assessment & Plan  Lab Results   Component Value Date    HGBA1C 6 1 08/28/2021     · Hold home metformin  · ISS and accucheks     VTE Pharmacologic Prophylaxis: VTE Score: 7 High Risk (Score >/= 5) - Pharmacological DVT Prophylaxis Ordered: apixaban (Eliquis)  Sequential Compression Devices Ordered  Patient Centered Rounds: I performed bedside rounds with nursing staff today  Discussions with Specialists or Other Care Team Provider:     Education and Discussions with Family / Patient: Patient declined call to   Discussed with patient's sister 12/10, she requested phone calls with new updates  She is aware of current plan to await letter of determination from the [facility] for SNF placement  Time Spent for Care: 30 minutes  More than 50% of total time spent on counseling and coordination of care as described above  Current Length of Stay: 6 day(s)  Current Patient Status: Inpatient   Certification Statement: The patient will continue to require additional inpatient hospital stay due to disposition planning, facility placement  Discharge Plan: Anticipate discharge in 24-48 hrs to SNF    Code Status: Level 3 - DNAR and DNI    Subjective:   Patient reports poor appetite  Abdominal pain well controlled with pain medication  Denies chest pain/palpitations, shortness of breath  Objective:     Vitals:   Temp (24hrs), Av 4 °F (36 3 °C), Min:97 3 °F (36 3 °C), Max:97 6 °F (36 4 °C)    Temp:  [97 3 °F (36 3 °C)-97 6 °F (36 4 °C)] 97 6 °F (36 4 °C)  HR:  [85-90] 85  Resp:  [15-19] 15  BP: ()/(50-65) 76/50  SpO2:  [100 %] 100 %  Body mass index is 18 44 kg/m²  Input and Output Summary (last 24 hours): Intake/Output Summary (Last 24 hours) at 2022 0915  Last data filed at 2022 0700  Gross per 24 hour   Intake 320 ml   Output 250 ml   Net 70 ml       Physical Exam:   Physical Exam  Vitals and nursing note reviewed  Constitutional:       General: He is not in acute distress  Appearance: He is cachectic  Comments: No acute distress   HENT:      Head: Normocephalic and atraumatic  Eyes:      General: No scleral icterus       Extraocular Movements: Extraocular movements intact  Conjunctiva/sclera: Conjunctivae normal    Cardiovascular:      Rate and Rhythm: Normal rate and regular rhythm  Heart sounds: S1 normal and S2 normal  No murmur heard  Pulmonary:      Effort: Pulmonary effort is normal  No respiratory distress  Breath sounds: Normal breath sounds  No wheezing, rhonchi or rales  Abdominal:      General: Bowel sounds are normal  There is distension  Palpations: Abdomen is soft  Tenderness: There is no abdominal tenderness  There is no guarding or rebound  Musculoskeletal:         General: No swelling  Cervical back: Normal range of motion  Comments: Able to move upper/lower ext bilaterally, no edema   Skin:     General: Skin is warm and dry  Capillary Refill: Capillary refill takes less than 2 seconds  Neurological:      Mental Status: He is alert and oriented to person, place, and time     Psychiatric:         Mood and Affect: Mood normal          Speech: Speech normal          Behavior: Behavior normal           Additional Data:     Labs:  Results from last 7 days   Lab Units 12/07/22  0555   WBC Thousand/uL 6 28   HEMOGLOBIN g/dL 10 0*   HEMATOCRIT % 31 4*   PLATELETS Thousands/uL 187   NEUTROS PCT % 88*   LYMPHS PCT % 3*   MONOS PCT % 8   EOS PCT % 0     Results from last 7 days   Lab Units 12/09/22  0530 12/07/22  0555   SODIUM mmol/L 138 137   POTASSIUM mmol/L 4 8 4 9   CHLORIDE mmol/L 109* 108   CO2 mmol/L 22 20*   BUN mg/dL 57* 62*   CREATININE mg/dL 1 35* 1 32*   ANION GAP mmol/L 7 9   CALCIUM mg/dL 8 7 8 5   ALBUMIN g/dL  --  2 3*   TOTAL BILIRUBIN mg/dL  --  0 50   ALK PHOS U/L  --  158*   ALT U/L  --  20   AST U/L  --  18   GLUCOSE RANDOM mg/dL 133 127     Results from last 7 days   Lab Units 12/05/22  1437   INR  1 01     Results from last 7 days   Lab Units 12/11/22  0723 12/10/22  2154 12/10/22  1652 12/10/22  1122 12/10/22  0727 12/09/22  2109 12/09/22  1640 12/09/22  1139 12/09/22  0728 12/08/22  2134 12/08/22  1525 12/08/22  1140   POC GLUCOSE mg/dl 112 166* 159* 200* 118 185* 177* 209* 139 106 136 206*         Results from last 7 days   Lab Units 12/06/22  0615 12/05/22  1437   LACTIC ACID mmol/L  --  1 8   PROCALCITONIN ng/ml 0 48* 0 43*       Lines/Drains:  Invasive Devices     Central Venous Catheter Line  Duration           Port A Cath 04/11/22 Right Chest 243 days          Peripheral Intravenous Line  Duration           Peripheral IV 12/09/22 Dorsal (posterior); Right Forearm 2 days                Central Line:  Goal for removal: N/A - Chronic PICC             Imaging: No pertinent imaging reviewed  Recent Cultures (last 7 days):   Results from last 7 days   Lab Units 12/06/22  1318 12/05/22  1456 12/05/22  1437   BLOOD CULTURE   --  No Growth After 5 Days  No Growth After 5 Days     GRAM STAIN RESULT  Rare Polys  No organisms seen  --   --    BODY FLUID CULTURE, STERILE  No growth  --   --        Last 24 Hours Medication List:   Current Facility-Administered Medications   Medication Dose Route Frequency Provider Last Rate   • acetaminophen  650 mg Oral Q6H PRN Ruddy Gutierrez PA-C     • ALPRAZolam  0 25 mg Oral TID PRN Ruddy Gutierrez PA-C     • apixaban  5 mg Oral BID Ruddy Gutierrez PA-C     • calcium carbonate  500 mg Oral Daily PRN Waylon Warner MD     • HYDROmorphone  0 5 mg Intravenous Q6H PRN Kei Mann PA-C     • insulin lispro  1-5 Units Subcutaneous TID AC Jody Gutierrez PA-C     • insulin lispro  1-5 Units Subcutaneous HS Ruddy Enriquez PA-C     • metoclopramide  10 mg Intravenous Q6H PRN Tri Swift PA-C     • ondansetron  4 mg Intravenous Q4H PRN Waylon Warner MD     • ondansetron  4 mg Oral [facility] TAMIKA Yates     • oxyCODONE  5 mg Oral Q4H PRN Kei Mann PA-C     • pantoprazole  40 mg Oral BID AC TAMIKA Yates     • potassium chloride  40 mEq Oral Daily Carmen Aly • pravastatin  20 mg Oral Daily Yolie Gomes [place]          Today, Patient Was Seen By: Theresa Love PA-C    **Please Note: This note may have been constructed using a voice recognition system  **

## 2022-12-11 NOTE — PLAN OF CARE
Problem: MOBILITY - ADULT  Goal: Maintain or return to baseline ADL function  Description: INTERVENTIONS:  -  Assess patient's ability to carry out ADLs; assess patient's baseline for ADL function and identify physical deficits which impact ability to perform ADLs (bathing, care of mouth/teeth, toileting, grooming, dressing, etc )  - Assess/evaluate cause of self-care deficits   - Assess range of motion  - Assess patient's mobility; develop plan if impaired  - Assess patient's need for assistive devices and provide as appropriate  - Encourage maximum independence but intervene and supervise when necessary  - Involve family in performance of ADLs  - Assess for home care needs following discharge   - Consider OT consult to assist with ADL evaluation and planning for discharge  - Provide patient education as appropriate  Outcome: Progressing  Goal: Maintains/Returns to pre admission functional level  Description: INTERVENTIONS:  - Perform BMAT or MOVE assessment daily    - Set and communicate daily mobility goal to care team and patient/family/caregiver  - Collaborate with rehabilitation services on mobility goals if consulted  - Perform Range of Motion 2 times a day  - Reposition patient every 2 hours  - Dangle patient 2 times a day  - Stand patient 2 times a day  - Ambulate patient 2 times a day  - Out of bed to chair 2 times a day   - Out of bed for meals 2 times a day  - Out of bed for toileting  - Record patient progress and toleration of activity level   Outcome: Progressing     Problem: Nutrition/Hydration-ADULT  Goal: Nutrient/Hydration intake appropriate for improving, restoring or maintaining nutritional needs  Description: Monitor and assess patient's nutrition/hydration status for malnutrition  Collaborate with interdisciplinary team and initiate plan and interventions as ordered  Monitor patient's weight and dietary intake as ordered or per policy   Utilize nutrition screening tool and intervene as necessary  Determine patient's food preferences and provide high-protein, high-caloric foods as appropriate       INTERVENTIONS:  - Monitor oral intake, urinary output, labs, and treatment plans  - Assess nutrition and hydration status and recommend course of action  - Evaluate amount of meals eaten  - Assist patient with eating if necessary   - Allow adequate time for meals  - Recommend/ encourage appropriate diets, oral nutritional supplements, and vitamin/mineral supplements  - Order, calculate, and assess calorie counts as needed  - Recommend, monitor, and adjust tube feedings and TPN/PPN based on assessed needs  - Assess need for intravenous fluids  - Provide specific nutrition/hydration education as appropriate  - Include patient/family/caregiver in decisions related to nutrition  Outcome: Progressing     Problem: INFECTION - ADULT  Goal: Absence or prevention of progression during hospitalization  Description: INTERVENTIONS:  - Assess and monitor for signs and symptoms of infection  - Monitor lab/diagnostic results  - Monitor all insertion sites, i e  indwelling lines, tubes, and drains  - Monitor endotracheal if appropriate and nasal secretions for changes in amount and color  - [place] appropriate cooling/warming therapies per order  - Administer medications as ordered  - Instruct and encourage patient and family to use good hand hygiene technique  - Identify and instruct in appropriate isolation precautions for identified infection/condition  Outcome: Progressing  Goal: Absence of fever/infection during neutropenic period  Description: INTERVENTIONS:  - Monitor WBC    Outcome: Progressing     Problem: SAFETY ADULT  Goal: Maintain or return to baseline ADL function  Description: INTERVENTIONS:  -  Assess patient's ability to carry out ADLs; assess patient's baseline for ADL function and identify physical deficits which impact ability to perform ADLs (bathing, care of mouth/teeth, toileting, grooming, dressing, etc )  - Assess/evaluate cause of self-care deficits   - Assess range of motion  - Assess patient's mobility; develop plan if impaired  - Assess patient's need for assistive devices and provide as appropriate  - Encourage maximum independence but intervene and supervise when necessary  - Involve family in performance of ADLs  - Assess for home care needs following discharge   - Consider OT consult to assist with ADL evaluation and planning for discharge  - Provide patient education as appropriate  Outcome: Progressing  Goal: Maintains/Returns to pre admission functional level  Description: INTERVENTIONS:  - Perform BMAT or MOVE assessment daily    - Set and communicate daily mobility goal to care team and patient/family/caregiver  - Collaborate with rehabilitation services on mobility goals if consulted  - Perform Range of Motion 2 times a day  - Reposition patient every 2 hours    - Dangle patient 2 times a day  - Stand patient 2 times a day  - Ambulate patient 2 times a day  - Out of bed to chair 2 times a day   - Out of bed for meals 2 times a day  - Out of bed for toileting  - Record patient progress and toleration of activity level   Outcome: Progressing  Goal: Patient will remain free of falls  Description: INTERVENTIONS:  - Educate patient/family on patient safety including physical limitations  - Instruct patient to call for assistance with activity   - Consult OT/PT to assist with strengthening/mobility   - Keep Call bell within reach  - Keep bed low and locked with side rails adjusted as appropriate  - Keep care items and personal belongings within reach  - Initiate and maintain comfort rounds  - Make Fall Risk Sign visible to staff  - Offer Toileting every 2 Hours, in advance of need  - Initiate/Maintain bed alarm  - Obtain necessary fall risk management equipment:   - Apply yellow socks and bracelet for high fall risk patients  - Consider moving patient to room near nurses station  Outcome: Progressing     Problem: Prexisting or High Potential for Compromised Skin Integrity  Goal: Skin integrity is maintained or improved  Description: INTERVENTIONS:  - Identify patients at risk for skin breakdown  - Assess and monitor skin integrity  - Assess and monitor nutrition and hydration status  - Monitor labs   - Assess for incontinence   - Turn and reposition patient  - Assist with mobility/ambulation  - Relieve pressure over bony prominences  - Avoid friction and shearing  - Provide appropriate hygiene as needed including keeping skin clean and dry  - Evaluate need for skin moisturizer/barrier cream  - Collaborate with interdisciplinary team   - Patient/family teaching  - Consider wound care consult   Outcome: Progressing

## 2022-12-11 NOTE — QUICK NOTE
Notified by RN that patient is experiencing abdominal pain  On exam, he does not appear to be in distress but does grab at his stomach in pain  He also has tenderness on palpation  His abdomen is fairly significantly distended, but not overall tense  I do suspect he will require a therapeutic tap prior to d/c  He is unsure when his last BM was, but states he is constipated  He is requesting pain medication for his abdominal pain, however BP is very soft in the 80s/60s  He is currently DNR/DNI and will be discharged on hospice  As he is going home on hospice, and I am unable to give him narcotics due to BP, and tylenol alone is not sufficient for pain control, I will give him a one-time dose of 15mg IV toradol despite GFR of 33  Additionally, I will give him dulcolax 10mg, monitor for BM  I will also give him albumin 25mg (100mLs) x 2 in order to try to maintain adequate pressures to receive narcotics for pain control  Will avoid IVF bolus as it will likely third-space into his abdomen and further worsening his ascites

## 2022-12-11 NOTE — ASSESSMENT & PLAN NOTE
· Baseline Cr 0 8-1  · Cr on admission 1 64, suspect prerenal dehydration, patient is with poor PO intake   · IVF discontinued 12/8 as patient is having appropriate oral intake  · Retention protocol   · Avoid hypotension and nephrotoxins  · Creatinine has been overall stable

## 2022-12-11 NOTE — ASSESSMENT & PLAN NOTE
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  · initiated on chemotherapy April 2022, most recently on Abraxane and Gemzar, patient states he is not currently receiving chemotherapy, thinks his last infusion was within the past several months  He does receive hydration infusion every Tuesday  · S/P radiation in august  · Seen in office 11/20 01/2022 with Dr David Ogden --> at that time was documented with progressively reduced appetite, weakness, poor family support, notably was falling asleep during office visit  Reportedly had canceled his chemo cycle 8 due to weakness     · Patient was seen again in office today 12/5, referred to ED by Dr David Ogden   · Palliative care consult

## 2022-12-12 PROBLEM — R65.10 SIRS (SYSTEMIC INFLAMMATORY RESPONSE SYNDROME) (HCC): Status: RESOLVED | Noted: 2022-01-01 | Resolved: 2022-01-01

## 2022-12-12 PROBLEM — R65.10 SIRS (SYSTEMIC INFLAMMATORY RESPONSE SYNDROME) (HCC): Status: ACTIVE | Noted: 2022-01-01

## 2022-12-12 PROBLEM — E87.5 HYPERKALEMIA: Status: ACTIVE | Noted: 2022-01-01

## 2022-12-12 PROBLEM — Z71.89 GOALS OF CARE, COUNSELING/DISCUSSION: Status: ACTIVE | Noted: 2022-01-01

## 2022-12-12 NOTE — CASE MANAGEMENT
Case Management Progress Note    Patient name Lela Check  Location /-21 MRN [id number]  : 1940 Date 2022       LOS (days): 7  Geometric Mean LOS (GMLOS) (days): 3 30  Days to GMLOS:-3 6        OBJECTIVE:        Current admission status: Inpatient  Preferred Pharmacy:   [id number], [address]  Phone: [phone] Fax: [phone] [address] [facility], [address] [place] [id number] [facility] [address] [facility] [address]  Phone: [phone] Fax: [phone] [facility] [address] [place] [address]  [address]  [address] [facility]  Phone: [phone] Fax: [phone]    Primary Care Provider: Ana Romero    Primary Insurance: [facility]  Secondary Insurance:     PROGRESS NOTE:    Cm received notice that pt's condition has deteriorated and he is not medically stable for transfer to a facility  Spoke with family and palliative and pt will placed on comfort care and his condition monitored to see if he becomes appropriate for transfer to facility

## 2022-12-12 NOTE — BRIEF OP NOTE (RAD/CATH)
Paracentesis Procedure Note    PATIENT NAME: Tip Urban  : 1940  MRN: [id number]     Pre-op Diagnosis:   1  Dehydration    2  Acute renal insufficiency    3  Pancreatic cancer ([facility] )    4  Pancreatic adenocarcinoma ([facility] )    5  Mild protein-calorie malnutrition ([facility] )    6  Esophageal dysmotility    7  Anxiety about health    8  Lethargy      Post-op Diagnosis:   1  Dehydration    2  Acute renal insufficiency    3  Pancreatic cancer ([facility] )    4  Pancreatic adenocarcinoma ([facility] )    5  Mild protein-calorie malnutrition ([facility] )    6  Esophageal dysmotility    7  Anxiety about health    8  Lethargy        Surgeon:   Steffany Copeland DO  Assistants:     No qualified resident was available  Estimated Blood Loss: none  Findings: RLQ access      Specimens: 4600 mL ascites    Complications:  none    Anesthesia: local    Steffany Copeland DO     Date: 2022  Time: 3:19 PM

## 2022-12-12 NOTE — ASSESSMENT & PLAN NOTE
· Referred to ED by OP Oncology due to report of lethargy/weakness after begin seen in office 12/5  Appears to be ongoing since 11/21 where lethargy/progressive decline in funcitonal status is noted in Onc note  Endorses abdominal pain, nausea/vomiting, diarrhea  · SIRS:  No leukocytosis, no fever, no tachycardia, no tachypnea  · Lactic is normal  · Procalcitonin slightly elevated, repeat morning  · No evidence/source of infection at this time  · UA is unremarkable  · CXR is unremarkable  · Blood culture negative x2 to date  · PT/OT eval   · Palliative care consult due to progressive decline in setting of metastatic cancer   · Family agreeable to hospice-patient for discharge to SNF, and prior mental health hospitalization, patient requires options process for placement    Case management following

## 2022-12-12 NOTE — ASSESSMENT & PLAN NOTE
Lab Results   Component Value Date    HGBA1C 6 1 08/28/2021     · Hold home metformin  · ISS and accucheks   · Monitor glucose closely after IV insulin/dextrose given for hyperkalemia

## 2022-12-12 NOTE — ASSESSMENT & PLAN NOTE
· Ongoing goals of care discussions held between palliative care, patient and family (patient's sister/POA)  · Patient's sister appears to be very realistic regarding patient's prognosis and that he is likely declining  Patient appears overwhelmed with medical updates and requests to hear "nothing negative, only things to give hope" but seems to understand hospice on discharge is appropriate  · Per palliative care discussion, no escalation in cares  Family and patient aware of potassium level    Will continue medical management (I e  medications, IVF) for now to treat hyperkalemia/dehydration but as patient and family are leaning towards comfort cares will not pursue cardiac monitoring per their wishes  · Level 3 DNR/DNI   · Patient's sister will be coming to visit patient tomorrow for ongoing goals of care discussions

## 2022-12-12 NOTE — PROGRESS NOTES
Was notified that K was 7    Hyperkalemia protocol ordered with labs EKG tele and meds      Repeat BMP at 800 pm    Anibal Collins

## 2022-12-12 NOTE — PLAN OF CARE
Problem: MOBILITY - ADULT  Goal: Maintain or return to baseline ADL function  Description: INTERVENTIONS:  -  Assess patient's ability to carry out ADLs; assess patient's baseline for ADL function and identify physical deficits which impact ability to perform ADLs (bathing, care of mouth/teeth, toileting, grooming, dressing, etc )  - Assess/evaluate cause of self-care deficits   - Assess range of motion  - Assess patient's mobility; develop plan if impaired  - Assess patient's need for assistive devices and provide as appropriate  - Encourage maximum independence but intervene and supervise when necessary  - Involve family in performance of ADLs  - Assess for home care needs following discharge   - Consider OT consult to assist with ADL evaluation and planning for discharge  - Provide patient education as appropriate  Outcome: Progressing  Goal: Maintains/Returns to pre admission functional level  Description: INTERVENTIONS:  - Perform BMAT or MOVE assessment daily    - Set and communicate daily mobility goal to care team and patient/family/caregiver  - Collaborate with rehabilitation services on mobility goals if consulted  - Perform Range of Motion 2 times a day  - Reposition patient every 2 hours  - Dangle patient 2 times a day  - Stand patient 2 times a day  - Ambulate patient 2 times a day  - Out of bed to chair 2 times a day   - Out of bed for meals 2 times a day  - Out of bed for toileting  - Record patient progress and toleration of activity level   Outcome: Progressing     Problem: Nutrition/Hydration-ADULT  Goal: Nutrient/Hydration intake appropriate for improving, restoring or maintaining nutritional needs  Description: Monitor and assess patient's nutrition/hydration status for malnutrition  Collaborate with interdisciplinary team and initiate plan and interventions as ordered  Monitor patient's weight and dietary intake as ordered or per policy   Utilize nutrition screening tool and intervene as necessary  Determine patient's food preferences and provide high-protein, high-caloric foods as appropriate       INTERVENTIONS:  - Monitor oral intake, urinary output, labs, and treatment plans  - Assess nutrition and hydration status and recommend course of action  - Evaluate amount of meals eaten  - Assist patient with eating if necessary   - Allow adequate time for meals  - Recommend/ encourage appropriate diets, oral nutritional supplements, and vitamin/mineral supplements  - Order, calculate, and assess calorie counts as needed  - Recommend, monitor, and adjust tube feedings and TPN/PPN based on assessed needs  - Assess need for intravenous fluids  - Provide specific nutrition/hydration education as appropriate  - Include patient/family/caregiver in decisions related to nutrition  Outcome: Progressing     Problem: INFECTION - ADULT  Goal: Absence or prevention of progression during hospitalization  Description: INTERVENTIONS:  - Assess and monitor for signs and symptoms of infection  - Monitor lab/diagnostic results  - Monitor all insertion sites, i e  indwelling lines, tubes, and drains  - Monitor endotracheal if appropriate and nasal secretions for changes in amount and color  - [place] appropriate cooling/warming therapies per order  - Administer medications as ordered  - Instruct and encourage patient and family to use good hand hygiene technique  - Identify and instruct in appropriate isolation precautions for identified infection/condition  Outcome: Progressing  Goal: Absence of fever/infection during neutropenic period  Description: INTERVENTIONS:  - Monitor WBC    Outcome: Progressing     Problem: SAFETY ADULT  Goal: Maintain or return to baseline ADL function  Description: INTERVENTIONS:  -  Assess patient's ability to carry out ADLs; assess patient's baseline for ADL function and identify physical deficits which impact ability to perform ADLs (bathing, care of mouth/teeth, toileting, grooming, dressing, etc )  - Assess/evaluate cause of self-care deficits   - Assess range of motion  - Assess patient's mobility; develop plan if impaired  - Assess patient's need for assistive devices and provide as appropriate  - Encourage maximum independence but intervene and supervise when necessary  - Involve family in performance of ADLs  - Assess for home care needs following discharge   - Consider OT consult to assist with ADL evaluation and planning for discharge  - Provide patient education as appropriate  Outcome: Progressing  Goal: Maintains/Returns to pre admission functional level  Description: INTERVENTIONS:  - Perform BMAT or MOVE assessment daily    - Set and communicate daily mobility goal to care team and patient/family/caregiver  - Collaborate with rehabilitation services on mobility goals if consulted  - Perform Range of Motion 2 times a day  - Reposition patient every 2 hours    - Dangle patient 2 times a day  - Stand patient 2 times a day  - Ambulate patient 2 times a day  - Out of bed to chair 2 times a day   - Out of bed for meals 2 times a day  - Out of bed for toileting  - Record patient progress and toleration of activity level   Outcome: Progressing  Goal: Patient will remain free of falls  Description: INTERVENTIONS:  - Educate patient/family on patient safety including physical limitations  - Instruct patient to call for assistance with activity   - Consult OT/PT to assist with strengthening/mobility   - Keep Call bell within reach  - Keep bed low and locked with side rails adjusted as appropriate  - Keep care items and personal belongings within reach  - Initiate and maintain comfort rounds  - Make Fall Risk Sign visible to staff  - Offer Toileting every 2 Hours, in advance of need  - Initiate/Maintain bed alarm  - Obtain necessary fall risk management equipment:   - Apply yellow socks and bracelet for high fall risk patients  - Consider moving patient to room near nurses station  Outcome: Progressing     Problem: Prexisting or High Potential for Compromised Skin Integrity  Goal: Skin integrity is maintained or improved  Description: INTERVENTIONS:  - Identify patients at risk for skin breakdown  - Assess and monitor skin integrity  - Assess and monitor nutrition and hydration status  - Monitor labs   - Assess for incontinence   - Turn and reposition patient  - Assist with mobility/ambulation  - Relieve pressure over bony prominences  - Avoid friction and shearing  - Provide appropriate hygiene as needed including keeping skin clean and dry  - Evaluate need for skin moisturizer/barrier cream  - Collaborate with interdisciplinary team   - Patient/family teaching  - Consider wound care consult   Outcome: Progressing

## 2022-12-12 NOTE — SEDATION DOCUMENTATION
Therapeutic paracentesis completed with removal 4600ml clear yellow fluid removed  Band aid to site  Transferred back to room via stretcher  Report and care given to primary RN

## 2022-12-12 NOTE — ASSESSMENT & PLAN NOTE
Malnutrition Findings:     BMI Findings: Body mass index is 17 61 kg/m²     Nutrition consult and recommendation

## 2022-12-12 NOTE — ASSESSMENT & PLAN NOTE
· Suspect multifactorial in setting of oral potassium, acute kidney injury, dehydration  · Discussed with nephrology:  · IV insulin/dextrose, IV calcium, Kayexalate  · Repeat BMP in 4 hours and repeat treatment as needed  · Per goals of care discussion will not upgrade to telemetry bed  · Discontinue oral potassium supplementation  · Urinary retention protocol

## 2022-12-12 NOTE — PROGRESS NOTES
Progress Note - Palliative & Supportive Care  Melita Zaidi  [de-identified] y o   male  /-01   MRN: [id number]  Encounter: [id number]     Patient Active Problem List   Diagnosis   • Type 2 diabetes mellitus without complication, with long-term current use of insulin ([email or web address] )   • GERD (gastroesophageal reflux disease)   • Pancreatic mass   • History of pulmonary embolism   • Hyperbilirubinemia   • Anxiety about health   • Pancreatic adenocarcinoma (HCC)   • Chemotherapy induced neutropenia (HCC)   • Hypokalemia   • Mild protein-calorie malnutrition (HCC)   • Acute exacerbation of chronic obstructive pulmonary disease (HCC)   • Dehydration   • Lethargy   • SHRAVAN (acute kidney injury) ([facility] [address] )   • Ascites       Assessment/Problems Actively Addressed this Visit:  Unresectable pancreatic cancer  Mesenteric metastasis  Ascites requiring paracentesis  SHRAVAN  Weakness  Lethargy  Nausea  Vomiting  Failure to thrive   Chronic diarrhea  Anxiety about health  Cancer related pain  Mild protein calorie malnutrtion  Goals of care counseling  Hyperkalemia     Plan:  1  Symptom Management  -agree with oxycodone PRN severe pain and IV hydromorphone 0 5MG Q3H PRN  -xanax 0 25MG Q6H PRN OR IV lorazepam 0 5MG Q6H PRN   -Add senna twice daily  -Add MiraLAX daily PRN    2  Goals of Care / Recommendations   · Long discussion with Sherwin Paris and his POA Rohini today, given Sebastien's decline, including hypotension and electrolyte abnormalities  · Sherwin Paris refuses to hear anything "negative"; he still agrees to hospice care at discharge  For now, he demands that I give him "hope"  Per Sherwin Paris, "don't cut me off "  · Attempted to provide emotional support yet balance realistic medical update  · Per Rohini, she would like to focus on Sebastien's comfort however wishes he would "accept it"  · For now, both parties agree with conservative medical therapies with purpose of comfort  Proceed with palliative paracentesis if able   We are providing PRN comfort medications so Hemant Alberto does not suffer  · Rohini agrees with not escalating cares such as telemetry monitoring  If Hemant Alberto declines abruptly, plan to transition to full comfort cares  Hemant Alberto does not want to suffer  · Rohini plans to visit Hemant Alberto tomorrow  Further recommendations forthcoming  · Level 3 code status maintained  24 History  Chart reviewed before visit  Hemant Alberto developed significant hypotension over the weekend  He also had significant abdominal pain and is scheduled for another paracentesis  He received low-dose opioid however medical team also cautious of his blood pressures  Repeat lab work this morning showed hyperkalemia however he has been receiving potassium supplementation and this is now discontinued  He is being treated for this  Long goals of care discussion today with Hemant Alberto and his power of  separately  Hemant Alberto expresses that he is not able to cope with negative news and does not allow this provider to speak further  Instead, he demands I tell him a positive medical report which I am unable to do and I expressed this to him  He gives me permission to speak with his sister who is his power of   Nena Hobson expresses full understanding of medical updates  She reports, "I know he is dying--I wish he could accept it!"  Nena Hobson has a lot of compassion for Hemant Alberto and he said that he is so fearful of death  She requests we give Hemant Alberto any emotional support possible to help him to process where he is with his health  Nena Hobson would like to discontinue IV medical treatments and focus on his comfort however she agrees with waiting until Hemant Alberto is more receptive to this  I explained that his blood work came back with a significant potassium abnormality and that we will treat conservatively  She is agreeable  She is agreeable to him receiving comfort medications for pain and anxiety so he does not suffer  Nena Joce plans to visit tomorrow with her     They will work on cleaning out his apartment which she states is in a sad state and she feels remorse that he was allowed to live there conditions like that  She wishes she could do more but notes Javier Dee is a private man and does not allow family to know his personal matters  Héctor Dunn will speak with Javier Dee tomorrow however she does not believe he will allow her to talk about end-of-life matters because he was not receptive to this last Saturday when she visited  Decisional apparatus:  Patient is possibly competent on exam today  If competence is lost, patient's substitute decision maker would default to Rohini [place]  Advance Directive/Living Will/POLST: Rohini and her spouse are Sebastien's POAs  Review of Systems:   Review of Systems   Unable to perform ROS: psychiatric disorder   Constitutional: Positive for decreased appetite  Negative for weight gain  HENT: Negative for congestion  Musculoskeletal: Negative for arthritis and back pain  Gastrointestinal: Negative for bloating, abdominal pain, nausea and vomiting  Genitourinary: Negative for bladder incontinence and flank pain         Medications    Current Facility-Administered Medications:   •  acetaminophen (TYLENOL) tablet 650 mg, 650 mg, Oral, Q6H PRN, Syl Gutierrez PA-C, 650 mg at 12/09/22 0386  •  ALPRAZolam Doretha Chough) tablet 0 25 mg, 0 25 mg, Oral, TID PRN, Syl Deniseaine, PA-C, 0 25 mg at 12/12/22 0147  •  apixaban (ELIQUIS) tablet 5 mg, 5 mg, Oral, BID, Syl Gutierrez PA-C, 5 mg at 12/12/22 1227  •  calcium carbonate (TUMS) chewable tablet 500 mg, 500 mg, Oral, Daily PRN, Anna Sanchez MD, 500 mg at 12/06/22 1024  •  HYDROmorphone (DILAUDID) injection 0 5 mg, 0 5 mg, Intravenous, Q6H PRN, Ana Matos PA-C, 0 5 mg at 12/10/22 2003  •  insulin lispro (HumaLOG) 100 units/mL subcutaneous injection 1-5 Units, 1-5 Units, Subcutaneous, TID AC, 1 Units at 12/11/22 1217 **AND** Fingerstick Glucose (POCT), , , TID AC, Teofilo Gutierrez PA-C  •  insulin lispro (HumaLOG) 100 units/mL subcutaneous injection 1-5 Units, 1-5 Units, Subcutaneous, HS, Teofilo Enriquez PA-C, 1 Units at 12/10/22 2155  •  metoclopramide (REGLAN) injection 10 mg, 10 mg, Intravenous, Q6H PRN, Natali Momin PA-C, 10 mg at 12/05/22 2138  •  ondansetron [facility]) injection 4 mg, 4 mg, Intravenous, Q4H PRN, Nuha Mathis MD, 4 mg at 12/11/22 0150  •  ondansetron (ZOFRAN-ODT) dispersible tablet 4 mg, 4 mg, Oral, Q8H [facility], TAMIKA Cheng, 4 mg at 12/12/22 9709  •  oxyCODONE (ROXICODONE) IR tablet 5 mg, 5 mg, Oral, Q4H PRN, Mirella Kapoor PA-C, 5 mg at 12/12/22 0150  •  pantoprazole (PROTONIX) EC tablet 40 mg, 40 mg, Oral, BID AC, TAMIKA Cheng, 40 mg at 12/12/22 5928  •  potassium chloride (K-DUR,KLOR-CON) CR tablet 40 mEq, 40 mEq, Oral, Daily, Teofilo Gutierrez PA-C, 40 mEq at 12/12/22 9126  •  pravastatin (PRAVACHOL) tablet 20 mg, 20 mg, Oral, Daily, Teofilo Gutierrez PA-C, 20 mg at 12/12/22 6162    Objective  BP (!) 75/50 (BP Location: Right arm)   Pulse 92   Temp 97 9 °F (36 6 °C) (Oral)   Resp 19   Ht 5' 7" (1 702 m)   Wt 51 kg (112 lb 7 oz)   SpO2 99%   BMI 17 61 kg/m²     Physical Exam  Vitals and nursing note reviewed  Exam conducted with a chaperone present  Constitutional:       General: He is not in acute distress  Appearance: He is cachectic  He is ill-appearing  Interventions: Nasal cannula in place  Comments: The patient appears frail and cachectic  He is fully conversational however evasive of some topics  Prominent muscle wasting  HENT:      Head: Normocephalic and atraumatic  Eyes:      General:         Right eye: No discharge  Left eye: No discharge  Conjunctiva/sclera: Conjunctivae normal    Cardiovascular:      Rate and Rhythm: Normal rate and regular rhythm  Heart sounds: No murmur heard    Pulmonary:      Effort: Pulmonary effort is normal  No respiratory distress  Breath sounds: Normal breath sounds  Abdominal:      General: There is distension  Tenderness: There is no abdominal tenderness  There is no guarding  Musculoskeletal:         General: Deformity (Fat loss and muscle wasting present ) present  No swelling  Cervical back: Neck supple  Skin:     General: Skin is warm and dry  Capillary Refill: Capillary refill takes less than 2 seconds  Neurological:      Mental Status: He is alert  Psychiatric:         Attention and Perception: Attention normal          Mood and Affect: Mood is anxious  Affect is flat  Lab Results:   I have personally reviewed pertinent labs  , CBC:   Lab Results   Component Value Date    WBC 3 46 (L) 12/12/2022    HGB 10 3 (L) 12/12/2022    HCT 31 8 (L) 12/12/2022    MCV 92 12/12/2022     12/12/2022    MCH 29 7 12/12/2022    MCHC 32 4 12/12/2022    RDW 16 2 (H) 12/12/2022    MPV 10 3 12/12/2022   , CMP:   Lab Results   Component Value Date    SODIUM 134 (L) 12/12/2022    K 7 0 (HH) 12/12/2022     12/12/2022    CO2 20 (L) 12/12/2022    BUN 76 (H) 12/12/2022    CREATININE 2 00 (H) 12/12/2022    CALCIUM 8 5 12/12/2022    AST 24 12/12/2022    ALT 30 12/12/2022    ALKPHOS 206 (H) 12/12/2022    EGFR 30 12/12/2022     Imaging Studies: I have personally reviewed pertinent reports  EKG, Pathology, and Other Studies: I have personally reviewed pertinent reports  Counseling / Coordination of Care  Total floor / unit time spent today 75 minutes  Greater than 50% of total time was spent with the patient and / or family counseling and / or coordinating of care   A description of the counseling / coordination of care: reviewed chart, reviewed lab values, reviewed imaging, provided medical updates, discussed palliative care and symptom management, discussed hospice care and comfort care, opioid titration, discussed goals of care, discussed advanced directives, assessed POA/HCA, provided supportive listening, provided anticipatory guidance, provided psychosocial and emotional support, assessed competency and decision-making and facilitated interdisciplinary communication  Reviewed with ALEXANDER team, RN and CM  ASHLEY Reyna  Palliative & Supportive Care    Portions of this document may have been created using dictation software and as such some "sound alike" terms may have been generated by the system  Do not hesitate to contact me with any questions or clarifications

## 2022-12-12 NOTE — ASSESSMENT & PLAN NOTE
· Baseline Cr 0 8-1  · Cr on admission 1 64, suspect prerenal dehydration, patient is with poor PO intake   · IVF discontinued 12/8 as patient's appetite had improved  · On repeat labs creatinine 2 0 suspect related to hypotension  · S/p IV albumin this morning  · We will also start IVF for blood pressure support  · Repeat BMP at 1400

## 2022-12-12 NOTE — ASSESSMENT & PLAN NOTE
· CT chest abdomen pelvis showed-" Massive ascites, developing since a CT from 10/28/2022  No new pancreatic head tumor poorly evaluated in the absence of IV and enteric contrast   Grossly unchanged in size  Previously demonstrated mesenteric metastases not visualized with certainty on this examination, most likely due to combination of ascites, mesenteric edema and absence of IV contrast   No evidence of new metastases in the chest, abdomen or pelvis  Stable small pulmonary nodules    Generalized dilatation of fluid-filled esophagus, consistent with esophageal dysmotility, reflux or, less likely, distal esophageal obstruction  · IR consult for paracentesis  · Gram stain negative for growth  · GI consult regarding esophageal dysmotility-signed off as patient will be transitioning to hospice, continue supportive measures  · Barium swallow -negative for dysmotility or stricture  · Appears to have some re-accumulation of fluid, will order repeat paracentesis for comfort prior to discharge

## 2022-12-12 NOTE — NURSING NOTE
Pt's K noted to be 7 again  Provider notified  Anticipate orders for Kayexelate, D50, and Novolog  Lanakalyn Messer agreed still no telemetry need @ this time

## 2022-12-12 NOTE — NURSING NOTE
Pt awake during most of night; slept only approx 2 hrs after PRN meds administered  Assisted OOB to [facility] this am; angle well; passed flatus, no BM

## 2022-12-12 NOTE — ASSESSMENT & PLAN NOTE
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  · initiated on chemotherapy April 2022, most recently on Abraxane and Gemzar, patient states he is not currently receiving chemotherapy, thinks his last infusion was within the past several months  He does receive hydration infusion every Tuesday  · S/P radiation in august  · Seen in office 11/20 01/2022 with Dr Ej Jennings --> at that time was documented with progressively reduced appetite, weakness, poor family support, notably was falling asleep during office visit  Reportedly had canceled his chemo cycle 8 due to weakness     · Patient was seen again in office today 12/5, referred to ED by Dr Ej Jennings   · Palliative care consult

## 2022-12-12 NOTE — PROGRESS NOTES
[place]     Progress Note - Melita Zaidi 1940, [de-identified] y o  male MRN: [id number]  Unit/Bed#: -01 Encounter: [id number]  Primary Care Provider: Magaly Sotelo   Date and time admitted to hospital: 12/5/2022  2:00 PM    * Lethargy  Assessment & Plan  · Referred to ED by OP Oncology due to report of lethargy/weakness after begin seen in office 12/5  Appears to be ongoing since 11/21 where lethargy/progressive decline in funcitonal status is noted in Onc note  Endorses abdominal pain, nausea/vomiting, diarrhea  · SIRS:  No leukocytosis, no fever, no tachycardia, no tachypnea  · Lactic is normal  · Procalcitonin slightly elevated, repeat morning  · No evidence/source of infection at this time  · UA is unremarkable  · CXR is unremarkable  · Blood culture negative x2 to date  · PT/OT eval   · Palliative care consult due to progressive decline in setting of metastatic cancer   · Family agreeable to hospice-patient for discharge to SNF, and prior mental health hospitalization, patient requires options process for placement  Case management following    Goals of care, counseling/discussion  Assessment & Plan  · Ongoing goals of care discussions held between palliative care, patient and family (patient's sister/POA)  · Patient's sister appears to be very realistic regarding patient's prognosis and that he is likely declining  Patient appears overwhelmed with medical updates and requests to hear "nothing negative, only things to give hope" but seems to understand hospice on discharge is appropriate  · Per palliative care discussion, no escalation in cares  Family and patient aware of potassium level    Will continue medical management (I e  medications, IVF) for now to treat hyperkalemia/dehydration but as patient and family are leaning towards comfort cares will not pursue cardiac monitoring per their wishes  · Level 3 DNR/DNI   · Patient's sister will be coming to visit patient tomorrow for ongoing goals of care discussions    Hyperkalemia  Assessment & Plan  · Suspect multifactorial in setting of oral potassium, acute kidney injury, dehydration  · Discussed with nephrology:  · IV insulin/dextrose, IV calcium, Kayexalate  · Repeat BMP in 4 hours and repeat treatment as needed  · Per goals of care discussion will not upgrade to telemetry bed  · Discontinue oral potassium supplementation  · Urinary retention protocol    Ascites  Assessment & Plan  · CT chest abdomen pelvis showed-" Massive ascites, developing since a CT from 10/28/2022  No new pancreatic head tumor poorly evaluated in the absence of IV and enteric contrast   Grossly unchanged in size  Previously demonstrated mesenteric metastases not visualized with certainty on this examination, most likely due to combination of ascites, mesenteric edema and absence of IV contrast   No evidence of new metastases in the chest, abdomen or pelvis  Stable small pulmonary nodules    Generalized dilatation of fluid-filled esophagus, consistent with esophageal dysmotility, reflux or, less likely, distal esophageal obstruction  · IR consult for paracentesis  · Gram stain negative for growth  · GI consult regarding esophageal dysmotility-signed off as patient will be transitioning to hospice, continue supportive measures  · Barium swallow -negative for dysmotility or stricture  · Appears to have some re-accumulation of fluid, will order repeat paracentesis for comfort prior to discharge    SHRAVAN (acute kidney injury) ([address] )  Assessment & Plan  · Baseline Cr 0 8-1  · Cr on admission 1 64, suspect prerenal dehydration, patient is with poor PO intake   · IVF discontinued 12/8 as patient's appetite had improved  · On repeat labs creatinine 2 0 suspect related to hypotension  · S/p IV albumin this morning  · We will also start IVF for blood pressure support  · Repeat BMP at 1400    Mild protein-calorie malnutrition ([address] )  Assessment & Plan  Malnutrition Findings:     BMI Findings: Body mass index is 17 61 kg/m²  Nutrition consult and recommendation    Pancreatic adenocarcinoma [facility])  Assessment & Plan  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  · initiated on chemotherapy April 2022, most recently on Abraxane and Gemzar, patient states he is not currently receiving chemotherapy, thinks his last infusion was within the past several months  He does receive hydration infusion every Tuesday  · S/P radiation in august  · Seen in office 11/20 01/2022 with Dr Shima Borrego --> at that time was documented with progressively reduced appetite, weakness, poor family support, notably was falling asleep during office visit  Reportedly had canceled his chemo cycle 8 due to weakness  · Patient was seen again in office today 12/5, referred to ED by Dr Shima Borrego   · Palliative care consult     History of pulmonary embolism  Assessment & Plan  · Continue Eliquis 5 mg b i d  Type 2 diabetes mellitus without complication, with long-term current use of insulin ([facility])  Assessment & Plan  Lab Results   Component Value Date    HGBA1C 6 1 08/28/2021     · Hold home metformin  · ISS and accucheks   · Monitor glucose closely after IV insulin/dextrose given for hyperkalemia    VTE Pharmacologic Prophylaxis: VTE Score: 7 High Risk (Score >/= 5) - Pharmacological DVT Prophylaxis Ordered: apixaban (Eliquis)  Sequential Compression Devices Ordered  Patient Centered Rounds: I performed bedside rounds with nursing staff today  Discussions with Specialists or Other Care Team Provider: CM, palliative care AP    Education and Discussions with Family / Patient: Extensive updates provided to patient's sister/POA by Palliative care        Time Spent for Care: 45 minutes   More than 50% of total time spent on counseling and coordination of care as described above     Current Length of Stay: 7 day(s)  Current Patient Status: Inpatient   Certification Statement: The patient will continue to require additional inpatient hospital stay due to ongoing goals of care discussions, possible SNF placement for hospice cares  Discharge Plan: Anticipate discharge in 24-48 hrs to SNF    Code Status: Level 3 - DNAR and DNI    Subjective:   States he feels okay this morning and is pleased that he does not have any abdominal pain  Expresses that he does not want to hear anything negative and that he cannot process a lot of information at once  He is agreeable to medications, blood draws and paracentesis today  Objective:     Vitals:   Temp (24hrs), Av 4 °F (36 3 °C), Min:97 °F (36 1 °C), Max:97 9 °F (36 6 °C)    Temp:  [97 °F (36 1 °C)-97 9 °F (36 6 °C)] 97 9 °F (36 6 °C)  HR:  [] 94  Resp:  [18-25] 19  BP: (75-93)/(50-61) 87/53  SpO2:  [87 %-99 %] 94 %  Body mass index is 17 61 kg/m²  Input and Output Summary (last 24 hours): Intake/Output Summary (Last 24 hours) at 2022 1146  Last data filed at 2022 1105  Gross per 24 hour   Intake 150 ml   Output 1156 ml   Net -1006 ml       Physical Exam:   Physical Exam  Vitals and nursing note reviewed  Constitutional:       General: He is not in acute distress  Appearance: He is cachectic  HENT:      Head: Normocephalic and atraumatic  Eyes:      General: No scleral icterus  Extraocular Movements: Extraocular movements intact  Conjunctiva/sclera: Conjunctivae normal    Cardiovascular:      Rate and Rhythm: Normal rate and regular rhythm  Heart sounds: S1 normal and S2 normal  No murmur heard  Pulmonary:      Effort: Pulmonary effort is normal  No respiratory distress  Breath sounds: Normal breath sounds  No wheezing, rhonchi or rales  Abdominal:      General: There is distension  Palpations: Abdomen is soft  Tenderness: There is no abdominal tenderness   There is no guarding or rebound  Musculoskeletal:         General: No swelling  Cervical back: Normal range of motion  Comments: Able to move upper/lower ext bilaterally, no edema   Skin:     General: Skin is warm and dry  Capillary Refill: Capillary refill takes less than 2 seconds  Neurological:      Mental Status: He is alert and oriented to person, place, and time  Psychiatric:         Mood and Affect: Mood is depressed  Speech: Speech normal          Behavior: Behavior is withdrawn            Additional Data:     Labs:  Results from last 7 days   Lab Units 12/12/22  0846 12/07/22  0555   WBC Thousand/uL 3 46* 6 28   HEMOGLOBIN g/dL 10 3* 10 0*   HEMATOCRIT % 31 8* 31 4*   PLATELETS Thousands/uL 162 187   BANDS PCT % 5  --    NEUTROS PCT %  --  88*   LYMPHS PCT %  --  3*   LYMPHO PCT % 2*  --    MONOS PCT %  --  8   MONO PCT % 2*  --    EOS PCT % 0 0     Results from last 7 days   Lab Units 12/12/22  0846   SODIUM mmol/L 134*   POTASSIUM mmol/L 7 0*   CHLORIDE mmol/L 105   CO2 mmol/L 20*   BUN mg/dL 76*   CREATININE mg/dL 2 00*   ANION GAP mmol/L 9   CALCIUM mg/dL 8 5   ALBUMIN g/dL 3 0*   TOTAL BILIRUBIN mg/dL 0 80   ALK PHOS U/L 206*   ALT U/L 30   AST U/L 24   GLUCOSE RANDOM mg/dL 149*     Results from last 7 days   Lab Units 12/05/22  1437   INR  1 01     Results from last 7 days   Lab Units 12/12/22  1053 12/12/22  0730 12/11/22  2118 12/11/22  1625 12/11/22  1111 12/11/22  0723 12/10/22  2154 12/10/22  1652 12/10/22  1122 12/10/22  0727 12/09/22  2109 12/09/22  1640   POC GLUCOSE mg/dl 206* 131 120 124 165* 112 166* 159* 200* 118 185* 177*         Results from last 7 days   Lab Units 12/06/22  0615 12/05/22  1437   LACTIC ACID mmol/L  --  1 8   PROCALCITONIN ng/ml 0 48* 0 43*       Lines/Drains:  Invasive Devices     Central Venous Catheter Line  Duration           Port A Cath 04/11/22 Right Chest 244 days          Peripheral Intravenous Line  Duration           Peripheral IV 12/09/22 Dorsal (posterior); Right Forearm 3 days                Central Line:  Goal for removal: N/A - Chronic PICC             Imaging: No pertinent imaging reviewed  Recent Cultures (last 7 days):   Results from last 7 days   Lab Units 12/06/22  1318 12/05/22  1456 12/05/22  1437   BLOOD CULTURE   --  No Growth After 5 Days  No Growth After 5 Days  GRAM STAIN RESULT  Rare Polys  No organisms seen  --   --    BODY FLUID CULTURE, STERILE  No growth  --   --        Last 24 Hours Medication List:   Current Facility-Administered Medications   Medication Dose Route Frequency Provider Last Rate   • acetaminophen  650 mg Oral Q6H PRN Sotero Gutierrez PA-C     • LORazepam  0 5 mg Intravenous Q6H PRN Katrin Garcia PA-C      Or   • ALPRAZolam  0 25 mg Oral Q6H PRN Katrin Garcia PA-C     • apixaban  5 mg Oral BID Sotero Enriquez PA-C     • HYDROmorphone  0 5 mg Intravenous Q3H PRN Katrin Garcia PA-C     • insulin lispro  1-5 Units Subcutaneous TID AC Joyd Gutierrez PA-C     • insulin lispro  1-5 Units Subcutaneous HS Sotero Enriquez PA-C     • metoclopramide  10 mg Intravenous Q6H PRN Silvestre Kolb PA-C     • ondansetron  4 mg Intravenous Q4H PRN Marcos Patterson MD     • oxyCODONE  5 mg Oral Q4H PRN Usama Mike PA-C     • pantoprazole  40 mg Oral BID AC TAMIKA Ceballos     • polyethylene glycol  17 g Oral Daily PRN Katrin Garcia PA-C     • pravastatin  20 mg Oral Daily Sotero Enriquez PA-C     • senna  1 tablet Oral BID Katrin Garcia PA-C     • sodium chloride  75 mL/hr Intravenous Continuous Uasma Mike PA-C 75 mL/hr (12/12/22 1033)        Today, Patient Was Seen By: Usama Mike PA-C    **Please Note: This note may have been constructed using a voice recognition system  **

## 2022-12-12 NOTE — NURSING NOTE
Pt noted to have K of 7  Notified and discussed with provider  Both agreed to notify Nephrology and have them determine course of treatment  Nephrology recommended D50, Calcium Gluconate, Novolog, and Kayexelate  Nurse discussed telemetry indication with hyperkalemia but provider and palliative PA agreed that it was not necessary as pt is Level 3 and his plan is for hospice  ECG retrieved from pt and provider notified of findings  Will continue to routinely assess for S/S of cardiac arrhythmias and notify provider promptly if evident  Plan for repeat BMP @ 1400

## 2022-12-13 NOTE — PROGRESS NOTES
[place]     Progress Note - Eliceo Oglesby 1940, [de-identified] y o  male MRN: [id number]  Unit/Bed#: -01 Encounter: [id number]  Primary Care Provider: Jannette Cardenas   Date and time admitted to hospital: 12/5/2022  2:00 PM    * Lethargy  Assessment & Plan  · Referred to ED by OP Oncology due to report of lethargy/weakness after begin seen in office 12/5  Appears to be ongoing since 11/21 where lethargy/progressive decline in funcitonal status is noted in Onc note  Endorses abdominal pain, nausea/vomiting, diarrhea  · SIRS:  No leukocytosis, no fever, no tachycardia, no tachypnea  · Lactic is normal  · Procalcitonin slightly elevated, repeat morning  · No evidence/source of infection at this time  · UA is unremarkable  · CXR is unremarkable  · Blood culture negative x2 to date  · PT/OT eval   · Palliative care consult due to progressive decline in setting of metastatic cancer   · Family agreeable to hospice-patient for discharge to SNF, and prior mental health hospitalization, patient requires options process for placement  Case management following    Goals of care, counseling/discussion  Assessment & Plan  · 12/12: Ongoing goals of care discussions held between palliative care, patient and family (patient's sister/POA)  · Patient's sister appears to be very realistic regarding patient's prognosis and that he is likely declining  Patient appears overwhelmed with medical updates and requests to hear "nothing negative, only things to give hope" but seems to understand hospice on discharge is appropriate  · Per palliative care discussion, no escalation in cares  Family and patient aware of potassium level    Will continue medical management (I e  medications, IVF) for now to treat hyperkalemia/dehydration but as patient and family are leaning towards comfort cares will not pursue cardiac monitoring per their wishes  · Level 3 DNR/DNI   · Potassium/creatinine and BP have stabilized, after discussion with sister will still pursue SNF placement for hospice cares unless decline recurs to the point he is unstable for transport or level of care needs to be changed (I e  IP hospice)  Patient still requesting medical support (IVF, labs, treatment of potassium/electrolyte abnormalities) while inpatient  · Patient's sister will be coming to visit patient today - requests if pastoral care could provide grief support for patient  Palliative to reach out to on-call pastoral care for assistance    Hyperkalemia  Assessment & Plan  · Suspect multifactorial in setting of oral potassium, acute kidney injury, dehydration  · Discussed with nephrology 12/12:  · IV insulin/dextrose, IV calcium, Kayexalate  · Required repeat overnight for persistent hyperkalemia  · Per goals of care discussion will not upgrade to telemetry bed  · Improved to 5 6 this AM - continue IVF  · Discontinue oral potassium supplementation  · Urinary retention protocol    Ascites  Assessment & Plan  · CT chest abdomen pelvis showed-" Massive ascites, developing since a CT from 10/28/2022  No new pancreatic head tumor poorly evaluated in the absence of IV and enteric contrast   Grossly unchanged in size  Previously demonstrated mesenteric metastases not visualized with certainty on this examination, most likely due to combination of ascites, mesenteric edema and absence of IV contrast   No evidence of new metastases in the chest, abdomen or pelvis  Stable small pulmonary nodules    Generalized dilatation of fluid-filled esophagus, consistent with esophageal dysmotility, reflux or, less likely, distal esophageal obstruction  · IR consult for paracentesis  · Gram stain negative for growth  · GI consult regarding esophageal dysmotility-signed off as patient will be transitioning to hospice, continue supportive measures  · Barium swallow -negative for dysmotility or stricture  · S/p paracentesis 12/12 - 4 6 L removed    SHRAVAN (acute kidney injury) ([facility] )  Assessment & Plan  · Baseline Cr 0 8-1  · Cr on admission 1 64, suspect prerenal dehydration, patient is with poor PO intake   · IVF discontinued 12/8 as patient's appetite had improved  · On repeat labs 12/12 creatinine 2 0 suspect related to hypotension  · Restarted IVF 12/12 for blood pressure support per pt request  · Creatinine improving to 1 62  · Urinary retention protocol    Mild protein-calorie malnutrition ([facility] )  Assessment & Plan  Malnutrition Findings:     BMI Findings: Body mass index is 17 99 kg/m²  Nutrition consult and recommendation    Pancreatic adenocarcinoma [facility])  Assessment & Plan  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  · initiated on chemotherapy April 2022, most recently on Abraxane and Gemzar, patient states he is not currently receiving chemotherapy, thinks his last infusion was within the past several months  He does receive hydration infusion every Tuesday  · S/P radiation in august  · Seen in office 11/20 01/2022 with Dr Jordyn Butcher --> at that time was documented with progressively reduced appetite, weakness, poor family support, notably was falling asleep during office visit  Reportedly had canceled his chemo cycle 8 due to weakness  · Patient was seen again in office today 12/5, referred to ED by Dr Jordyn Butcher   · Palliative care following    History of pulmonary embolism  Assessment & Plan  · Continue Eliquis 5 mg b i d  Type 2 diabetes mellitus without complication, with long-term current use of insulin [facility])  Assessment & Plan  Lab Results   Component Value Date    HGBA1C 6 1 08/28/2021     · Hold home metformin  · ISS and accucheks     VTE Pharmacologic Prophylaxis: VTE Score: 7 High Risk (Score >/= 5) - Pharmacological DVT Prophylaxis Ordered: apixaban (Eliquis)  Sequential Compression Devices Ordered      Patient Centered Rounds: I performed bedside rounds with nursing staff today  Discussions with Specialists or Other Care Team Provider: CM, palliative care AP    Education and Discussions with Family / Patient: Updated  (sister) via phone  Time Spent for Care: 30 minutes  More than 50% of total time spent on counseling and coordination of care as described above  Current Length of Stay: 8 day(s)  Current Patient Status: Inpatient   Certification Statement: The patient will continue to require additional inpatient hospital stay due to disposition planning  Discharge Plan: Anticipate discharge in 24-48 hrs to SNF for hospice cares    Code Status: Level 3 - DNAR and DNI    Subjective:   Anxious this morning, continually requesting that he receive no negative updates today regarding his medical conditions  Notes improvement of abdominal pain s/p paracentesis yesterday and also had a bowel movement overnight  Discussed improvement in blood work due to medical intervention but concern that this likely will recur (SHRAVAN, dehydration, re-accumulation of ascites) given that we cannot fix the underlying issue (metastatic cancer)  Patient again asked that other providers not provide him with any other negative updates later today as he wants to try to be positive  Objective:     Vitals:   Temp (24hrs), Av 4 °F (36 3 °C), Min:97 3 °F (36 3 °C), Max:97 5 °F (36 4 °C)    Temp:  [97 3 °F (36 3 °C)-97 5 °F (36 4 °C)] 97 5 °F (36 4 °C)  HR:  [84-85] 85  Resp:  [14-20] 20  BP: ()/(56-67) 95/65  SpO2:  [95 %-99 %] 95 %  Body mass index is 17 99 kg/m²  Input and Output Summary (last 24 hours): Intake/Output Summary (Last 24 hours) at 2022 1129  Last data filed at 2022 6083  Gross per 24 hour   Intake 1180 ml   Output 5225 ml   Net -4045 ml       Physical Exam:   Physical Exam  Vitals and nursing note reviewed  Constitutional:       General: He is not in acute distress  Appearance: He is cachectic  HENT:      Head: Normocephalic and atraumatic  Eyes:      General: No scleral icterus  Extraocular Movements: Extraocular movements intact  Conjunctiva/sclera: Conjunctivae normal    Cardiovascular:      Rate and Rhythm: Normal rate and regular rhythm  Heart sounds: S1 normal and S2 normal  No murmur heard  Pulmonary:      Effort: Pulmonary effort is normal  No respiratory distress  Breath sounds: Normal breath sounds  No wheezing, rhonchi or rales  Abdominal:      General: Bowel sounds are normal  There is distension  Palpations: Abdomen is soft  Tenderness: There is no abdominal tenderness  There is no guarding or rebound  Musculoskeletal:         General: No swelling  Cervical back: Normal range of motion  Comments: Able to move upper/lower ext bilaterally, no edema   Skin:     General: Skin is warm and dry  Capillary Refill: Capillary refill takes less than 2 seconds  Neurological:      Mental Status: He is alert and oriented to person, place, and time  Psychiatric:         Mood and Affect: Mood is anxious            Additional Data:     Labs:  Results from last 7 days   Lab Units 12/13/22  0406 12/12/22  0846   WBC Thousand/uL 3 39* 3 46*   HEMOGLOBIN g/dL 9 8* 10 3*   HEMATOCRIT % 29 7* 31 8*   PLATELETS Thousands/uL 175 162   BANDS PCT %  --  5   NEUTROS PCT % 88*  --    LYMPHS PCT % 4*  --    LYMPHO PCT %  --  2*   MONOS PCT % 8  --    MONO PCT %  --  2*   EOS PCT % 0 0     Results from last 7 days   Lab Units 12/13/22  0406 12/12/22  1608 12/12/22  0846   SODIUM mmol/L 138   < > 134*   POTASSIUM mmol/L 5 6*   < > 7 0*   CHLORIDE mmol/L 108   < > 105   CO2 mmol/L 19*   < > 20*   BUN mg/dL 74*   < > 76*   CREATININE mg/dL 1 62*   < > 2 00*   ANION GAP mmol/L 11   < > 9   CALCIUM mg/dL 8 4   < > 8 5   ALBUMIN g/dL  --   --  3 0*   TOTAL BILIRUBIN mg/dL  --   --  0 80   ALK PHOS U/L  --   --  206*   ALT U/L  --   --  30   AST U/L  --   --  24   GLUCOSE RANDOM mg/dL 84   < > 149*    < > = values in this interval not displayed  Results from last 7 days   Lab Units 12/13/22  0707 12/12/22  2124 12/12/22  1305 12/12/22  1053 12/12/22  0730 12/11/22  2118 12/11/22  1625 12/11/22  1111 12/11/22  0723 12/10/22  2154 12/10/22  1652 12/10/22  1122   POC GLUCOSE mg/dl 90 62* 72 206* 131 120 124 165* 112 166* 159* 200*               Lines/Drains:  Invasive Devices     Central Venous Catheter Line  Duration           Port A Cath 04/11/22 Right Chest 245 days          Peripheral Intravenous Line  Duration           Peripheral IV 12/12/22 Left;Ventral (anterior) Forearm <1 day                Central Line:  Goal for removal: N/A - Chronic PICC             Imaging: No pertinent imaging reviewed      Recent Cultures (last 7 days):   Results from last 7 days   Lab Units 12/06/22  1318   GRAM STAIN RESULT  Rare Polys  No organisms seen   BODY FLUID CULTURE, STERILE  No growth       Last 24 Hours Medication List:   Current Facility-Administered Medications   Medication Dose Route Frequency Provider Last Rate   • acetaminophen  650 mg Oral Q6H PRN Sigifredo Gutierrez PA-C     • LORazepam  0 5 mg Intravenous Q6H PRN Jignesh Garcia PA-C      Or   • ALPRAZolam  0 25 mg Oral Q6H PRN Jignesh Garcia PA-C     • apixaban  5 mg Oral BID Sigifredo Enriquez PA-C     • HYDROmorphone  0 5 mg Intravenous Q3H PRN Jignesh Garcia PA-C     • insulin lispro  1-5 Units Subcutaneous TID AC Jody Gutierrez PA-C     • insulin lispro  1-5 Units Subcutaneous HS Sigifredo Enriquez PA-C     • metoclopramide  10 mg Intravenous Q6H PRN Myriam Summers PA-C     • ondansetron  4 mg Intravenous Q4H PRN Eduar Rascon MD     • oxyCODONE  5 mg Oral Q4H PRN Benito Robin PA-C     • pantoprazole  40 mg Oral BID AC TAMIKA Paula     • polyethylene glycol  17 g Oral Daily PRN Jignesh Garcia PA-C     • pravastatin  20 mg Oral Daily Kendell Enriquez PA-C     • senna  1 tablet Oral BID [place] Zeeshan Garcia PA-C     • sodium chloride  75 mL/hr Intravenous Continuous Carmen Mg 75 mL/hr (12/12/22 1940)        Today, Patient Was Seen By: Beltran Best PA-C    **Please Note: This note may have been constructed using a voice recognition system  **

## 2022-12-13 NOTE — ASSESSMENT & PLAN NOTE
· CT chest abdomen pelvis showed-" Massive ascites, developing since a CT from 10/28/2022  No new pancreatic head tumor poorly evaluated in the absence of IV and enteric contrast   Grossly unchanged in size  Previously demonstrated mesenteric metastases not visualized with certainty on this examination, most likely due to combination of ascites, mesenteric edema and absence of IV contrast   No evidence of new metastases in the chest, abdomen or pelvis  Stable small pulmonary nodules    Generalized dilatation of fluid-filled esophagus, consistent with esophageal dysmotility, reflux or, less likely, distal esophageal obstruction  · IR consult for paracentesis  · Gram stain negative for growth  · GI consult regarding esophageal dysmotility-signed off as patient will be transitioning to hospice, continue supportive measures  · Barium swallow -negative for dysmotility or stricture  · S/p paracentesis 12/12 - 4 6 L removed

## 2022-12-13 NOTE — MALNUTRITION/BMI
This medical record reflects one or more clinical indicators suggestive of malnutrition and/or morbid obesity  Malnutrition Findings:   Adult Malnutrition type: Acute illness  Adult Degree of Malnutrition: Other severe protein calorie malnutrition  Malnutrition Characteristics: Inadequate energy, Weight loss                  360 Statement: Pt presents with severe protein calorie malnutrition as evidenced by 12% wt loss in 3 months and <50% po intake> 5 days  Treat with CCD2, 2 gm K+ diet  Glucerna supplements BID  BMI Findings:  Adult BMI Classifications: Underweight < 18 5        Body mass index is 17 99 kg/m²  See Nutrition note dated 12/13/22  for additional details  Completed nutrition assessment is viewable in the nutrition documentation

## 2022-12-13 NOTE — ASSESSMENT & PLAN NOTE
· Suspect multifactorial in setting of oral potassium, acute kidney injury, dehydration  · Discussed with nephrology 12/12:  · IV insulin/dextrose, IV calcium, Kayexalate  · Required repeat overnight for persistent hyperkalemia  · Per goals of care discussion will not upgrade to telemetry bed  · Improved to 5 6 this AM - continue IVF  · Discontinue oral potassium supplementation  · Urinary retention protocol

## 2022-12-13 NOTE — PLAN OF CARE
Problem: MOBILITY - ADULT  Goal: Maintain or return to baseline ADL function  Description: INTERVENTIONS:  -  Assess patient's ability to carry out ADLs; assess patient's baseline for ADL function and identify physical deficits which impact ability to perform ADLs (bathing, care of mouth/teeth, toileting, grooming, dressing, etc )  - Assess/evaluate cause of self-care deficits   - Assess range of motion  - Assess patient's mobility; develop plan if impaired  - Assess patient's need for assistive devices and provide as appropriate  - Encourage maximum independence but intervene and supervise when necessary  - Involve family in performance of ADLs  - Assess for home care needs following discharge   - Consider OT consult to assist with ADL evaluation and planning for discharge  - Provide patient education as appropriate  Outcome: Progressing  Goal: Maintains/Returns to pre admission functional level  Description: INTERVENTIONS:  - Perform BMAT or MOVE assessment daily    - Set and communicate daily mobility goal to care team and patient/family/caregiver  - Collaborate with rehabilitation services on mobility goals if consulted  - Perform Range of Motion 2 times a day  - Reposition patient every 2 hours  - Dangle patient 2 times a day  - Stand patient 2 times a day  - Ambulate patient 2 times a day  - Out of bed to chair 2 times a day   - Out of bed for meals 2 times a day  - Out of bed for toileting  - Record patient progress and toleration of activity level   Outcome: Progressing     Problem: Nutrition/Hydration-ADULT  Goal: Nutrient/Hydration intake appropriate for improving, restoring or maintaining nutritional needs  Description: Monitor and assess patient's nutrition/hydration status for malnutrition  Collaborate with interdisciplinary team and initiate plan and interventions as ordered  Monitor patient's weight and dietary intake as ordered or per policy   Utilize nutrition screening tool and intervene as necessary  Determine patient's food preferences and provide high-protein, high-caloric foods as appropriate       INTERVENTIONS:  - Monitor oral intake, urinary output, labs, and treatment plans  - Assess nutrition and hydration status and recommend course of action  - Evaluate amount of meals eaten  - Assist patient with eating if necessary   - Allow adequate time for meals  - Recommend/ encourage appropriate diets, oral nutritional supplements, and vitamin/mineral supplements  - Order, calculate, and assess calorie counts as needed  - Recommend, monitor, and adjust tube feedings and TPN/PPN based on assessed needs  - Assess need for intravenous fluids  - Provide specific nutrition/hydration education as appropriate  - Include patient/family/caregiver in decisions related to nutrition  Outcome: Progressing     Problem: INFECTION - ADULT  Goal: Absence or prevention of progression during hospitalization  Description: INTERVENTIONS:  - Assess and monitor for signs and symptoms of infection  - Monitor lab/diagnostic results  - Monitor all insertion sites, i e  indwelling lines, tubes, and drains  - Monitor endotracheal if appropriate and nasal secretions for changes in amount and color  - [place] appropriate cooling/warming therapies per order  - Administer medications as ordered  - Instruct and encourage patient and family to use good hand hygiene technique  - Identify and instruct in appropriate isolation precautions for identified infection/condition  Outcome: Progressing  Goal: Absence of fever/infection during neutropenic period  Description: INTERVENTIONS:  - Monitor WBC    Outcome: Progressing     Problem: SAFETY ADULT  Goal: Maintain or return to baseline ADL function  Description: INTERVENTIONS:  -  Assess patient's ability to carry out ADLs; assess patient's baseline for ADL function and identify physical deficits which impact ability to perform ADLs (bathing, care of mouth/teeth, toileting, grooming, dressing, etc )  - Assess/evaluate cause of self-care deficits   - Assess range of motion  - Assess patient's mobility; develop plan if impaired  - Assess patient's need for assistive devices and provide as appropriate  - Encourage maximum independence but intervene and supervise when necessary  - Involve family in performance of ADLs  - Assess for home care needs following discharge   - Consider OT consult to assist with ADL evaluation and planning for discharge  - Provide patient education as appropriate  Outcome: Progressing  Goal: Maintains/Returns to pre admission functional level  Description: INTERVENTIONS:  - Perform BMAT or MOVE assessment daily    - Set and communicate daily mobility goal to care team and patient/family/caregiver  - Collaborate with rehabilitation services on mobility goals if consulted  - Perform Range of Motion 2 times a day  - Reposition patient every 2 hours    - Dangle patient 2 times a day  - Stand patient 2 times a day  - Ambulate patient 2 times a day  - Out of bed to chair 2 times a day   - Out of bed for meals 2 times a day  - Out of bed for toileting  - Record patient progress and toleration of activity level   Outcome: Progressing

## 2022-12-13 NOTE — ASSESSMENT & PLAN NOTE
Malnutrition Findings:     BMI Findings: Body mass index is 17 99 kg/m²     Nutrition consult and recommendation

## 2022-12-13 NOTE — CASE MANAGEMENT
Case Management Discharge Planning Note    Patient name Edith Miner  Location /-07 MRN [id number]  : 1940 Date 2022       Current Admission Date: 2022  Current Admission Diagnosis:Lethargy   Patient Active Problem List    Diagnosis Date Noted   • Goals of care, counseling/discussion 2022   • Hyperkalemia 2022   • Ascites 2022   • Lethargy 2022   • SHRAVAN (acute kidney injury) ([facility] [address] ) 2022   • Dehydration 10/22/2022   • Mild protein-calorie malnutrition ([facility] ) 2022   • Acute exacerbation of chronic obstructive pulmonary disease ([facility] ) 2022   • Hypokalemia 2022   • Chemotherapy induced neutropenia ([facility] ) 2022   • Pancreatic adenocarcinoma ([facility] ) 2022   • Type 2 diabetes mellitus without complication, with long-term current use of insulin (HCC)    • GERD (gastroesophageal reflux disease)    • Pancreatic mass    • History of pulmonary embolism    • Hyperbilirubinemia    • Anxiety about health       LOS (days): 8  Geometric Mean LOS (GMLOS) (days): 3 30  Days to GMLOS:-4 7     OBJECTIVE:  Risk of Unplanned Readmission Score: 25 49      Current admission status: Inpatient   Preferred Pharmacy:   [id number] Kailyn Carlson  Phone: [phone] Fax: [address], \[facility] [place] [facility]\"" [address]  [address] [address] [address]  Phone: [phone] Fax: [address], [address]  [address]  [address]  [address]  Phone: [phone] Fax: [phone]    Primary Care Provider: Jv Avendano    Primary Insurance: Leticia Sánchez [facility] REP  Secondary Insurance:     DISCHARGE DETAILS:    Discharge planning discussed with[de-identified] patient and his sister  Freedom of Choice: Yes  Comments - Freedom of Choice: Pt chose [address] from [facility] contacted family/caregiver?: Yes  Were Treatment Team discharge recommendations reviewed with patient/caregiver?: Yes  Did patient/caregiver verbalize understanding of patient care needs?: Yes  Were patient/caregiver advised of the risks associated with not following Treatment Team discharge recommendations?: Yes    Contacts  Patient Contacts: sister Oda Spurling  Relationship to Patient[de-identified] Family  Contact Method: Phone  Phone Number: [phone]  Reason/Outcome: Continuity of Care, Emergency Contact, Discharge [address]         Is the patient interested in [facility] at discharge?: No    DME Referral Provided  Referral made for DME?: No    Other Referral/Resources/Interventions Provided:  Interventions: SNF, Hospice    Treatment Team Recommendation: Hospice, SNF  Discharge Destination Plan[de-identified] SNF, Hospice  Transport at Discharge : [facility]  Dispatcher Contacted: Yes  Number/Name of Dispatcher: [employer] via [address] [place] [address] Avenue  waiting for a company to claim the ride     ETA of Transport (Date): 12/14/22     Transfer Mode: Wheelchair  Accompanied by: Alone     Family notified[de-identified] Yes  Additional Comments: Received NFCE determination from [facility]  [facility] and they will make hospice referral once he is there  His sister was notified of his choice and spoke with facility about MA application  Pt will be received by [facility] as MA Pending for Long Term Care  [facility] report  number at [phone]  fax is [phone]  Request for [facility] van transport placed via [employer] for 9:30 on 12/14

## 2022-12-13 NOTE — ASSESSMENT & PLAN NOTE
· 12/12: Ongoing goals of care discussions held between palliative care, patient and family (patient's sister/POA)  · Patient's sister appears to be very realistic regarding patient's prognosis and that he is likely declining  Patient appears overwhelmed with medical updates and requests to hear "nothing negative, only things to give hope" but seems to understand hospice on discharge is appropriate  · Per palliative care discussion, no escalation in cares  Family and patient aware of potassium level  Will continue medical management (I e  medications, IVF) for now to treat hyperkalemia/dehydration but as patient and family are leaning towards comfort cares will not pursue cardiac monitoring per their wishes  · Level 3 DNR/DNI   · Potassium/creatinine and BP have stabilized, after discussion with sister will still pursue SNF placement for hospice cares unless decline recurs to the point he is unstable for transport or level of care needs to be changed (I e  IP hospice)  Patient still requesting medical support (IVF, labs, treatment of potassium/electrolyte abnormalities) while inpatient  · Patient's sister will be coming to visit patient today - requests if pastoral care could provide grief support for patient    Palliative to reach out to on-call pastoral care for assistance

## 2022-12-13 NOTE — ASSESSMENT & PLAN NOTE
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  · initiated on chemotherapy April 2022, most recently on Abraxane and Gemzar, patient states he is not currently receiving chemotherapy, thinks his last infusion was within the past several months  He does receive hydration infusion every Tuesday  · S/P radiation in august  · Seen in office 11/20 01/2022 with Dr Moo Crowe --> at that time was documented with progressively reduced appetite, weakness, poor family support, notably was falling asleep during office visit  Reportedly had canceled his chemo cycle 8 due to weakness     · Patient was seen again in office today 12/5, referred to ED by Dr Moo Crowe   · Palliative care following

## 2022-12-13 NOTE — ACP (ADVANCE CARE PLANNING)
Record of goals of care discussion 2022: I discussed case extensively with RN Amna Miller, primary team Marquita Ford, spiritual care consult Quinn Betancourt, [address], and with patient and family  · At this time, plan is clear for hospice at discharge  · Anticipated discharge to skilled nursing facility potentially 2022  · Plan is clear to continue conservative medical care short of DNR/DNR  · With further deterioration, DO NOT escalate cares  Would transition to full comfort cares at that time  · Emotional support provided  Family grateful for the emotional support staff has provided to [place]  Time of ACP planninmin       Aman Garcia PA-C  [facility]

## 2022-12-13 NOTE — PLAN OF CARE
Problem: MOBILITY - ADULT  Goal: Maintain or return to baseline ADL function  Description: INTERVENTIONS:  -  Assess patient's ability to carry out ADLs; assess patient's baseline for ADL function and identify physical deficits which impact ability to perform ADLs (bathing, care of mouth/teeth, toileting, grooming, dressing, etc )  - Assess/evaluate cause of self-care deficits   - Assess range of motion  - Assess patient's mobility; develop plan if impaired  - Assess patient's need for assistive devices and provide as appropriate  - Encourage maximum independence but intervene and supervise when necessary  - Involve family in performance of ADLs  - Assess for home care needs following discharge   - Consider OT consult to assist with ADL evaluation and planning for discharge  - Provide patient education as appropriate  Outcome: Progressing  Goal: Maintains/Returns to pre admission functional level  Description: INTERVENTIONS:  - Perform BMAT or MOVE assessment daily    - Set and communicate daily mobility goal to care team and patient/family/caregiver  - Collaborate with rehabilitation services on mobility goals if consulted  - Perform Range of Motion 2 times a day  - Reposition patient every 2 hours  - Dangle patient 2 times a day  - Stand patient 2 times a day  - Ambulate patient 2 times a day  - Out of bed to chair 2 times a day   - Out of bed for meals 2 times a day  - Out of bed for toileting  - Record patient progress and toleration of activity level   Outcome: Progressing     Problem: Nutrition/Hydration-ADULT  Goal: Nutrient/Hydration intake appropriate for improving, restoring or maintaining nutritional needs  Description: Monitor and assess patient's nutrition/hydration status for malnutrition  Collaborate with interdisciplinary team and initiate plan and interventions as ordered  Monitor patient's weight and dietary intake as ordered or per policy   Utilize nutrition screening tool and intervene as necessary  Determine patient's food preferences and provide high-protein, high-caloric foods as appropriate       INTERVENTIONS:  - Monitor oral intake, urinary output, labs, and treatment plans  - Assess nutrition and hydration status and recommend course of action  - Evaluate amount of meals eaten  - Assist patient with eating if necessary   - Allow adequate time for meals  - Recommend/ encourage appropriate diets, oral nutritional supplements, and vitamin/mineral supplements  - Order, calculate, and assess calorie counts as needed  - Recommend, monitor, and adjust tube feedings and TPN/PPN based on assessed needs  - Assess need for intravenous fluids  - Provide specific nutrition/hydration education as appropriate  - Include patient/family/caregiver in decisions related to nutrition  Outcome: Progressing     Problem: INFECTION - ADULT  Goal: Absence or prevention of progression during hospitalization  Description: INTERVENTIONS:  - Assess and monitor for signs and symptoms of infection  - Monitor lab/diagnostic results  - Monitor all insertion sites, i e  indwelling lines, tubes, and drains  - Monitor endotracheal if appropriate and nasal secretions for changes in amount and color  - [place] appropriate cooling/warming therapies per order  - Administer medications as ordered  - Instruct and encourage patient and family to use good hand hygiene technique  - Identify and instruct in appropriate isolation precautions for identified infection/condition  Outcome: Progressing  Goal: Absence of fever/infection during neutropenic period  Description: INTERVENTIONS:  - Monitor WBC    Outcome: Progressing     Problem: SAFETY ADULT  Goal: Maintain or return to baseline ADL function  Description: INTERVENTIONS:  -  Assess patient's ability to carry out ADLs; assess patient's baseline for ADL function and identify physical deficits which impact ability to perform ADLs (bathing, care of mouth/teeth, toileting, grooming, dressing, etc )  - Assess/evaluate cause of self-care deficits   - Assess range of motion  - Assess patient's mobility; develop plan if impaired  - Assess patient's need for assistive devices and provide as appropriate  - Encourage maximum independence but intervene and supervise when necessary  - Involve family in performance of ADLs  - Assess for home care needs following discharge   - Consider OT consult to assist with ADL evaluation and planning for discharge  - Provide patient education as appropriate  Outcome: Progressing  Goal: Maintains/Returns to pre admission functional level  Description: INTERVENTIONS:  - Perform BMAT or MOVE assessment daily    - Set and communicate daily mobility goal to care team and patient/family/caregiver  - Collaborate with rehabilitation services on mobility goals if consulted  - Perform Range of Motion 2 times a day  - Reposition patient every 2 hours    - Dangle patient 2 times a day  - Stand patient 2 times a day  - Ambulate patient 2 times a day  - Out of bed to chair 2 times a day   - Out of bed for meals 2 times a day  - Out of bed for toileting  - Record patient progress and toleration of activity level   Outcome: Progressing  Goal: Patient will remain free of falls  Description: INTERVENTIONS:  - Educate patient/family on patient safety including physical limitations  - Instruct patient to call for assistance with activity   - Consult OT/PT to assist with strengthening/mobility   - Keep Call bell within reach  - Keep bed low and locked with side rails adjusted as appropriate  - Keep care items and personal belongings within reach  - Initiate and maintain comfort rounds  - Make Fall Risk Sign visible to staff  - Offer Toileting every 2 Hours, in advance of need  - Initiate/Maintain bed alarm  - Obtain necessary fall risk management equipment:   - Apply yellow socks and bracelet for high fall risk patients  - Consider moving patient to room near nurses station  Outcome: Progressing     Problem: Prexisting or High Potential for Compromised Skin Integrity  Goal: Skin integrity is maintained or improved  Description: INTERVENTIONS:  - Identify patients at risk for skin breakdown  - Assess and monitor skin integrity  - Assess and monitor nutrition and hydration status  - Monitor labs   - Assess for incontinence   - Turn and reposition patient  - Assist with mobility/ambulation  - Relieve pressure over bony prominences  - Avoid friction and shearing  - Provide appropriate hygiene as needed including keeping skin clean and dry  - Evaluate need for skin moisturizer/barrier cream  - Collaborate with interdisciplinary team   - Patient/family teaching  - Consider wound care consult   Outcome: Progressing

## 2022-12-13 NOTE — ASSESSMENT & PLAN NOTE
· Baseline Cr 0 8-1  · Cr on admission 1 64, suspect prerenal dehydration, patient is with poor PO intake   · IVF discontinued 12/8 as patient's appetite had improved  · On repeat labs 12/12 creatinine 2 0 suspect related to hypotension  · Restarted IVF 12/12 for blood pressure support per pt request  · Creatinine improving to 1 62  · Urinary retention protocol

## 2022-12-13 NOTE — CASE MANAGEMENT
Case Management Discharge Planning Note    Patient name Jeanine Elias  Location /-18 MRN [id number]  : 1940 Date 2022       Current Admission Date: 2022  Current Admission Diagnosis:Lethargy   Patient Active Problem List    Diagnosis Date Noted   • Goals of care, counseling/discussion 2022   • Hyperkalemia 2022   • Ascites 2022   • Lethargy 2022   • SHRAVAN (acute kidney injury) ([facility] ) 2022   • Dehydration 10/22/2022   • Mild protein-calorie malnutrition ([facility] ) 2022   • Acute exacerbation of chronic obstructive pulmonary disease ([facility] ) 2022   • Hypokalemia 2022   • Chemotherapy induced neutropenia ([facility] ) 2022   • Pancreatic adenocarcinoma ([facility] ) 2022   • Type 2 diabetes mellitus without complication, with long-term current use of insulin (HCC)    • GERD (gastroesophageal reflux disease)    • Pancreatic mass    • History of pulmonary embolism    • Hyperbilirubinemia    • Anxiety about health       LOS (days): 8  Geometric Mean LOS (GMLOS) (days): 3 30  Days to GMLOS:-4 7     OBJECTIVE:  Risk of Unplanned Readmission Score: 27 26      Current admission status: Inpatient   Preferred Pharmacy:   [facility]/pharmacy #[id number] Hollie, [address]  Phone: [phone] Fax: Jakym 08, [address] [facility] [address] [address] [address]  Phone: [phone] Fax: [address], [address] [facility] [address] [address]  Phone: [phone] Fax: [phone]    Primary Care Provider: Vic Doherty    Primary Insurance: Lucia Rivera [facility]  Secondary Insurance:     DISCHARGE DETAILS:    Discharge planning discussed with[de-identified] patient and his sister  Freedom of Choice: Yes  Comments - Freedom of Choice: [facility] [address] [place] [address] contacted family/caregiver?: Yes  Were Treatment Team discharge recommendations reviewed with patient/caregiver?: Yes  Did patient/caregiver verbalize understanding of patient care needs?: Yes  Were patient/caregiver advised of the risks associated with not following Treatment Team discharge recommendations?: Yes    Contacts  Patient Contacts: sister Christiano Garcia  Relationship to Patient[de-identified] Family  Contact Method: Phone  Phone Number: [phone]  Reason/Outcome: Continuity of Care, Emergency Contact, Discharge Planning    Transport at Discharge : Wheelchair Ramona Brown  Dispatcher Contacted: Yes  Number/Name of Dispatcher: [facility] via Round Trip  Transported by [employer] and Unit #): Liliya at [phone]  ETA of Transport (Date): 12/14/22  ETA of Transport (Time): 0230     Transfer Mode: Wheelchair  Accompanied by: Alone     Family notified[de-identified] YEs  Additional Comments: Received NFCE determination from [facility]  Pt chose [facility] and they will make hospice referral once he is there  His sister was notified of his choice and spoke with facility about MA application  Pt will be received by [facility] as MA Pending for Long Term Care  [facility] report  number at [phone]  fax is [phone]  Request for [facility] van transport placed via Roundtrip for 9:30 on 12/14  Liliya claimed ride at 9:30 am on 12/14      Accepting Facility Name, [address] : [facility]  Receiving Facility/Agency Phone Number: Report [phone]  Facility/Agency Fax Number: [address] Number: no auth needed 13-Mar-2017

## 2022-12-14 PROBLEM — E43 SEVERE PROTEIN-CALORIE MALNUTRITION (HCC): Status: ACTIVE | Noted: 2022-01-01

## 2022-12-14 PROBLEM — U07.1 COVID-19: Status: ACTIVE | Noted: 2022-01-01

## 2022-12-14 NOTE — CASE MANAGEMENT
Case Management Progress Note    Patient name Neisha Kim  Location /-26 MRN [id number]  : 1940 Date 2022       LOS (days): 9  Geometric Mean LOS (GMLOS) (days): 3 30  Days to GMLOS:-5 7        OBJECTIVE:        Current admission status: Inpatient  Preferred Pharmacy:   [facility]/pharmacy #[id number] Salazar, [address]  Phone: [phone] Fax: [address], [address]  [facility] [address]  Phone: [phone] Fax: [address], [address] [facility] 6[address] [facility]  [address]  Phone: [phone] Fax: [phone]    Primary Care Provider: Viki Ritchie    Primary Insurance: Valeri Weaver [facility]  Secondary Insurance:     PROGRESS NOTE: Discussed obtaining information for MA questions that facilities have  Sister will reach out to the bank tomorrow

## 2022-12-14 NOTE — ASSESSMENT & PLAN NOTE
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  · initiated on chemotherapy April 2022, most recently on Abraxane and Gemzar, patient states he is not currently receiving chemotherapy, thinks his last infusion was within the past several months  He does receive hydration infusion every Tuesday  · S/P radiation in august  · Seen in office 11/20 01/2022 with Dr Katlyn Rosas --> at that time was documented with progressively reduced appetite, weakness, poor family support, notably was falling asleep during office visit  Reportedly had canceled his chemo cycle 8 due to weakness     · Patient was seen again in office today 12/5, referred to ED by Dr Katlyn Rosas   · Palliative care following - level 4 comfort care initiated 12/14

## 2022-12-14 NOTE — ASSESSMENT & PLAN NOTE
· Suspect multifactorial in setting of oral potassium, acute kidney injury, dehydration  · Discussed with nephrology 12/12:  · IV insulin/dextrose, IV calcium, Kayexalate  · Required repeat overnight for persistent hyperkalemia  · Per goals of care discussion will not upgrade to telemetry bed  · Improved to 5 6 this AM - continue IVF  · Discontinue oral potassium supplementation  · Urinary retention protocol  · No further blood draws - pt level 4 comfort care

## 2022-12-14 NOTE — DISCHARGE SUMMARY
[facility]  [facility]- Delisa Chen 1940, [de-identified] y o  male MRN: [id number]  Unit/Bed#: -01 Encounter: [id number]  Primary Care Provider: Rodolfo Rush   Date and time admitted to hospital: 12/5/2022  2:00 PM    Pancreatic adenocarcinoma 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  · initiated on chemotherapy April 2022, most recently on Abraxane and Gemzar, patient states he is not currently receiving chemotherapy, thinks his last infusion was within the past several months  He does receive hydration infusion every Tuesday  · S/P radiation in august  · Seen in office 11/20 01/2022 with Dr Hiral Ramirez --> at that time was documented with progressively reduced appetite, weakness, poor family support, notably was falling asleep during office visit  Reportedly had canceled his chemo cycle 8 due to weakness  · Patient was seen again in office today 12/5, referred to ED by Dr Hiral Ramirez   · Palliative care following - level 4 comfort care initiated 12/14    * Lethargy  Assessment & Plan  · Referred to ED by OP Oncology due to report of lethargy/weakness after begin seen in office 12/5  Appears to be ongoing since 11/21 where lethargy/progressive decline in funcitonal status is noted in Onc note    Endorses abdominal pain, nausea/vomiting, diarrhea  · SIRS:  No leukocytosis, no fever, no tachycardia, no tachypnea  · Lactic is normal  · Procalcitonin slightly elevated, repeat morning  · No evidence/source of infection at this time  · UA is unremarkable  · CXR is unremarkable  · Blood culture negative x2 to date  · PT/OT eval   · Palliative care consult due to progressive decline in setting of metastatic cancer   · CM following for SNF placement pending bed availability    Ascites  Assessment & Plan  · CT chest abdomen pelvis showed-" Massive ascites, developing since a CT from 10/28/2022  No new pancreatic head tumor poorly evaluated in the absence of IV and enteric contrast   Grossly unchanged in size  Previously demonstrated mesenteric metastases not visualized with certainty on this examination, most likely due to combination of ascites, mesenteric edema and absence of IV contrast   No evidence of new metastases in the chest, abdomen or pelvis  Stable small pulmonary nodules  Generalized dilatation of fluid-filled esophagus, consistent with esophageal dysmotility, reflux or, less likely, distal esophageal obstruction  · IR consult for paracentesis  · Gram stain negative for growth  · GI consult regarding esophageal dysmotility-signed off as patient will be transitioning to hospice, continue supportive measures  · Barium swallow -negative for dysmotility or stricture  · S/p paracentesis 12/12 - 4 6 L removed    Goals of care, counseling/discussion  Assessment & Plan  · 12/12: Ongoing goals of care discussions held between palliative care, patient and family (patient's sister/POA)  · Patient's sister appears to be very realistic regarding patient's prognosis and that he is likely declining  Patient appears overwhelmed with medical updates and requests to hear "nothing negative, only things to give hope" but seems to understand hospice on discharge is appropriate  · Per palliative care discussion, no escalation in cares  Family and patient aware of potassium level    Will continue medical management (I e  medications, IVF) for now to treat hyperkalemia/dehydration but as patient and family are leaning towards comfort cares will not pursue cardiac monitoring per their wishes  · Level 3 DNR/DNI   · 12/13: Potassium/creatinine and BP have stabilized, after discussion with sister will still pursue SNF placement for hospice cares unless decline recurs to the point he is unstable for transport or level of care needs to be changed (I e  IP hospice)  Patient still requesting medical support (IVF, labs, treatment of potassium/electrolyte abnormalities) while inpatient  · Per discussion with palliative care, patient now transitioned to level 4 comfort care measures  · Awaiting SNF placement for ongoing hospice cares    COVID-19  Assessment & Plan  · Incidentally COVID + as of 12/13   · Asymptomatic, on RA  · Contact precautions    Severe protein-calorie malnutrition ([facility] )  Assessment & Plan  Malnutrition Findings:   Adult Malnutrition type: Acute illness  Adult Degree of Malnutrition: Other severe protein calorie malnutrition  Malnutrition Characteristics: Inadequate energy, Weight loss      Allow pleasure feeds            360 Statement: Pt presents with severe protein calorie malnutrition as evidenced by 12% wt loss in 3 months and <50% po intake> 5 days  Treat with CCD2, 2 gm K+ diet  Glucerna supplements BID  BMI Findings:  Adult BMI Classifications: Underweight < 18 5        Body mass index is 17 96 kg/m²         Hyperkalemia  Assessment & Plan  · Suspect multifactorial in setting of oral potassium, acute kidney injury, dehydration  · Discussed with nephrology 12/12:  · IV insulin/dextrose, IV calcium, Kayexalate  · Required repeat overnight for persistent hyperkalemia  · Per goals of care discussion will not upgrade to telemetry bed  · Improved to 5 6 this AM - continue IVF  · Discontinue oral potassium supplementation  · Urinary retention protocol  · No further blood draws - pt level 4 comfort care    SHRAVAN (acute kidney injury) ([facility] )  Assessment & Plan  · Baseline Cr 0 8-1  · Cr on admission 1 64, suspect prerenal dehydration, patient is with poor PO intake   · IVF discontinued 12/8 as patient's appetite had improved  · On repeat labs 12/12 creatinine 2 0 suspect related to hypotension  · Restarted IVF 12/12 for blood pressure support per pt request  · Creatinine improved  · Now level 4 comfort care measures only - discontinue blood draws, IVF    Mild protein-calorie malnutrition ([facility] )  Assessment & Plan  Malnutrition Findings:     BMI Findings: Body mass index is 17 96 kg/m²  Nutrition consult and recommendation    History of pulmonary embolism  Assessment & Plan  · Discontinue eliquis  · Pt comfort care only    Type 2 diabetes mellitus without complication, with long-term current use of insulin [facility])  Assessment & Plan  Lab Results   Component Value Date    HGBA1C 6 1 08/28/2021   · Discontinue SSI + accuchek - pt level 4 comfort care only    Medical Problems     Resolved Problems  Date Reviewed: 12/15/2022   None       Discharging Physician / Practitioner: Mark Sauceda PA-C  PCP: Lilli Salas  Admission Date:   Admission Orders (From admission, onward)     Ordered        12/05/22 1610  INPATIENT ADMISSION  Once                      Discharge Date: 12/16/2022    Consultations During Hospital Stay:  · Gastroenterology  · Palliative care  · Case management    Procedures Performed:   · FL esophagram: Ingested enteric contrast passes freely from the esophagus into the stomach without evidence of dysmotility or stricture    Significant Findings / Test Results:   · CXR 12/5: No acute cardiopulmonary disease  · CT chest abdomen pelvis: Limited examination due to lack of IV contrast, as discussed above  Massive ascites, developing since a CT from 10/28/2022  No new pancreatic head tumor poorly evaluated in the absence of IV and enteric contrast   Grossly unchanged in size  Previously demonstrated mesenteric metastases not visualized with certainty on this examination, most likely due to combination of ascites, mesenteric edema and absence of IV contrast   No evidence of new metastases in the chest, abdomen or pelvis  Stable small pulmonary nodules    Generalized dilatation of fluid-filled esophagus, consistent with esophageal dysmotility, reflux or, less likely, distal esophageal obstruction      Incidental Findings:   · None     Test Results Pending at Discharge (will require follow up): · None     Outpatient Tests Requested:  · None    Complications:   as inpatient    Reason for Admission: lethargy    Hospital Course:   Jabari Gorman is a [de-identified] y o  male patient with pmh pancreatic adenocardinoma, T2DM on insulin, h/o PE on eliquis, malnutrition who originally presented to the hospital on 2022 due to lethargy  After negative infectious work up this was attributed to progressing metastatic cancer  He had malignant ascites which was drained for comfort on 2022  Palliative care was consulted and discussed with patient and family who decided on Level 4 - comfort measures  Further lab monitoring and non-comfort oriented medications were discontinued  Patient  while IP while on Comfort care - level 4  Patient's family, Anil Malave was notified via telephone and  home was obtained  Family will not visit again in the hospital, will make further plans with the  home for arrangements  Patient's family have had all of their concerns addressed and do not wish for most mortuum autopsy  Please see above list of diagnoses and related plan for additional information  Condition at Discharge:     Discharge Day Visit / Exam:   Subjective:  RN reported some increased work of breathing treated with IV prn medications with good effect and on her rounds later in the night noticed patient was   SLIM physician assistant promptly called for evaluation and pronouncement    Vitals: Blood Pressure: 92/59 (12/15/22 0722)  Pulse: 72 (12/15/22 2100)  Temperature: 97 6 °F (36 4 °C) (12/15/22 0722)  Temp Source: Oral (12/15/22 0722)  Respirations: 21 (12/15/22 2217)  Height: 5' 7" (170 2 cm) (22 1406)  Weight - Scale: 52 kg (114 lb 10 2 oz) (22 0556)  SpO2: 97 % (12/15/22 0722)  Exam:    Constitutional:       Comments: Unresponsive    emaciated   Eyes:      Comments: No pupillary light reflex   Cardiovascular: Comments: No detectable pulses, no heart beat on auscultation  Pulmonary:      Comments: No rise and fall of chest  No breath sounds on auscultation  Skin:     Comments: Cool skin, jaundiced  Discussion with Family: Updated  (sister) via phone  I left voice mail without revealing information to the phone number for his daughter  Sister tells me she will call the daughter later in the morning to check on her and make  arrangements  Discharge instructions/Information to patient and family:   See after visit summary for information provided to patient and family  Provisions for Follow-Up Care:  See after visit summary for information related to follow-up care and any pertinent home health orders  Disposition:       Planned Readmission: none     Discharge Statement:  I spent 45 minutes discharging the patient  This time was spent on the day of discharge  I had direct contact with the patient on the day of discharge  Greater than 50% of the total time was spent examining patient, answering all patient questions, arranging and discussing plan of care with patient as well as directly providing post-discharge instructions  Additional time then spent on discharge activities  Discharge Medications:  See after visit summary for reconciled discharge medications provided to patient and/or family        **Please Note: This note may have been constructed using a voice recognition system**

## 2022-12-14 NOTE — PLAN OF CARE
Problem: MOBILITY - ADULT  Goal: Maintain or return to baseline ADL function  Description: INTERVENTIONS:  -  Assess patient's ability to carry out ADLs; assess patient's baseline for ADL function and identify physical deficits which impact ability to perform ADLs (bathing, care of mouth/teeth, toileting, grooming, dressing, etc )  - Assess/evaluate cause of self-care deficits   - Assess range of motion  - Assess patient's mobility; develop plan if impaired  - Assess patient's need for assistive devices and provide as appropriate  - Encourage maximum independence but intervene and supervise when necessary  - Involve family in performance of ADLs  - Assess for home care needs following discharge   - Consider OT consult to assist with ADL evaluation and planning for discharge  - Provide patient education as appropriate  Outcome: Progressing  Goal: Maintains/Returns to pre admission functional level  Description: INTERVENTIONS:  - Perform BMAT or MOVE assessment daily    - Set and communicate daily mobility goal to care team and patient/family/caregiver  - Collaborate with rehabilitation services on mobility goals if consulted  - Perform Range of Motion 2 times a day  - Reposition patient every 2 hours  - Dangle patient 2 times a day  - Stand patient 2 times a day  - Ambulate patient 2 times a day  - Out of bed to chair 2 times a day   - Out of bed for meals 2 times a day  - Out of bed for toileting  - Record patient progress and toleration of activity level   Outcome: Progressing     Problem: Nutrition/Hydration-ADULT  Goal: Nutrient/Hydration intake appropriate for improving, restoring or maintaining nutritional needs  Description: Monitor and assess patient's nutrition/hydration status for malnutrition  Collaborate with interdisciplinary team and initiate plan and interventions as ordered  Monitor patient's weight and dietary intake as ordered or per policy   Utilize nutrition screening tool and intervene as necessary  Determine patient's food preferences and provide high-protein, high-caloric foods as appropriate       INTERVENTIONS:  - Monitor oral intake, urinary output, labs, and treatment plans  - Assess nutrition and hydration status and recommend course of action  - Evaluate amount of meals eaten  - Assist patient with eating if necessary   - Allow adequate time for meals  - Recommend/ encourage appropriate diets, oral nutritional supplements, and vitamin/mineral supplements  - Order, calculate, and assess calorie counts as needed  - Recommend, monitor, and adjust tube feedings and TPN/PPN based on assessed needs  - Assess need for intravenous fluids  - Provide specific nutrition/hydration education as appropriate  - Include patient/family/caregiver in decisions related to nutrition  Outcome: Progressing     Problem: INFECTION - ADULT  Goal: Absence or prevention of progression during hospitalization  Description: INTERVENTIONS:  - Assess and monitor for signs and symptoms of infection  - Monitor lab/diagnostic results  - Monitor all insertion sites, i e  indwelling lines, tubes, and drains  - Monitor endotracheal if appropriate and nasal secretions for changes in amount and color  - [place] appropriate cooling/warming therapies per order  - Administer medications as ordered  - Instruct and encourage patient and family to use good hand hygiene technique  - Identify and instruct in appropriate isolation precautions for identified infection/condition  Outcome: Progressing  Goal: Absence of fever/infection during neutropenic period  Description: INTERVENTIONS:  - Monitor WBC    Outcome: Progressing     Problem: SAFETY ADULT  Goal: Maintain or return to baseline ADL function  Description: INTERVENTIONS:  -  Assess patient's ability to carry out ADLs; assess patient's baseline for ADL function and identify physical deficits which impact ability to perform ADLs (bathing, care of mouth/teeth, toileting, grooming, dressing, etc )  - Assess/evaluate cause of self-care deficits   - Assess range of motion  - Assess patient's mobility; develop plan if impaired  - Assess patient's need for assistive devices and provide as appropriate  - Encourage maximum independence but intervene and supervise when necessary  - Involve family in performance of ADLs  - Assess for home care needs following discharge   - Consider OT consult to assist with ADL evaluation and planning for discharge  - Provide patient education as appropriate  Outcome: Progressing  Goal: Maintains/Returns to pre admission functional level  Description: INTERVENTIONS:  - Perform BMAT or MOVE assessment daily    - Set and communicate daily mobility goal to care team and patient/family/caregiver  - Collaborate with rehabilitation services on mobility goals if consulted  - Perform Range of Motion 2 times a day  - Reposition patient every 2 hours    - Dangle patient 2 times a day  - Stand patient 2 times a day  - Ambulate patient 2 times a day  - Out of bed to chair 2 times a day   - Out of bed for meals 2 times a day  - Out of bed for toileting  - Record patient progress and toleration of activity level   Outcome: Progressing  Goal: Patient will remain free of falls  Description: INTERVENTIONS:  - Educate patient/family on patient safety including physical limitations  - Instruct patient to call for assistance with activity   - Consult OT/PT to assist with strengthening/mobility   - Keep Call bell within reach  - Keep bed low and locked with side rails adjusted as appropriate  - Keep care items and personal belongings within reach  - Initiate and maintain comfort rounds  - Make Fall Risk Sign visible to staff  - Offer Toileting every 2 Hours, in advance of need  - Initiate/Maintain bed alarm  - Obtain necessary fall risk management equipment:   - Apply yellow socks and bracelet for high fall risk patients  - Consider moving patient to room near nurses station  Outcome: Progressing     Problem: Prexisting or High Potential for Compromised Skin Integrity  Goal: Skin integrity is maintained or improved  Description: INTERVENTIONS:  - Identify patients at risk for skin breakdown  - Assess and monitor skin integrity  - Assess and monitor nutrition and hydration status  - Monitor labs   - Assess for incontinence   - Turn and reposition patient  - Assist with mobility/ambulation  - Relieve pressure over bony prominences  - Avoid friction and shearing  - Provide appropriate hygiene as needed including keeping skin clean and dry  - Evaluate need for skin moisturizer/barrier cream  - Collaborate with interdisciplinary team   - Patient/family teaching  - Consider wound care consult   Outcome: Progressing

## 2022-12-14 NOTE — PLAN OF CARE
Problem: MOBILITY - ADULT  Goal: Maintain or return to baseline ADL function  Description: INTERVENTIONS:  -  Assess patient's ability to carry out ADLs; assess patient's baseline for ADL function and identify physical deficits which impact ability to perform ADLs (bathing, care of mouth/teeth, toileting, grooming, dressing, etc )  - Assess/evaluate cause of self-care deficits   - Assess range of motion  - Assess patient's mobility; develop plan if impaired  - Assess patient's need for assistive devices and provide as appropriate  - Encourage maximum independence but intervene and supervise when necessary  - Involve family in performance of ADLs  - Assess for home care needs following discharge   - Consider OT consult to assist with ADL evaluation and planning for discharge  - Provide patient education as appropriate  Outcome: Progressing  Goal: Maintains/Returns to pre admission functional level  Description: INTERVENTIONS:  - Perform BMAT or MOVE assessment daily    - Set and communicate daily mobility goal to care team and patient/family/caregiver  - Collaborate with rehabilitation services on mobility goals if consulted  - Perform Range of Motion 2 times a day  - Reposition patient every 2 hours  - Dangle patient 2 times a day  - Stand patient 2 times a day  - Ambulate patient 2 times a day  - Out of bed to chair 2 times a day   - Out of bed for meals 2 times a day  - Out of bed for toileting  - Record patient progress and toleration of activity level   Outcome: Progressing     Problem: Nutrition/Hydration-ADULT  Goal: Nutrient/Hydration intake appropriate for improving, restoring or maintaining nutritional needs  Description: Monitor and assess patient's nutrition/hydration status for malnutrition  Collaborate with interdisciplinary team and initiate plan and interventions as ordered  Monitor patient's weight and dietary intake as ordered or per policy   Utilize nutrition screening tool and intervene as necessary  Determine patient's food preferences and provide high-protein, high-caloric foods as appropriate       INTERVENTIONS:  - Monitor oral intake, urinary output, labs, and treatment plans  - Assess nutrition and hydration status and recommend course of action  - Evaluate amount of meals eaten  - Assist patient with eating if necessary   - Allow adequate time for meals  - Recommend/ encourage appropriate diets, oral nutritional supplements, and vitamin/mineral supplements  - Order, calculate, and assess calorie counts as needed  - Recommend, monitor, and adjust tube feedings and TPN/PPN based on assessed needs  - Assess need for intravenous fluids  - Provide specific nutrition/hydration education as appropriate  - Include patient/family/caregiver in decisions related to nutrition  Outcome: Progressing     Problem: INFECTION - ADULT  Goal: Absence or prevention of progression during hospitalization  Description: INTERVENTIONS:  - Assess and monitor for signs and symptoms of infection  - Monitor lab/diagnostic results  - Monitor all insertion sites, i e  indwelling lines, tubes, and drains  - Monitor endotracheal if appropriate and nasal secretions for changes in amount and color  - Griffith appropriate cooling/warming therapies per order  - Administer medications as ordered  - Instruct and encourage patient and family to use good hand hygiene technique  - Identify and instruct in appropriate isolation precautions for identified infection/condition  Outcome: Progressing  Goal: Absence of fever/infection during neutropenic period  Description: INTERVENTIONS:  - Monitor WBC    Outcome: Progressing

## 2022-12-14 NOTE — ASSESSMENT & PLAN NOTE
· Baseline Cr 0 8-1  · Cr on admission 1 64, suspect prerenal dehydration, patient is with poor PO intake   · IVF discontinued 12/8 as patient's appetite had improved  · On repeat labs 12/12 creatinine 2 0 suspect related to hypotension  · Restarted IVF 12/12 for blood pressure support per pt request  · Creatinine improved  · Now level 4 comfort care measures only - discontinue blood draws, IVF

## 2022-12-14 NOTE — ASSESSMENT & PLAN NOTE
· Referred to ED by OP Oncology due to report of lethargy/weakness after begin seen in office 12/5  Appears to be ongoing since 11/21 where lethargy/progressive decline in funcitonal status is noted in Onc note    Endorses abdominal pain, nausea/vomiting, diarrhea  · SIRS:  No leukocytosis, no fever, no tachycardia, no tachypnea  · Lactic is normal  · Procalcitonin slightly elevated, repeat morning  · No evidence/source of infection at this time  · UA is unremarkable  · CXR is unremarkable  · Blood culture negative x2 to date  · PT/OT eval   · Palliative care consult due to progressive decline in setting of metastatic cancer   · CM following for SNF placement pending bed availability

## 2022-12-14 NOTE — PROGRESS NOTES
[place]     Progress Note - Stuart Prose 1940, [de-identified] y o  male MRN: [id number]  Unit/Bed#: -01 Encounter: [id number]  Primary Care Provider: Lilli Salas   Date and time admitted to hospital: 12/5/2022  2:00 PM    * Lethargy  Assessment & Plan  · Referred to ED by OP Oncology due to report of lethargy/weakness after begin seen in office 12/5  Appears to be ongoing since 11/21 where lethargy/progressive decline in funcitonal status is noted in Onc note  Endorses abdominal pain, nausea/vomiting, diarrhea  · SIRS:  No leukocytosis, no fever, no tachycardia, no tachypnea  · Lactic is normal  · Procalcitonin slightly elevated, repeat morning  · No evidence/source of infection at this time  · UA is unremarkable  · CXR is unremarkable  · Blood culture negative x2 to date  · PT/OT eval   · Palliative care consult due to progressive decline in setting of metastatic cancer   · CM following for SNF placement pending bed availability    Goals of care, counseling/discussion  Assessment & Plan  · 12/12: Ongoing goals of care discussions held between palliative care, patient and family (patient's sister/POA)  · Patient's sister appears to be very realistic regarding patient's prognosis and that he is likely declining  Patient appears overwhelmed with medical updates and requests to hear "nothing negative, only things to give hope" but seems to understand hospice on discharge is appropriate  · Per palliative care discussion, no escalation in cares  Family and patient aware of potassium level    Will continue medical management (I e  medications, IVF) for now to treat hyperkalemia/dehydration but as patient and family are leaning towards comfort cares will not pursue cardiac monitoring per their wishes  · Level 3 DNR/DNI   · 12/13: Potassium/creatinine and BP have stabilized, after discussion with sister will still pursue SNF placement for hospice cares unless decline recurs to the point he is unstable for transport or level of care needs to be changed (I e  IP hospice)  Patient still requesting medical support (IVF, labs, treatment of potassium/electrolyte abnormalities) while inpatient  · Per discussion with palliative care, patient now transitioned to level 4 comfort care measures  · Awaiting SNF placement for ongoing hospice cares    Hyperkalemia  Assessment & Plan  · Suspect multifactorial in setting of oral potassium, acute kidney injury, dehydration  · Discussed with nephrology 12/12:  · IV insulin/dextrose, IV calcium, Kayexalate  · Required repeat overnight for persistent hyperkalemia  · Per goals of care discussion will not upgrade to telemetry bed  · Improved to 5 6 this AM - continue IVF  · Discontinue oral potassium supplementation  · Urinary retention protocol  · No further blood draws - pt level 4 comfort care    COVID-19  Assessment & Plan  · Incidentally COVID + as of 12/13   · Asymptomatic, on RA  · Contact precautions    Severe protein-calorie malnutrition ([facility] [address] )  Assessment & Plan  Malnutrition Findings:   Adult Malnutrition type: Acute illness  Adult Degree of Malnutrition: Other severe protein calorie malnutrition  Malnutrition Characteristics: Inadequate energy, Weight loss      Allow pleasure feeds            360 Statement: Pt presents with severe protein calorie malnutrition as evidenced by 12% wt loss in 3 months and <50% po intake> 5 days  Treat with CCD2, 2 gm K+ diet  Glucerna supplements BID  BMI Findings:  Adult BMI Classifications: Underweight < 18 5        Body mass index is 17 96 kg/m²  Ascites  Assessment & Plan  · CT chest abdomen pelvis showed-" Massive ascites, developing since a CT from 10/28/2022  No new pancreatic head tumor poorly evaluated in the absence of IV and enteric contrast   Grossly unchanged in size    Previously demonstrated mesenteric metastases not visualized with certainty on this examination, most likely due to combination of ascites, mesenteric edema and absence of IV contrast   No evidence of new metastases in the chest, abdomen or pelvis  Stable small pulmonary nodules  Generalized dilatation of fluid-filled esophagus, consistent with esophageal dysmotility, reflux or, less likely, distal esophageal obstruction  · IR consult for paracentesis  · Gram stain negative for growth  · GI consult regarding esophageal dysmotility-signed off as patient will be transitioning to hospice, continue supportive measures  · Barium swallow -negative for dysmotility or stricture  · S/p paracentesis 12/12 - 4 6 L removed    SHRAVAN (acute kidney injury) ([facility] [address] )  Assessment & Plan  · Baseline Cr 0 8-1  · Cr on admission 1 64, suspect prerenal dehydration, patient is with poor PO intake   · IVF discontinued 12/8 as patient's appetite had improved  · On repeat labs 12/12 creatinine 2 0 suspect related to hypotension  · Restarted IVF 12/12 for blood pressure support per pt request  · Creatinine improved  · Now level 4 comfort care measures only - discontinue blood draws, IVF    Mild protein-calorie malnutrition ([facility] [address] )  Assessment & Plan  Malnutrition Findings:     BMI Findings: Body mass index is 17 96 kg/m²  Nutrition consult and recommendation    Pancreatic adenocarcinoma [facility])  Assessment & Plan  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  · initiated on chemotherapy April 2022, most recently on Abraxane and Gemzar, patient states he is not currently receiving chemotherapy, thinks his last infusion was within the past several months  He does receive hydration infusion every Tuesday  · S/P radiation in august  · Seen in office 11/20 01/2022 with Dr Augusta Walker --> at that time was documented with progressively reduced appetite, weakness, poor family support, notably was falling asleep during office visit    Reportedly had canceled his chemo cycle 8 due to weakness  · Patient was seen again in office today , referred to ED by Dr Trina Dunn   · Palliative care following - level 4 comfort care initiated     History of pulmonary embolism  Assessment & Plan  · Discontinue eliquis  · Pt comfort care only    Type 2 diabetes mellitus without complication, with long-term current use of insulin ([address] )  Assessment & Plan  Lab Results   Component Value Date    HGBA1C 6 1 2021   · Discontinue SSI + accuchek - pt level 4 comfort care only    VTE Pharmacologic Prophylaxis: VTE Score: 7 High Risk (Score >/= 5) - Pharmacological DVT Prophylaxis Contraindicated  Sequential Compression Devices Ordered  Patient Centered Rounds: I performed bedside rounds with nursing staff today  Discussions with Specialists or Other Care Team Provider: CM, palliative care AP    Education and Discussions with Family / Patient: Appreciate palliative care updating patient's sister/POA regarding transition to level 4 comfort care  Time Spent for Care: 30 minutes  More than 50% of total time spent on counseling and coordination of care as described above  Current Length of Stay: 9 day(s)  Current Patient Status: Inpatient   Certification Statement: The patient will continue to require additional inpatient hospital stay due to SNF placement for hospice  Discharge Plan: Anticipate discharge later today or tomorrow to SNF    Code Status: Level 4 - Comfort Care    Subjective:   Patient expresses he wants no negative/bad news today  Would like to be able to rest this morning  Denies any significant abdominal pain  Objective:     Vitals:   Temp (24hrs), Av 5 °F (36 4 °C), Min:97 3 °F (36 3 °C), Max:97 8 °F (36 6 °C)    Temp:  [97 3 °F (36 3 °C)-97 8 °F (36 6 °C)] 97 4 °F (36 3 °C)  HR:  [80-86] 80  Resp:  [17-20] 17  BP: ()/(58-67) 95/65  SpO2:  [97 %-100 %] 99 %  Body mass index is 17 96 kg/m²       Input and Output Summary (last 24 hours): Intake/Output Summary (Last 24 hours) at 12/14/2022 1409  Last data filed at 12/14/2022 1156  Gross per 24 hour   Intake 240 ml   Output 750 ml   Net -510 ml       Physical Exam:   Physical Exam  Vitals and nursing note reviewed  Constitutional:       General: He is not in acute distress  Appearance: He is cachectic  Comments: No acute distress   HENT:      Head: Normocephalic and atraumatic  Eyes:      General: No scleral icterus  Extraocular Movements: Extraocular movements intact  Conjunctiva/sclera: Conjunctivae normal    Cardiovascular:      Rate and Rhythm: Normal rate and regular rhythm  Heart sounds: No murmur heard  Pulmonary:      Effort: Pulmonary effort is normal  No respiratory distress  Breath sounds: Normal breath sounds  No wheezing, rhonchi or rales  Abdominal:      General: There is distension  Palpations: Abdomen is soft  Tenderness: There is no abdominal tenderness  There is no guarding or rebound  Musculoskeletal:         General: No swelling  Cervical back: Normal range of motion  Comments: No ext edema   Skin:     General: Skin is warm and dry  Capillary Refill: Capillary refill takes less than 2 seconds  Neurological:      Mental Status: He is alert  Mental status is at baseline  Psychiatric:         Mood and Affect: Mood normal  Affect is flat  Behavior: Behavior is withdrawn            Additional Data:     Labs:  Results from last 7 days   Lab Units 12/13/22  0406 12/12/22  0846   WBC Thousand/uL 3 39* 3 46*   HEMOGLOBIN g/dL 9 8* 10 3*   HEMATOCRIT % 29 7* 31 8*   PLATELETS Thousands/uL 175 162   BANDS PCT %  --  5   NEUTROS PCT % 88*  --    LYMPHS PCT % 4*  --    LYMPHO PCT %  --  2*   MONOS PCT % 8  --    MONO PCT %  --  2*   EOS PCT % 0 0     Results from last 7 days   Lab Units 12/14/22  0444 12/12/22  1608 12/12/22  0846   SODIUM mmol/L 135   < > 134*   POTASSIUM mmol/L 6 2*   < > 7 0*   CHLORIDE mmol/L 107   < > 105   CO2 mmol/L 14*   < > 20*   BUN mg/dL 71*   < > 76*   CREATININE mg/dL 1 40*   < > 2 00*   ANION GAP mmol/L 14*   < > 9   CALCIUM mg/dL 8 7   < > 8 5   ALBUMIN g/dL  --   --  3 0*   TOTAL BILIRUBIN mg/dL  --   --  0 80   ALK PHOS U/L  --   --  206*   ALT U/L  --   --  30   AST U/L  --   --  24   GLUCOSE RANDOM mg/dL 94   < > 149*    < > = values in this interval not displayed  Results from last 7 days   Lab Units 12/13/22  2113 12/13/22  1645 12/13/22  1119 12/13/22  0707 12/12/22  2124 12/12/22  1305 12/12/22  1053 12/12/22  0730 12/11/22  2118 12/11/22  1625 12/11/22  1111 12/11/22  0723   POC GLUCOSE mg/dl 69 130 209* 90 62* 72 206* 131 120 124 165* 112               Lines/Drains:  Invasive Devices     Central Venous Catheter Line  Duration           Port A Cath 04/11/22 Right Chest 247 days          Peripheral Intravenous Line  Duration           Peripheral IV 12/12/22 Left;Ventral (anterior) Forearm 1 day                Central Line:  Goal for removal: N/A - Chronic PICC             Imaging: No pertinent imaging reviewed      Recent Cultures (last 7 days):         Last 24 Hours Medication List:   Current Facility-Administered Medications   Medication Dose Route Frequency Provider Last Rate   • acetaminophen  650 mg Oral Q6H PRN Shital Gutierrez PA-C     • LORazepam  0 5 mg Intravenous Q6H PRN Amina Garcia PA-C      Or   • ALPRAZolam  0 25 mg Oral Q6H PRN Amina Garcia PA-C     • bisacodyl  10 mg Rectal Daily PRN Amina Garcia PA-C     • haloperidol  2 mg Sublingual Q4H PRN Amina Garcia PA-C      Or   • haloperidol lactate  0 5 mg Intravenous Q1H PRN Amina Garcia PA-C     • HYDROmorphone  0 5 mg Intravenous Q1H PRN Amina Garcia PA-C     • oxyCODONE  5 mg Oral Q4H PRN Jennyfer Macias PA-C     • pantoprazole  40 mg Oral BID AC TAMIKA Mendoza     • senna  1 tablet Oral BID Ismael Lamar PA-C Today, Patient Was Seen By: Yo Edmond PA-C    **Please Note: This note may have been constructed using a voice recognition system  **

## 2022-12-14 NOTE — ASSESSMENT & PLAN NOTE
Malnutrition Findings:     BMI Findings: Body mass index is 17 96 kg/m²     Nutrition consult and recommendation

## 2022-12-14 NOTE — ASSESSMENT & PLAN NOTE
· 12/12: Ongoing goals of care discussions held between palliative care, patient and family (patient's sister/POA)  · Patient's sister appears to be very realistic regarding patient's prognosis and that he is likely declining  Patient appears overwhelmed with medical updates and requests to hear "nothing negative, only things to give hope" but seems to understand hospice on discharge is appropriate  · Per palliative care discussion, no escalation in cares  Family and patient aware of potassium level  Will continue medical management (I e  medications, IVF) for now to treat hyperkalemia/dehydration but as patient and family are leaning towards comfort cares will not pursue cardiac monitoring per their wishes  · Level 3 DNR/DNI   · 12/13: Potassium/creatinine and BP have stabilized, after discussion with sister will still pursue SNF placement for hospice cares unless decline recurs to the point he is unstable for transport or level of care needs to be changed (I e  IP hospice)    Patient still requesting medical support (IVF, labs, treatment of potassium/electrolyte abnormalities) while inpatient  · Per discussion with palliative care, patient now transitioned to level 4 comfort care measures  · Awaiting SNF placement for ongoing hospice cares

## 2022-12-14 NOTE — ASSESSMENT & PLAN NOTE
Malnutrition Findings:   Adult Malnutrition type: Acute illness  Adult Degree of Malnutrition: Other severe protein calorie malnutrition  Malnutrition Characteristics: Inadequate energy, Weight loss      Allow pleasure feeds            360 Statement: Pt presents with severe protein calorie malnutrition as evidenced by 12% wt loss in 3 months and <50% po intake> 5 days  Treat with CCD2, 2 gm K+ diet  Glucerna supplements BID  BMI Findings:  Adult BMI Classifications: Underweight < 18 5        Body mass index is 17 96 kg/m²

## 2022-12-14 NOTE — ASSESSMENT & PLAN NOTE
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  · initiated on chemotherapy April 2022, most recently on Abraxane and Gemzar, patient states he is not currently receiving chemotherapy, thinks his last infusion was within the past several months  He does receive hydration infusion every Tuesday  · S/P radiation in august  · Seen in office 11/20 01/2022 with Dr Larissa Heredia --> at that time was documented with progressively reduced appetite, weakness, poor family support, notably was falling asleep during office visit  Reportedly had canceled his chemo cycle 8 due to weakness     · Patient was seen again in office today 12/5, referred to ED by Dr Larissa Heredia   · Palliative care following - level 4 comfort care initiated 12/14

## 2022-12-14 NOTE — CASE MANAGEMENT
Case Management Discharge Planning Note    Patient name Eliceo Cobian /-82 MRN [id number]  : 1940 Date 2022       Current Admission Date: 2022  Current Admission Diagnosis:Lethargy   Patient Active Problem List    Diagnosis Date Noted   • Severe protein-calorie malnutrition ([facility] [address] ) 2022   • Goals of care, counseling/discussion 2022   • Hyperkalemia 2022   • Ascites 2022   • Lethargy 2022   • SHRAVAN (acute kidney injury) ([facility] [address] ) 2022   • Dehydration 10/22/2022   • Mild protein-calorie malnutrition ([facility] [address] ) 2022   • Acute exacerbation of chronic obstructive pulmonary disease ([facility] [address] ) 2022   • Hypokalemia 2022   • Chemotherapy induced neutropenia ([facility] [address] ) 2022   • Pancreatic adenocarcinoma ([facility] [address] ) 2022   • Type 2 diabetes mellitus without complication, with long-term current use of insulin ([facility] [address] )    • GERD (gastroesophageal reflux disease)    • Pancreatic mass    • History of pulmonary embolism    • Hyperbilirubinemia    • Anxiety about health       LOS (days): 9  Geometric Mean LOS (GMLOS) (days): 3 30  Days to GMLOS:-5 5     OBJECTIVE:  Risk of Unplanned Readmission Score: 25 77         Current admission status: Inpatient   Preferred Pharmacy:   [id number] [address], [address] [facility]  Phone: [phone] Fax: [address], [address] [address] [address] [address] [address]  Phone: [phone] Fax: [address] [facility] [address], [address]  [facility] [address]  Phone: [phone] Fax: [phone]    Primary Care Provider: Jannette Cardenas    Primary Insurance: [id number]  [address]  REP  Secondary Insurance:     DISCHARGE DETAILS:    Discharge planning discussed with[de-identified] patient and his sister  Freedom of Choice: Yes  Comments - Freedom of Choice: New referrals sent out as pt is covid positive  CM contacted family/caregiver?: Yes  Were Treatment Team discharge recommendations reviewed with patient/caregiver?: Yes  Did patient/caregiver verbalize understanding of patient care needs?: Yes  Were patient/caregiver advised of the risks associated with not following Treatment Team discharge recommendations?: Yes    Contacts  Patient Contacts: sister Mera Hayden  Relationship to Patient[de-identified] Family  Contact Method: Phone  Phone Number: [phone]  Reason/Outcome: Continuity of Care, Emergency Contact, Discharge Planning    Other Referral/Resources/Interventions Provided:  Interventions: SNF, Hospice  Referral Comments: Pt is positive for covid, referral reopened for pt to go a facility that can accept COVID +  Treatment Team Recommendation: SNF, Hospice  Discharge Destination Plan[de-identified] SNF, Hospice  [facility]     Additional Comments: Pt is covid positive  Family notified [facility] needs him to time out 5 days  Referral reopened in [place] and awaiting determinations  Transport was cancelled

## 2022-12-14 NOTE — PROGRESS NOTES
Progress Note - Palliative & Supportive Care  Jennifer Fortune  [de-identified] y o   male  /-01   MRN: [id number]  Encounter: [id number]     Assessment/Problems Actively Addressed this Visit:  Unresectable pancreatic cancer  Mesenteric metastasis  Ascites requiring paracentesis  SHRAVAN  Weakness  Lethargy  Nausea  Vomiting  Failure to thrive   Chronic diarrhea  Anxiety about health  Cancer related pain  Severe protein calorie malnutrtion  Goals of care counseling  Hyperkalemia   COVID-19 positive test     Plan:  1  Symptom Management  Comfort care medications:  -Oxycodone 5 mg PO Q4H PRN pain  -Hydromorphone IV 0 5MG Q1HPRN BT pain     -Alprazolam 0 25MG Q6H PRN  OR  -Lorazepam 0 5MG IV Q6H PRN anxiety/work of breathing     -PO haldol 2MG Q4H PRN agitation/nausea   -IV haldol 0 5MG Q4H PRN agitation/nausea     -bowel regimen: Senna, PRN dulcolax suppository    2  Goals of Care / Recommendations   · Transition to level 4 comfort cares  · Repeat goals of care discussion today given recurrent hyperkalemia and COVID+ status  · Both Sebastien and CARIDAD CARRANZA are agreeable to transition to full comfort cares here  · Plan to stop all IV disease-directed care  Olivia Cerda agrees no more lab draws  · Olivia Cerda specifically requests no more negative news while he is admitted here  He would like to be kept comfortable and does not want to hear any more about his prognosis  · He is agreeable to family being updated--DILLON is aware of prognosis  · Discussed with RN, primary team and CM  24 History  Chart reviewed before visit  Decisional apparatus:  Patient is possibly competent on exam today  If competence is lost, patient's substitute decision maker would default to [address] by [place] Act 169  Advance Directive/Living Will/POLST: Yes, on file  Review of Systems:   Review of Systems   Unable to perform ROS: other   Constitutional: Negative for decreased appetite  Eyes: Negative for blurred vision     Respiratory: Negative for cough and shortness of breath  Gastrointestinal: Negative for bloating, abdominal pain, nausea and vomiting  Neurological: Positive for weakness     Patient unwilling to elaborate on review of systems    Medications    Current Facility-Administered Medications:   •  acetaminophen (TYLENOL) tablet 650 mg, 650 mg, Oral, Q6H PRN, HARPER Higuera-YESI, 650 mg at 12/14/22 0278  •  LORazepam (ATIVAN) injection 0 5 mg, 0 5 mg, Intravenous, Q6H PRN **OR** ALPRAZolam (XANAX) tablet 0 25 mg, 0 25 mg, Oral, Q6H PRN, Carson Garcia PA-C, 0 25 mg at 12/13/22 1141  •  apixaban (ELIQUIS) tablet 5 mg, 5 mg, Oral, BID, Nabil Gutierrez PA-C, 5 mg at 12/14/22 9416  •  HYDROmorphone (DILAUDID) injection 0 5 mg, 0 5 mg, Intravenous, Q3H PRN, Carson Garcia PA-C, 0 5 mg at 12/13/22 1321  •  insulin lispro (HumaLOG) 100 units/mL subcutaneous injection 1-5 Units, 1-5 Units, Subcutaneous, TID AC, 1 Units at 12/13/22 1142 **AND** Fingerstick Glucose (POCT), , , TID AC, Nabil Gutierrez PA-C  •  insulin lispro (HumaLOG) 100 units/mL subcutaneous injection 1-5 Units, 1-5 Units, Subcutaneous, HS, HARPER Higuera-C, 1 Units at 12/10/22 2155  •  metoclopramide (REGLAN) injection 10 mg, 10 mg, Intravenous, Q6H PRN, Mary Heredia PA-C, 10 mg at 12/05/22 2138  •  ondansetron (ZOFRAN) injection 4 mg, 4 mg, Intravenous, Q4H PRN, Garry Powell MD, 4 mg at 12/11/22 0150  •  oxyCODONE (ROXICODONE) IR tablet 5 mg, 5 mg, Oral, Q4H PRN, Tommy Rose PA-C, 5 mg at 12/13/22 2238  •  pantoprazole (PROTONIX) EC tablet 40 mg, 40 mg, Oral, BID AC, TAMIKA Zaldivar, 40 mg at 12/14/22 1438  •  polyethylene glycol (MIRALAX) packet 17 g, 17 g, Oral, Daily PRN, Carson Garcia PA-C, 17 g at 12/12/22 6427  •  pravastatin (PRAVACHOL) tablet 20 mg, 20 mg, Oral, Daily, Nabil Gutierrez PA-C, 20 mg at 12/14/22 0831  •  senna (SENOKOT) tablet 8 6 mg, 1 tablet, Oral, BID, Carson West ERNIE Garcia, 8 6 mg at 12/14/22 0831  •  sodium chloride 0 9 % infusion, 75 mL/hr, Intravenous, Continuous, Amy ERNIE Pandey, Last Rate: 75 mL/hr at 12/14/22 0556, 75 mL/hr at 12/14/22 0556    Objective  /67 (BP Location: Left arm)   Pulse 81   Temp (!) 97 4 °F (36 3 °C) (Oral)   Resp 18   Ht 5' 7" (1 702 m)   Wt 52 kg (114 lb 10 2 oz)   SpO2 99%   BMI 17 96 kg/m²     Physical Exam  Vitals and nursing note reviewed  Constitutional:       General: He is sleeping  He is not in acute distress  Appearance: He is cachectic  He is ill-appearing  Comments: Fat loss and muscle wasting present   HENT:      Head: Normocephalic and atraumatic  Eyes:      General:         Right eye: No discharge  Left eye: No discharge  Conjunctiva/sclera: Conjunctivae normal    Cardiovascular:      Rate and Rhythm: Normal rate  Heart sounds: No murmur heard  Pulmonary:      Effort: Pulmonary effort is normal  No respiratory distress  Musculoskeletal:         General: No swelling  Cervical back: Neck supple  Skin:     General: Skin is warm and dry  Capillary Refill: Capillary refill takes less than 2 seconds  Coloration: Skin is pale  Neurological:      Mental Status: He is oriented to person, place, and time and easily aroused  Mental status is at baseline  Psychiatric:         Mood and Affect: Mood normal        Lab Results:   I have personally reviewed pertinent labs  , CBC: No results found for: WBC, HGB, HCT, MCV, PLT, ADJUSTEDWBC, MCH, MCHC, RDW, MPV, NRBC, CMP:   Lab Results   Component Value Date    SODIUM 135 12/14/2022    K 6 2 (H) 12/14/2022     12/14/2022    CO2 14 (L) 12/14/2022    BUN 71 (H) 12/14/2022    CREATININE 1 40 (H) 12/14/2022    CALCIUM 8 7 12/14/2022    EGFR 47 12/14/2022     Imaging Studies: I have personally reviewed pertinent reports  EKG, Pathology, and Other Studies: I have personally reviewed pertinent reports      Counseling / Coordination of Care  Total floor / unit time spent today 90 minutes  Greater than 50% of total time was spent with the patient and / or family counseling and / or coordinating of care  A description of the counseling / coordination of care: reviewed chart, reviewed lab values, reviewed imaging, provided medical updates, discussed palliative care and symptom management, discussed hospice care and comfort care, discussed goals of care, discussed code status, discussed advanced directives, assessed POA/HCA, provided supportive listening, provided anticipatory guidance, provided psychosocial and emotional support, assessed competency and decision-making and facilitated interdisciplinary communication  Reviewed with ALEXANDER jackson, RN and CM  ASHLEY Cook  Palliative & Supportive Care    Portions of this document may have been created using dictation software and as such some "sound alike" terms may have been generated by the system  Do not hesitate to contact me with any questions or clarifications

## 2022-12-14 NOTE — ASSESSMENT & PLAN NOTE
Lab Results   Component Value Date    HGBA1C 6 1 08/28/2021   · Discontinue SSI + accuchek - pt level 4 comfort care only

## 2022-12-14 NOTE — CASE MANAGEMENT
Case Management Discharge Planning Note    Patient name Sasha Gonsales  Location /-89 MRN [id number]  : 1940 Date 2022       Current Admission Date: 2022  Current Admission Diagnosis:Lethargy   Patient Active Problem List    Diagnosis Date Noted   • Severe protein-calorie malnutrition ([facility] [address] ) 2022   • COVID-19 2022   • Goals of care, counseling/discussion 2022   • Hyperkalemia 2022   • Ascites 2022   • Lethargy 2022   • SHRAVAN (acute kidney injury) ([facility] [address] ) 2022   • Dehydration 10/22/2022   • Mild protein-calorie malnutrition ([facility] [address] ) 2022   • Acute exacerbation of chronic obstructive pulmonary disease ([facility] [address] ) 2022   • Hypokalemia 2022   • Chemotherapy induced neutropenia ([facility] [address] ) 2022   • Pancreatic adenocarcinoma ([facility] [address] ) 2022   • Type 2 diabetes mellitus without complication, with long-term current use of insulin ([facility] [address] )    • GERD (gastroesophageal reflux disease)    • Pancreatic mass    • History of pulmonary embolism    • Hyperbilirubinemia    • Anxiety about health       LOS (days): 9  Geometric Mean LOS (GMLOS) (days): 3 30  Days to GMLOS:-5 7     OBJECTIVE:  Risk of Unplanned Readmission Score: 25 35         Current admission status: Inpatient   Preferred Pharmacy:   [facility]/pharmacy #[id number] Kailyn Delgadillo  Phone: [phone] Fax: [address], [address] [address] [place] [address] [address] [address]  Phone: [phone] Fax: [address] [facility] [address], [address] [facility] [address]   [address]  Phone: [phone] Fax: [phone]    Primary Care Provider: Miki Iniguez    Primary Insurance: [facility]  Secondary Insurance:     DISCHARGE DETAILS:    Discharge planning discussed with[de-identified] his sister  Freedom of Choice: Yes  Comments - Freedom of Choice: [facility] and [facility]ania Arceo are considering pt with covid +  CM contacted family/caregiver?: Yes  Were Treatment Team discharge recommendations reviewed with patient/caregiver?: Yes  Did patient/caregiver verbalize understanding of patient care needs?: Yes  Were patient/caregiver advised of the risks associated with not following Treatment Team discharge recommendations?: Yes    Contacts  Patient Contacts: sister Odette Mccoy  Relationship to Patient[de-identified] Family  Contact Method: Phone  Phone Number: [phone]  Reason/Outcome: Continuity of Care, Emergency Contact, Discharge [address]         Is the patient interested in [address] at discharge?: No    DME Referral Provided  Referral made for DME?: No    Other Referral/Resources/Interventions Provided:  Interventions: SNF, Hospice  Referral Comments: Pt is covid positive, waiting to hear from facilities about acceptance  Treatment Team Recommendation: SNF, Hospice  Discharge Destination Plan[de-identified] SNF, Hospice     Additional Comments: Pt is covid positive  Pt's sister is working on obtaining information for MA application that facilities need  Pt is on comfort care at hospital  Palliative and SLIM are monitoring his condition overnight  [facility]incent is following

## 2022-12-15 NOTE — CASE MANAGEMENT
Case Management Discharge Planning Note    Patient name Jeff Mention  Location /-76 MRN [id number]  : 1940 Date 12/15/2022       Current Admission Date: 2022  Current Admission Diagnosis:Lethargy   Patient Active Problem List    Diagnosis Date Noted   • Severe protein-calorie malnutrition ([facility] [address] ) 2022   • COVID-19 2022   • Goals of care, counseling/discussion 2022   • Hyperkalemia 2022   • Ascites 2022   • Lethargy 2022   • SHRAVAN (acute kidney injury) ([facility] [address] ) 2022   • Dehydration 10/22/2022   • Mild protein-calorie malnutrition ([facility] [address] ) 2022   • Acute exacerbation of chronic obstructive pulmonary disease ([facility] [address] ) 2022   • Hypokalemia 2022   • Chemotherapy induced neutropenia ([facility] [address] ) 2022   • Pancreatic adenocarcinoma ([facility] [address] ) 2022   • Type 2 diabetes mellitus without complication, with long-term current use of insulin (HCC)    • GERD (gastroesophageal reflux disease)    • Pancreatic mass    • History of pulmonary embolism    • Hyperbilirubinemia    • Anxiety about health       LOS (days): 10  Geometric Mean LOS (GMLOS) (days): 4 70  Days to GMLOS:-5 2     OBJECTIVE:  Risk of Unplanned Readmission Score: 24 97      Current admission status: Inpatient   Preferred Pharmacy:   [facility] #[id number]  Phone: [phone] Fax: [address], [address] [facility] [address] [address] [address]  Phone: [phone] Fax: [address], [address] [facility] [address]   [address]  Phone: [phone] Fax: [phone]    Primary Care Provider: Terra Tsang    Primary Insurance: [facility]  Secondary Insurance:     DISCHARGE DETAILS:    Discharge planning discussed with[de-identified] his sister  Freedom of Choice: Yes  Comments - Freedom of Choice: [facility] is considering  CM contacted family/caregiver?: Yes  Were Treatment Team discharge recommendations reviewed with patient/caregiver?: Yes    Contacts  Patient Contacts: sister Lorene Thakkar  Relationship to Patient[de-identified] Family  Contact Method: Phone  Phone Number: [phone]  Reason/Outcome: Continuity of Care, Emergency Contact, Discharge [address]         Is the patient interested in [address] at discharge?: No    Other Referral/Resources/Interventions Provided:  Interventions: SNF, Hospice  Referral Comments: Awaiting determination from [facility] and [facility]    Treatment Team Recommendation: SNF, Hospice  Discharge Destination Plan[de-identified] SNF, Hospice  Transport at Discharge : BLS Ambulance     Additional Comments: Letitia is discussing JOSELYN carter with pt's sister and cm is waiting for determination  Jonathon is also considering

## 2022-12-15 NOTE — PROGRESS NOTES
Progress Note - Palliative & Supportive Care  Byron Morejon  [de-identified] y o   male  /-01   MRN: [id number]  Encounter: [id number]     Assessment/Problems Actively Addressed this Visit:  Unresectable pancreatic cancer  Mesenteric metastasis  Ascites requiring paracentesis  SHRAVAN  Weakness  Lethargy  Nausea  Vomiting  Failure to thrive   Chronic diarrhea  Anxiety about health  Cancer related pain  Severe protein calorie malnutrtion  Goals of care counseling  Hyperkalemia   COVID-19 positive test     Plan:  1  Symptom Management  -PO alprazolam 0 25MG Q6H PRN anxiety  OR  -IV Lorazepam 0 5MG Q6H PRN     -PO oxycodone 5MG Q4H PRN pain  OR  -IV hydromorphone 0 5MG Q1H PRN BT pain    -PO Haldol 2MG SL Q4H PRN agitation/nausea  OR  -IV Haldol Q1H PRN     -CT Dulcolax suppository PRN     2  Goals of Care / Recommendations   · Continue Level 4 comfort cares   · Determining placement  · Mani Chaudhry is medical and financial POA; Maury Tavares is glad to have her involved in decision-making  · Maury Dry without complaints today  Continuing comfort cares as above  · Palliative will follow  24 History  Chart reviewed before visit  Maury Tavares reports he is doing well overall this morning  No acute complaints  He did note some mild abdominal discomfort and requested something to take the edge off of this  Soon after he is resting comfortably  Decisional apparatus:  Patient is possibly competent on exam today  If competence is lost, patient's substitute decision maker would default to Lachelle [place] by [place] Act 169  Advance Directive/Living Will/POLST: yes on file  Review of Systems   Cardiovascular: Negative for chest pain, claudication and dyspnea on exertion  Musculoskeletal: Negative for arthritis, back pain and gout  Gastrointestinal: Positive for bloating and abdominal pain (mild )  Negative for nausea and vomiting  Neurological: Positive for weakness  Negative for disturbances in coordination     Psychiatric/Behavioral: Negative for altered mental status, depression and hallucinations  Medications    Current Facility-Administered Medications:   •  acetaminophen (TYLENOL) tablet 650 mg, 650 mg, Oral, Q6H PRN, Guanako Gutierrez PA-C, 650 mg at 12/14/22 1821  •  LORazepam (ATIVAN) injection 0 5 mg, 0 5 mg, Intravenous, Q6H PRN **OR** ALPRAZolam (XANAX) tablet 0 25 mg, 0 25 mg, Oral, Q6H PRN, Jesse Yee PA-C, 0 25 mg at 12/15/22 0056  •  bisacodyl (DULCOLAX) rectal suppository 10 mg, 10 mg, Rectal, Daily PRN, Dale Garcia PA-C  •  haloperidol (HALDOL) oral concentrated solution 2 mg, 2 mg, Sublingual, Q4H PRN **OR** haloperidol lactate (HALDOL) injection 0 5 mg, 0 5 mg, Intravenous, Q1H PRN, Jesse Yee PA-C  •  HYDROmorphone (DILAUDID) injection 0 5 mg, 0 5 mg, Intravenous, Q1H PRN, Jesse Yee PA-C, 0 5 mg at 12/15/22 8818  •  oxyCODONE (ROXICODONE) IR tablet 5 mg, 5 mg, Oral, Q4H PRN, Sydney Rose PA-C, 5 mg at 12/14/22 2231  •  pantoprazole (PROTONIX) EC tablet 40 mg, 40 mg, Oral, BID Radha MARTINEZ CRNP, 40 mg at 12/15/22 9156  •  senna (SENOKOT) tablet 8 6 mg, 1 tablet, Oral, BID, Dale Garcia PA-C, 8 6 mg at 12/15/22 4846    Objective  BP 92/59 (BP Location: Right arm)   Pulse 82   Temp 97 6 °F (36 4 °C) (Oral)   Resp 20   Ht 5' 7" (1 702 m)   Wt 52 kg (114 lb 10 2 oz)   SpO2 97%   BMI 17 96 kg/m²     Physical Exam  Vitals and nursing note reviewed  Constitutional:       General: He is not in acute distress  Appearance: He is cachectic  He is ill-appearing  HENT:      Head: Normocephalic and atraumatic  Eyes:      General:         Right eye: No discharge  Left eye: No discharge  Conjunctiva/sclera: Conjunctivae normal    Cardiovascular:      Rate and Rhythm: Normal rate and regular rhythm  Heart sounds: No murmur heard  Pulmonary:      Effort: Pulmonary effort is normal  No respiratory distress        Breath sounds: Normal breath sounds  Abdominal:      General: There is distension  Tenderness: There is no abdominal tenderness  Musculoskeletal:         General: No swelling  Cervical back: Neck supple  Skin:     General: Skin is warm and dry  Capillary Refill: Capillary refill takes less than 2 seconds  Coloration: Skin is pale  Neurological:      Mental Status: He is alert and oriented to person, place, and time  Psychiatric:         Mood and Affect: Mood normal            Lab Results: I have personally reviewed pertinent labs  , CBC: No results found for: WBC, HGB, HCT, MCV, PLT, ADJUSTEDWBC, MCH, MCHC, RDW, MPV, NRBC, CMP: No results found for: SODIUM, K, CL, CO2, ANIONGAP, BUN, CREATININE, GLUCOSE, CALCIUM, AST, ALT, ALKPHOS, PROT, BILITOT, EGFR  Imaging Studies: I have personally reviewed pertinent reports  EKG, Pathology, and Other Studies: I have personally reviewed pertinent reports  Counseling / Coordination of Care  Total floor / unit time spent today 40 minutes  Greater than 50% of total time was spent with the patient and / or family counseling and / or coordinating of care  A description of the counseling / coordination of care: reviewed chart, provided medical updates, discussed palliative care and symptom management, discussed hospice care and comfort care, discussed goals of care, provided supportive listening, provided anticipatory guidance, provided psychosocial and emotional support, assessed competency and decision-making and facilitated interdisciplinary communication  Reviewed with ALEXANDER jackson, LINCOLN and MARICRUZ Garcia PA-C  Palliative & Supportive Care    Portions of this document may have been created using dictation software and as such some "sound alike" terms may have been generated by the system  Do not hesitate to contact me with any questions or clarifications

## 2022-12-15 NOTE — PLAN OF CARE
Problem: MOBILITY - ADULT  Goal: Maintain or return to baseline ADL function  Description: INTERVENTIONS:  -  Assess patient's ability to carry out ADLs; assess patient's baseline for ADL function and identify physical deficits which impact ability to perform ADLs (bathing, care of mouth/teeth, toileting, grooming, dressing, etc )  - Assess/evaluate cause of self-care deficits   - Assess range of motion  - Assess patient's mobility; develop plan if impaired  - Assess patient's need for assistive devices and provide as appropriate  - Encourage maximum independence but intervene and supervise when necessary  - Involve family in performance of ADLs  - Assess for home care needs following discharge   - Consider OT consult to assist with ADL evaluation and planning for discharge  - Provide patient education as appropriate  Outcome: Progressing  Goal: Maintains/Returns to pre admission functional level  Description: INTERVENTIONS:  - Perform BMAT or MOVE assessment daily    - Set and communicate daily mobility goal to care team and patient/family/caregiver  - Collaborate with rehabilitation services on mobility goals if consulted  - Perform Range of Motion 2 times a day  - Reposition patient every 2 hours  - Dangle patient 2 times a day  - Stand patient 2 times a day  - Ambulate patient 2 times a day  - Out of bed to chair 2 times a day   - Out of bed for meals 2 times a day  - Out of bed for toileting  - Record patient progress and toleration of activity level   Outcome: Progressing     Problem: Nutrition/Hydration-ADULT  Goal: Nutrient/Hydration intake appropriate for improving, restoring or maintaining nutritional needs  Description: Monitor and assess patient's nutrition/hydration status for malnutrition  Collaborate with interdisciplinary team and initiate plan and interventions as ordered  Monitor patient's weight and dietary intake as ordered or per policy   Utilize nutrition screening tool and intervene as necessary  Determine patient's food preferences and provide high-protein, high-caloric foods as appropriate       INTERVENTIONS:  - Monitor oral intake, urinary output, labs, and treatment plans  - Assess nutrition and hydration status and recommend course of action  - Evaluate amount of meals eaten  - Assist patient with eating if necessary   - Allow adequate time for meals  - Recommend/ encourage appropriate diets, oral nutritional supplements, and vitamin/mineral supplements  - Order, calculate, and assess calorie counts as needed  - Recommend, monitor, and adjust tube feedings and TPN/PPN based on assessed needs  - Assess need for intravenous fluids  - Provide specific nutrition/hydration education as appropriate  - Include patient/family/caregiver in decisions related to nutrition  Outcome: Progressing     Problem: INFECTION - ADULT  Goal: Absence or prevention of progression during hospitalization  Description: INTERVENTIONS:  - Assess and monitor for signs and symptoms of infection  - Monitor lab/diagnostic results  - Monitor all insertion sites, i e  indwelling lines, tubes, and drains  - Monitor endotracheal if appropriate and nasal secretions for changes in amount and color  - [place] appropriate cooling/warming therapies per order  - Administer medications as ordered  - Instruct and encourage patient and family to use good hand hygiene technique  - Identify and instruct in appropriate isolation precautions for identified infection/condition  Outcome: Progressing  Goal: Absence of fever/infection during neutropenic period  Description: INTERVENTIONS:  - Monitor WBC    Outcome: Progressing     Problem: SAFETY ADULT  Goal: Maintain or return to baseline ADL function  Description: INTERVENTIONS:  -  Assess patient's ability to carry out ADLs; assess patient's baseline for ADL function and identify physical deficits which impact ability to perform ADLs (bathing, care of mouth/teeth, toileting, grooming, dressing, etc )  - Assess/evaluate cause of self-care deficits   - Assess range of motion  - Assess patient's mobility; develop plan if impaired  - Assess patient's need for assistive devices and provide as appropriate  - Encourage maximum independence but intervene and supervise when necessary  - Involve family in performance of ADLs  - Assess for home care needs following discharge   - Consider OT consult to assist with ADL evaluation and planning for discharge  - Provide patient education as appropriate  Outcome: Progressing  Goal: Maintains/Returns to pre admission functional level  Description: INTERVENTIONS:  - Perform BMAT or MOVE assessment daily    - Set and communicate daily mobility goal to care team and patient/family/caregiver  - Collaborate with rehabilitation services on mobility goals if consulted  - Perform Range of Motion 2 times a day  - Reposition patient every 2 hours    - Dangle patient 2 times a day  - Stand patient 2 times a day  - Ambulate patient 2 times a day  - Out of bed to chair 2 times a day   - Out of bed for meals 2 times a day  - Out of bed for toileting  - Record patient progress and toleration of activity level   Outcome: Progressing  Goal: Patient will remain free of falls  Description: INTERVENTIONS:  - Educate patient/family on patient safety including physical limitations  - Instruct patient to call for assistance with activity   - Consult OT/PT to assist with strengthening/mobility   - Keep Call bell within reach  - Keep bed low and locked with side rails adjusted as appropriate  - Keep care items and personal belongings within reach  - Initiate and maintain comfort rounds  - Make Fall Risk Sign visible to staff  - Offer Toileting every 2 Hours, in advance of need  - Initiate/Maintain bed alarm  - Obtain necessary fall risk management equipment:   - Apply yellow socks and bracelet for high fall risk patients  - Consider moving patient to room near nurses station  Outcome: Progressing     Problem: Prexisting or High Potential for Compromised Skin Integrity  Goal: Skin integrity is maintained or improved  Description: INTERVENTIONS:  - Identify patients at risk for skin breakdown  - Assess and monitor skin integrity  - Assess and monitor nutrition and hydration status  - Monitor labs   - Assess for incontinence   - Turn and reposition patient  - Assist with mobility/ambulation  - Relieve pressure over bony prominences  - Avoid friction and shearing  - Provide appropriate hygiene as needed including keeping skin clean and dry  - Evaluate need for skin moisturizer/barrier cream  - Collaborate with interdisciplinary team   - Patient/family teaching  - Consider wound care consult   Outcome: Progressing

## 2022-12-15 NOTE — PROGRESS NOTES
[place]  Progress Note - Gonzalez Villagomez 1940, [de-identified] y o  male MRN: [id number]  Unit/Bed#: -01 Encounter: [id number]  Primary Care Provider: Ron Ramírez   Date and time admitted to hospital: 12/5/2022  2:00 PM    * Lethargy  Assessment & Plan  · Referred to ED by OP Oncology due to report of lethargy/weakness after begin seen in office 12/5  Appears to be ongoing since 11/21 where lethargy/progressive decline in funcitonal status is noted in Onc note  Endorses abdominal pain, nausea/vomiting, diarrhea  · SIRS:  No leukocytosis, no fever, no tachycardia, no tachypnea  · Lactic is normal  · Procalcitonin slightly elevated, repeat morning  · No evidence/source of infection at this time  · UA is unremarkable  · CXR is unremarkable  · Blood culture negative x2 to date  · PT/OT eval   · Palliative care consult due to progressive decline in setting of metastatic cancer   · CM following for SNF placement pending bed availability    COVID-19  Assessment & Plan  · Incidentally COVID + as of 12/13   · Asymptomatic, on RA  · Contact precautions    Severe protein-calorie malnutrition ([address] )  Assessment & Plan  Malnutrition Findings:   Adult Malnutrition type: Acute illness  Adult Degree of Malnutrition: Other severe protein calorie malnutrition  Malnutrition Characteristics: Inadequate energy, Weight loss      Allow pleasure feeds            360 Statement: Pt presents with severe protein calorie malnutrition as evidenced by 12% wt loss in 3 months and <50% po intake> 5 days  Treat with CCD2, 2 gm K+ diet  Glucerna supplements BID  BMI Findings:  Adult BMI Classifications: Underweight < 18 5        Body mass index is 17 96 kg/m²         Hyperkalemia  Assessment & Plan  · Suspect multifactorial in setting of oral potassium, acute kidney injury, dehydration  · Discussed with nephrology 12/12:  · IV insulin/dextrose, IV calcium, Kayexalate  · Required repeat overnight for persistent hyperkalemia  · Per goals of care discussion will not upgrade to telemetry bed  · Improved to 5 6 this AM - continue IVF  · Discontinue oral potassium supplementation  · Urinary retention protocol  · No further blood draws - pt level 4 comfort care    Goals of care, counseling/discussion  Assessment & Plan  · 12/12: Ongoing goals of care discussions held between palliative care, patient and family (patient's sister/POA)  · Patient's sister appears to be very realistic regarding patient's prognosis and that he is likely declining  Patient appears overwhelmed with medical updates and requests to hear "nothing negative, only things to give hope" but seems to understand hospice on discharge is appropriate  · Per palliative care discussion, no escalation in cares  Family and patient aware of potassium level  Will continue medical management (I e  medications, IVF) for now to treat hyperkalemia/dehydration but as patient and family are leaning towards comfort cares will not pursue cardiac monitoring per their wishes  · Level 3 DNR/DNI   · 12/13: Potassium/creatinine and BP have stabilized, after discussion with sister will still pursue SNF placement for hospice cares unless decline recurs to the point he is unstable for transport or level of care needs to be changed (I e  IP hospice)  Patient still requesting medical support (IVF, labs, treatment of potassium/electrolyte abnormalities) while inpatient  · Per discussion with palliative care, patient now transitioned to level 4 comfort care measures  · Awaiting SNF placement for ongoing hospice cares    Ascites  Assessment & Plan  · CT chest abdomen pelvis showed-" Massive ascites, developing since a CT from 10/28/2022  No new pancreatic head tumor poorly evaluated in the absence of IV and enteric contrast   Grossly unchanged in size    Previously demonstrated mesenteric metastases not visualized with certainty on this examination, most likely due to combination of ascites, mesenteric edema and absence of IV contrast   No evidence of new metastases in the chest, abdomen or pelvis  Stable small pulmonary nodules  Generalized dilatation of fluid-filled esophagus, consistent with esophageal dysmotility, reflux or, less likely, distal esophageal obstruction  · IR consult for paracentesis  · Gram stain negative for growth  · GI consult regarding esophageal dysmotility-signed off as patient will be transitioning to hospice, continue supportive measures  · Barium swallow -negative for dysmotility or stricture  · S/p paracentesis 12/12 - 4 6 L removed    SHRAVAN (acute kidney injury) ([facility] )  Assessment & Plan  · Baseline Cr 0 8-1  · Cr on admission 1 64, suspect prerenal dehydration, patient is with poor PO intake   · IVF discontinued 12/8 as patient's appetite had improved  · On repeat labs 12/12 creatinine 2 0 suspect related to hypotension  · Restarted IVF 12/12 for blood pressure support per pt request  · Creatinine improved  · Now level 4 comfort care measures only - discontinue blood draws, IVF    Mild protein-calorie malnutrition (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  · initiated on chemotherapy April 2022, most recently on Abraxane and Gemzar, patient states he is not currently receiving chemotherapy, thinks his last infusion was within the past several months  He does receive hydration infusion every Tuesday  · S/P radiation in august  · Seen in office 11/20 01/2022 with Dr Man Martins --> at that time was documented with progressively reduced appetite, weakness, poor family support, notably was falling asleep during office visit    Reportedly had canceled his chemo cycle 8 due to weakness  · Patient was seen again in office today , referred to ED by Dr Moo Crowe   · Palliative care following - level 4 comfort care initiated     History of pulmonary embolism  Assessment & Plan  · Discontinue eliquis  · Pt comfort care only    Type 2 diabetes mellitus without complication, with long-term current use of insulin ([address] )  Assessment & Plan  Lab Results   Component Value Date    HGBA1C 6 1 2021   · Discontinue SSI + accuchek - pt level 4 comfort care only    VTE Pharmacologic Prophylaxis: VTE Score: 7 High Risk (Score >/= 5) - Pharmacological DVT Prophylaxis Contraindicated  Sequential Compression Devices Ordered  - COMFORT CARE    Patient Centered Rounds: I performed bedside rounds with nursing staff today  Discussions with Specialists or Other Care Team Provider: Discussed with palliative care team, case management    Education and Discussions with Family / Patient: Patient declined call to   Time Spent for Care: 30 minutes  More than 50% of total time spent on counseling and coordination of care as described above  Current Length of Stay: 10 day(s)  Current Patient Status: Inpatient   Certification Statement: The patient will continue to require additional inpatient hospital stay due to Discharge planning  Discharge Plan: Anticipate discharge in 24-48 hrs to Facility that is able to provide hospice    Code Status: Level 4 - Comfort Care    Subjective:   Patient asks to be repositioned multiple times, to get comfortable and have everything he wants in reach, offers no other complaints today    Objective:     Vitals:   Temp (24hrs), Av 6 °F (36 4 °C), Min:97 6 °F (36 4 °C), Max:97 6 °F (36 4 °C)    Temp:  [97 6 °F (36 4 °C)] 97 6 °F (36 4 °C)  HR:  [55-82] 82  Resp:  [18-20] 20  BP: (92)/(59) 92/59  SpO2:  [90 %-97 %] 97 %  Body mass index is 17 96 kg/m²  Input and Output Summary (last 24 hours):      Intake/Output Summary (Last 24 hours) at 12/15/2022 1123  Last data filed at 12/15/2022 0601  Gross per 24 hour   Intake 480 ml   Output 520 ml   Net -40 ml       Physical Exam:   Physical Exam  Vitals and nursing note reviewed  Constitutional:       General: He is not in acute distress  Appearance: Normal appearance  He is well-developed  He is cachectic  He is ill-appearing  HENT:      Head: Normocephalic and atraumatic  Eyes:      General: No scleral icterus  Conjunctiva/sclera: Conjunctivae normal    Cardiovascular:      Rate and Rhythm: Normal rate and regular rhythm  Heart sounds: No murmur heard  Pulmonary:      Effort: Pulmonary effort is normal       Breath sounds: Decreased breath sounds present  No wheezing, rhonchi or rales  Abdominal:      General: There is no distension  Palpations: Abdomen is soft  Skin:     General: Skin is warm and dry  Neurological:      General: No focal deficit present  Mental Status: He is alert     Psychiatric:         Mood and Affect: Mood normal         Additional Data:     Labs:  Results from last 7 days   Lab Units 12/13/22  0406 12/12/22  0846   WBC Thousand/uL 3 39* 3 46*   HEMOGLOBIN g/dL 9 8* 10 3*   HEMATOCRIT % 29 7* 31 8*   PLATELETS Thousands/uL 175 162   BANDS PCT %  --  5   NEUTROS PCT % 88*  --    LYMPHS PCT % 4*  --    LYMPHO PCT %  --  2*   MONOS PCT % 8  --    MONO PCT %  --  2*   EOS PCT % 0 0     Results from last 7 days   Lab Units 12/14/22  0444 12/12/22  1608 12/12/22  0846   SODIUM mmol/L 135   < > 134*   POTASSIUM mmol/L 6 2*   < > 7 0*   CHLORIDE mmol/L 107   < > 105   CO2 mmol/L 14*   < > 20*   BUN mg/dL 71*   < > 76*   CREATININE mg/dL 1 40*   < > 2 00*   ANION GAP mmol/L 14*   < > 9   CALCIUM mg/dL 8 7   < > 8 5   ALBUMIN g/dL  --   --  3 0*   TOTAL BILIRUBIN mg/dL  --   --  0 80   ALK PHOS U/L  --   --  206*   ALT U/L  --   --  30   AST U/L  --   --  24   GLUCOSE RANDOM mg/dL 94   < > 149*    < > = values in this interval not displayed  Results from last 7 days   Lab Units 12/13/22  2113 12/13/22  1645 12/13/22  1119 12/13/22  0707 12/12/22  2124 12/12/22  1305 12/12/22  1053 12/12/22  0730 12/11/22  2118 12/11/22  1625 12/11/22  1111 12/11/22  0723   POC GLUCOSE mg/dl 69 130 209* 90 62* 72 206* 131 120 124 165* 112               Lines/Drains:  Invasive Devices     Central Venous Catheter Line  Duration           Port A Cath 04/11/22 Right Chest 247 days          Peripheral Intravenous Line  Duration           Peripheral IV 12/12/22 Left;Ventral (anterior) Forearm 2 days                Central Line:  Goal for removal: Will discontinue when meds requiring line are completed  Imaging: Reviewed radiology reports from this admission including: procedure reports    Recent Cultures (last 7 days):         Last 24 Hours Medication List:   Current Facility-Administered Medications   Medication Dose Route Frequency Provider Last Rate   • acetaminophen  650 mg Oral Q6H PRN Omar Gutierrez PA-C     • LORazepam  0 5 mg Intravenous Q6H PRN Suzy Garcia PA-C      Or   • ALPRAZolam  0 25 mg Oral Q6H PRN Suzy Garcia PA-C     • bisacodyl  10 mg Rectal Daily PRN Suzy Garcia PA-C     • haloperidol  2 mg Sublingual Q4H PRN Suzy Garcia PA-C      Or   • haloperidol lactate  0 5 mg Intravenous Q1H PRN Suzy Garcia PA-C     • HYDROmorphone  0 5 mg Intravenous Q1H PRN Suzy Garcia PA-C     • oxyCODONE  5 mg Oral Q4H PRN Nidhi Wheeler PA-C     • pantoprazole  40 mg Oral BID AC TAMIKA Gee     • senna  1 tablet Oral BID Ted Jim PA-C          Today, Patient Was Seen By: Charna Cockayne, PA-C    **Please Note: This note may have been constructed using a voice recognition system  ** No

## 2022-12-15 NOTE — CASE MANAGEMENT
Case Management Progress Note    Patient name Jimmy Davila  Location /-17 MRN [id number]  : 1940 Date 12/15/2022       LOS (days): 10  Geometric Mean LOS (GMLOS) (days): 4 70  Days to GMLOS:-5 3        OBJECTIVE:        Current admission status: Inpatient  Preferred Pharmacy:   [id number] [place]  Phone: [phone] Fax: [phone] [address], [address] [facility] [address] [address] [address]  Phone: [phone] Fax: [phone] [facility] [address] [place] [address]  [address]  [facility] [address]  Phone: [phone] Fax: [phone]    Primary Care Provider: Hattie Lee    Primary Insurance: [facility] RE[facility]  Secondary Insurance:     PROGRESS NOTE:    Cm sister spoke to Cm via phone and is touching base with the  to get information [facility] needs  Cm left a message for [facility] admissions to provide what information cm obtained from sister  Awaiting response via phone or Aidin

## 2022-12-16 NOTE — PLAN OF CARE
Problem: MOBILITY - ADULT  Goal: Maintain or return to baseline ADL function  Description: INTERVENTIONS:  -  Assess patient's ability to carry out ADLs; assess patient's baseline for ADL function and identify physical deficits which impact ability to perform ADLs (bathing, care of mouth/teeth, toileting, grooming, dressing, etc )  - Assess/evaluate cause of self-care deficits   - Assess range of motion  - Assess patient's mobility; develop plan if impaired  - Assess patient's need for assistive devices and provide as appropriate  - Encourage maximum independence but intervene and supervise when necessary  - Involve family in performance of ADLs  - Assess for home care needs following discharge   - Consider OT consult to assist with ADL evaluation and planning for discharge  - Provide patient education as appropriate  Outcome: Progressing  Goal: Maintains/Returns to pre admission functional level  Description: INTERVENTIONS:  - Perform BMAT or MOVE assessment daily    - Set and communicate daily mobility goal to care team and patient/family/caregiver  - Collaborate with rehabilitation services on mobility goals if consulted  - Perform Range of Motion 2 times a day  - Reposition patient every 2 hours  - Dangle patient 2 times a day  - Stand patient 2 times a day  - Ambulate patient 2 times a day  - Out of bed to chair 2 times a day   - Out of bed for meals 2 times a day  - Out of bed for toileting  - Record patient progress and toleration of activity level   Outcome: Progressing     Problem: Nutrition/Hydration-ADULT  Goal: Nutrient/Hydration intake appropriate for improving, restoring or maintaining nutritional needs  Description: Monitor and assess patient's nutrition/hydration status for malnutrition  Collaborate with interdisciplinary team and initiate plan and interventions as ordered  Monitor patient's weight and dietary intake as ordered or per policy   Utilize nutrition screening tool and intervene as necessary  Determine patient's food preferences and provide high-protein, high-caloric foods as appropriate       INTERVENTIONS:  - Monitor oral intake, urinary output, labs, and treatment plans  - Assess nutrition and hydration status and recommend course of action  - Evaluate amount of meals eaten  - Assist patient with eating if necessary   - Allow adequate time for meals  - Recommend/ encourage appropriate diets, oral nutritional supplements, and vitamin/mineral supplements  - Order, calculate, and assess calorie counts as needed  - Recommend, monitor, and adjust tube feedings and TPN/PPN based on assessed needs  - Assess need for intravenous fluids  - Provide specific nutrition/hydration education as appropriate  - Include patient/family/caregiver in decisions related to nutrition  Outcome: Progressing     Problem: INFECTION - ADULT  Goal: Absence or prevention of progression during hospitalization  Description: INTERVENTIONS:  - Assess and monitor for signs and symptoms of infection  - Monitor lab/diagnostic results  - Monitor all insertion sites, i e  indwelling lines, tubes, and drains  - Monitor endotracheal if appropriate and nasal secretions for changes in amount and color  - [place] appropriate cooling/warming therapies per order  - Administer medications as ordered  - Instruct and encourage patient and family to use good hand hygiene technique  - Identify and instruct in appropriate isolation precautions for identified infection/condition  Outcome: Progressing  Goal: Absence of fever/infection during neutropenic period  Description: INTERVENTIONS:  - Monitor WBC    Outcome: Progressing     Problem: SAFETY ADULT  Goal: Maintain or return to baseline ADL function  Description: INTERVENTIONS:  -  Assess patient's ability to carry out ADLs; assess patient's baseline for ADL function and identify physical deficits which impact ability to perform ADLs (bathing, care of mouth/teeth, toileting, grooming, dressing, etc )  - Assess/evaluate cause of self-care deficits   - Assess range of motion  - Assess patient's mobility; develop plan if impaired  - Assess patient's need for assistive devices and provide as appropriate  - Encourage maximum independence but intervene and supervise when necessary  - Involve family in performance of ADLs  - Assess for home care needs following discharge   - Consider OT consult to assist with ADL evaluation and planning for discharge  - Provide patient education as appropriate  Outcome: Progressing  Goal: Maintains/Returns to pre admission functional level  Description: INTERVENTIONS:  - Perform BMAT or MOVE assessment daily    - Set and communicate daily mobility goal to care team and patient/family/caregiver  - Collaborate with rehabilitation services on mobility goals if consulted  - Perform Range of Motion 2 times a day  - Reposition patient every 2 hours    - Dangle patient 2 times a day  - Stand patient 2 times a day  - Ambulate patient 2 times a day  - Out of bed to chair 2 times a day   - Out of bed for meals 2 times a day  - Out of bed for toileting  - Record patient progress and toleration of activity level   Outcome: Progressing  Goal: Patient will remain free of falls  Description: INTERVENTIONS:  - Educate patient/family on patient safety including physical limitations  - Instruct patient to call for assistance with activity   - Consult OT/PT to assist with strengthening/mobility   - Keep Call bell within reach  - Keep bed low and locked with side rails adjusted as appropriate  - Keep care items and personal belongings within reach  - Initiate and maintain comfort rounds  - Make Fall Risk Sign visible to staff  - Offer Toileting every 2 Hours, in advance of need  - Initiate/Maintain bed alarm  - Obtain necessary fall risk management equipment:   - Apply yellow socks and bracelet for high fall risk patients  - Consider moving patient to room near nurses station  Outcome: Progressing     Problem: Prexisting or High Potential for Compromised Skin Integrity  Goal: Skin integrity is maintained or improved  Description: INTERVENTIONS:  - Identify patients at risk for skin breakdown  - Assess and monitor skin integrity  - Assess and monitor nutrition and hydration status  - Monitor labs   - Assess for incontinence   - Turn and reposition patient  - Assist with mobility/ambulation  - Relieve pressure over bony prominences  - Avoid friction and shearing  - Provide appropriate hygiene as needed including keeping skin clean and dry  - Evaluate need for skin moisturizer/barrier cream  - Collaborate with interdisciplinary team   - Patient/family teaching  - Consider wound care consult   Outcome: Progressing

## 2022-12-16 NOTE — ASSESSMENT & PLAN NOTE
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  · initiated on chemotherapy April 2022, most recently on Abraxane and Gemzar, patient states he is not currently receiving chemotherapy, thinks his last infusion was within the past several months  He does receive hydration infusion every Tuesday  · S/P radiation in august  · Seen in office 11/20 01/2022 with Dr Vicente Bliss --> at that time was documented with progressively reduced appetite, weakness, poor family support, notably was falling asleep during office visit  Reportedly had canceled his chemo cycle 8 due to weakness     · Patient was seen again in office today 12/5, referred to ED by Dr Vicente Bliss   · Palliative care following - level 4 comfort care initiated 12/14

## 2022-12-16 NOTE — NURSING NOTE
Spoke with Erika Correa from [facility],[address], at [address] [place] [address] on 12/16/22  Arsenic stated that patient, Cristina Del Cid is not a candidate for Gift of Life or Sight Life

## 2022-12-16 NOTE — DEATH NOTE
INPATIENT DEATH NOTE  Rubin Gao [de-identified] y o  male MRN: [id number]  Unit/Bed#: -01 Encounter: [id number]    Date, Time and Cause of Death    Date of Death: 22  Time of Death: 12:31 AM  Preliminary Cause of Death: Pancreatic adenocarcinoma ([facility] [address] )  Entered by: Janie Vee PA-C[LD1 1]     Attribution     LD1 1 Janie Vee PA-C 22 00:46           Patient's Information  Pronounced by: Janie Vee PA-C  Did the patient's death occur in the ED?: No  Did the patient's death occur in the OR?: No  Did the patient's death occur less than 10 days post-op?: No  Did the patient's death occur within 24 hours of admission?: No  Was code status DNR at the time of death?: Yes    PHYSICAL EXAM:  Physical Exam  Vitals and nursing note reviewed  Constitutional:       Comments: Unresponsive    emaciated   Eyes:      Comments: No pupillary light reflex   Cardiovascular:      Comments: No detectable pulses, no heart beat on auscultation  Pulmonary:      Comments: No rise and fall of chest  No breath sounds on auscultation  Skin:     Comments: Cool skin, pale           Medical Examiner notification criteria:  NONE APPLICABLE   Medical Examiner's office notified?:  No, does not meet ME notification criteria   Medical Examiner accepted case?:  N/A  Name of Medical Examiner: N/A    Family Notification  Was the family notified?: Yes  Date Notified: 22  Time Notified: 5186  Notified by: Janie Vee PA-C  Name of Family Notified of Death: Murtis Morning   Relationship to Patient: Sister  Family Notification Route: Telephone  Was the family told to contact a  home?: Yes  Name of  Home[de-identified] [address]    Autopsy Options:  Post-mortem examination declined by next of kin    Primary Service Attending Physician notified?:  yes - Attending:  Yoli Aragon*    Physician/Resident responsible for completing Discharge Summary:  Dr Steve Ritchie

## 2022-12-16 NOTE — NURSING NOTE
Nurse walked into pt  room and pt  was laying sideways with legs hanging off bed, his breathing was very labored and tachypneic with respirations at 30 bpm  Nurse helped the pt  sit up and resposition and the pt  claimed of not having any pain at the moment  Pt 's lungs sounded course with crackles at the bases, and his legs has +1 pitting edema  Ativan was given to help with work of breathing per order  SLIM provider notified  Pt  was put on 2L O2 for comfort  One hour later respirations rechecked were at 21 bpm, but still labored, and pt  at this time non-responsive and not following orders or responding to sternal rub  IV dilaudid given  Will continue to monitor and give IV dilaudid to keep pt  comfortable

## 2022-12-19 LAB — FUNGUS SPEC CULT: NORMAL

## 2022-12-19 NOTE — UTILIZATION REVIEW
Notification of Discharge   This is a Notification of Discharge from our facility [address]  Please be advised that this patient has been discharge from our facility  Below you will find the admission and discharge date and time including the patient’s disposition  UTILIZATION REVIEW CONTACT:  Edouard Garcia  Utilization   Network Utilization Review Department  Phone: [phone] carefully listen to the prompts  All voicemails are confidential   Email: [email or web address]     PHYSICIAN ADVISORY SERVICES:  FOR [id number] REVIEW - MEDICAL NECESSITY DENIAL  Phone: [phone]  Fax: [phone]  Email: [email or web address]     PRESENTATION DATE: 2022  2:00 PM  OBERVATION ADMISSION DATE:  INPATIENT ADMISSION DATE: 22  4:10 PM   DISCHARGE DATE: 2022  2:29 AM  DISPOSITION:        IMPORTANT INFORMATION:  Send all requests for admission clinical reviews, approved or denied determinations and any other requests to dedicated fax number below belonging to the campus where the patient is receiving treatment   List of dedicated fax numbers:  [address] [place] [address] DENIALS (Administrative/Medical Necessity) [phone]   [address] [place] [address] (Maternity/NICU/Pediatrics) [phone]   [facility] [phone]   [place] [address] [phone]   [address] [phone]   [address] [facility] [phone]   [facility] [phone]   [address] [place] [address] [phone]   [facility]  [phone]   [address] [phone]   [address] [place] [address] [address] [facility] [phone]

## 2022-12-20 ENCOUNTER — HOSPITAL ENCOUNTER (OUTPATIENT)
Dept: INFUSION CENTER | Facility: HOSPITAL | Age: 82
End: 2022-12-20

## 2022-12-27 LAB — FUNGUS SPEC CULT: NORMAL

## 2023-01-03 LAB — FUNGUS SPEC CULT: NORMAL

## 2023-01-09 LAB — FUNGUS SPEC CULT: NORMAL

## 2023-03-28 NOTE — PROGRESS NOTES
Department of Anesthesiology  Postprocedure Note    Patient: Cecilia Rosales  MRN: [id number]  YOB: 1949  Date of evaluation: 3/28/2023      Procedure Summary     Date: 03/28/23 Room / Location: [facility] 03 / [facility]    Anesthesia Start: 1213 Anesthesia Stop: 3732    Procedure: DEEP CERVICAL LYMPH NODE BIOPSY WITH FROZEN SECTION   (PATHOLOGY NOTIFIED) (Neck) Diagnosis:       Neck mass      (Neck mass [R22.1])    Surgeons: Dakotah Real DO Responsible Provider: Edyta Campos MD    Anesthesia Type: General ASA Status: 2          Anesthesia Type: General    Gentry Phase I: Gentry Score: 10    Gentry Phase II:        Anesthesia Post Evaluation    Patient location during evaluation: PACU  Patient participation: complete - patient participated  Level of consciousness: awake and alert  Pain score: 2  Airway patency: patent  Nausea & Vomiting: no vomiting and no nausea  Complications: no  Cardiovascular status: hemodynamically stable  Respiratory status: spontaneous ventilation  Hydration status: stable Patient completed chemotherapy infusion with no adverse reactions  Elastomeric pump infusing  Reviewed with patient when to have his labs done for next treatment and provided hard copies of lab scripts  Also provided AVS and reviewed his return time for disconnect  Patient verbalized understanding and left unit ambulatory with steady gait

## 2025-05-13 NOTE — TELEPHONE ENCOUNTER
Is there any availability on 8/3 for this patient?  He's scheduled for 8/2 but that is one day sooner than planned for his d1c8 n/a

## 2025-06-18 NOTE — Clinical Note
Monitor: The problem is well controlled.  Evaluation: Labs/tests ordered, see encounter summary.  Assessment/Treatment: Continue glipizide 5mg QD, statin, and ARB inhibitor.  Eye exam to be completed on site today.   Remington Randolph should be transferred out to [facility] [place] [facility] AND [facility], accepted by

## (undated) DEVICE — BULB SYRINGE,IRRIGATION WITH PROTECTIVE CAP: Brand: DOVER

## (undated) DEVICE — SUT PROLENE 3-0 SH 36 IN 8522H

## (undated) DEVICE — GLOVE SRG BIOGEL 6.5

## (undated) DEVICE — SUT MONOCRYL 4-0 PS-2 27 IN Y426H

## (undated) DEVICE — TUBING SUCTION 5MM X 12 FT

## (undated) DEVICE — ELECTRODE BLADE MOD E-Z CLEAN 2.5IN 6.4CM -0012M

## (undated) DEVICE — 3000CC GUARDIAN II: Brand: GUARDIAN

## (undated) DEVICE — MEDI-VAC YANK SUCT HNDL W/TPRD BULBOUS TIP: Brand: CARDINAL HEALTH

## (undated) DEVICE — INTENDED FOR TISSUE SEPARATION, AND OTHER PROCEDURES THAT REQUIRE A SHARP SURGICAL BLADE TO PUNCTURE OR CUT.: Brand: BARD-PARKER SAFETY BLADES SIZE 15, STERILE

## (undated) DEVICE — ADHESIVE SKIN HIGH VISCOSITY EXOFIN 1ML

## (undated) DEVICE — SUT SILK 3-0 18 IN A184H

## (undated) DEVICE — INTENDED FOR TISSUE SEPARATION, AND OTHER PROCEDURES THAT REQUIRE A SHARP SURGICAL BLADE TO PUNCTURE OR CUT.: Brand: BARD-PARKER SAFETY BLADES SIZE 11, STERILE

## (undated) DEVICE — CHLORAPREP HI-LITE 26ML ORANGE

## (undated) DEVICE — STRL BETHLACCESS CATHETER PACK: Brand: CARDINAL HEALTH

## (undated) DEVICE — PAD GROUNDING ADULT

## (undated) DEVICE — 3M™ STERI-STRIP™ REINFORCED ADHESIVE SKIN CLOSURES, R1547, 1/2 IN X 4 IN (12 MM X 100 MM), 6 STRIPS/ENVELOPE: Brand: 3M™ STERI-STRIP™

## (undated) DEVICE — SUT VICRYL 3-0 SH 27 IN J416H

## (undated) DEVICE — BAG DECANTER

## (undated) DEVICE — PLUMEPEN PRO 10FT